# Patient Record
Sex: FEMALE | Race: BLACK OR AFRICAN AMERICAN | NOT HISPANIC OR LATINO | URBAN - METROPOLITAN AREA
[De-identification: names, ages, dates, MRNs, and addresses within clinical notes are randomized per-mention and may not be internally consistent; named-entity substitution may affect disease eponyms.]

---

## 2020-09-03 ENCOUNTER — HISTORICAL (OUTPATIENT)
Dept: PHYSICAL THERAPY | Facility: HOSPITAL | Age: 35
End: 2020-09-03

## 2020-09-09 ENCOUNTER — HISTORICAL (OUTPATIENT)
Dept: PHYSICAL THERAPY | Facility: HOSPITAL | Age: 35
End: 2020-09-09

## 2020-09-18 ENCOUNTER — HISTORICAL (OUTPATIENT)
Dept: PHYSICAL THERAPY | Facility: HOSPITAL | Age: 35
End: 2020-09-18

## 2020-09-22 ENCOUNTER — HISTORICAL (OUTPATIENT)
Dept: PHYSICAL THERAPY | Facility: HOSPITAL | Age: 35
End: 2020-09-22

## 2021-11-29 NOTE — PROVIDER TRANSFER
(Physician in Lead of Transfers)   Outside Transfer Acceptance Note / Regional Referral Center    Upon patient arrival to the ICU, please contact Hospital Medicine on call.     Patient Name: Shauna Queen    Transferring Physician: Dr. Temple///Hospital Medicine    Accepting Physician: BENITEZ Huffman MD    Date of Acceptance:  November 29, 2021    Transferring Facility:  [facility] ICU    Destination facility:  [facility] ICU///Dr. Joel    Reason for Transfer:  Surgical capacity    Report from Transferring Physician/Hospital course:  36-year-old female history of diabetes mellitus, sleep apnea on CPAP, schizophrenia, hypertension, and hyperlipidemia admitted to [facility] on November 24 from [facility] for treatment of diabetic ketoacidosis.  She was initiated on an insulin infusion.  DKA improved and she was transitioned off the insulin infusion.  She again developed DKA and was placed back on the insulin infusion for a period of time.  Currently she is off the insulin infusion and on subcutaneous insulin.  She was noted to have right upper/inner thigh pain with erythema, induration, and tenderness to palpation.  She was treated with IV clindamycin and vancomycin.  Due to her size ((BMI 74.32) they are unable to perform CT or MRI.  General Surgery there performed an incision and drainage with purulent material found.  She was taken to the OR on November 29 for a second-look at the thigh wound.  They were only able to do superficial debridement.  Postprocedure she extubated to 2 L nasal cannula and was awake with oxygen saturation 100 percent.  Surgery at their facility recommended transfer to a facility better able to perform procedures on a patient with her BMI.  Referring provider spoke with General Surgery at [facility].  Hospital Medicine was asked to admit for further treatment.  I spoke with her current attending.  He noted she has no alteration in  mentation.  Lower extremity is intact from a neurovascular standpoint.  She remains in the ICU.    November 24:  Sodium 132, potassium 3.8, chloride 104, CO2 13, BUN 5.05, creatinine 0.99, glucose 275, AST 13, ALT 17, magnesium 1.7, phosphorus 1.2, white blood cells 14.83, hemoglobin 13, hematocrit 42.1, platelets 348    November 25:  COVID negative, A1C greater than 14, lactic acid 1    November 27:  Procalcitonin 4.56    November 28:  Lactic acid 2.7, CRP 48.13    November 29:  Sodium 139, potassium 3.7, chloride 109, CO2 17, BUN 9.42, creatinine 0.72, glucose 172, calcium 8.2, magnesium 2.1, phosphorus 1.7, white blood cells 14.69, hemoglobin 11.6, hematocrit 34.6, platelets 272    Thigh wound Gram stain from November 28 noted few Gram-negative rods and few Gram-positive cocci    Chest x-ray on November 24 showed right-sided PICC line in good position.  No acute cardiopulmonary abnormality.    VS:  Temperature 98.7, pulse 125, respirations 43, blood pressure 140/74, oxygen saturation 100 percent 2L NC    To Do List:  Admit to Hospital Medicine  Consult General Surgery    BENITEZ Huffman MD  Hospital Medicine Staff  Cell: [phone]

## 2021-11-30 ENCOUNTER — HOSPITAL ENCOUNTER (INPATIENT)
Facility: HOSPITAL | Age: 36
LOS: 31 days | Discharge: NURSING, REHAB OR LONG-TERM CARE | DRG: 463 | End: 2021-12-31
Attending: INTERNAL MEDICINE | Admitting: INTERNAL MEDICINE
Payer: COMMERCIAL

## 2021-11-30 ENCOUNTER — ANESTHESIA (OUTPATIENT)
Dept: SURGERY | Facility: HOSPITAL | Age: 36
DRG: 463 | End: 2021-11-30
Payer: COMMERCIAL

## 2021-11-30 ENCOUNTER — ANESTHESIA EVENT (OUTPATIENT)
Dept: SURGERY | Facility: HOSPITAL | Age: 36
DRG: 463 | End: 2021-11-30
Payer: COMMERCIAL

## 2021-11-30 DIAGNOSIS — L02.415 ABSCESS OF RIGHT THIGH: ICD-10-CM

## 2021-11-30 DIAGNOSIS — M72.6 NECROTIZING FASCIITIS: ICD-10-CM

## 2021-11-30 DIAGNOSIS — K52.1 ANTIBIOTIC-ASSOCIATED DIARRHEA: ICD-10-CM

## 2021-11-30 DIAGNOSIS — R00.0 TACHYCARDIA: ICD-10-CM

## 2021-11-30 DIAGNOSIS — T36.95XA ANTIBIOTIC-ASSOCIATED DIARRHEA: ICD-10-CM

## 2021-11-30 DIAGNOSIS — M79.89 NECROTIZING SOFT TISSUE INFECTION: Primary | ICD-10-CM

## 2021-11-30 PROBLEM — G47.33 OSA (OBSTRUCTIVE SLEEP APNEA): Status: ACTIVE | Noted: 2021-11-30

## 2021-11-30 PROBLEM — E78.5 HLD (HYPERLIPIDEMIA): Status: ACTIVE | Noted: 2021-11-30

## 2021-11-30 PROBLEM — E11.9 DM (DIABETES MELLITUS): Status: ACTIVE | Noted: 2021-11-30

## 2021-11-30 LAB
ABO + RH BLD: NORMAL
ALBUMIN SERPL BCP-MCNC: 1.1 G/DL (ref 3.5–5.2)
ALLENS TEST: ABNORMAL
ANION GAP SERPL CALC-SCNC: 11 MMOL/L (ref 8–16)
ANION GAP SERPL CALC-SCNC: 8 MMOL/L (ref 8–16)
ANISOCYTOSIS BLD QL SMEAR: SLIGHT
BASOPHILS # BLD AUTO: ABNORMAL K/UL (ref 0–0.2)
BASOPHILS NFR BLD: 0 % (ref 0–1.9)
BASOPHILS NFR BLD: 0 % (ref 0–1.9)
BLD GP AB SCN CELLS X3 SERPL QL: NORMAL
BLD PROD TYP BPU: NORMAL
BLOOD UNIT EXPIRATION DATE: NORMAL
BLOOD UNIT TYPE CODE: 1700
BLOOD UNIT TYPE: NORMAL
BUN SERPL-MCNC: 10 MG/DL (ref 6–20)
BUN SERPL-MCNC: 9 MG/DL (ref 6–20)
CALCIUM SERPL-MCNC: 7.1 MG/DL (ref 8.7–10.5)
CALCIUM SERPL-MCNC: 7.9 MG/DL (ref 8.7–10.5)
CHLORIDE SERPL-SCNC: 108 MMOL/L (ref 95–110)
CHLORIDE SERPL-SCNC: 109 MMOL/L (ref 95–110)
CO2 SERPL-SCNC: 20 MMOL/L (ref 23–29)
CO2 SERPL-SCNC: 22 MMOL/L (ref 23–29)
CODING SYSTEM: NORMAL
CREAT SERPL-MCNC: 0.6 MG/DL (ref 0.5–1.4)
CREAT SERPL-MCNC: 0.8 MG/DL (ref 0.5–1.4)
DACRYOCYTES BLD QL SMEAR: ABNORMAL
DELSYS: ABNORMAL
DIFFERENTIAL METHOD: ABNORMAL
DIFFERENTIAL METHOD: ABNORMAL
DISPENSE STATUS: NORMAL
EOSINOPHIL # BLD AUTO: ABNORMAL K/UL (ref 0–0.5)
EOSINOPHIL NFR BLD: 0 % (ref 0–8)
EOSINOPHIL NFR BLD: 1 % (ref 0–8)
ERYTHROCYTE [DISTWIDTH] IN BLOOD BY AUTOMATED COUNT: 14.6 % (ref 11.5–14.5)
ERYTHROCYTE [DISTWIDTH] IN BLOOD BY AUTOMATED COUNT: 15.4 % (ref 11.5–14.5)
EST. GFR  (AFRICAN AMERICAN): >60 ML/MIN/1.73 M^2
EST. GFR  (AFRICAN AMERICAN): >60 ML/MIN/1.73 M^2
EST. GFR  (NON AFRICAN AMERICAN): >60 ML/MIN/1.73 M^2
EST. GFR  (NON AFRICAN AMERICAN): >60 ML/MIN/1.73 M^2
GLUCOSE SERPL-MCNC: 266 MG/DL (ref 70–110)
GLUCOSE SERPL-MCNC: 354 MG/DL (ref 70–110)
HCO3 UR-SCNC: 20.2 MMOL/L (ref 24–28)
HCT VFR BLD AUTO: 30.4 % (ref 37–48.5)
HCT VFR BLD AUTO: 33 % (ref 37–48.5)
HGB BLD-MCNC: 10.8 G/DL (ref 12–16)
HGB BLD-MCNC: 9.8 G/DL (ref 12–16)
HYPOCHROMIA BLD QL SMEAR: ABNORMAL
IMM GRANULOCYTES # BLD AUTO: ABNORMAL K/UL (ref 0–0.04)
IMM GRANULOCYTES # BLD AUTO: ABNORMAL K/UL (ref 0–0.04)
IMM GRANULOCYTES NFR BLD AUTO: ABNORMAL % (ref 0–0.5)
IMM GRANULOCYTES NFR BLD AUTO: ABNORMAL % (ref 0–0.5)
LACTATE SERPL-SCNC: 1.8 MMOL/L (ref 0.5–2.2)
LYMPHOCYTES # BLD AUTO: ABNORMAL K/UL (ref 1–4.8)
LYMPHOCYTES # BLD AUTO: ABNORMAL K/UL (ref 1–4.8)
LYMPHOCYTES NFR BLD: 15 % (ref 18–48)
LYMPHOCYTES NFR BLD: 16 % (ref 18–48)
MAGNESIUM SERPL-MCNC: 2.2 MG/DL (ref 1.6–2.6)
MAGNESIUM SERPL-MCNC: 2.2 MG/DL (ref 1.6–2.6)
MCH RBC QN AUTO: 26 PG (ref 27–31)
MCH RBC QN AUTO: 26.6 PG (ref 27–31)
MCHC RBC AUTO-ENTMCNC: 32.2 G/DL (ref 32–36)
MCHC RBC AUTO-ENTMCNC: 32.7 G/DL (ref 32–36)
MCV RBC AUTO: 79 FL (ref 82–98)
MCV RBC AUTO: 83 FL (ref 82–98)
MONOCYTES # BLD AUTO: ABNORMAL K/UL (ref 0.3–1)
MONOCYTES NFR BLD: 7 % (ref 4–15)
MONOCYTES NFR BLD: 7 % (ref 4–15)
NEUTROPHILS NFR BLD: 73 % (ref 38–73)
NEUTROPHILS NFR BLD: 75 % (ref 38–73)
NEUTS BAND NFR BLD MANUAL: 2 %
NEUTS BAND NFR BLD MANUAL: 4 %
NRBC BLD-RTO: 0 /100 WBC
NRBC BLD-RTO: 1 /100 WBC
PCO2 BLDA: 32.7 MMHG (ref 35–45)
PH SMN: 7.4 [PH] (ref 7.35–7.45)
PHOSPHATE SERPL-MCNC: 1.2 MG/DL (ref 2.7–4.5)
PHOSPHATE SERPL-MCNC: 1.5 MG/DL (ref 2.7–4.5)
PLATELET # BLD AUTO: 225 K/UL (ref 150–450)
PLATELET # BLD AUTO: 251 K/UL (ref 150–450)
PLATELET BLD QL SMEAR: ABNORMAL
PMV BLD AUTO: 9.4 FL (ref 9.2–12.9)
PMV BLD AUTO: 9.9 FL (ref 9.2–12.9)
PO2 BLDA: 92 MMHG (ref 80–100)
POC BE: -4 MMOL/L
POC SATURATED O2: 97 % (ref 95–100)
POC TCO2: 21 MMOL/L (ref 23–27)
POCT GLUCOSE: 311 MG/DL (ref 70–110)
POCT GLUCOSE: 315 MG/DL (ref 70–110)
POCT GLUCOSE: 374 MG/DL (ref 70–110)
POLYCHROMASIA BLD QL SMEAR: ABNORMAL
POTASSIUM SERPL-SCNC: 3.6 MMOL/L (ref 3.5–5.1)
POTASSIUM SERPL-SCNC: 4 MMOL/L (ref 3.5–5.1)
RBC # BLD AUTO: 3.68 M/UL (ref 4–5.4)
RBC # BLD AUTO: 4.16 M/UL (ref 4–5.4)
RH BLD: NORMAL
SAMPLE: ABNORMAL
SITE: ABNORMAL
SODIUM SERPL-SCNC: 139 MMOL/L (ref 136–145)
SODIUM SERPL-SCNC: 139 MMOL/L (ref 136–145)
TRANS ERYTHROCYTES VOL PATIENT: NORMAL ML
VANCOMYCIN SERPL-MCNC: 13.2 UG/ML
WBC # BLD AUTO: 19.2 K/UL (ref 3.9–12.7)
WBC # BLD AUTO: 19.43 K/UL (ref 3.9–12.7)

## 2021-11-30 PROCEDURE — 87077 CULTURE AEROBIC IDENTIFY: CPT | Performed by: INTERNAL MEDICINE

## 2021-11-30 PROCEDURE — 80069 RENAL FUNCTION PANEL: CPT | Performed by: SURGERY

## 2021-11-30 PROCEDURE — 82330 ASSAY OF CALCIUM: CPT | Performed by: SURGERY

## 2021-11-30 PROCEDURE — 99223 PR INITIAL HOSPITAL CARE,LEVL III: ICD-10-PCS | Mod: 25,,, | Performed by: SURGERY

## 2021-11-30 PROCEDURE — 84100 ASSAY OF PHOSPHORUS: CPT | Performed by: INTERNAL MEDICINE

## 2021-11-30 PROCEDURE — 25000003 PHARM REV CODE 250: Performed by: INTERNAL MEDICINE

## 2021-11-30 PROCEDURE — 94761 N-INVAS EAR/PLS OXIMETRY MLT: CPT

## 2021-11-30 PROCEDURE — D9220A PRA ANESTHESIA: ICD-10-PCS | Mod: CRNA,,, | Performed by: NURSE ANESTHETIST, CERTIFIED REGISTERED

## 2021-11-30 PROCEDURE — 87070 CULTURE OTHR SPECIMN AEROBIC: CPT | Performed by: INTERNAL MEDICINE

## 2021-11-30 PROCEDURE — 36000706: Performed by: SURGERY

## 2021-11-30 PROCEDURE — 85027 COMPLETE CBC AUTOMATED: CPT | Performed by: INTERNAL MEDICINE

## 2021-11-30 PROCEDURE — 36600 WITHDRAWAL OF ARTERIAL BLOOD: CPT

## 2021-11-30 PROCEDURE — 80202 ASSAY OF VANCOMYCIN: CPT | Performed by: INTERNAL MEDICINE

## 2021-11-30 PROCEDURE — 86901 BLOOD TYPING SEROLOGIC RH(D): CPT | Performed by: ANESTHESIOLOGY

## 2021-11-30 PROCEDURE — 83605 ASSAY OF LACTIC ACID: CPT | Performed by: INTERNAL MEDICINE

## 2021-11-30 PROCEDURE — C9399 UNCLASSIFIED DRUGS OR BIOLOG: HCPCS | Performed by: INTERNAL MEDICINE

## 2021-11-30 PROCEDURE — 87075 CULTR BACTERIA EXCEPT BLOOD: CPT | Performed by: INTERNAL MEDICINE

## 2021-11-30 PROCEDURE — 85027 COMPLETE CBC AUTOMATED: CPT | Mod: 91 | Performed by: SURGERY

## 2021-11-30 PROCEDURE — 36000707: Performed by: SURGERY

## 2021-11-30 PROCEDURE — 83735 ASSAY OF MAGNESIUM: CPT | Mod: 91 | Performed by: SURGERY

## 2021-11-30 PROCEDURE — 63600175 PHARM REV CODE 636 W HCPCS: Performed by: NURSE ANESTHETIST, CERTIFIED REGISTERED

## 2021-11-30 PROCEDURE — 86920 COMPATIBILITY TEST SPIN: CPT | Performed by: SURGERY

## 2021-11-30 PROCEDURE — 36415 COLL VENOUS BLD VENIPUNCTURE: CPT | Performed by: ANESTHESIOLOGY

## 2021-11-30 PROCEDURE — 20000000 HC ICU ROOM

## 2021-11-30 PROCEDURE — 63600175 PHARM REV CODE 636 W HCPCS: Performed by: INTERNAL MEDICINE

## 2021-11-30 PROCEDURE — D9220A PRA ANESTHESIA: Mod: CRNA,,, | Performed by: NURSE ANESTHETIST, CERTIFIED REGISTERED

## 2021-11-30 PROCEDURE — 87102 FUNGUS ISOLATION CULTURE: CPT | Performed by: INTERNAL MEDICINE

## 2021-11-30 PROCEDURE — 36415 COLL VENOUS BLD VENIPUNCTURE: CPT | Performed by: SURGERY

## 2021-11-30 PROCEDURE — 86850 RBC ANTIBODY SCREEN: CPT | Performed by: ANESTHESIOLOGY

## 2021-11-30 PROCEDURE — 85007 BL SMEAR W/DIFF WBC COUNT: CPT | Mod: 91 | Performed by: SURGERY

## 2021-11-30 PROCEDURE — 87040 BLOOD CULTURE FOR BACTERIA: CPT | Performed by: INTERNAL MEDICINE

## 2021-11-30 PROCEDURE — 25000003 PHARM REV CODE 250: Performed by: NURSE ANESTHETIST, CERTIFIED REGISTERED

## 2021-11-30 PROCEDURE — D9220A PRA ANESTHESIA: Mod: ANES,,, | Performed by: ANESTHESIOLOGY

## 2021-11-30 PROCEDURE — 83735 ASSAY OF MAGNESIUM: CPT | Performed by: INTERNAL MEDICINE

## 2021-11-30 PROCEDURE — 11045 PR DEB SUBQ TISSUE ADD-ON: ICD-10-PCS | Mod: ,,, | Performed by: SURGERY

## 2021-11-30 PROCEDURE — 25000003 PHARM REV CODE 250: Performed by: SURGERY

## 2021-11-30 PROCEDURE — 80048 BASIC METABOLIC PNL TOTAL CA: CPT | Performed by: INTERNAL MEDICINE

## 2021-11-30 PROCEDURE — 37000008 HC ANESTHESIA 1ST 15 MINUTES: Performed by: SURGERY

## 2021-11-30 PROCEDURE — 11042 DBRDMT SUBQ TIS 1ST 20SQCM/<: CPT | Mod: ,,, | Performed by: SURGERY

## 2021-11-30 PROCEDURE — 63600175 PHARM REV CODE 636 W HCPCS: Performed by: SURGERY

## 2021-11-30 PROCEDURE — 85007 BL SMEAR W/DIFF WBC COUNT: CPT | Performed by: INTERNAL MEDICINE

## 2021-11-30 PROCEDURE — 87186 SC STD MICRODIL/AGAR DIL: CPT | Performed by: INTERNAL MEDICINE

## 2021-11-30 PROCEDURE — 86920 COMPATIBILITY TEST SPIN: CPT

## 2021-11-30 PROCEDURE — 11045 DBRDMT SUBQ TISS EACH ADDL: CPT | Mod: ,,, | Performed by: SURGERY

## 2021-11-30 PROCEDURE — 99223 1ST HOSP IP/OBS HIGH 75: CPT | Mod: 25,,, | Performed by: SURGERY

## 2021-11-30 PROCEDURE — 36415 COLL VENOUS BLD VENIPUNCTURE: CPT | Performed by: INTERNAL MEDICINE

## 2021-11-30 PROCEDURE — P9021 RED BLOOD CELLS UNIT: HCPCS

## 2021-11-30 PROCEDURE — 37000009 HC ANESTHESIA EA ADD 15 MINS: Performed by: SURGERY

## 2021-11-30 PROCEDURE — 11042 PR DEBRIDEMENT, SKIN, SUB-Q TISSUE,=<20 SQ CM: ICD-10-PCS | Mod: ,,, | Performed by: SURGERY

## 2021-11-30 PROCEDURE — D9220A PRA ANESTHESIA: ICD-10-PCS | Mod: ANES,,, | Performed by: ANESTHESIOLOGY

## 2021-11-30 RX ORDER — SODIUM,POTASSIUM PHOSPHATES 280-250MG
2 POWDER IN PACKET (EA) ORAL 2 TIMES DAILY
Status: DISCONTINUED | OUTPATIENT
Start: 2021-11-30 | End: 2021-12-01

## 2021-11-30 RX ORDER — CLINDAMYCIN PHOSPHATE 600 MG/50ML
600 INJECTION, SOLUTION INTRAVENOUS
Status: DISCONTINUED | OUTPATIENT
Start: 2021-11-30 | End: 2021-12-01

## 2021-11-30 RX ORDER — SODIUM CHLORIDE 0.9 % (FLUSH) 0.9 %
.1-1 SYRINGE (ML) INJECTION
Status: DISCONTINUED | OUTPATIENT
Start: 2021-11-30 | End: 2021-12-03

## 2021-11-30 RX ORDER — ONDANSETRON 2 MG/ML
4 INJECTION INTRAMUSCULAR; INTRAVENOUS EVERY 8 HOURS PRN
Status: DISCONTINUED | OUTPATIENT
Start: 2021-11-30 | End: 2021-12-17

## 2021-11-30 RX ORDER — INSULIN ASPART 100 [IU]/ML
1-10 INJECTION, SOLUTION INTRAVENOUS; SUBCUTANEOUS EVERY 6 HOURS PRN
Status: DISCONTINUED | OUTPATIENT
Start: 2021-11-30 | End: 2021-12-01

## 2021-11-30 RX ORDER — HYDROCODONE BITARTRATE AND ACETAMINOPHEN 5; 325 MG/1; MG/1
1 TABLET ORAL EVERY 4 HOURS PRN
Status: DISCONTINUED | OUTPATIENT
Start: 2021-11-30 | End: 2021-12-31 | Stop reason: HOSPADM

## 2021-11-30 RX ORDER — HYDROCODONE BITARTRATE AND ACETAMINOPHEN 500; 5 MG/1; MG/1
TABLET ORAL
Status: DISCONTINUED | OUTPATIENT
Start: 2021-11-30 | End: 2021-12-03

## 2021-11-30 RX ORDER — ACETAMINOPHEN 325 MG/1
650 TABLET ORAL EVERY 4 HOURS PRN
Status: DISCONTINUED | OUTPATIENT
Start: 2021-11-30 | End: 2021-12-09

## 2021-11-30 RX ORDER — LIDOCAINE HYDROCHLORIDE 20 MG/ML
INJECTION INTRAVENOUS
Status: DISCONTINUED | OUTPATIENT
Start: 2021-11-30 | End: 2021-11-30

## 2021-11-30 RX ORDER — SUCCINYLCHOLINE CHLORIDE 20 MG/ML
INJECTION INTRAMUSCULAR; INTRAVENOUS
Status: DISCONTINUED | OUTPATIENT
Start: 2021-11-30 | End: 2021-11-30

## 2021-11-30 RX ORDER — FENTANYL CITRATE 50 UG/ML
INJECTION, SOLUTION INTRAMUSCULAR; INTRAVENOUS
Status: DISCONTINUED | OUTPATIENT
Start: 2021-11-30 | End: 2021-11-30

## 2021-11-30 RX ORDER — MORPHINE SULFATE 4 MG/ML
4 INJECTION, SOLUTION INTRAMUSCULAR; INTRAVENOUS EVERY 6 HOURS PRN
Status: DISCONTINUED | OUTPATIENT
Start: 2021-11-30 | End: 2021-12-18

## 2021-11-30 RX ORDER — TALC
6 POWDER (GRAM) TOPICAL NIGHTLY PRN
Status: DISCONTINUED | OUTPATIENT
Start: 2021-11-30 | End: 2021-12-31 | Stop reason: HOSPADM

## 2021-11-30 RX ORDER — ROCURONIUM BROMIDE 10 MG/ML
INJECTION, SOLUTION INTRAVENOUS
Status: DISCONTINUED | OUTPATIENT
Start: 2021-11-30 | End: 2021-11-30

## 2021-11-30 RX ORDER — MUPIROCIN 20 MG/G
OINTMENT TOPICAL 2 TIMES DAILY
Status: COMPLETED | OUTPATIENT
Start: 2021-11-30 | End: 2021-12-04

## 2021-11-30 RX ORDER — HEPARIN SODIUM 5000 [USP'U]/ML
5000 INJECTION, SOLUTION INTRAVENOUS; SUBCUTANEOUS EVERY 8 HOURS
Status: DISCONTINUED | OUTPATIENT
Start: 2021-11-30 | End: 2021-12-01

## 2021-11-30 RX ORDER — CEFEPIME HYDROCHLORIDE 1 G/50ML
2 INJECTION, SOLUTION INTRAVENOUS
Status: DISCONTINUED | OUTPATIENT
Start: 2021-11-30 | End: 2021-12-03

## 2021-11-30 RX ORDER — GLUCAGON 1 MG
1 KIT INJECTION
Status: DISCONTINUED | OUTPATIENT
Start: 2021-11-30 | End: 2021-12-31 | Stop reason: HOSPADM

## 2021-11-30 RX ORDER — PROPOFOL 10 MG/ML
VIAL (ML) INTRAVENOUS
Status: DISCONTINUED | OUTPATIENT
Start: 2021-11-30 | End: 2021-11-30

## 2021-11-30 RX ORDER — SODIUM CHLORIDE 9 MG/ML
INJECTION, SOLUTION INTRAVENOUS CONTINUOUS
Status: DISCONTINUED | OUTPATIENT
Start: 2021-11-30 | End: 2021-12-01

## 2021-11-30 RX ORDER — SODIUM CHLORIDE 0.9 % (FLUSH) 0.9 %
10 SYRINGE (ML) INJECTION
Status: DISCONTINUED | OUTPATIENT
Start: 2021-11-30 | End: 2021-12-03

## 2021-11-30 RX ADMIN — SUGAMMADEX 400 MG: 100 INJECTION, SOLUTION INTRAVENOUS at 05:11

## 2021-11-30 RX ADMIN — SODIUM CHLORIDE 1000 ML: 0.9 INJECTION, SOLUTION INTRAVENOUS at 05:11

## 2021-11-30 RX ADMIN — CEFEPIME 2 G: 2 INJECTION, POWDER, FOR SOLUTION INTRAVENOUS at 04:11

## 2021-11-30 RX ADMIN — Medication 2 PACKET: at 09:11

## 2021-11-30 RX ADMIN — INSULIN ASPART 8 UNITS: 100 INJECTION, SOLUTION INTRAVENOUS; SUBCUTANEOUS at 05:11

## 2021-11-30 RX ADMIN — INSULIN ASPART 10 UNITS: 100 INJECTION, SOLUTION INTRAVENOUS; SUBCUTANEOUS at 06:11

## 2021-11-30 RX ADMIN — ROCURONIUM BROMIDE 20 MG: 10 INJECTION, SOLUTION INTRAVENOUS at 04:11

## 2021-11-30 RX ADMIN — HEPARIN SODIUM 5000 UNITS: 5000 INJECTION INTRAVENOUS; SUBCUTANEOUS at 02:11

## 2021-11-30 RX ADMIN — LIDOCAINE HYDROCHLORIDE 100 MG: 20 INJECTION, SOLUTION INTRAVENOUS at 04:11

## 2021-11-30 RX ADMIN — ESMOLOL HYDROCHLORIDE 10 MG: 10 INJECTION INTRAVENOUS at 05:11

## 2021-11-30 RX ADMIN — MUPIROCIN: 20 OINTMENT TOPICAL at 08:11

## 2021-11-30 RX ADMIN — SODIUM CHLORIDE 5 ML/HR: 0.9 INJECTION, SOLUTION INTRAVENOUS at 04:11

## 2021-11-30 RX ADMIN — VANCOMYCIN HYDROCHLORIDE 2000 MG: 10 INJECTION, POWDER, LYOPHILIZED, FOR SOLUTION INTRAVENOUS at 12:11

## 2021-11-30 RX ADMIN — PROPOFOL 200 MG: 10 INJECTION, EMULSION INTRAVENOUS at 04:11

## 2021-11-30 RX ADMIN — FENTANYL CITRATE 50 MCG: 50 INJECTION, SOLUTION INTRAMUSCULAR; INTRAVENOUS at 04:11

## 2021-11-30 RX ADMIN — PROPOFOL 50 MG: 10 INJECTION, EMULSION INTRAVENOUS at 04:11

## 2021-11-30 RX ADMIN — GLYCOPYRROLATE 0.1 MG: 0.2 INJECTION, SOLUTION INTRAMUSCULAR; INTRAVITREAL at 03:11

## 2021-11-30 RX ADMIN — HEPARIN SODIUM 5000 UNITS: 5000 INJECTION INTRAVENOUS; SUBCUTANEOUS at 05:11

## 2021-11-30 RX ADMIN — MORPHINE SULFATE 4 MG: 4 INJECTION, SOLUTION INTRAMUSCULAR; INTRAVENOUS at 04:11

## 2021-11-30 RX ADMIN — SODIUM CHLORIDE, SODIUM LACTATE, POTASSIUM CHLORIDE, AND CALCIUM CHLORIDE 1000 ML: .6; .31; .03; .02 INJECTION, SOLUTION INTRAVENOUS at 08:11

## 2021-11-30 RX ADMIN — SUCCINYLCHOLINE CHLORIDE 200 MG: 20 INJECTION, SOLUTION INTRAMUSCULAR; INTRAVENOUS at 04:11

## 2021-11-30 RX ADMIN — CLINDAMYCIN PHOSPHATE 600 MG: 600 INJECTION, SOLUTION INTRAVENOUS at 06:11

## 2021-11-30 RX ADMIN — INSULIN DETEMIR 20 UNITS: 100 INJECTION, SOLUTION SUBCUTANEOUS at 12:11

## 2021-11-30 RX ADMIN — INSULIN ASPART 8 UNITS: 100 INJECTION, SOLUTION INTRAVENOUS; SUBCUTANEOUS at 12:11

## 2021-11-30 RX ADMIN — CEFEPIME 2 G: 2 INJECTION, POWDER, FOR SOLUTION INTRAVENOUS at 06:11

## 2021-11-30 RX ADMIN — HEPARIN SODIUM 5000 UNITS: 5000 INJECTION INTRAVENOUS; SUBCUTANEOUS at 09:11

## 2021-11-30 RX ADMIN — CLINDAMYCIN PHOSPHATE 600 MG: 600 INJECTION, SOLUTION INTRAVENOUS at 10:11

## 2021-11-30 NOTE — HPI
"Ms. Queen is a 36-year-old presents to the ED from outside hospital for abscess.  She has initially presented to outside hospital for DKA which resolved.  Patient was unaware of why she was here in July prompted her.  Per the checkout are received: "36-year-old female history of diabetes mellitus, sleep apnea on CPAP, schizophrenia, hypertension, and hyperlipidemia admitted to [facility] on November 24 from [facility] for treatment of diabetic ketoacidosis. She was initiated on an insulin infusion. DKA improved and she was transitioned off the insulin infusion. She again developed DKA and was placed back on the insulin infusion for a period of time. Currently she is off the insulin infusion and on subcutaneous insulin. She was noted to have right upper/inner thigh pain with erythema, induration, and tenderness to palpation. She was treated with IV clindamycin and vancomycin. Due to her size ((BMI 74.32) they are unable to perform CT or MRI. General Surgery there performed an incision and drainage with purulent material found. She was taken to the OR on November 29 for a second-look at the thigh wound. They were only able to do superficial debridement. Postprocedure she extubated to 2 L nasal cannula and was awake with oxygen saturation 100 percent. Surgery at their facility recommended transfer to a facility better able to perform procedures on a patient with her BMI. Referring provider spoke with General Surgery at [facility]. Hospital Medicine was asked to admit for further treatment. I spoke with her current attending. He noted she has no alteration in mentation. Lower extremity is intact from a neurovascular standpoint. She remains in the ICU."  "

## 2021-11-30 NOTE — CARE UPDATE
[facility]  ICU Shift Summary  Date: 11/30/2021      COVID Test: (--)  Isolation: No active isolations     Prehospitalization: [facility]  Admit Date / LOS : 11/30/2021/ 0 days    Diagnosis: <principal problem not specified>    Consults:        Active: Gen Surg       Needed: Wound Care     Code Status: Full Code   Advanced Directive: <no information>    LDA: Rajput and PICC       Central Lines/Site/Justification:Unable to Obtain/Maintain PIV       Urinary Cath/Order/Justification:Critically Ill in the ICU and requiring intensive monitoring    Vasopressors/Infusions:    sodium chloride 0.9% 5 mL/hr (11/30/21 3258)          GOALS: Volume/ Hemodynamic: MAP >65                     RASS: 0  alert and calm    Pain Management: PO/IV       Pain Controlled: yes     Rhythm: ST    Respiratory Device: Nasal Cannula and Bipap                  Most Recent SBT/ SAT: N/A       MOVE Screen: PT Consult  ICU Liberation: no    VTE Prophylaxis: Pharm  Mobility: Bedrest  Stress Ulcer Prophylaxis: Yes    Dietary: NPO  Tolerance: yes  Advancement: @ goal    I & O (24h):  No intake or output data in the 24 hours ending 11/30/21 0638     Restraints: No    Significant Dates:  Post Op Date: N/A  Rescue Date: N/A  Imaging/ Diagnostics: N/A    Noteworthy Labs:  See Below     CBC/Anemia Labs: Coags:    Recent Labs   Lab 11/30/21  0436   WBC 19.20*   HGB 10.8*   HCT 33.0*      MCV 79*   RDW 14.6*    No results for input(s): PT, INR, APTT in the last 168 hours.     Chemistries:   No results for input(s): NA, K, CL, CO2, BUN, CREATININE, CALCIUM, PROT, BILITOT, ALKPHOS, ALT, AST, GLUCOSE, MG, PHOS in the last 168 hours.    Invalid input(s): LABALBU     Cardiac Enzymes: Ejection Fractions:    No results for input(s): CPK, CPKMB, MB, TROPONINI in the last 72 hours. No results found for: EF     POCT Glucose: HbA1c:    Recent Labs   Lab 11/30/21  0557   POCTGLUCOSE 311*    No results found for: HGBA1C        ICU  LOS 2h  Level of Care: Critical Care    Chart Check: 12 HR Done  Shift Summary/Plan for the shift: New admit to the ICU, alert and oriented able to make all needs known. Patient on 2L per NC with oxygen saturations maintained above 99%. Patient is ST on cardiac monitor with HR 110s-120s. Wound noted to RT thigh area, Dsg changed. General Surgery has been  consulted and wound care. Increase complaint of pain noted, patient given one dose of morphine 4mg IVP per MD orders x1.  Rajput cath intact draining to gravity clear ector urine. Plan of care reviewed with patient. Safety precautions maintained/bariatric bed in use.

## 2021-11-30 NOTE — PLAN OF CARE
Problem: Adult Inpatient Plan of Care  Goal: Plan of Care Review  Outcome: Ongoing, Progressing  Goal: Patient-Specific Goal (Individualization)  Outcome: Ongoing, Progressing  Goal: Absence of Hospital-Acquired Illness or Injury  Outcome: Ongoing, Progressing  Goal: Optimal Comfort and Wellbeing  Outcome: Ongoing, Progressing  Goal: Readiness for Transition of Care  Outcome: Ongoing, Progressing  Goal: Rounds/Family Conference  Outcome: Ongoing, Progressing     Problem: Diabetes Comorbidity  Goal: Blood Glucose Level Within Desired Range  Outcome: Ongoing, Progressing     Problem: Bariatric Environmental Safety  Goal: Safety Maintained with Care  Outcome: Ongoing, Progressing     Problem: Skin Injury Risk Increased  Goal: Skin Health and Integrity  Outcome: Ongoing, Progressing

## 2021-11-30 NOTE — PLAN OF CARE
SW attempted to conduct assessment on patient, however, patient was very drowsy and unable to assist with full assessment. Patient confirmed her  and address, stating that she lives with her 11 y.o. daughter. Patient provided phone number of her mom, Deisy [phone].     SW called patient's mom twice to conduct assessment but was given message that she is not receiving calls at the moment.

## 2021-11-30 NOTE — CONSULTS
A 36 year old female transferred to [facility] from [facility] with HLD; abscess right thigh; DANG; DM  PMH: DM; DANG; schizophrenia; HTN; HLD  Was admitted to [facility] on 11/24 from [facility] for treatment of diabetic ketoacidosis  General surgery performed I&D right upper/inner thigh abscess and taken to OR 11/29 for second look at thigh wound-superficial debridement  11/30 WBC 19.2 Hgb 10.8 Hct 33.0   On [employer] bariatric bed  Scheduled for surgery this pm.   Will assist with wound management as needed

## 2021-11-30 NOTE — HOSPITAL COURSE
35 y/o female admitted to the hospital as a transfer secondary to R thigh abscess and a BMI of Body mass index is 84.77 kg/m².   Patient transferred here for surgical evaluation.  Patient went for surgical debridement on 11/30/21. Patient continued on Abx per ID recommendations. Blood cultures were NG. Underwent another debridement on 12/2 and again on 12/4 - noting significant necrotizing soft tissue infections concerning for tracking down into her knee. On broad spectrum antibiotics. To OR with Orthopedic surgery to explore right knee on 12/5. Patient again went for surgery on 12/6.  Wound closure with wound vac placement on 12/8.

## 2021-11-30 NOTE — SUBJECTIVE & OBJECTIVE
No past medical history on file.    No past surgical history on file.    Review of patient's allergies indicates:  Not on File    No current facility-administered medications on file prior to encounter.     No current outpatient medications on file prior to encounter.     Family History    None       Tobacco Use    Smoking status: Not on file    Smokeless tobacco: Not on file   Substance and Sexual Activity    Alcohol use: Not on file    Drug use: Not on file    Sexual activity: Not on file     Review of Systems   Constitutional: Positive for fever.   HENT: Negative.    Eyes: Negative.    Respiratory: Negative.    Cardiovascular: Negative.    Gastrointestinal: Positive for nausea and vomiting.   Endocrine: Negative.    Genitourinary: Negative.    Musculoskeletal: Negative.    Neurological: Negative.    Hematological: Negative.    Psychiatric/Behavioral: Negative.      Objective:     Vital Signs (Most Recent):    Vital Signs (24h Range):  Temp:  [100.2 °F (37.9 °C)] 100.2 °F (37.9 °C)  Pulse:  [129] 129  Resp:  [22] 22  SpO2:  [100 %] 100 %  BP: (119)/(67) 119/67        There is no height or weight on file to calculate BMI.    Physical Exam  Constitutional:       General: She is not in acute distress.     Appearance: She is obese.   HENT:      Head: Normocephalic and atraumatic.      Right Ear: External ear normal.      Left Ear: External ear normal.      Nose: Nose normal.      Mouth/Throat:      Mouth: Mucous membranes are moist.      Pharynx: Oropharynx is clear.   Eyes:      Extraocular Movements: Extraocular movements intact.      Conjunctiva/sclera: Conjunctivae normal.   Cardiovascular:      Rate and Rhythm: Normal rate and regular rhythm.      Pulses: Normal pulses.      Heart sounds: Normal heart sounds.   Pulmonary:      Effort: Pulmonary effort is normal.      Breath sounds: Normal breath sounds.   Abdominal:      General: Abdomen is flat. There is no distension.      Palpations: Abdomen is soft.       Tenderness: There is no abdominal tenderness.   Musculoskeletal:         General: No swelling. Normal range of motion.      Cervical back: Normal range of motion and neck supple.   Skin:     General: Skin is warm and dry.      Comments: Right upper thigh incision with packing. C/d   Neurological:      General: No focal deficit present.      Mental Status: She is alert and oriented to person, place, and time.      Comments: Patient groggy             Significant Labs: All pertinent labs within the past 24 hours have been reviewed.    Significant Imaging: I have reviewed all pertinent imaging results/findings within the past 24 hours.

## 2021-11-30 NOTE — ASSESSMENT & PLAN NOTE
Status post I&D with General surgery at outside hospital, did not feel like they completely removed abscess  Started on vanc Zosyn  Blood cultures pending  Lactate pending  Labs pending  Consult general surgery  NPO pending surgical procedure  Norco and Tylenol as needed for pain

## 2021-11-30 NOTE — EICU
36 year old female admitted s/p incision and drainage of right thigh abscess.  On camera morbidly obese  ,/61       Plan:  -.NS1L bolus  -Continue cefepime  -pancultures and deescalate antibiotics as needed  -analgesics as needed  -strict glycemic control  DVT prophylaxsis-SQ heparin 5000 units SQ q 8 hours

## 2021-11-30 NOTE — CARE UPDATE
Reviewed H and P by my colleague and agree with A and P. Patient transferred here for evaluation of R thigh abscess. Because of her size- BMI of Body mass index is 84.77 kg/m²- outside hospital transferred patient for surgical eval here. She was started on Vanc and Cefepime. Patient was transferred to the floor on 11/30. Not sure why she was in the ICU- although she had resolved DKA at other hospital.  Surgery consulted. Patient with AMS- likely infectious encephalopathy.  Discussed with surgery- patient will go for surgery today.  Unable to reach family. Emergency consent.  Will check an ABG.

## 2021-11-30 NOTE — NURSING
Pt placed on bariatric bed upon arrival to unit. Sizewise rep notified and Sizewise bed charges activated.

## 2021-11-30 NOTE — CONSULTS
[facility]  General Surgery  Consult Note    Consults  Subjective:     Chief Complaint/Reason for Admission: DKA due to necrotizing infection of right inner thigh/groin    History of Present Illness: This is a 37 yo F with class 3 obesity, T2DM, sleep apnea on CPAP, HTN, HLD, and schizophrenia, who is transferred from [facility] for treatment of a suspected necrotizing soft tissue infection of her right inner thigh/groin. She reportedly underwent I&D with purulent material obtained. However she subsequently developed signs concerning for NSTI. She underwent one further debridement and then was transferred. Here. Previously in DKA, now anion gap closed, on SQ insulin.     When we saw the patient she was very somnolent and disoriented. She wasn't really able to participate in the interview or answer our questions.      PMH (per chart review):  Class 3 obesity   T2DM  Sleep apnea   HTN   HLD  Schizophrenia    PSH:   Unknown    Family History    None       Tobacco Use    Smoking status: Not on file    Smokeless tobacco: Not on file   Substance and Sexual Activity    Alcohol use: Not on file    Drug use: Not on file    Sexual activity: Not on file     Review of Systems Unable to obtain 2/2 AMS  Objective:     Vital Signs (Most Recent):  Temp: 97.6 °F (36.4 °C) (11/30/21 0800)  Pulse: (!) 117 (11/30/21 1000)  Resp: (!) 34 (11/30/21 1000)  BP: 134/85 (11/30/21 1000)  SpO2: 99 % (11/30/21 1000) Vital Signs (24h Range):  Temp:  [97.6 °F (36.4 °C)-100.2 °F (37.9 °C)] 97.6 °F (36.4 °C)  Pulse:  [114-129] 117  Resp:  [22-34] 34  SpO2:  [99 %-100 %] 99 %  BP: (119-143)/(67-85) 134/85     Weight: (!) 224 kg (493 lb 13.3 oz)  Body mass index is 84.77 kg/m².      Intake/Output Summary (Last 24 hours) at 11/30/2021 1153  Last data filed at 11/30/2021 0800  Gross per 24 hour   Intake 1007.86 ml   Output --   Net 1007.86 ml       Physical Exam  GEN: somnolent, uncomfortable, obese. Not oriented to  place or time.  HEENT: MMM, no scleral icterus  CV: Tachycardia, distant heart sounds due to body habitus, regular rhythm  PULM: Mildly increased WOB on NC  ABD: soft, non-tender, non-distended, obese  EXT: moves all. Right upper thigh/groin with edema, erythema, induration and serous bullae. Incision packed, not fully removed. Inferior portion with necrotic fat.   SKIN: As above  NEURO: DALE  PSYCH: DALE    Significant Labs:  CBC:   Recent Labs   Lab 11/30/21  0436   WBC 19.20*   RBC 4.16   HGB 10.8*   HCT 33.0*      MCV 79*   MCH 26.0*   MCHC 32.7     CMP:   Recent Labs   Lab 11/30/21  0914   *   CALCIUM 7.1*      K 3.6   CO2 22*      BUN 9   CREATININE 0.6       Significant Diagnostics:  None    Assessment/Plan:     Active Diagnoses:    Diagnosis Date Noted POA    DM (diabetes mellitus) [E11.9] 11/30/2021 Yes    DANG (obstructive sleep apnea) [G47.33] 11/30/2021 Yes    Abscess of right thigh [L02.415] 11/30/2021 Yes    HLD (hyperlipidemia) [E78.5] 11/30/2021 Yes      Problems Resolved During this Admission:     This is a 36 year old F with class 3 obesity c/b several medical problems who is transferred to [facility] for treatment of suspected NSTI.    Agree with assessment that this is a necrotizing infection. Patient is booked for debridement today  Patient is disoriented. Unable to reach family - emergency 2 physician consent obtained  Continue broad spectrum antibiotics  Anticipate return to ICU post op    Patient examined with attending surgeon Dr. Joon Black MD PGY5  General Surgery  [facility]

## 2021-11-30 NOTE — H&P
"[facility] Hospital Medicine  History & Physical    Patient Name: Shauna Queen  MRN: [id number]  Patient Class: IP- Inpatient  Admission Date: 11/30/2021  Attending Physician: Francesco Arriaga Jr, MD  Primary Care Provider: Primary Doctor No         Patient information was obtained from patient and ER records.     Subjective:     Principal Problem:<principal problem not specified>    Chief Complaint: No chief complaint on file.       HPI: Ms. Queen is a 36-year-old presents to the ED from outside hospital for abscess.  She has initially presented to outside hospital for DKA which resolved.  Patient was unaware of why she was here in July prompted her.  Per the checkout are received: "36-year-old female history of diabetes mellitus, sleep apnea on CPAP, schizophrenia, hypertension, and hyperlipidemia admitted [place] on November 24 from [facility] for treatment of diabetic ketoacidosis. She was initiated on an insulin infusion. DKA improved and she was transitioned off the insulin infusion. She again developed DKA and was placed back on the insulin infusion for a period of time. Currently she is off the insulin infusion and on subcutaneous insulin. She was noted to have right upper/inner thigh pain with erythema, induration, and tenderness to palpation. She was treated with IV clindamycin and vancomycin. Due to her size ((BMI 74.32) they are unable to perform CT or MRI. General Surgery there performed an incision and drainage with purulent material found. She was taken to the OR on November 29 for a second-look at the thigh wound. They were only able to do superficial debridement. Postprocedure she extubated to 2 L nasal cannula and was awake with oxygen saturation 100 percent. Surgery at their facility recommended transfer to a facility better able to perform procedures on a patient with her BMI. Referring provider spoke with General Surgery at [facility]. " "Hospital Medicine was asked to admit for further treatment. I spoke with her current attending. He noted she has no alteration in mentation. Lower extremity is intact from a neurovascular standpoint. She remains in the ICU."      No past medical history on file.    No past surgical history on file.    Review of patient's allergies indicates:  Not on File    No current facility-administered medications on file prior to encounter.     No current outpatient medications on file prior to encounter.     Family History    None       Tobacco Use    Smoking status: Not on file    Smokeless tobacco: Not on file   Substance and Sexual Activity    Alcohol use: Not on file    Drug use: Not on file    Sexual activity: Not on file     Review of Systems   Constitutional: Positive for fever.   HENT: Negative.    Eyes: Negative.    Respiratory: Negative.    Cardiovascular: Negative.    Gastrointestinal: Positive for nausea and vomiting.   Endocrine: Negative.    Genitourinary: Negative.    Musculoskeletal: Negative.    Neurological: Negative.    Hematological: Negative.    Psychiatric/Behavioral: Negative.      Objective:     Vital Signs (Most Recent):    Vital Signs (24h Range):  Temp:  [100.2 °F (37.9 °C)] 100.2 °F (37.9 °C)  Pulse:  [129] 129  Resp:  [22] 22  SpO2:  [100 %] 100 %  BP: (119)/(67) 119/67        There is no height or weight on file to calculate BMI.    Physical Exam  Constitutional:       General: She is not in acute distress.     Appearance: She is obese.   HENT:      Head: Normocephalic and atraumatic.      Right Ear: External ear normal.      Left Ear: External ear normal.      Nose: Nose normal.      Mouth/Throat:      Mouth: Mucous membranes are moist.      Pharynx: Oropharynx is clear.   Eyes:      Extraocular Movements: Extraocular movements intact.      Conjunctiva/sclera: Conjunctivae normal.   Cardiovascular:      Rate and Rhythm: Normal rate and regular rhythm.      Pulses: Normal pulses.      Heart " sounds: Normal heart sounds.   Pulmonary:      Effort: Pulmonary effort is normal.      Breath sounds: Normal breath sounds.   Abdominal:      General: Abdomen is flat. There is no distension.      Palpations: Abdomen is soft.      Tenderness: There is no abdominal tenderness.   Musculoskeletal:         General: No swelling. Normal range of motion.      Cervical back: Normal range of motion and neck supple.   Skin:     General: Skin is warm and dry.      Comments: Right upper thigh incision with packing. C/d   Neurological:      General: No focal deficit present.      Mental Status: She is alert and oriented to person, place, and time.      Comments: Patient groggy             Significant Labs: All pertinent labs within the past 24 hours have been reviewed.    Significant Imaging: I have reviewed all pertinent imaging results/findings within the past 24 hours.    Assessment/Plan:     HLD (hyperlipidemia)  Awaiting med rec      Abscess of right thigh  Status post I&D with General surgery at outside hospital, did not feel like they completely removed abscess  Started on vanc Zosyn  Blood cultures pending  Lactate pending  Labs pending  Consult general surgery  NPO pending surgical procedure  Norco and Tylenol as needed for pain      DANG (obstructive sleep apnea)  Will start CPAP nightly at 5      DM (diabetes mellitus)  Sliding scale insulin, can adjust as needed        VTE Risk Mitigation (From admission, onward)         Ordered     IP VTE LOW RISK PATIENT  Once         11/30/21 0403     Place sequential compression device  Until discontinued         11/30/21 0403              Critical care time spent on the evaluation and treatment of severe organ dysfunction, review of pertinent labs and imaging studies, discussions with consulting providers and discussions with patient/family: 45 minutes.     Francesco Arriaga Jr, MD  Department of Hospital Medicine   [facility]

## 2021-12-01 ENCOUNTER — ANESTHESIA EVENT (OUTPATIENT)
Dept: SURGERY | Facility: HOSPITAL | Age: 36
DRG: 463 | End: 2021-12-01
Payer: COMMERCIAL

## 2021-12-01 PROBLEM — E83.39 HYPOPHOSPHATEMIA: Status: ACTIVE | Noted: 2021-12-01

## 2021-12-01 PROBLEM — E66.9 OBESITY: Status: ACTIVE | Noted: 2021-12-01

## 2021-12-01 PROBLEM — R53.81 DEBILITY: Status: ACTIVE | Noted: 2021-12-01

## 2021-12-01 PROBLEM — D63.8 ANEMIA OF CHRONIC DISEASE: Status: ACTIVE | Noted: 2021-12-01

## 2021-12-01 LAB
ANION GAP SERPL CALC-SCNC: 9 MMOL/L (ref 8–16)
BUN SERPL-MCNC: 9 MG/DL (ref 6–20)
CA-I BLDV-SCNC: 1.09 MMOL/L (ref 1.06–1.42)
CALCIUM SERPL-MCNC: 8.1 MG/DL (ref 8.7–10.5)
CHLORIDE SERPL-SCNC: 106 MMOL/L (ref 95–110)
CO2 SERPL-SCNC: 23 MMOL/L (ref 23–29)
CREAT SERPL-MCNC: 0.8 MG/DL (ref 0.5–1.4)
EST. GFR  (AFRICAN AMERICAN): >60 ML/MIN/1.73 M^2
EST. GFR  (NON AFRICAN AMERICAN): >60 ML/MIN/1.73 M^2
ESTIMATED AVG GLUCOSE: ABNORMAL MG/DL (ref 68–131)
GLUCOSE SERPL-MCNC: 324 MG/DL (ref 70–110)
HBA1C MFR BLD: >14 % (ref 4–5.6)
PHOSPHATE SERPL-MCNC: 1.3 MG/DL (ref 2.7–4.5)
POCT GLUCOSE: 311 MG/DL (ref 70–110)
POCT GLUCOSE: 330 MG/DL (ref 70–110)
POCT GLUCOSE: 335 MG/DL (ref 70–110)
POCT GLUCOSE: 346 MG/DL (ref 70–110)
POCT GLUCOSE: 367 MG/DL (ref 70–110)
POCT GLUCOSE: 377 MG/DL (ref 70–110)
POTASSIUM SERPL-SCNC: 3.5 MMOL/L (ref 3.5–5.1)
SODIUM SERPL-SCNC: 138 MMOL/L (ref 136–145)
VANCOMYCIN TROUGH SERPL-MCNC: 14 UG/ML (ref 10–22)

## 2021-12-01 PROCEDURE — 25000003 PHARM REV CODE 250: Performed by: INTERNAL MEDICINE

## 2021-12-01 PROCEDURE — 36415 COLL VENOUS BLD VENIPUNCTURE: CPT | Performed by: INTERNAL MEDICINE

## 2021-12-01 PROCEDURE — 63600175 PHARM REV CODE 636 W HCPCS: Performed by: INTERNAL MEDICINE

## 2021-12-01 PROCEDURE — 25000003 PHARM REV CODE 250: Performed by: SURGERY

## 2021-12-01 PROCEDURE — 84100 ASSAY OF PHOSPHORUS: CPT | Performed by: INTERNAL MEDICINE

## 2021-12-01 PROCEDURE — 83036 HEMOGLOBIN GLYCOSYLATED A1C: CPT | Performed by: INTERNAL MEDICINE

## 2021-12-01 PROCEDURE — 80202 ASSAY OF VANCOMYCIN: CPT | Performed by: INTERNAL MEDICINE

## 2021-12-01 PROCEDURE — 20000000 HC ICU ROOM

## 2021-12-01 PROCEDURE — C9399 UNCLASSIFIED DRUGS OR BIOLOG: HCPCS | Performed by: INTERNAL MEDICINE

## 2021-12-01 PROCEDURE — 99900035 HC TECH TIME PER 15 MIN (STAT)

## 2021-12-01 PROCEDURE — 80048 BASIC METABOLIC PNL TOTAL CA: CPT | Performed by: INTERNAL MEDICINE

## 2021-12-01 RX ORDER — SODIUM,POTASSIUM PHOSPHATES 280-250MG
1 POWDER IN PACKET (EA) ORAL
Status: DISCONTINUED | OUTPATIENT
Start: 2021-12-01 | End: 2021-12-31 | Stop reason: HOSPADM

## 2021-12-01 RX ORDER — SODIUM CHLORIDE 9 MG/ML
INJECTION, SOLUTION INTRAVENOUS CONTINUOUS
Status: DISCONTINUED | OUTPATIENT
Start: 2021-12-01 | End: 2021-12-09

## 2021-12-01 RX ORDER — INSULIN ASPART 100 [IU]/ML
1-10 INJECTION, SOLUTION INTRAVENOUS; SUBCUTANEOUS
Status: DISCONTINUED | OUTPATIENT
Start: 2021-12-01 | End: 2021-12-31 | Stop reason: HOSPADM

## 2021-12-01 RX ADMIN — Medication 1 PACKET: at 05:12

## 2021-12-01 RX ADMIN — SODIUM CHLORIDE 150 ML/HR: 0.9 INJECTION, SOLUTION INTRAVENOUS at 07:12

## 2021-12-01 RX ADMIN — CEFEPIME 2 G: 2 INJECTION, POWDER, FOR SOLUTION INTRAVENOUS at 05:12

## 2021-12-01 RX ADMIN — INSULIN ASPART 4 UNITS: 100 INJECTION, SOLUTION INTRAVENOUS; SUBCUTANEOUS at 09:12

## 2021-12-01 RX ADMIN — INSULIN ASPART 4 UNITS: 100 INJECTION, SOLUTION INTRAVENOUS; SUBCUTANEOUS at 05:12

## 2021-12-01 RX ADMIN — HEPARIN SODIUM 5000 UNITS: 5000 INJECTION INTRAVENOUS; SUBCUTANEOUS at 05:12

## 2021-12-01 RX ADMIN — INSULIN DETEMIR 20 UNITS: 100 INJECTION, SOLUTION SUBCUTANEOUS at 08:12

## 2021-12-01 RX ADMIN — Medication 2 PACKET: at 08:12

## 2021-12-01 RX ADMIN — INSULIN ASPART 8 UNITS: 100 INJECTION, SOLUTION INTRAVENOUS; SUBCUTANEOUS at 12:12

## 2021-12-01 RX ADMIN — MUPIROCIN: 20 OINTMENT TOPICAL at 09:12

## 2021-12-01 RX ADMIN — VANCOMYCIN HYDROCHLORIDE 2000 MG: 10 INJECTION, POWDER, LYOPHILIZED, FOR SOLUTION INTRAVENOUS at 12:12

## 2021-12-01 RX ADMIN — INSULIN ASPART 5 UNITS: 100 INJECTION, SOLUTION INTRAVENOUS; SUBCUTANEOUS at 12:12

## 2021-12-01 RX ADMIN — INSULIN ASPART 8 UNITS: 100 INJECTION, SOLUTION INTRAVENOUS; SUBCUTANEOUS at 05:12

## 2021-12-01 RX ADMIN — Medication 1 PACKET: at 09:12

## 2021-12-01 RX ADMIN — SODIUM CHLORIDE: 0.9 INJECTION, SOLUTION INTRAVENOUS at 12:12

## 2021-12-01 RX ADMIN — INSULIN DETEMIR 15 UNITS: 100 INJECTION, SOLUTION SUBCUTANEOUS at 12:12

## 2021-12-01 RX ADMIN — CLINDAMYCIN PHOSPHATE 600 MG: 600 INJECTION, SOLUTION INTRAVENOUS at 05:12

## 2021-12-01 RX ADMIN — Medication 1 PACKET: at 12:12

## 2021-12-01 RX ADMIN — HYDROCODONE BITARTRATE AND ACETAMINOPHEN 1 TABLET: 5; 325 TABLET ORAL at 08:12

## 2021-12-01 RX ADMIN — MUPIROCIN: 20 OINTMENT TOPICAL at 08:12

## 2021-12-01 RX ADMIN — INSULIN ASPART 10 UNITS: 100 INJECTION, SOLUTION INTRAVENOUS; SUBCUTANEOUS at 08:12

## 2021-12-01 NOTE — CARE UPDATE
Transfer Summary:     Patient admitted to the hospital as a transfer secondary to R thigh abscess and a BMI of Body mass index is 84.77 kg/m².   Patient transferred here for surgical evaluation.  Patient went for surgical debridement on 11/30/21. Patient continued on Abx. Wound cultures sent. Blood cultures were NG. PT/OT consulted.  Patient transferred to the floor on 12/1.    Plan: Continue Vanc/Cefepime. Follow cultures. Monitor blood sugars. PT/OT. Surgery will take for another debridement on Thursday.

## 2021-12-01 NOTE — PROGRESS NOTES
"[facility] Medicine  Progress Note    Patient Name: Shauna Queen  MRN: [id number]  Patient Class: IP- Inpatient   Admission Date: 11/30/2021  Length of Stay: 1 days  Attending Physician: Delgado Rangel MD  Primary Care Provider: Primary Doctor No        Subjective:     Principal Problem:Abscess of right thigh        HPI:  Ms. Queen is a 36-year-old presents to the ED from outside hospital for abscess.  She has initially presented to outside hospital for DKA which resolved.  Patient was unaware of why she was here in July prompted her.  Per the checkout are received: "36-year-old female history of diabetes mellitus, sleep apnea on CPAP, schizophrenia, hypertension, and hyperlipidemia admitted to [facility] on November 24 from [facility] for treatment of diabetic ketoacidosis. She was initiated on an insulin infusion. DKA improved and she was transitioned off the insulin infusion. She again developed DKA and was placed back on the insulin infusion for a period of time. Currently she is off the insulin infusion and on subcutaneous insulin. She was noted to have right upper/inner thigh pain with erythema, induration, and tenderness to palpation. She was treated with IV clindamycin and vancomycin. Due to her size ((BMI 74.32) they are unable to perform CT or MRI. General Surgery there performed an incision and drainage with purulent material found. She was taken to the OR on November 29 for a second-look at the thigh wound. They were only able to do superficial debridement. Postprocedure she extubated to 2 L nasal cannula and was awake with oxygen saturation 100 percent. Surgery at their facility recommended transfer to a facility better able to perform procedures on a patient with her BMI. Referring provider spoke with General Surgery at [facility]. Hospital Medicine was asked to admit for further treatment. I spoke with her current attending. He noted " "she has no alteration in mentation. Lower extremity is intact from a neurovascular standpoint. She remains in the ICU."      Overview/Hospital Course:  Patient admitted to the hospital as a transfer secondary to R thigh abscess and a BMI of Body mass index is 84.77 kg/m².   Patient transferred here for surgical evaluation.  Patient went for surgical debridement on 11/30/21. Patient continued on Abx. Wound cultures sent. Blood cultures were NG. PT/OT consulted.  Patient transferred to the floor on 12/1.       Interval History: No new issues     Review of Systems   Constitutional: Negative for fever.   HENT: Negative.    Eyes: Negative.    Respiratory: Negative.    Cardiovascular: Negative.    Gastrointestinal: Negative for nausea and vomiting.   Endocrine: Negative.    Genitourinary: Negative.    Musculoskeletal: Negative.    Neurological: Negative.    Hematological: Negative.    Psychiatric/Behavioral: Negative.      Objective:     Vital Signs (Most Recent):  Temp: 98.9 °F (37.2 °C) (12/01/21 0800)  Pulse: (!) 123 (12/01/21 0800)  Resp: 17 (12/01/21 0800)  BP: 117/69 (12/01/21 0805)  SpO2: 100 % (12/01/21 0800) Vital Signs (24h Range):  Temp:  [97.7 °F (36.5 °C)-99.9 °F (37.7 °C)] 98.9 °F (37.2 °C)  Pulse:  [113-139] 123  Resp:  [6-39] 17  SpO2:  [99 %-100 %] 100 %  BP: ()/(46-84) 117/69     Weight: (!) 224 kg (493 lb 13.3 oz)  Body mass index is 84.77 kg/m².    Intake/Output Summary (Last 24 hours) at 12/1/2021 1013  Last data filed at 12/1/2021 0800  Gross per 24 hour   Intake 1758 ml   Output 4400 ml   Net -2642 ml      Physical Exam  Vitals and nursing note reviewed.   Constitutional:       General: She is not in acute distress.     Appearance: Normal appearance. She is obese.   HENT:      Head: Normocephalic and atraumatic.   Cardiovascular:      Rate and Rhythm: Normal rate and regular rhythm.   Pulmonary:      Effort: Pulmonary effort is normal. No respiratory distress.      Breath sounds: No wheezing. "   Musculoskeletal:         General: No swelling. Normal range of motion.      Cervical back: Normal range of motion and neck supple.   Skin:     General: Skin is warm and dry.      Comments: Right upper thigh incision with packing. C/d   Neurological:      General: No focal deficit present.      Mental Status: She is alert and oriented to person, place, and time.      Comments: Patient groggy   Psychiatric:         Mood and Affect: Mood normal.         Behavior: Behavior normal.         Thought Content: Thought content normal.         Significant Labs:   All pertinent labs within the past 24 hours have been reviewed.  BMP:   Recent Labs   Lab 11/30/21 2142 11/30/21 2142 12/01/21  0524   *   < > 324*      < > 138   K 4.0   < > 3.5      < > 106   CO2 20*   < > 23   BUN 10   < > 9   CREATININE 0.8   < > 0.8   CALCIUM 7.9*   < > 8.1*   MG 2.2  --   --     < > = values in this interval not displayed.     CBC:   Recent Labs   Lab 11/30/21  0436 11/30/21 2142   WBC 19.20* 19.43*   HGB 10.8* 9.8*   HCT 33.0* 30.4*    225           Assessment/Plan:      * Abscess of right thigh  Status post I&D with General surgery at outside hospital, did not feel like they completely removed abscess  Started on vanc Zosyn  Blood cultures pending  Lactate pending  Labs pending  Consult general surgery  NPO pending surgical procedure  Norco and Tylenol as needed for pain    Necrotizing infection per surgery. S/p debridement on 11/30. Wound cultures sent. Unclear of future surgery plans.     Continue vanc and cefepime. Follow wound cultures       Obesity  Body mass index is 84.77 kg/m².  Weight loss as out patient       Hypophosphatemia  Repeating lab now. Will replace as needed       Anemia of chronic disease  No acute issues       Debility  PT/OT   Does not use a walker or cane       HLD (hyperlipidemia)  Awaiting med rec      DANG (obstructive sleep apnea)  Will start CPAP nightly at 5      DM (diabetes  mellitus)  Sliding scale insulin, can adjust as needed    Adjusting insulin. A1c ordered         VTE Risk Mitigation (From admission, onward)         Ordered     heparin (porcine) injection 5,000 Units  Every 8 hours         11/30/21 0509     IP VTE LOW RISK PATIENT  Once         11/30/21 0403     Place sequential compression device  Until discontinued         11/30/21 0403                Discharge Planning   STEVEN:      Code Status: Full Code   Is the patient medically ready for discharge?:     Reason for patient still in hospital (select all that apply): Patient unstable               Critical care time spent on the evaluation and treatment of severe organ dysfunction, review of pertinent labs and imaging studies, discussions with consulting providers and discussions with patient/family: 45 minutes.      Delgado Olsen MD  Department of Hospital Medicine   [facility]

## 2021-12-01 NOTE — ASSESSMENT & PLAN NOTE
Status post I&D with General surgery at outside hospital, did not feel like they completely removed abscess  Started on vanc Zosyn  Blood cultures pending  Lactate pending  Labs pending  Consult general surgery  NPO pending surgical procedure  Norco and Tylenol as needed for pain    Necrotizing infection per surgery. S/p debridement on 11/30. Wound cultures sent. Unclear of future surgery plans.     Continue vanc and cefepime. Follow wound cultures

## 2021-12-01 NOTE — CARE UPDATE
[facility]  ICU Shift Summary  Date: 11/30/2021      COVID Test: (--)  Isolation: No active isolations     Prehospitalization: Home  Admit Date / LOS : 11/30/2021/ 0 days    Diagnosis: <principal problem not specified>    Consults:        Active: Gen Surg       Needed: N/A     Code Status: Full Code   Advanced Directive: <no information>    LDA: Rajput and PICC       Central Lines/Site/Justification:Unable to Obtain/Maintain PIV       Urinary Cath/Order/Justification:Critically Ill in the ICU and requiring intensive monitoring    Vasopressors/Infusions:    sodium chloride 0.9% Stopped (11/30/21 0555)          GOALS: Volume/ Hemodynamic: N/A                     RASS: N/A    Pain Management: none       Pain Controlled: not applicable     Rhythm: ST    Respiratory Device: Room Air                  Most Recent SBT/ SAT: N/A        ICU Liberation: not applicable    VTE Prophylaxis: Pharm  Mobility: Bedrest  Stress Ulcer Prophylaxis: No    Dietary: NPO  Tolerance: not applicable  Advancement: no    I & O (24h):    Intake/Output Summary (Last 24 hours) at 11/30/2021 1833  Last data filed at 11/30/2021 1726  Gross per 24 hour   Intake 1228.37 ml   Output 3400 ml   Net -2171.63 ml        Restraints: No    Significant Dates:  Post Op Date: 11/30/21  Rescue Date: N/A  Imaging/ Diagnostics: N/A    Noteworthy Labs:  See labs attached.    CBC/Anemia Labs: Coags:    Recent Labs   Lab 11/30/21  0436   WBC 19.20*   HGB 10.8*   HCT 33.0*      MCV 79*   RDW 14.6*    No results for input(s): PT, INR, APTT in the last 168 hours.     Chemistries:   Recent Labs   Lab 11/30/21  0914      K 3.6      CO2 22*   BUN 9   CREATININE 0.6   CALCIUM 7.1*   MG 2.2   PHOS 1.2*        Cardiac Enzymes: Ejection Fractions:    No results for input(s): CPK, CPKMB, MB, TROPONINI in the last 72 hours. No results found for: EF     POCT Glucose: HbA1c:    Recent Labs   Lab 11/30/21  0557 11/30/21  1128 11/30/21  1818    POCTGLUCOSE 311* 315* 374*    No results found for: HGBA1C        ICU LOS 14h  Level of Care: Critical Care    Chart Check: 12 HR Done  Shift Summary/Plan for the shift: Patient remains in the ICU. Went to OR today for I&D of right leg wound. Returned to ICU on BiPAP. HR more elevated since returning from OR. Pulling off BiPAP, sats stable on room air. Free of falls, injury, or breakdown.

## 2021-12-01 NOTE — EICU
eICU Physician Virtual/Remote Brief Evaluation Note      Telephone call RN  Patient with heart rate in 120 is status post 1 L lactated Ringer's bolus  Also requesting diet postop  Chart reviewed, patient observed, discussed with RN  Underwent surgery for debridement of necrotizing soft infection  BMI 84, diabetic  /62, P 133, RR 18, O2 sat 100  Phosphorus this morning 1.2 with calcium 7.1, no albumin value  On long-acting and SSI insulin  Significantly negative I&O with 500 mL blood loss  Bolus additional L Ringer's lactate  Consistent carb diet ordered  Enteral phosphate supplementation started  Stat labs ordered      This report has been created through the use of M-Modal dictation software. Typographical and content errors may occur with this process. While efforts are made to detect and correct such errors, in some cases errors will persist. For this reason, wording in this document should be considered in the proper context and not strictly verbatim

## 2021-12-01 NOTE — CARE UPDATE
[facility] Intensive [facility]  ICU Shift Summary  Date: 12/1/2021      COVID Test: (--)  Isolation: No active isolations     Prehospitalization: Home  Admit Date / LOS : 11/30/2021/ 1 days    Diagnosis: Abscess of right thigh    Consults:        Active: Gen Surg, OT and PT       Needed: N/A     Code Status: Full Code   Advanced Directive: <no information>    LDA: Rajput and PICC       Central Lines/Site/Justification:Unable to Obtain/Maintain PIV       Urinary Cath/Order/Justification:Critically Ill in the ICU and requiring intensive monitoring    Vasopressors/Infusions:    sodium chloride 0.9% 150 mL/hr at 12/01/21 1600          GOALS: Volume/ Hemodynamic: N/A                     RASS: N/A    Pain Management: none       Pain Controlled: not applicable     Rhythm: ST    Respiratory Device: Room Air                  Most Recent SBT/ SAT: N/A       MOVE Screen: PT Consult  ICU Liberation: not applicable    VTE Prophylaxis: Pharm  Mobility: Bedrest  Stress Ulcer Prophylaxis: No    Dietary: PO, NPO after Midnight  Tolerance: yes  Advancement: NPO after midnight    I & O (24h):    Intake/Output Summary (Last 24 hours) at 12/1/2021 1714  Last data filed at 12/1/2021 1600  Gross per 24 hour   Intake 2559.58 ml   Output 1100 ml   Net 1459.58 ml        Restraints: No    Significant Dates:  Post Op Date: 11/30/21  Rescue Date: N/A  Imaging/ Diagnostics: N/A    Noteworthy Labs:  See labs attached.    CBC/Anemia Labs: Coags:    Recent Labs   Lab 11/30/21 0436 11/30/21 2142   WBC 19.20* 19.43*   HGB 10.8* 9.8*   HCT 33.0* 30.4*    225   MCV 79* 83   RDW 14.6* 15.4*    No results for input(s): PT, INR, APTT in the last 168 hours.     Chemistries:   Recent Labs   Lab 11/30/21  0914 11/30/21  0914 11/30/21 2142 12/01/21  0524      < > 139 138   K 3.6   < > 4.0 3.5      < > 108 106   CO2 22*   < > 20* 23   BUN 9   < > 10 9   CREATININE 0.6   < > 0.8 0.8   CALCIUM 7.1*   < > 7.9* 8.1*   MG 2.2  --  2.2  --    PHOS  1.2*   < > 1.5* 1.3*    < > = values in this interval not displayed.        Cardiac Enzymes: Ejection Fractions:    No results for input(s): CPK, CPKMB, MB, TROPONINI in the last 72 hours. No results found for: EF     POCT Glucose: HbA1c:    Recent Labs   Lab 12/01/21  0521 12/01/21  0807 12/01/21  1201   POCTGLUCOSE 346* 377* 335*    No results found for: HGBA1C        ICU LOS 1d 13h  Level of Care: Critical Care    Chart Check: 12 HR Done  Shift Summary/Plan for the shift: Patient remains in the ICU with transfer orders placed. NS infusing at 150mL/hr. PT/OT Consulted. Rajput in place, clear yellow urine output noted. Free of falls, injury, or breakdown.

## 2021-12-01 NOTE — PLAN OF CARE
Problem: Adult Inpatient Plan of Care  Goal: Plan of Care Review  Outcome: Ongoing, Progressing  Goal: Patient-Specific Goal (Individualization)  Outcome: Ongoing, Progressing  Goal: Absence of Hospital-Acquired Illness or Injury  Outcome: Ongoing, Progressing  Goal: Optimal Comfort and Wellbeing  Outcome: Ongoing, Progressing  Goal: Readiness for Transition of Care  Outcome: Ongoing, Progressing  Goal: Rounds/Family Conference  Outcome: Ongoing, Progressing     Problem: Diabetes Comorbidity  Goal: Blood Glucose Level Within Desired Range  Outcome: Ongoing, Progressing     Problem: Bariatric Environmental Safety  Goal: Safety Maintained with Care  Outcome: Ongoing, Progressing     Problem: Skin Injury Risk Increased  Goal: Skin Health and Integrity  Outcome: Ongoing, Progressing     Problem: Infection  Goal: Infection Symptom Resolution  Outcome: Ongoing, Progressing     Problem: Wound  Goal: Optimal Wound Healing  Outcome: Ongoing, Progressing

## 2021-12-01 NOTE — SUBJECTIVE & OBJECTIVE
Interval History: No new issues     Review of Systems   Constitutional: Negative for fever.   HENT: Negative.    Eyes: Negative.    Respiratory: Negative.    Cardiovascular: Negative.    Gastrointestinal: Negative for nausea and vomiting.   Endocrine: Negative.    Genitourinary: Negative.    Musculoskeletal: Negative.    Neurological: Negative.    Hematological: Negative.    Psychiatric/Behavioral: Negative.      Objective:     Vital Signs (Most Recent):  Temp: 98.9 °F (37.2 °C) (12/01/21 0800)  Pulse: (!) 123 (12/01/21 0800)  Resp: 17 (12/01/21 0800)  BP: 117/69 (12/01/21 0805)  SpO2: 100 % (12/01/21 0800) Vital Signs (24h Range):  Temp:  [97.7 °F (36.5 °C)-99.9 °F (37.7 °C)] 98.9 °F (37.2 °C)  Pulse:  [113-139] 123  Resp:  [6-39] 17  SpO2:  [99 %-100 %] 100 %  BP: ()/(46-84) 117/69     Weight: (!) 224 kg (493 lb 13.3 oz)  Body mass index is 84.77 kg/m².    Intake/Output Summary (Last 24 hours) at 12/1/2021 1013  Last data filed at 12/1/2021 0800  Gross per 24 hour   Intake 1758 ml   Output 4400 ml   Net -2642 ml      Physical Exam  Vitals and nursing note reviewed.   Constitutional:       General: She is not in acute distress.     Appearance: Normal appearance. She is obese.   HENT:      Head: Normocephalic and atraumatic.   Cardiovascular:      Rate and Rhythm: Normal rate and regular rhythm.   Pulmonary:      Effort: Pulmonary effort is normal. No respiratory distress.      Breath sounds: No wheezing.   Musculoskeletal:         General: No swelling. Normal range of motion.      Cervical back: Normal range of motion and neck supple.   Skin:     General: Skin is warm and dry.      Comments: Right upper thigh incision with packing. C/d   Neurological:      General: No focal deficit present.      Mental Status: She is alert and oriented to person, place, and time.      Comments: Patient groggy   Psychiatric:         Mood and Affect: Mood normal.         Behavior: Behavior normal.         Thought Content:  Thought content normal.         Significant Labs:   All pertinent labs within the past 24 hours have been reviewed.  BMP:   Recent Labs   Lab 11/30/21 2142 11/30/21 2142 12/01/21  0524   *   < > 324*      < > 138   K 4.0   < > 3.5      < > 106   CO2 20*   < > 23   BUN 10   < > 9   CREATININE 0.8   < > 0.8   CALCIUM 7.9*   < > 8.1*   MG 2.2  --   --     < > = values in this interval not displayed.     CBC:   Recent Labs   Lab 11/30/21 0436 11/30/21 2142   WBC 19.20* 19.43*   HGB 10.8* 9.8*   HCT 33.0* 30.4*    225

## 2021-12-01 NOTE — CARE UPDATE
[facility]  ICU Shift Summary  Date: 12/1/2021      COVID Test: (--)  Isolation: No active isolations     Prehospitalization: other  [facility]   Admit Date / LOS : 11/30/2021/ 1 days    Diagnosis: <principal problem not specified>    Consults:        Active: Gen Surg, WC       Needed: SW     Code Status: Full Code   Advanced Directive: <no information>    LDA: BIPAP, Rajput and PICC       Central Lines/Site/Justification:Multiple GTTS       Urinary Cath/Order/Justification:Critically Ill in the ICU and requiring intensive monitoring    Vasopressors/Infusions:    sodium chloride 0.9% Stopped (11/30/21 0555)          GOALS: Volume/ Hemodynamic: MAP >65                     RASS: 0  alert and calm    Pain Management: IV       Pain Controlled: yes     Rhythm: ST    Respiratory Device: Room Air                  Most Recent SBT/ SAT: N/A       MOVE Screen: PT Consult  ICU Liberation: no    VTE Prophylaxis: Pharm  Mobility: Bedrest  Stress Ulcer Prophylaxis: Yes    Dietary: PO  Tolerance: yes  Advancement: @ goal    I & O (24h):    Intake/Output Summary (Last 24 hours) at 12/1/2021 0601  Last data filed at 12/1/2021 0104  Gross per 24 hour   Intake 1486.37 ml   Output 4400 ml   Net -2913.63 ml        Restraints: No    Significant Dates:  Post Op Date: N/A  Rescue Date: N/A  Imaging/ Diagnostics: N/A    Noteworthy Labs: Labs Pending     CBC/Anemia Labs: Coags:    Recent Labs   Lab 11/30/21  0436 11/30/21 2142   WBC 19.20* 19.43*   HGB 10.8* 9.8*   HCT 33.0* 30.4*    225   MCV 79* 83   RDW 14.6* 15.4*    No results for input(s): PT, INR, APTT in the last 168 hours.     Chemistries:   Recent Labs   Lab 11/30/21  0914 11/30/21  0914 11/30/21 2142 12/01/21  0524      < > 139 138   K 3.6   < > 4.0 3.5      < > 108 106   CO2 22*   < > 20* 23   BUN 9   < > 10 9   CREATININE 0.6   < > 0.8 0.8   CALCIUM 7.1*   < > 7.9* 8.1*   MG 2.2  --  2.2  --    PHOS 1.2*  --  1.5*  --     < > = values  in this interval not displayed.        Cardiac Enzymes: Ejection Fractions:    No results for input(s): CPK, CPKMB, MB, TROPONINI in the last 72 hours. No results found for: EF     POCT Glucose: HbA1c:    Recent Labs   Lab 11/30/21  1818 12/01/21  0044 12/01/21  0521   POCTGLUCOSE 374* 367* 346*    No results found for: HGBA1C        ICU LOS 1d 2h  Level of Care: Critical Care    Chart Check: 12 HR Done  Shift Summary/Plan for the shift: Patient is oriented x 4 able to follow commands but drowsy,  falls back to sleep during conversations . Patient continuously took off cpap throughout the night, oxygen saturations remained above 99% on RA. Patient ST on cardiac monitor despite fluid boluses MD aware.   Rajput cath intact draining to gravity clear ector urine. Plan of care reviewed with patient, reinforcement is needed. Patient received 1 unit of PRBC this shift. Safety precautions maintained/bariatric bed in use.

## 2021-12-01 NOTE — PROGRESS NOTES
[facility]  General Surgery  Progress Note      Interval History:     POD1 s/p debridement of right thigh/groin for NSTI.     Feeling better since. More alert this morning. Now oriented to person, place, and time. Feels hungry.     Objective:     Vital Signs (Most Recent):  Temp: 98.9 °F (37.2 °C) (12/01/21 0800)  Pulse: (!) 112 (12/01/21 1000)  Resp: (!) 37 (12/01/21 1000)  BP: (!) 72/34 (12/01/21 1000)  SpO2: 100 % (12/01/21 1000) Vital Signs (24h Range):  Temp:  [97.7 °F (36.5 °C)-99.9 °F (37.7 °C)] 98.9 °F (37.2 °C)  Pulse:  [112-139] 112  Resp:  [6-39] 37  SpO2:  [97 %-100 %] 100 %  BP: ()/(34-84) 72/34     Weight: (!) 224 kg (493 lb 13.3 oz)  Body mass index is 84.77 kg/m².      Intake/Output Summary (Last 24 hours) at 12/1/2021 1152  Last data filed at 12/1/2021 0800  Gross per 24 hour   Intake 1758 ml   Output 4400 ml   Net -2642 ml       Physical Exam  GEN: drowsy, arousable, oriented  HEENT: MMM, no scleral icterus  CV: Tachycardia, distant heart sounds due to body habitus, regular rhythm  PULM: Mildly increased WOB on NC  ABD: soft, non-tender, non-distended, obese  EXT: moves all. Right upper thigh/groin with dressing in place, not taken down.       Significant Labs:  CBC:   Recent Labs   Lab 11/30/21 2142   WBC 19.43*   RBC 3.68*   HGB 9.8*   HCT 30.4*      MCV 83   MCH 26.6*   MCHC 32.2     CMP:   Recent Labs   Lab 11/30/21 2142 11/30/21 2142 12/01/21  0524   *   < > 324*   CALCIUM 7.9*   < > 8.1*   ALBUMIN 1.1*  --   --       < > 138   K 4.0   < > 3.5   CO2 20*   < > 23      < > 106   BUN 10   < > 9   CREATININE 0.8   < > 0.8    < > = values in this interval not displayed.       Significant Diagnostics:  None    Assessment/Plan:     Active Diagnoses:    Diagnosis Date Noted POA    PRINCIPAL PROBLEM:  Abscess of right thigh [L02.415] 11/30/2021 Yes    Debility [R53.81] 12/01/2021 Yes    Anemia of chronic disease [D63.8] 12/01/2021 Yes     Hypophosphatemia [E83.39] 12/01/2021 Yes    Obesity [E66.9] 12/01/2021 Yes    DM (diabetes mellitus) [E11.9] 11/30/2021 Yes    DANG (obstructive sleep apnea) [G47.33] 11/30/2021 Yes    HLD (hyperlipidemia) [E78.5] 11/30/2021 Yes      Problems Resolved During this Admission:     This is a 36 year old F with class 3 obesity c/b several medical problems who is transferred to [facility] for treatment of right groin/thigh NSTI.    Now POD1 s/p debridement. Feeling better since    Plan to return to OR tommorow   Okay for diet, NPO past midnight  Continue broad spectrum antibiotics to include Clindamycin    Patient discussed with attending surgeon Dr. Nohemi Black MD PGY5  General Surgery  [facility]

## 2021-12-01 NOTE — PLAN OF CARE
[facility]  Initial Discharge Assessment       Primary Care Provider: Jorge Dennis MD    Admission Diagnosis: Abscess of right thigh [L02.415]    Admission Date: 11/30/2021  Expected Discharge Date: TBD    Discharge Barriers Identified: None    Payor: MEDICAID / Plan[place] [employer] / Product Type: Managed Medicaid /     Extended Emergency Contact Information  Primary Emergency Contact: Deisy Elliott  Mobile Phone: [phone]  Relation: Mother  Secondary Emergency Contact: Soto Queen  Mobile Phone: [phone]  Relation: Sister    Discharge Plan A: Home  Discharge Plan B: Other (TBD)      [facility] - [place] [id number] [address] [address]  Phone: [phone] Fax: [phone]      Initial Assessment (most recent)       Adult Discharge Assessment - 12/01/21 1513          Discharge Assessment    Assessment Type Discharge Planning Assessment     Confirmed/corrected address, phone number and insurance Yes     Confirmed Demographics Correct on Facesheet   updated emergency contact phone numbers in demographics    Source of Information family     If unable to respond/provide information was family/caregiver contacted? Yes     Contact Name/Number Deisy Elliott (Mother)   [phone]     When was your last doctors appointment? 11/23/21     Communicated STEVEN with patient/caregiver Date not available/Unable to determine     Reason For Admission abcess of right thigh     Lives With alone     Facility Arrived From: home     Do you expect to return to your current living situation? Yes     Do you have help at home or someone to help you manage your care at home? No     Prior to hospitilization cognitive status: Alert/Oriented     Current cognitive status: Unable to Assess     Walking or Climbing Stairs Difficulty none     Dressing/Bathing Difficulty none     Equipment Currently Used at Home none     Readmission within 30 days? No     Patient currently  being followed by outpatient case management? No     Do you currently have service(s) that help you manage your care at home? No     Do you take prescription medications? Yes     Do you have prescription coverage? Yes     [place]age [place] [employer]     Do you have any problems affording any of your prescribed medications? No     Is the patient taking medications as prescribed? yes     Who is going to help you get home at discharge? Deisy Elliott (Mother)   [phone]  or Soto Queen (sister) [phone]     How do you get to doctors appointments? car, drives self     Are you on dialysis? No     Do you take coumadin? No     Discharge Plan A Home     Discharge Plan B Other   TBD    DME Needed Upon Discharge  other (see comments)   TBD    Discharge Plan discussed with: Parent(s)     Name(s) and Number(s) Deisy Elliott (Mother)   [phone]     Discharge Barriers Identified None                   SW introduced herself to patient's mother, Deisy, and explained Sw's role in the hospital. Patient's mother confirmed patient's  and address, stating that she lives alone but patient's mother lives close to patient. Patient's help at home will be her mom or her sister. Patient is independent at home, does not use any DME.     DC needs: none known at this time

## 2021-12-02 ENCOUNTER — ANESTHESIA (OUTPATIENT)
Dept: SURGERY | Facility: HOSPITAL | Age: 36
DRG: 463 | End: 2021-12-02
Payer: COMMERCIAL

## 2021-12-02 LAB
ANION GAP SERPL CALC-SCNC: 5 MMOL/L (ref 8–16)
ANISOCYTOSIS BLD QL SMEAR: SLIGHT
ANISOCYTOSIS BLD QL SMEAR: SLIGHT
APTT BLDCRRT: 23.6 SEC (ref 21–32)
BACTERIA SPEC AEROBE CULT: ABNORMAL
BASOPHILS # BLD AUTO: ABNORMAL K/UL (ref 0–0.2)
BASOPHILS NFR BLD: 0 % (ref 0–1.9)
BASOPHILS NFR BLD: 0 % (ref 0–1.9)
BLD PROD TYP BPU: NORMAL
BLOOD UNIT EXPIRATION DATE: NORMAL
BLOOD UNIT TYPE CODE: 1700
BLOOD UNIT TYPE: NORMAL
BUN SERPL-MCNC: 7 MG/DL (ref 6–20)
CALCIUM SERPL-MCNC: 7.8 MG/DL (ref 8.7–10.5)
CHLORIDE SERPL-SCNC: 106 MMOL/L (ref 95–110)
CO2 SERPL-SCNC: 26 MMOL/L (ref 23–29)
CODING SYSTEM: NORMAL
CREAT SERPL-MCNC: 0.7 MG/DL (ref 0.5–1.4)
DACRYOCYTES BLD QL SMEAR: ABNORMAL
DIFFERENTIAL METHOD: ABNORMAL
DIFFERENTIAL METHOD: ABNORMAL
DISPENSE STATUS: NORMAL
DOHLE BOD BLD QL SMEAR: PRESENT
EOSINOPHIL # BLD AUTO: ABNORMAL K/UL (ref 0–0.5)
EOSINOPHIL NFR BLD: 0 % (ref 0–8)
EOSINOPHIL NFR BLD: 0 % (ref 0–8)
ERYTHROCYTE [DISTWIDTH] IN BLOOD BY AUTOMATED COUNT: 14.6 % (ref 11.5–14.5)
ERYTHROCYTE [DISTWIDTH] IN BLOOD BY AUTOMATED COUNT: 15.6 % (ref 11.5–14.5)
EST. GFR  (AFRICAN AMERICAN): >60 ML/MIN/1.73 M^2
EST. GFR  (NON AFRICAN AMERICAN): >60 ML/MIN/1.73 M^2
FIBRINOGEN PPP-MCNC: 682 MG/DL (ref 182–400)
GLUCOSE SERPL-MCNC: 257 MG/DL (ref 70–110)
HCT VFR BLD AUTO: 27.5 % (ref 37–48.5)
HCT VFR BLD AUTO: 28.4 % (ref 37–48.5)
HGB BLD-MCNC: 9 G/DL (ref 12–16)
HGB BLD-MCNC: 9.1 G/DL (ref 12–16)
IMM GRANULOCYTES # BLD AUTO: ABNORMAL K/UL (ref 0–0.04)
IMM GRANULOCYTES # BLD AUTO: ABNORMAL K/UL (ref 0–0.04)
IMM GRANULOCYTES NFR BLD AUTO: ABNORMAL % (ref 0–0.5)
IMM GRANULOCYTES NFR BLD AUTO: ABNORMAL % (ref 0–0.5)
INR PPP: 1 (ref 0.8–1.2)
LYMPHOCYTES # BLD AUTO: ABNORMAL K/UL (ref 1–4.8)
LYMPHOCYTES NFR BLD: 18 % (ref 18–48)
LYMPHOCYTES NFR BLD: 21 % (ref 18–48)
MCH RBC QN AUTO: 26.5 PG (ref 27–31)
MCH RBC QN AUTO: 27.2 PG (ref 27–31)
MCHC RBC AUTO-ENTMCNC: 32 G/DL (ref 32–36)
MCHC RBC AUTO-ENTMCNC: 32.7 G/DL (ref 32–36)
MCV RBC AUTO: 81 FL (ref 82–98)
MCV RBC AUTO: 85 FL (ref 82–98)
METAMYELOCYTES NFR BLD MANUAL: 4 %
MONOCYTES # BLD AUTO: ABNORMAL K/UL (ref 0.3–1)
MONOCYTES NFR BLD: 13 % (ref 4–15)
MONOCYTES NFR BLD: 3 % (ref 4–15)
MYELOCYTES NFR BLD MANUAL: 1 %
NEUTROPHILS NFR BLD: 58 % (ref 38–73)
NEUTROPHILS NFR BLD: 63 % (ref 38–73)
NEUTS BAND NFR BLD MANUAL: 11 %
NEUTS BAND NFR BLD MANUAL: 8 %
NRBC BLD-RTO: 1 /100 WBC
NRBC BLD-RTO: 1 /100 WBC
OVALOCYTES BLD QL SMEAR: ABNORMAL
PHOSPHATE SERPL-MCNC: 1.4 MG/DL (ref 2.7–4.5)
PLATELET # BLD AUTO: 215 K/UL (ref 150–450)
PLATELET # BLD AUTO: 228 K/UL (ref 150–450)
PLATELET BLD QL SMEAR: ABNORMAL
PMV BLD AUTO: 10.1 FL (ref 9.2–12.9)
PMV BLD AUTO: 9.7 FL (ref 9.2–12.9)
POCT GLUCOSE: 224 MG/DL (ref 70–110)
POCT GLUCOSE: 277 MG/DL (ref 70–110)
POCT GLUCOSE: 327 MG/DL (ref 70–110)
POLYCHROMASIA BLD QL SMEAR: ABNORMAL
POTASSIUM SERPL-SCNC: 3 MMOL/L (ref 3.5–5.1)
PROTHROMBIN TIME: 11 SEC (ref 9–12.5)
RBC # BLD AUTO: 3.35 M/UL (ref 4–5.4)
RBC # BLD AUTO: 3.39 M/UL (ref 4–5.4)
SODIUM SERPL-SCNC: 137 MMOL/L (ref 136–145)
TOXIC GRANULES BLD QL SMEAR: PRESENT
TRANS ERYTHROCYTES VOL PATIENT: NORMAL ML
WBC # BLD AUTO: 18.78 K/UL (ref 3.9–12.7)
WBC # BLD AUTO: 24.06 K/UL (ref 3.9–12.7)

## 2021-12-02 PROCEDURE — P9021 RED BLOOD CELLS UNIT: HCPCS | Performed by: SURGERY

## 2021-12-02 PROCEDURE — D9220A PRA ANESTHESIA: Mod: CRNA,,, | Performed by: NURSE ANESTHETIST, CERTIFIED REGISTERED

## 2021-12-02 PROCEDURE — 63600175 PHARM REV CODE 636 W HCPCS: Performed by: STUDENT IN AN ORGANIZED HEALTH CARE EDUCATION/TRAINING PROGRAM

## 2021-12-02 PROCEDURE — 36000707: Performed by: SURGERY

## 2021-12-02 PROCEDURE — 85007 BL SMEAR W/DIFF WBC COUNT: CPT | Performed by: INTERNAL MEDICINE

## 2021-12-02 PROCEDURE — 25000003 PHARM REV CODE 250: Performed by: INTERNAL MEDICINE

## 2021-12-02 PROCEDURE — D9220A PRA ANESTHESIA: ICD-10-PCS | Mod: CRNA,,, | Performed by: NURSE ANESTHETIST, CERTIFIED REGISTERED

## 2021-12-02 PROCEDURE — 63600175 PHARM REV CODE 636 W HCPCS: Performed by: ANESTHESIOLOGY

## 2021-12-02 PROCEDURE — 85610 PROTHROMBIN TIME: CPT | Performed by: STUDENT IN AN ORGANIZED HEALTH CARE EDUCATION/TRAINING PROGRAM

## 2021-12-02 PROCEDURE — 20000000 HC ICU ROOM

## 2021-12-02 PROCEDURE — 11042 PR DEBRIDEMENT, SKIN, SUB-Q TISSUE,=<20 SQ CM: ICD-10-PCS | Mod: ,,, | Performed by: SURGERY

## 2021-12-02 PROCEDURE — 37000008 HC ANESTHESIA 1ST 15 MINUTES: Performed by: SURGERY

## 2021-12-02 PROCEDURE — 36415 COLL VENOUS BLD VENIPUNCTURE: CPT | Performed by: INTERNAL MEDICINE

## 2021-12-02 PROCEDURE — 63600175 PHARM REV CODE 636 W HCPCS: Performed by: INTERNAL MEDICINE

## 2021-12-02 PROCEDURE — 11045 PR DEB SUBQ TISSUE ADD-ON: ICD-10-PCS | Mod: ,,, | Performed by: SURGERY

## 2021-12-02 PROCEDURE — 80048 BASIC METABOLIC PNL TOTAL CA: CPT | Performed by: INTERNAL MEDICINE

## 2021-12-02 PROCEDURE — 25000003 PHARM REV CODE 250: Performed by: STUDENT IN AN ORGANIZED HEALTH CARE EDUCATION/TRAINING PROGRAM

## 2021-12-02 PROCEDURE — 85730 THROMBOPLASTIN TIME PARTIAL: CPT | Performed by: STUDENT IN AN ORGANIZED HEALTH CARE EDUCATION/TRAINING PROGRAM

## 2021-12-02 PROCEDURE — 11045 DBRDMT SUBQ TISS EACH ADDL: CPT | Mod: ,,, | Performed by: SURGERY

## 2021-12-02 PROCEDURE — 97530 THERAPEUTIC ACTIVITIES: CPT

## 2021-12-02 PROCEDURE — 85027 COMPLETE CBC AUTOMATED: CPT | Mod: 91 | Performed by: STUDENT IN AN ORGANIZED HEALTH CARE EDUCATION/TRAINING PROGRAM

## 2021-12-02 PROCEDURE — 37000009 HC ANESTHESIA EA ADD 15 MINS: Performed by: SURGERY

## 2021-12-02 PROCEDURE — 85007 BL SMEAR W/DIFF WBC COUNT: CPT | Mod: 91 | Performed by: STUDENT IN AN ORGANIZED HEALTH CARE EDUCATION/TRAINING PROGRAM

## 2021-12-02 PROCEDURE — 71000039 HC RECOVERY, EACH ADD'L HOUR: Performed by: SURGERY

## 2021-12-02 PROCEDURE — 36000706: Performed by: SURGERY

## 2021-12-02 PROCEDURE — 85027 COMPLETE CBC AUTOMATED: CPT | Performed by: INTERNAL MEDICINE

## 2021-12-02 PROCEDURE — 71000033 HC RECOVERY, INTIAL HOUR: Performed by: SURGERY

## 2021-12-02 PROCEDURE — 84100 ASSAY OF PHOSPHORUS: CPT | Performed by: INTERNAL MEDICINE

## 2021-12-02 PROCEDURE — D9220A PRA ANESTHESIA: ICD-10-PCS | Mod: ANES,,, | Performed by: ANESTHESIOLOGY

## 2021-12-02 PROCEDURE — D9220A PRA ANESTHESIA: Mod: ANES,,, | Performed by: ANESTHESIOLOGY

## 2021-12-02 PROCEDURE — 11042 DBRDMT SUBQ TIS 1ST 20SQCM/<: CPT | Mod: ,,, | Performed by: SURGERY

## 2021-12-02 PROCEDURE — 97166 OT EVAL MOD COMPLEX 45 MIN: CPT

## 2021-12-02 PROCEDURE — 85384 FIBRINOGEN ACTIVITY: CPT | Performed by: STUDENT IN AN ORGANIZED HEALTH CARE EDUCATION/TRAINING PROGRAM

## 2021-12-02 RX ORDER — SUCCINYLCHOLINE CHLORIDE 20 MG/ML
INJECTION INTRAMUSCULAR; INTRAVENOUS
Status: DISCONTINUED | OUTPATIENT
Start: 2021-12-02 | End: 2021-12-02

## 2021-12-02 RX ORDER — ACETAMINOPHEN 10 MG/ML
1000 INJECTION, SOLUTION INTRAVENOUS ONCE
Status: COMPLETED | OUTPATIENT
Start: 2021-12-02 | End: 2021-12-02

## 2021-12-02 RX ORDER — LIDOCAINE HYDROCHLORIDE 20 MG/ML
INJECTION INTRAVENOUS
Status: DISCONTINUED | OUTPATIENT
Start: 2021-12-02 | End: 2021-12-02

## 2021-12-02 RX ORDER — SODIUM CHLORIDE 0.9 % (FLUSH) 0.9 %
3 SYRINGE (ML) INJECTION
Status: DISCONTINUED | OUTPATIENT
Start: 2021-12-02 | End: 2021-12-03

## 2021-12-02 RX ORDER — HYDROMORPHONE HYDROCHLORIDE 2 MG/ML
0.2 INJECTION, SOLUTION INTRAMUSCULAR; INTRAVENOUS; SUBCUTANEOUS EVERY 5 MIN PRN
Status: DISCONTINUED | OUTPATIENT
Start: 2021-12-02 | End: 2021-12-05

## 2021-12-02 RX ORDER — PHENYLEPHRINE HYDROCHLORIDE 10 MG/ML
INJECTION INTRAVENOUS
Status: DISCONTINUED | OUTPATIENT
Start: 2021-12-02 | End: 2021-12-02

## 2021-12-02 RX ORDER — SODIUM CHLORIDE 0.9 % (FLUSH) 0.9 %
10 SYRINGE (ML) INJECTION
Status: DISCONTINUED | OUTPATIENT
Start: 2021-12-02 | End: 2021-12-14

## 2021-12-02 RX ORDER — PROPOFOL 10 MG/ML
VIAL (ML) INTRAVENOUS
Status: DISCONTINUED | OUTPATIENT
Start: 2021-12-02 | End: 2021-12-02

## 2021-12-02 RX ORDER — POTASSIUM CHLORIDE 20 MEQ/1
40 TABLET, EXTENDED RELEASE ORAL ONCE
Status: COMPLETED | OUTPATIENT
Start: 2021-12-02 | End: 2021-12-02

## 2021-12-02 RX ORDER — FENTANYL CITRATE 50 UG/ML
25 INJECTION, SOLUTION INTRAMUSCULAR; INTRAVENOUS EVERY 5 MIN PRN
Status: DISCONTINUED | OUTPATIENT
Start: 2021-12-02 | End: 2021-12-05

## 2021-12-02 RX ORDER — KETAMINE HYDROCHLORIDE 50 MG/ML
INJECTION, SOLUTION INTRAMUSCULAR; INTRAVENOUS
Status: DISCONTINUED | OUTPATIENT
Start: 2021-12-02 | End: 2021-12-02

## 2021-12-02 RX ORDER — HYDROCODONE BITARTRATE AND ACETAMINOPHEN 500; 5 MG/1; MG/1
TABLET ORAL
Status: DISCONTINUED | OUTPATIENT
Start: 2021-12-02 | End: 2021-12-05

## 2021-12-02 RX ADMIN — PHENYLEPHRINE HYDROCHLORIDE 200 MCG: 10 INJECTION INTRAVENOUS at 02:12

## 2021-12-02 RX ADMIN — SODIUM CHLORIDE: 0.9 INJECTION, SOLUTION INTRAVENOUS at 09:12

## 2021-12-02 RX ADMIN — Medication 1 PACKET: at 09:12

## 2021-12-02 RX ADMIN — CEFEPIME 2 G: 2 INJECTION, POWDER, FOR SOLUTION INTRAVENOUS at 04:12

## 2021-12-02 RX ADMIN — VANCOMYCIN HYDROCHLORIDE 2000 MG: 10 INJECTION, POWDER, LYOPHILIZED, FOR SOLUTION INTRAVENOUS at 12:12

## 2021-12-02 RX ADMIN — SODIUM CHLORIDE: 0.9 INJECTION, SOLUTION INTRAVENOUS at 11:12

## 2021-12-02 RX ADMIN — ACETAMINOPHEN 1000 MG: 10 INJECTION INTRAVENOUS at 05:12

## 2021-12-02 RX ADMIN — KETAMINE HYDROCHLORIDE 20 MG: 50 INJECTION, SOLUTION, CONCENTRATE INTRAMUSCULAR; INTRAVENOUS at 02:12

## 2021-12-02 RX ADMIN — SODIUM CHLORIDE 150 ML/HR: 0.9 INJECTION, SOLUTION INTRAVENOUS at 05:12

## 2021-12-02 RX ADMIN — INSULIN ASPART 6 UNITS: 100 INJECTION, SOLUTION INTRAVENOUS; SUBCUTANEOUS at 07:12

## 2021-12-02 RX ADMIN — POTASSIUM CHLORIDE 40 MEQ: 1500 TABLET, EXTENDED RELEASE ORAL at 08:12

## 2021-12-02 RX ADMIN — CEFEPIME 2 G: 2 INJECTION, POWDER, FOR SOLUTION INTRAVENOUS at 10:12

## 2021-12-02 RX ADMIN — ESMOLOL HYDROCHLORIDE 30 MG: 10 INJECTION INTRAVENOUS at 02:12

## 2021-12-02 RX ADMIN — PROPOFOL 200 MG: 10 INJECTION, EMULSION INTRAVENOUS at 02:12

## 2021-12-02 RX ADMIN — ESMOLOL HYDROCHLORIDE 10 MG: 10 INJECTION INTRAVENOUS at 03:12

## 2021-12-02 RX ADMIN — SUCCINYLCHOLINE CHLORIDE 200 MG: 20 INJECTION, SOLUTION INTRAMUSCULAR; INTRAVENOUS at 02:12

## 2021-12-02 RX ADMIN — MUPIROCIN: 20 OINTMENT TOPICAL at 08:12

## 2021-12-02 RX ADMIN — INSULIN ASPART 4 UNITS: 100 INJECTION, SOLUTION INTRAVENOUS; SUBCUTANEOUS at 11:12

## 2021-12-02 RX ADMIN — LIDOCAINE HYDROCHLORIDE 100 MG: 20 INJECTION, SOLUTION INTRAVENOUS at 02:12

## 2021-12-02 RX ADMIN — MUPIROCIN: 20 OINTMENT TOPICAL at 09:12

## 2021-12-02 NOTE — ASSESSMENT & PLAN NOTE
- Status post I&D with General surgery at outside hospital, did not feel like they completely removed abscess  - Started on vanc/Zosyn  - Blood cultures pending, negative to date so far  - Lactate normal  - Consult general surgery - planning for debridement in OR 11/30 and 12/2  - Pain control  - Cultures growing Enterococcus, continue on both Vanc/Zosyn until sensitivities are back

## 2021-12-02 NOTE — PROGRESS NOTES
"[facility]  [facility] Medicine  Progress Note    Patient Name: Shauna Queen  MRN: [id number]  Patient Class: IP- Inpatient   Admission Date: 11/30/2021  Length of Stay: 2 days  Attending Physician: Jaya Ibarra MD  Primary Care Provider: Jorge Dennis MD        Subjective:     Principal Problem:Abscess of right thigh        HPI:  Ms. Queen is a 36-year-old presents to the ED from outside hospital for abscess.  She has initially presented to outside hospital for DKA which resolved.  Patient was unaware of why she was here in July prompted her.  Per the checkout are received: "36-year-old female history of diabetes mellitus, sleep apnea on CPAP, schizophrenia, hypertension, and hyperlipidemia admitted to [facility] on November 24 from [facility] for treatment of diabetic ketoacidosis. She was initiated on an insulin infusion. DKA improved and she was transitioned off the insulin infusion. She again developed DKA and was placed back on the insulin infusion for a period of time. Currently she is off the insulin infusion and on subcutaneous insulin. She was noted to have right upper/inner thigh pain with erythema, induration, and tenderness to palpation. She was treated with IV clindamycin and vancomycin. Due to her size ((BMI 74.32) they are unable to perform CT or MRI. General Surgery there performed an incision and drainage with purulent material found. She was taken to the OR on November 29 for a second-look at the thigh wound. They were only able to do superficial debridement. Postprocedure she extubated to 2 L nasal cannula and was awake with oxygen saturation 100 percent. Surgery at their facility recommended transfer to a facility better able to perform procedures on a patient with her BMI. Referring provider spoke with General Surgery at [facility]. Hospital Medicine was asked to admit for further treatment. I spoke with her current attending. He noted she has " "no alteration in mentation. Lower extremity is intact from a neurovascular standpoint. She remains in the ICU."      Overview/Hospital Course:  Patient admitted to the hospital as a transfer secondary to R thigh abscess and a BMI of Body mass index is 84.77 kg/m².   Patient transferred here for surgical evaluation.  Patient went for surgical debridement on 11/30/21. Patient continued on Abx. Wound cultures sent. Blood cultures were NG. PT/OT consulted.  Patient transferred to the floor on 12/1.       Interval History: sleepy this morning and hungry.    Review of Systems   Constitutional: Negative for fever.   HENT: Negative.    Eyes: Negative.    Respiratory: Negative for cough and shortness of breath.    Cardiovascular: Negative for chest pain and leg swelling.   Gastrointestinal: Negative for nausea and vomiting.   Skin: Positive for wound.     Objective:     Vital Signs (Most Recent):  Temp: 99.1 °F (37.3 °C) (12/02/21 0715)  Pulse: 107 (12/02/21 1000)  Resp: (!) 39 (12/02/21 1000)  BP: (!) 97/53 (12/02/21 1000)  SpO2: 100 % (12/02/21 1000) Vital Signs (24h Range):  Temp:  [98.2 °F (36.8 °C)-99.1 °F (37.3 °C)] 99.1 °F (37.3 °C)  Pulse:  [103-124] 107  Resp:  [5-46] 39  SpO2:  [96 %-100 %] 100 %  BP: ()/(48-70) 97/53     Weight: (!) 224 kg (493 lb 13.3 oz)  Body mass index is 84.77 kg/m².    Intake/Output Summary (Last 24 hours) at 12/2/2021 1017  Last data filed at 12/2/2021 1000  Gross per 24 hour   Intake 4111.61 ml   Output 2500 ml   Net 1611.61 ml      Physical Exam  Vitals and nursing note reviewed.   Constitutional:       General: She is not in acute distress.     Appearance: Normal appearance. She is obese.   HENT:      Head: Normocephalic and atraumatic.   Cardiovascular:      Rate and Rhythm: Normal rate and regular rhythm.   Pulmonary:      Effort: Pulmonary effort is normal. No respiratory distress.      Breath sounds: No wheezing.   Musculoskeletal:         General: No swelling. Normal range of " motion.      Cervical back: Normal range of motion and neck supple.   Skin:     General: Skin is warm and dry.      Comments: Right upper thigh incision with packing. C/d   Neurological:      General: No focal deficit present.      Mental Status: She is alert and oriented to person, place, and time.      Comments: Patient groggy   Psychiatric:         Mood and Affect: Mood normal.         Behavior: Behavior normal.         Thought Content: Thought content normal.         Significant Labs:   All pertinent labs within the past 24 hours have been reviewed.  BMP:   Recent Labs   Lab 11/30/21 2142 12/01/21 0524 12/02/21  0428   *   < > 257*      < > 137   K 4.0   < > 3.0*      < > 106   CO2 20*   < > 26   BUN 10   < > 7   CREATININE 0.8   < > 0.7   CALCIUM 7.9*   < > 7.8*   MG 2.2  --   --     < > = values in this interval not displayed.     CBC:   Recent Labs   Lab 11/30/21 2142 12/02/21 0428   WBC 19.43* 18.78*   HGB 9.8* 9.0*   HCT 30.4* 27.5*    228           Assessment/Plan:      * Abscess of right thigh  - Status post I&D with General surgery at outside hospital, did not feel like they completely removed abscess  - Started on vanc/Zosyn  - Blood cultures pending, negative to date so far  - Lactate normal  - Consult general surgery - planning for debridement in OR 11/30 and 12/2  - Pain control  - Cultures growing Enterococcus, continue on both Vanc/Zosyn until sensitivities are back        Obesity  Body mass index is 84.77 kg/m².  Weight loss as out patient         Hypophosphatemia  - persistent  - started on phos-nak with meals      Anemia of chronic disease  No acute issues       Debility  PT/OT   Does not use a walker or cane   - see after procedure      HLD (hyperlipidemia)  - reported but patient is not sure what medications she is on  - hold for now      DANG (obstructive sleep apnea)  Will start CPAP nightly at 5      DM (diabetes mellitus)  A1c:   Lab Results   Component Value  Date    HGBA1C >14.0 (H) 12/01/2021     Meds: basal bolus insulin + SSI PRN to maintain goal 140-180  ADA diet, accuchecks ACHS, hypoglycemic protocol    - very uncontrolled, will need to  this patient on need for diet and medication adherence        VTE Risk Mitigation (From admission, onward)         Ordered     IP VTE LOW RISK PATIENT  Once         11/30/21 0403     Place sequential compression device  Until discontinued         11/30/21 0403                Discharge Planning   STEVEN:      Code Status: Full Code   Is the patient medically ready for discharge?:     Reason for patient still in hospital (select all that apply): Imaging  Discharge Plan A: Home            Critical care time spent on the evaluation and treatment of severe organ dysfunction, review of pertinent labs and imaging studies, discussions with consulting providers and discussions with patient/family: 45 minutes.      Jaya Ibarra MD  Department of Hospital Medicine   [facility] - Intensive Care

## 2021-12-02 NOTE — PT/OT/SLP PROGRESS
"Physical Therapy      Patient Name:  Shauna Queen   MRN:  [id number]    Patient not seen today secondary to pending another debridement today. Per MD note "see after procedure". Will follow up.        "

## 2021-12-02 NOTE — PROGRESS NOTES
Pharmacokinetic Assessment Follow Up: IV Vancomycin    Vancomycin serum concentration assessment(s):    The trough level was drawn correctly and can be used to guide therapy at this time. The measurement is within the desired definitive target range of 10 to 20 mcg/mL.    Vancomycin Regimen Plan:    Continue regimen to Vancomycin 2000 mg IV every 12 hours with next serum trough concentration measured at 2330 prior to 4th dose on 12/3/21    Drug levels (last 3 results):  Recent Labs   Lab Result Units 11/30/21  0436 12/01/21  2329   Vancomycin, Random ug/mL 13.2  --    Vancomycin-Trough ug/mL  --  14.0       Pharmacy will continue to follow and monitor vancomycin.    Please contact pharmacy at extension [phone] for questions regarding this assessment.    Thank you for the consult,   Fitz Hernandez       Patient brief summary:  Shauna Queen is a 36 y.o. female initiated on antimicrobial therapy with IV Vancomycin for treatment of skin & soft tissue infection    The patient's current regimen is Vancomycin 2000mg q12h    Drug Allergies:   Review of patient's allergies indicates:  No Known Allergies    Actual Body Weight:   224 kg    Renal Function:   Estimated Creatinine Clearance: 189.7 mL/min (based on SCr of 0.8 mg/dL).,     Dialysis Method (if applicable):  N/A    CBC (last 72 hours):  Recent Labs   Lab Result Units 11/30/21 0436 11/30/21 2142 12/01/21  0524   WBC K/uL 19.20* 19.43*  --    Hemoglobin g/dL 10.8* 9.8*  --    Hemoglobin A1C %  --   --  >14.0*   Hematocrit % 33.0* 30.4*  --    Platelets K/uL 251 225  --    Gran % % 73.0 75.0*  --    Lymph % % 15.0* 16.0*  --    Mono % % 7.0 7.0  --    Eosinophil % % 1.0 0.0  --    Basophil % % 0.0 0.0  --    Differential Method  Manual Manual  --        Metabolic Panel (last 72 hours):  Recent Labs   Lab Result Units 11/30/21  0914 11/30/21 2142 12/01/21  0524   Sodium mmol/L 139 139 138   Potassium mmol/L 3.6 4.0 3.5   Chloride mmol/L 109 108 106   CO2  mmol/L 22* 20* 23   Glucose mg/dL 266* 354* 324*   BUN mg/dL 9 10 9   Creatinine mg/dL 0.6 0.8 0.8   Albumin g/dL  --  1.1*  --    Magnesium mg/dL 2.2 2.2  --    Phosphorus mg/dL 1.2* 1.5* 1.3*       Vancomycin Administrations:  vancomycin given in the last 96 hours                     vancomycin (VANCOCIN) 2,000 mg in dextrose 5 % 500 mL IVPB (mg) 2,000 mg New Bag 12/02/21 0043      Restarted 12/01/21 1354     2,000 mg New Bag  1209     2,000 mg New Bag  0054      Restarted 11/30/21 1326     2,000 mg New Bag  1207                    Microbiologic Results:  Microbiology Results (last 7 days)       Procedure Component Value Units Date/Time    Aerobic culture [[id number]]  (Abnormal) Collected: 11/30/21 1635    Order Status: Completed Specimen: Abscess from Leg, Right Updated: 12/01/21 1113     Aerobic Bacterial Culture ENTEROCOCCUS SPECIES  Many  Identification and susceptibility pending      Narrative:      Right Inner thigh abcess    Blood culture [[id number]] Collected: 11/30/21 0436    Order Status: Completed Specimen: Blood Updated: 12/01/21 0503     Blood Culture, Routine No Growth to date      No Growth to date    Blood culture [[id number]] Collected: 11/30/21 0436    Order Status: Completed Specimen: Blood Updated: 12/01/21 0503     Blood Culture, Routine No Growth to date      No Growth to date    Fungus culture [[id number]] Collected: 11/30/21 1635    Order Status: Sent Specimen: Abscess from Leg, Right Updated: 11/30/21 1933    Culture, Anaerobe [[id number]] Collected: 11/30/21 1635    Order Status: Sent Specimen: Abscess from Leg, Right Updated: 11/30/21 1932

## 2021-12-02 NOTE — PLAN OF CARE
Problem: Occupational Therapy Goal  Goal: Occupational Therapy Goal  Description: Goals to be met by: 12/31/2021    Patient will increase functional independence with ADLs by performing:    Feeding with [place].  UE Dressing with Modified [place].  LE Dressing with Minimal Assistance.  Grooming while seated with [place].  Toileting from bedside commode with Minimal Assistance for hygiene and clothing management.   Toilet transfer to bedside commode with Minimal Assistance.    Outcome: Ongoing, Progressing

## 2021-12-02 NOTE — PT/OT/SLP EVAL
"Occupational Therapy   Evaluation    Name: Shauna Queen  MRN: [id number]  Admitting Diagnosis:  Abscess of right thigh  Recent Surgery: Procedure(s) (LRB):  IRRIGATION AND DEBRIDEMENT (Right) 2 Days Post-Op    Recommendations:     Discharge Recommendations: home health OT  Discharge Equipment Recommendations:  bedside commode  Barriers to discharge:   (Acuity, revision of R innner thigh abcess required this am.)    Assessment:     Shauna Queen is a 36 y.o. female with a medical diagnosis of Abscess of right thigh.  She presents with max fatigue, RN requesting no standing this date 2* abscess revision planned for "11:08" am this date. Performance deficits affecting function: weakness,impaired functional mobilty,gait instability,impaired endurance,impaired self care skills,decreased lower extremity function,decreased ROM,impaired skin,impaired cardiopulmonary response to activity.      Rehab Prognosis: Good; patient would benefit from acute skilled OT services to address these deficits and reach maximum level of function.       Plan:     Patient to be seen 3 x/week,5 x/week to address the above listed problems via self-care/home management,therapeutic activities,therapeutic exercises  · Plan of Care Expires: 12/31/21  · Plan of Care Reviewed with: patient,mother    Subjective     Chief Complaint: Fatigue and The nurses don't want me to get up. I'm ready though."   Patient/Family Comments/goals: Return to own Home with Mother on next street over for community level post d/c support.   Occupational Profile:  Living Environment: Patient resides in [facility] with 2 children (male 4yrs, Female 11 yrs old). 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 Patient was (I) all self care, and home management (x16 years exp.), (+) driving and employed as stated above 1 week prior to eval this date.   Equipment Used at Home:  none  Assistance upon Discharge: Self, Mother (also a trained CNA). "     Pain/Comfort:  · Pain Rating 1: 0/10    Patients cultural, spiritual, Christian conflicts given the current situation: no    Objective:     Communicated with: RN  prior to session.  Patient found supine with telemetry,peripheral IV,blood pressure cuff,pulse ox/continuous, and utilizing Bariatric bed upon OT entry to room.    General Precautions: Standard, fall (high BMI)   Orthopedic Precautions:N/A   Braces: N/A  Respiratory Status::   · O2 Device (Oxygen Therapy): room air    Occupational Performance:    Bed Mobility:    · Patient completed Rolling/Turning to Left with  moderate assistance  · Patient completed Rolling/Turning to Right with maximal assistance x2 persons (OT and nursing student)   · Patient completed Scooting/Bridging with moderate assistance  · Patient completed Supine to Sit with moderate assistance  Patient completed Sit to Supine with maximal assistance x2 persons   (OT and nursing student)     Functional Mobility/Transfers: RN requesting hold standing this am 2* R inner thigh abscess revision planned mid am and following OT eval.   · Functional Mobility: Patient willingness and initiation good, max a over all.     Activities of Daily Living:  · Feeding:  Hold, NPO pending am procedure    · Grooming: moderate assistance in semi recumbent   · Upper Body Dressing: maximal assistance gown, ties and medical lines.   · Lower Body Dressing: total assistance    Toileting: maximal assistance and of in bed 2-3 staff.     Cognitive/Visual Perceptual:  Cognitive/Psychosocial Skills:     -       Oriented to: Person, Place, Time and Situation   -       Follows Commands/attention:Follows multistep  commands  -       Communication: clear/fluent  -       Memory: No Deficits noted  -       Safety awareness/insight to disability: intact   -       Mood/Affect/Coping skills/emotional control: Cooperative and Pleasant    Physical Exam:  Postural examination/scapula alignment:    -       Rounded shoulders  -        Forward head  Skin integrity: UB visible skin intact, Right inner thigh abscess with wound treatments ongoing, bandaged and outwardly dry during eval and all mobility.   Sensation:    -       Intact  light/touch with localization  (B) Upper Extremity Range of Motion:  WNL  (B) Upper Extremity Strength: WNL   Strength: 4/5 (B)   Fine /Gross Motor Coordination: Intact neurologically, however impaired in supine 2* BMI    AMPAC 6 Click ADL:  AMPAC Total Score: 12    Treatment & Education:  Orienting interventions not required.  *Patient and Mother provided with education re: role of OT, and OT Treatment rationales / protocols. Both parties verbalized understanding.  *Patient agreeable to therapy session. Lights on / shade open for session .    SC:   *Basic self-care tasks and rudimentary functional mobility undertaken -- assist levels noted above.  TA:   Patient completed dynamic sit/stand TRUNK CONTROL/FUNCTIONAL REACH AND GROSS GRASP activity to bolster balance recovery skills as employed in bedside ADL related  functional reach tasks, as well as to progress safest execution of transitional movements in ADL tasks.   SESSION'S END:  *General in room safety and (A)'d mobility advised, call button use reviewed.  *Questions/concerns within OT scope addressed.  Education:  Patient left with HOB elevated (mid Lilly's) with all lines intact, call button in reach, RN notified and Mother  present    GOALS:   Multidisciplinary Problems     Occupational Therapy Goals        Problem: Occupational Therapy Goal    Goal Priority Disciplines Outcome Interventions   Occupational Therapy Goal     OT, PT/OT Ongoing, Progressing    Description: Goals to be met by: 12/31/2021    Patient will increase functional independence with ADLs by performing:    Feeding with [place].  UE Dressing with Modified [place].  LE Dressing with Minimal Assistance.  Grooming while seated with [place].  Toileting from bedside commode  with Minimal Assistance for hygiene and clothing management.   Toilet transfer to bedside commode with Minimal Assistance.                     History:     History reviewed. No pertinent past medical history.    History reviewed. No pertinent surgical history.    Time Tracking:     OT Date of Treatment: 12/02/21  OT Start Time: 0947  OT Stop Time: 1036  OT Total Time (min): 49 min    Billable Minutes:Evaluation 20  Therapeutic Activity 29  12/2/2021

## 2021-12-02 NOTE — SUBJECTIVE & OBJECTIVE
Interval History: sleepy this morning and hungry.    Review of Systems   Constitutional: Negative for fever.   HENT: Negative.    Eyes: Negative.    Respiratory: Negative for cough and shortness of breath.    Cardiovascular: Negative for chest pain and leg swelling.   Gastrointestinal: Negative for nausea and vomiting.   Skin: Positive for wound.     Objective:     Vital Signs (Most Recent):  Temp: 99.1 °F (37.3 °C) (12/02/21 0715)  Pulse: 107 (12/02/21 1000)  Resp: (!) 39 (12/02/21 1000)  BP: (!) 97/53 (12/02/21 1000)  SpO2: 100 % (12/02/21 1000) Vital Signs (24h Range):  Temp:  [98.2 °F (36.8 °C)-99.1 °F (37.3 °C)] 99.1 °F (37.3 °C)  Pulse:  [103-124] 107  Resp:  [5-46] 39  SpO2:  [96 %-100 %] 100 %  BP: ()/(48-70) 97/53     Weight: (!) 224 kg (493 lb 13.3 oz)  Body mass index is 84.77 kg/m².    Intake/Output Summary (Last 24 hours) at 12/2/2021 1017  Last data filed at 12/2/2021 1000  Gross per 24 hour   Intake 4111.61 ml   Output 2500 ml   Net 1611.61 ml      Physical Exam  Vitals and nursing note reviewed.   Constitutional:       General: She is not in acute distress.     Appearance: Normal appearance. She is obese.   HENT:      Head: Normocephalic and atraumatic.   Cardiovascular:      Rate and Rhythm: Normal rate and regular rhythm.   Pulmonary:      Effort: Pulmonary effort is normal. No respiratory distress.      Breath sounds: No wheezing.   Musculoskeletal:         General: No swelling. Normal range of motion.      Cervical back: Normal range of motion and neck supple.   Skin:     General: Skin is warm and dry.      Comments: Right upper thigh incision with packing. C/d   Neurological:      General: No focal deficit present.      Mental Status: She is alert and oriented to person, place, and time.      Comments: Patient groggy   Psychiatric:         Mood and Affect: Mood normal.         Behavior: Behavior normal.         Thought Content: Thought content normal.         Significant Labs:   All  pertinent labs within the past 24 hours have been reviewed.  BMP:   Recent Labs   Lab 11/30/21 2142 12/01/21 0524 12/02/21 0428   *   < > 257*      < > 137   K 4.0   < > 3.0*      < > 106   CO2 20*   < > 26   BUN 10   < > 7   CREATININE 0.8   < > 0.7   CALCIUM 7.9*   < > 7.8*   MG 2.2  --   --     < > = values in this interval not displayed.     CBC:   Recent Labs   Lab 11/30/21 2142 12/02/21 0428   WBC 19.43* 18.78*   HGB 9.8* 9.0*   HCT 30.4* 27.5*    228

## 2021-12-02 NOTE — OP NOTE
[facility] - Intensive Care  General Surgery  Operative Note    SUMMARY     Date of Procedure: 12/2/2021     Procedure: Procedure(s) (LRB):  DEBRIDEMENT, LOWER EXTREMITY (Right)       Surgeon(s) and Role:     * Jorge Dupree MD - Primary     * Serena Black MD - Resident - Assisting        Pre-Operative Diagnosis: Necrotizing soft tissue infection [M79.89]    Post-Operative Diagnosis: Necrotizing fasciitis     Anesthesia: General    Operative Findings (including complications, if any): Significant amount of necrotic fat, fascia, and muscle. Thin, purulent fluid tracking along fascial planes.     Description of Technical Procedures:  All risks/benefits/alternatives were explained to the patient. She expressed understanding and gave written consent. She was brought to the operating room and placed on the OR table in the supine position. After the induction of general anesthesia she was prepped and draped in the usual sterile fashion. Prior to beginning a WHO timeout was performed. When the dressing was removed we encountered a significant amount of dead tissues, which included skin, fat, fascia, and muscle. Furthermore, purulent drainage was found tracking along fascial planes. The necrotic tissues were extensively debrided bluntly and with electrocautery. The wound measured 50 cm x 30 cm x 10 cm at the conclusion of our debridement. The wound was then irrigated with 3 liters of sterile saline. Hemostasis was obtained with electrocautery. The wound was packed with Dakin's-soaked Kerlix and a sterile dressing applied. There were no complications. The patient was hemodynamically stable throughout the case, excepting tachycardia. She was extubated and taken to the PACU in stable condition for recovery, where she will receive 1 U PRBC.    Estimated Blood Loss (EBL): 600 cc           Implants: Wound packed with 5 Kerlix rolls    Specimens: None            Condition: Stable    Disposition: PACU - hemodynamically  stable.

## 2021-12-02 NOTE — PT/OT/SLP PROGRESS
Occupational Therapy      Patient Name:  Shauna Queen   MRN:  [id number]    Patient not seen today secondary to Patient unarousable. Mother, , and OT present. Mother providing Hx. OT will follow-up 12/02/21.    12/1/2021

## 2021-12-02 NOTE — ASSESSMENT & PLAN NOTE
A1c:   Lab Results   Component Value Date    HGBA1C >14.0 (H) 12/01/2021     Meds: basal bolus insulin + SSI PRN to maintain goal 140-180  ADA diet, accuchecks ACHS, hypoglycemic protocol    - very uncontrolled, will need to  this patient on need for diet and medication adherence

## 2021-12-03 ENCOUNTER — ANESTHESIA EVENT (OUTPATIENT)
Dept: SURGERY | Facility: HOSPITAL | Age: 36
DRG: 463 | End: 2021-12-03
Payer: COMMERCIAL

## 2021-12-03 LAB
ANION GAP SERPL CALC-SCNC: 9 MMOL/L (ref 8–16)
BASOPHILS # BLD AUTO: ABNORMAL K/UL (ref 0–0.2)
BASOPHILS NFR BLD: 0 % (ref 0–1.9)
BUN SERPL-MCNC: 7 MG/DL (ref 6–20)
CALCIUM SERPL-MCNC: 6.6 MG/DL (ref 8.7–10.5)
CHLORIDE SERPL-SCNC: 104 MMOL/L (ref 95–110)
CO2 SERPL-SCNC: 22 MMOL/L (ref 23–29)
CREAT SERPL-MCNC: 0.7 MG/DL (ref 0.5–1.4)
DIFFERENTIAL METHOD: ABNORMAL
EOSINOPHIL # BLD AUTO: ABNORMAL K/UL (ref 0–0.5)
EOSINOPHIL NFR BLD: 0 % (ref 0–8)
ERYTHROCYTE [DISTWIDTH] IN BLOOD BY AUTOMATED COUNT: 14.6 % (ref 11.5–14.5)
EST. GFR  (AFRICAN AMERICAN): >60 ML/MIN/1.73 M^2
EST. GFR  (NON AFRICAN AMERICAN): >60 ML/MIN/1.73 M^2
GLUCOSE SERPL-MCNC: 318 MG/DL (ref 70–110)
HCT VFR BLD AUTO: 26.7 % (ref 37–48.5)
HGB BLD-MCNC: 8.9 G/DL (ref 12–16)
IMM GRANULOCYTES # BLD AUTO: ABNORMAL K/UL (ref 0–0.04)
IMM GRANULOCYTES NFR BLD AUTO: ABNORMAL % (ref 0–0.5)
LYMPHOCYTES # BLD AUTO: ABNORMAL K/UL (ref 1–4.8)
LYMPHOCYTES NFR BLD: 10 % (ref 18–48)
MCH RBC QN AUTO: 27.1 PG (ref 27–31)
MCHC RBC AUTO-ENTMCNC: 33.3 G/DL (ref 32–36)
MCV RBC AUTO: 81 FL (ref 82–98)
MONOCYTES # BLD AUTO: ABNORMAL K/UL (ref 0.3–1)
MONOCYTES NFR BLD: 9 % (ref 4–15)
NEUTROPHILS NFR BLD: 66 % (ref 38–73)
NEUTS BAND NFR BLD MANUAL: 15 %
NRBC BLD-RTO: 1 /100 WBC
PHOSPHATE SERPL-MCNC: 2.1 MG/DL (ref 2.7–4.5)
PLATELET # BLD AUTO: 208 K/UL (ref 150–450)
PMV BLD AUTO: 9.8 FL (ref 9.2–12.9)
POCT GLUCOSE: 321 MG/DL (ref 70–110)
POCT GLUCOSE: 338 MG/DL (ref 70–110)
POCT GLUCOSE: 369 MG/DL (ref 70–110)
POTASSIUM SERPL-SCNC: 4.1 MMOL/L (ref 3.5–5.1)
RBC # BLD AUTO: 3.29 M/UL (ref 4–5.4)
SODIUM SERPL-SCNC: 135 MMOL/L (ref 136–145)
WBC # BLD AUTO: 23.8 K/UL (ref 3.9–12.7)

## 2021-12-03 PROCEDURE — 85007 BL SMEAR W/DIFF WBC COUNT: CPT | Performed by: INTERNAL MEDICINE

## 2021-12-03 PROCEDURE — 97161 PT EVAL LOW COMPLEX 20 MIN: CPT

## 2021-12-03 PROCEDURE — 25000003 PHARM REV CODE 250: Performed by: INTERNAL MEDICINE

## 2021-12-03 PROCEDURE — 27000221 HC OXYGEN, UP TO 24 HOURS

## 2021-12-03 PROCEDURE — 20000000 HC ICU ROOM

## 2021-12-03 PROCEDURE — 36415 COLL VENOUS BLD VENIPUNCTURE: CPT | Performed by: STUDENT IN AN ORGANIZED HEALTH CARE EDUCATION/TRAINING PROGRAM

## 2021-12-03 PROCEDURE — 85027 COMPLETE CBC AUTOMATED: CPT | Performed by: INTERNAL MEDICINE

## 2021-12-03 PROCEDURE — 63600175 PHARM REV CODE 636 W HCPCS: Performed by: STUDENT IN AN ORGANIZED HEALTH CARE EDUCATION/TRAINING PROGRAM

## 2021-12-03 PROCEDURE — C9399 UNCLASSIFIED DRUGS OR BIOLOG: HCPCS | Performed by: STUDENT IN AN ORGANIZED HEALTH CARE EDUCATION/TRAINING PROGRAM

## 2021-12-03 PROCEDURE — 36415 COLL VENOUS BLD VENIPUNCTURE: CPT | Performed by: INTERNAL MEDICINE

## 2021-12-03 PROCEDURE — 94660 CPAP INITIATION&MGMT: CPT

## 2021-12-03 PROCEDURE — 27000190 HC CPAP FULL FACE MASK W/VALVE

## 2021-12-03 PROCEDURE — 80048 BASIC METABOLIC PNL TOTAL CA: CPT | Performed by: INTERNAL MEDICINE

## 2021-12-03 PROCEDURE — 99900035 HC TECH TIME PER 15 MIN (STAT)

## 2021-12-03 PROCEDURE — 97530 THERAPEUTIC ACTIVITIES: CPT

## 2021-12-03 PROCEDURE — 80202 ASSAY OF VANCOMYCIN: CPT | Performed by: STUDENT IN AN ORGANIZED HEALTH CARE EDUCATION/TRAINING PROGRAM

## 2021-12-03 PROCEDURE — 84100 ASSAY OF PHOSPHORUS: CPT | Performed by: INTERNAL MEDICINE

## 2021-12-03 PROCEDURE — 63600175 PHARM REV CODE 636 W HCPCS: Performed by: INTERNAL MEDICINE

## 2021-12-03 PROCEDURE — 25000003 PHARM REV CODE 250: Performed by: STUDENT IN AN ORGANIZED HEALTH CARE EDUCATION/TRAINING PROGRAM

## 2021-12-03 RX ORDER — CALCIUM CARBONATE 200(500)MG
1000 TABLET,CHEWABLE ORAL DAILY
Status: DISCONTINUED | OUTPATIENT
Start: 2021-12-03 | End: 2021-12-31 | Stop reason: HOSPADM

## 2021-12-03 RX ORDER — LOPERAMIDE HYDROCHLORIDE 2 MG/1
2 CAPSULE ORAL 4 TIMES DAILY PRN
Status: DISCONTINUED | OUTPATIENT
Start: 2021-12-04 | End: 2021-12-18

## 2021-12-03 RX ORDER — INSULIN ASPART 100 [IU]/ML
8 INJECTION, SOLUTION INTRAVENOUS; SUBCUTANEOUS
Status: DISCONTINUED | OUTPATIENT
Start: 2021-12-03 | End: 2021-12-11

## 2021-12-03 RX ADMIN — INSULIN ASPART 8 UNITS: 100 INJECTION, SOLUTION INTRAVENOUS; SUBCUTANEOUS at 04:12

## 2021-12-03 RX ADMIN — INSULIN DETEMIR 35 UNITS: 100 INJECTION, SOLUTION SUBCUTANEOUS at 08:12

## 2021-12-03 RX ADMIN — SODIUM CHLORIDE: 0.9 INJECTION, SOLUTION INTRAVENOUS at 09:12

## 2021-12-03 RX ADMIN — Medication 1 PACKET: at 08:12

## 2021-12-03 RX ADMIN — VANCOMYCIN HYDROCHLORIDE 2000 MG: 10 INJECTION, POWDER, LYOPHILIZED, FOR SOLUTION INTRAVENOUS at 12:12

## 2021-12-03 RX ADMIN — MUPIROCIN: 20 OINTMENT TOPICAL at 08:12

## 2021-12-03 RX ADMIN — SODIUM CHLORIDE: 0.9 INJECTION, SOLUTION INTRAVENOUS at 10:12

## 2021-12-03 RX ADMIN — INSULIN ASPART 10 UNITS: 100 INJECTION, SOLUTION INTRAVENOUS; SUBCUTANEOUS at 08:12

## 2021-12-03 RX ADMIN — Medication 1 PACKET: at 09:12

## 2021-12-03 RX ADMIN — INSULIN ASPART 8 UNITS: 100 INJECTION, SOLUTION INTRAVENOUS; SUBCUTANEOUS at 11:12

## 2021-12-03 RX ADMIN — Medication 1 PACKET: at 11:12

## 2021-12-03 RX ADMIN — INSULIN DETEMIR 35 UNITS: 100 INJECTION, SOLUTION SUBCUTANEOUS at 09:12

## 2021-12-03 RX ADMIN — MUPIROCIN: 20 OINTMENT TOPICAL at 09:12

## 2021-12-03 RX ADMIN — VANCOMYCIN HYDROCHLORIDE 2000 MG: 10 INJECTION, POWDER, LYOPHILIZED, FOR SOLUTION INTRAVENOUS at 11:12

## 2021-12-03 RX ADMIN — Medication 1 PACKET: at 04:12

## 2021-12-03 RX ADMIN — CALCIUM CARBONATE (ANTACID) CHEW TAB 500 MG 1000 MG: 500 CHEW TAB at 08:12

## 2021-12-03 NOTE — PLAN OF CARE
Problem: Physical Therapy Goal  Goal: Physical Therapy Goal  Description: Goals to be met by:      Patient will increase functional independence with mobility by performin. Supine to sit with Modified [place]  2. Sit to supine with Modified [place]  3. Sit to stand transfer with Modified [place]  4. Bed to chair transfer with Modified [place]   5. Gait  x 150 feet with Modified [place] .     Outcome: Ongoing, Progressing

## 2021-12-03 NOTE — PT/OT/SLP PROGRESS
Occupational Therapy      Patient Name:  Shauna Queen   MRN:  [id number]    Patient not seen this am: OT spoke with attending GIOVANY Canchola. Jesika on duty as assigned GIOVANY Herring at lunch x2 trials. GIOVANY Canchola, states Patient experienced significant blood loss in surgery yesterday. Has returned to ICU vs moved to floor as planned. RN further reports Sx rounding incomplete. Patient may need further interventions/Nursing care. RN at time of wellness prechecks (x2) instructing HOLD out of bed this date. OT will follow-up as instructed.     Addendum: conflicting Patient availability reports given to OT and PT. Verbal from regularly attending GIOVANY herring to PT indicates Patient cleared for treatments this date. OT will follow up pm this date.     ADDENDUM: OT follow up pm: OT instructed in absolute terms to HOLD Patient 2* unstable BP and pending abscess site revision.  OT will track Patient over weekend, and follow up as instructed.    12/3/2021

## 2021-12-03 NOTE — PT/OT/SLP EVAL
Physical Therapy Evaluation    Patient Name:  Shauna Queen   MRN:  [id number]    Recommendations:     Discharge Recommendations:  other (see comments) (TBD - pt going for surgery again tomorrow.)   Discharge Equipment Recommendations: other (see comments) (TBD)   Barriers to discharge: ongoing medical treatment    Assessment:     Shauna Queen is a 36 y.o. female admitted with a medical diagnosis of Abscess of right thigh.  She presents with the following impairments/functional limitations:  weakness,impaired skin,edema,impaired cardiopulmonary response to activity,impaired self care skills,impaired functional mobilty,gait instability,decreased lower extremity function .    Pt able to move to sit and stand from bariatric bed in ICU with minimum assistance however pt became suddenly weak after standing, causing her to abrubtly sit unsafely at edge of bed which required emergent assist of 5 staff to prevent fall to floor. Hypotensive in standing? Pt going to OR again possibly Saturday for another R thigh debridement per Gen Surgery.     Rehab Prognosis: Good; patient would benefit from acute skilled PT services to address these deficits and reach maximum level of function.    Recent Surgery: Procedure(s) (LRB):  DEBRIDEMENT, LOWER EXTREMITY (Right) 1 Day Post-Op    Plan:     During this hospitalization, patient to be seen 6 x/week to address the identified rehab impairments via gait training,therapeutic activities,therapeutic exercises,neuromuscular re-education and progress toward the following goals:    · Plan of Care Expires:  12/17/21    Subjective     Chief Complaint: unable to tolerate bed flat (after being fully supine x several minutes for pericare)  Patient/Family Comments/goals: pt eager to stand, as she has not stood in several days  Pain/Comfort:  · Pain Rating 1: 0/10    Patients cultural, spiritual, Baptist conflicts given the current situation: no    Living Environment:  [facility] with no SAMANTHA with 4 &  12 y/o children. 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   Prior to admission, patients level of function was fully independent.  Equipment used at home: none.  DME owned (not currently used): none.  Upon discharge, patient will have assistance from ?    Objective:     Communicated with nurse Herring prior to session.  Patient found HOB elevated with PICC line,telemetry,blood pressure cuff,pulse ox (continuous)  upon PT entry to room.    General Precautions: Standard, fall   Orthopedic Precautions:N/A   Braces: N/A  Respiratory Status:  · O2 Device (Oxygen Therapy): room air    Exams:  · mother present throughout. HR 120s at rest  · Cognitive Exam:  Patient is oriented to Person, Place, Time, Situation and flat affect  · Gross Motor Coordination:  WFL  · Postural Exam:  Patient presented with the following abnormalities:    · -       No postural abnormalities identified  · Sensation:    · -       Intact  · RLE ROM: Deficits: limited by adipose approximation  · RLE Strength: Deficits: all move against gravity  · LLE ROM: Deficits: see RLE  · LLE Strength: Deficits: see RLE    Functional Mobility:  · Bed Mobility:     · Rolling Left:  moderate assistance  · Scooting: unable to scoot posteriorly once seated EOB  · Supine to Sit: minimum assistance  · For trunk via HHA  · Increased time and cues needed  · Sit to Supine: dependence of 5 persons  · Transfers:     · Sit to Stand:  minimum assistance with hand-held assist   · HHA x 2 (mother as helper)  · Static standing ~ 60 seconds with good balance as nurse vasiliy eason (pt noted to have BM).   · Pt abruptly sat at EOB. Cues pt to stand again in order to safely get her buttocks back in bed however with x 2 attempts to stand pt's buttocks began to slide anteriorly, causing emergent call for help of more staff to safely assist back in supine  · Pt denied R thigh pain, even while standing  · Balance: good seated and standing  · HR 130s with  activtiy    AM-PAC 6 CLICK MOBILITY  Total Score:10     Patient left supine with multiple nurses present.    GOALS:   Multidisciplinary Problems     Physical Therapy Goals        Problem: Physical Therapy Goal    Goal Priority Disciplines Outcome Goal Variances Interventions   Physical Therapy Goal     PT, PT/OT Ongoing, Progressing     Description: Goals to be met by:      Patient will increase functional independence with mobility by performin. Supine to sit with Modified [place]  2. Sit to supine with Modified [place]  3. Sit to stand transfer with Modified [place]  4. Bed to chair transfer with Modified [place]   5. Gait  x 150 feet with Modified [place] .                      History:     History reviewed. No pertinent past medical history.    Past Surgical History:   Procedure Laterality Date    DEBRIDEMENT OF LOWER EXTREMITY Right 2021    Procedure: DEBRIDEMENT, LOWER EXTREMITY;  Surgeon: Jorge Dupree MD;  Location: [facility];  Service: General;  Laterality: Right;       Time Tracking:     PT Received On: 21  PT Start Time: 1358     PT Stop Time: 1436  PT Total Time (min): 38 min     Billable Minutes: Evaluation 15 and Therapeutic Activity 23      2021

## 2021-12-03 NOTE — PLAN OF CARE
Patient made a tele boarder, awaiting a bed.   Tolerating diet.  Right groin oozing slightly, drsg changed.   Patient stood briefly at side of bed for very short period with PT.  BM x 1, loose yellow brown.   Rajput cath patent, good output.   Mother at bedside all shift.

## 2021-12-03 NOTE — CARE UPDATE
[facility]  ICU Shift Summary  Date: 12/3/2021      COVID Test: (--)  Isolation: No active isolations     Prehospitalization: Home  Admit Date / LOS : 11/30/2021/ 3 days    Diagnosis: Abscess of right thigh    Consults:        Active: Gen Surg and Wound Care       Needed: N/A     Code Status: Full Code   Advanced Directive: <no information>    LDA: Rajput and PICC       Central Lines/Site/Justification:Unable to Obtain/Maintain PIV       Urinary Cath/Order/Justification:Non Healing Sacral/Perineal Wound    Vasopressors/Infusions:    sodium chloride 0.9% 150 mL/hr at 12/03/21 1700          GOALS: Volume/ Hemodynamic: N/A                     RASS: 0  alert and calm    Pain Management: IV       Pain Controlled: yes     Rhythm: ST    Respiratory Device: Room Air                  Most Recent SBT/ SAT: N/A       MOVE Screen: FAIL  ICU Liberation: not applicable    VTE Prophylaxis: Pharm and Mechanical  Mobility: OOB to Chair  Stress Ulcer Prophylaxis: Yes    Dietary: PO  Tolerance: yes  Advancement: @ goal    I & O (24h):    Intake/Output Summary (Last 24 hours) at 12/3/2021 1710  Last data filed at 12/3/2021 1700  Gross per 24 hour   Intake 4356.1 ml   Output 4500 ml   Net -143.9 ml        Restraints: No    Significant Dates:  Post Op Date: 12/02/2021  Rescue Date: N/A  Imaging/ Diagnostics: N/A    Noteworthy Labs:  none    CBC/Anemia Labs: Coags:    Recent Labs   Lab 12/02/21  1821 12/03/21  0440   WBC 24.06* 23.80*   HGB 9.1* 8.9*   HCT 28.4* 26.7*    208   MCV 85 81*   RDW 15.6* 14.6*    Recent Labs   Lab 12/02/21  1821   INR 1.0   APTT 23.6        Chemistries:   Recent Labs   Lab 11/30/21  0914 11/30/21  0914 11/30/21 2142 12/01/21  0524 12/02/21  0428 12/03/21  0440      < > 139   < > 137 135*   K 3.6   < > 4.0   < > 3.0* 4.1      < > 108   < > 106 104   CO2 22*   < > 20*   < > 26 22*   BUN 9   < > 10   < > 7 7   CREATININE 0.6   < > 0.8   < > 0.7 0.7   CALCIUM 7.1*   < > 7.9*   <  > 7.8* 6.6*   MG 2.2  --  2.2  --   --   --    PHOS 1.2*   < > 1.5*   < > 1.4* 2.1*    < > = values in this interval not displayed.        Cardiac Enzymes: Ejection Fractions:    No results for input(s): CPK, CPKMB, MB, TROPONINI in the last 72 hours. No results found for: EF     POCT Glucose: HbA1c:    Recent Labs   Lab 12/03/21  0714 12/03/21  1147 12/03/21  1638   POCTGLUCOSE 369* 338* 321*    Hemoglobin A1C   Date Value Ref Range Status   12/01/2021 >14.0 (H) 4.0 - 5.6 % Final     Comment:     ADA Screening Guidelines:  5.7-6.4%  Consistent with prediabetes  >or=6.5%  Consistent with diabetes    High levels of fetal hemoglobin interfere with the HbA1C  assay. Heterozygous hemoglobin variants (HbS, HgC, etc)do  not significantly interfere with this assay.   However, presence of multiple variants may affect accuracy.             ICU LOS 3d 13h  Level of Care: OK to Transfer    Chart Check: 12 HR Done  Shift Summary/Plan for the shift: see care plan note

## 2021-12-03 NOTE — EICU
Rounding (Video Assessment):  No    Intervention Initiated From:  Bedside    Megan Communicated with Bedside Nurse regarding:  Other    Nurse Notified:  No    Doctor Notified:  Yes    Comments: bedside nurse called to get diet reordered after surgery. Pt was drinking water in PACU without a problem. Informed .

## 2021-12-03 NOTE — EICU
Request for diet    Patient is s/p debridement of RLE  Reportedly tolerating sips of water in PACU  Patient seen awake    · Diet ordered with aspiration precautions  · Diet ordered. Discussed with bedside RN to keep NPO after midnight in the event that patient is to be brought back to OR for repeat debrindement in am

## 2021-12-03 NOTE — PROGRESS NOTES
"[facility] Care  [facility] Medicine  Progress Note    Patient Name: Shauna Queen  MRN: [id number]  Patient Class: IP- Inpatient   Admission Date: 11/30/2021  Length of Stay: 3 days  Attending Physician: Jaya Ibarra MD  Primary Care Provider: Jorge Dennis MD        Subjective:     Principal Problem:Abscess of right thigh        HPI:  Ms. Queen is a 36-year-old presents to the ED from outside hospital for abscess.  She has initially presented to outside hospital for DKA which resolved.  Patient was unaware of why she was here in July prompted her.  Per the checkout are received: "36-year-old female history of diabetes mellitus, sleep apnea on CPAP, schizophrenia, hypertension, and hyperlipidemia admitted to [facility] on November 24 from [facility] for treatment of diabetic ketoacidosis. She was initiated on an insulin infusion. DKA improved and she was transitioned off the insulin infusion. She again developed DKA and was placed back on the insulin infusion for a period of time. Currently she is off the insulin infusion and on subcutaneous insulin. She was noted to have right upper/inner thigh pain with erythema, induration, and tenderness to palpation. She was treated with IV clindamycin and vancomycin. Due to her size ((BMI 74.32) they are unable to perform CT or MRI. General Surgery there performed an incision and drainage with purulent material found. She was taken to the OR on November 29 for a second-look at the thigh wound. They were only able to do superficial debridement. Postprocedure she extubated to 2 L nasal cannula and was awake with oxygen saturation 100 percent. Surgery at their facility recommended transfer to a facility better able to perform procedures on a patient with her BMI. Referring provider spoke with General Surgery at [facility]. Hospital Medicine was asked to admit for further treatment. I spoke with her current attending. He noted she has " "no alteration in mentation. Lower extremity is intact from a neurovascular standpoint. She remains in the ICU."      Overview/Hospital Course:  Patient admitted to the hospital as a transfer secondary to R thigh abscess and a BMI of Body mass index is 84.77 kg/m².   Patient transferred here for surgical evaluation.  Patient went for surgical debridement on 11/30/21. Patient continued on Abx. Wound cultures sent. Blood cultures were NG. PT/OT consulted.  Patient transferred to the floor on 12/1.       Interval History: Wants to go home, discussed need to stay in hospital until we formulate a plan for home.    Review of Systems   Constitutional: Negative for fever.   HENT: Negative.    Eyes: Negative.    Respiratory: Negative for cough and shortness of breath.    Cardiovascular: Negative for chest pain and leg swelling.   Gastrointestinal: Negative for nausea and vomiting.   Skin: Positive for wound.     Objective:     Vital Signs (Most Recent):  Temp: 98.8 °F (37.1 °C) (12/03/21 1200)  Pulse: (!) 114 (12/03/21 1400)  Resp: (!) 37 (12/03/21 1400)  BP: (!) 115/53 (12/03/21 1300)  SpO2: 100 % (12/03/21 1400) Vital Signs (24h Range):  Temp:  [98.1 °F (36.7 °C)-99.1 °F (37.3 °C)] 98.8 °F (37.1 °C)  Pulse:  [] 114  Resp:  [14-39] 37  SpO2:  [95 %-100 %] 100 %  BP: ()/(51-87) 115/53     Weight: (!) 224 kg (493 lb 13.3 oz)  Body mass index is 84.77 kg/m².    Intake/Output Summary (Last 24 hours) at 12/3/2021 1539  Last data filed at 12/3/2021 1400  Gross per 24 hour   Intake 4049.06 ml   Output 3200 ml   Net 849.06 ml      Physical Exam  Vitals and nursing note reviewed.   Constitutional:       General: She is not in acute distress.     Appearance: Normal appearance. She is obese.   HENT:      Head: Normocephalic and atraumatic.   Cardiovascular:      Rate and Rhythm: Regular rhythm. Tachycardia present.   Pulmonary:      Effort: Pulmonary effort is normal. No respiratory distress.      Breath sounds: No " wheezing.   Musculoskeletal:         General: No swelling. Normal range of motion.      Cervical back: Normal range of motion and neck supple.   Skin:     General: Skin is warm and dry.      Comments: Right upper thigh incision with packing. C/d   Neurological:      General: No focal deficit present.      Mental Status: She is alert and oriented to person, place, and time.   Psychiatric:         Mood and Affect: Mood normal.         Behavior: Behavior normal.         Thought Content: Thought content normal.         Significant Labs:   All pertinent labs within the past 24 hours have been reviewed.  BMP:   Recent Labs   Lab 12/03/21  0440   *   *   K 4.1      CO2 22*   BUN 7   CREATININE 0.7   CALCIUM 6.6*     CBC:   Recent Labs   Lab 12/02/21  0428 12/02/21  1821 12/03/21  0440   WBC 18.78* 24.06* 23.80*   HGB 9.0* 9.1* 8.9*   HCT 27.5* 28.4* 26.7*    215 208           Assessment/Plan:      * Abscess of right thigh  - Status post I&D with General surgery at outside hospital, did not feel like they completely removed abscess  - Started on vanc/Zosyn  - Blood cultures pending, negative to date so far  - Lactate normal  - Consult general surgery - planning for debridement in OR 11/30 and 12/2. Possibly another debridement planned.  - Pain control  - Cultures growing Enterococcus, continue on Vanc for now        Obesity  Body mass index is 84.77 kg/m².  Weight loss as out patient         Hypophosphatemia  - persistent  - started on phos-nak with meals      Anemia of chronic disease  No acute issues   - had some blood loss during OR - received 1 unit pRBC      Debility  PT/OT   Does not use a walker or cane   - see after procedure      HLD (hyperlipidemia)  - reported but patient is not sure what medications she is on  - check am lipid panel      DANG (obstructive sleep apnea)  Patient likely has component of OHS as well  - outpatient sleep study      DM (diabetes mellitus)  A1c:   Lab Results    Component Value Date    HGBA1C >14.0 (H) 12/01/2021     Meds: basal bolus insulin + SSI PRN to maintain goal 140-180  ADA diet, accuchecks ACHS, hypoglycemic protocol    - very uncontrolled, will need to  this patient on need for diet and medication adherence        VTE Risk Mitigation (From admission, onward)         Ordered     IP VTE LOW RISK PATIENT  Once         11/30/21 0403     Place sequential compression device  Until discontinued         11/30/21 0403                Discharge Planning   STEVEN:      Code Status: Full Code   Is the patient medically ready for discharge?:     Reason for patient still in hospital (select all that apply): Treatment  Discharge Plan A: Home            Critical care time spent on the evaluation and treatment of severe organ dysfunction, review of pertinent labs and imaging studies, discussions with consulting providers and discussions with patient/family: 40 minutes.      Jaya Ibarra MD  Department of Hospital Medicine   [facility] - Intensive Care

## 2021-12-03 NOTE — NURSING TRANSFER
Nursing Transfer Note      12/2/2021     Reason patient is being transferred: ICU admit    Transfer From: PACU; To: ICU room 273    Transfer via bariatric bed    Transfer with cardiac monitoring    Transported by 2x nurses (KOBE Mondragon, RN & SHERRI Brown RN)    Medicines sent: NS infusing    Any special needs or follow-up needed: None    Chart send with patient: Yes    Notified: patient & mother    Patient reassessed at: 1722 on 12/2/20221    Pt is AAO x4 and denies having any pain at current time. Pt appears to be in no apparent distress at current time. Pt has IV fluid infusing and is on room air. Incision to right inner thigh which is dressed in gauze and tape remained c/d/i. Pt blood transfusion stopped and completed. Dr. Le notified regarding pt's HR within the 120, ordered to complete fluid replacement, no additional orders placed. Nuvia score documented per flow sheet. Pt has no personal belongings in PACU at bedside.

## 2021-12-03 NOTE — PLAN OF CARE
Pt shift uneventful. Recheck Hemoglobin showed improvement from EBL during procedure previous shift. No fall/ injuries during shift

## 2021-12-03 NOTE — SUBJECTIVE & OBJECTIVE
Interval History: Wants to go home, discussed need to stay in hospital until we formulate a plan for home.    Review of Systems   Constitutional: Negative for fever.   HENT: Negative.    Eyes: Negative.    Respiratory: Negative for cough and shortness of breath.    Cardiovascular: Negative for chest pain and leg swelling.   Gastrointestinal: Negative for nausea and vomiting.   Skin: Positive for wound.     Objective:     Vital Signs (Most Recent):  Temp: 98.8 °F (37.1 °C) (12/03/21 1200)  Pulse: (!) 114 (12/03/21 1400)  Resp: (!) 37 (12/03/21 1400)  BP: (!) 115/53 (12/03/21 1300)  SpO2: 100 % (12/03/21 1400) Vital Signs (24h Range):  Temp:  [98.1 °F (36.7 °C)-99.1 °F (37.3 °C)] 98.8 °F (37.1 °C)  Pulse:  [] 114  Resp:  [14-39] 37  SpO2:  [95 %-100 %] 100 %  BP: ()/(51-87) 115/53     Weight: (!) 224 kg (493 lb 13.3 oz)  Body mass index is 84.77 kg/m².    Intake/Output Summary (Last 24 hours) at 12/3/2021 1539  Last data filed at 12/3/2021 1400  Gross per 24 hour   Intake 4049.06 ml   Output 3200 ml   Net 849.06 ml      Physical Exam  Vitals and nursing note reviewed.   Constitutional:       General: She is not in acute distress.     Appearance: Normal appearance. She is obese.   HENT:      Head: Normocephalic and atraumatic.   Cardiovascular:      Rate and Rhythm: Regular rhythm. Tachycardia present.   Pulmonary:      Effort: Pulmonary effort is normal. No respiratory distress.      Breath sounds: No wheezing.   Musculoskeletal:         General: No swelling. Normal range of motion.      Cervical back: Normal range of motion and neck supple.   Skin:     General: Skin is warm and dry.      Comments: Right upper thigh incision with packing. C/d   Neurological:      General: No focal deficit present.      Mental Status: She is alert and oriented to person, place, and time.   Psychiatric:         Mood and Affect: Mood normal.         Behavior: Behavior normal.         Thought Content: Thought content normal.          Significant Labs:   All pertinent labs within the past 24 hours have been reviewed.  BMP:   Recent Labs   Lab 12/03/21  0440   *   *   K 4.1      CO2 22*   BUN 7   CREATININE 0.7   CALCIUM 6.6*     CBC:   Recent Labs   Lab 12/02/21  0428 12/02/21  1821 12/03/21  0440   WBC 18.78* 24.06* 23.80*   HGB 9.0* 9.1* 8.9*   HCT 27.5* 28.4* 26.7*    215 208

## 2021-12-04 ENCOUNTER — ANESTHESIA EVENT (OUTPATIENT)
Dept: SURGERY | Facility: HOSPITAL | Age: 36
DRG: 463 | End: 2021-12-04
Payer: COMMERCIAL

## 2021-12-04 ENCOUNTER — ANESTHESIA (OUTPATIENT)
Dept: SURGERY | Facility: HOSPITAL | Age: 36
DRG: 463 | End: 2021-12-04
Payer: COMMERCIAL

## 2021-12-04 PROBLEM — M72.6 NECROTIZING FASCIITIS: Status: ACTIVE | Noted: 2021-11-30

## 2021-12-04 LAB
ANION GAP SERPL CALC-SCNC: 9 MMOL/L (ref 8–16)
BACTERIA BLD CULT: NORMAL
BACTERIA BLD CULT: NORMAL
BACTERIA SPEC ANAEROBE CULT: NORMAL
BASOPHILS # BLD AUTO: ABNORMAL K/UL (ref 0–0.2)
BASOPHILS NFR BLD: 0 % (ref 0–1.9)
BUN SERPL-MCNC: 5 MG/DL (ref 6–20)
CALCIUM SERPL-MCNC: 7.2 MG/DL (ref 8.7–10.5)
CHLORIDE SERPL-SCNC: 106 MMOL/L (ref 95–110)
CHOLEST SERPL-MCNC: 97 MG/DL (ref 120–199)
CHOLEST/HDLC SERPL: 7.5 {RATIO} (ref 2–5)
CO2 SERPL-SCNC: 23 MMOL/L (ref 23–29)
CREAT SERPL-MCNC: 0.7 MG/DL (ref 0.5–1.4)
DIFFERENTIAL METHOD: ABNORMAL
EOSINOPHIL # BLD AUTO: ABNORMAL K/UL (ref 0–0.5)
EOSINOPHIL NFR BLD: 0 % (ref 0–8)
ERYTHROCYTE [DISTWIDTH] IN BLOOD BY AUTOMATED COUNT: 14.9 % (ref 11.5–14.5)
EST. GFR  (AFRICAN AMERICAN): >60 ML/MIN/1.73 M^2
EST. GFR  (NON AFRICAN AMERICAN): >60 ML/MIN/1.73 M^2
GLUCOSE SERPL-MCNC: 163 MG/DL (ref 70–110)
HCT VFR BLD AUTO: 26.7 % (ref 37–48.5)
HDLC SERPL-MCNC: 13 MG/DL (ref 40–75)
HDLC SERPL: 13.4 % (ref 20–50)
HGB BLD-MCNC: 9 G/DL (ref 12–16)
IMM GRANULOCYTES # BLD AUTO: ABNORMAL K/UL (ref 0–0.04)
IMM GRANULOCYTES NFR BLD AUTO: ABNORMAL % (ref 0–0.5)
LDLC SERPL CALC-MCNC: 51.6 MG/DL (ref 63–159)
LYMPHOCYTES # BLD AUTO: ABNORMAL K/UL (ref 1–4.8)
LYMPHOCYTES NFR BLD: 13 % (ref 18–48)
MCH RBC QN AUTO: 27.8 PG (ref 27–31)
MCHC RBC AUTO-ENTMCNC: 33.7 G/DL (ref 32–36)
MCV RBC AUTO: 82 FL (ref 82–98)
MONOCYTES # BLD AUTO: ABNORMAL K/UL (ref 0.3–1)
MONOCYTES NFR BLD: 8 % (ref 4–15)
NEUTROPHILS NFR BLD: 65 % (ref 38–73)
NEUTS BAND NFR BLD MANUAL: 14 %
NONHDLC SERPL-MCNC: 84 MG/DL
NRBC BLD-RTO: 2 /100 WBC
PHOSPHATE SERPL-MCNC: 1.7 MG/DL (ref 2.7–4.5)
PLATELET # BLD AUTO: 213 K/UL (ref 150–450)
PMV BLD AUTO: 9.5 FL (ref 9.2–12.9)
POCT GLUCOSE: 188 MG/DL (ref 70–110)
POCT GLUCOSE: 305 MG/DL (ref 70–110)
POCT GLUCOSE: 370 MG/DL (ref 70–110)
POCT GLUCOSE: 423 MG/DL (ref 70–110)
POTASSIUM SERPL-SCNC: 4 MMOL/L (ref 3.5–5.1)
RBC # BLD AUTO: 3.24 M/UL (ref 4–5.4)
SODIUM SERPL-SCNC: 138 MMOL/L (ref 136–145)
TRIGL SERPL-MCNC: 162 MG/DL (ref 30–150)
VANCOMYCIN TROUGH SERPL-MCNC: 17.3 UG/ML (ref 10–22)
WBC # BLD AUTO: 21.77 K/UL (ref 3.9–12.7)

## 2021-12-04 PROCEDURE — 87075 CULTR BACTERIA EXCEPT BLOOD: CPT | Performed by: STUDENT IN AN ORGANIZED HEALTH CARE EDUCATION/TRAINING PROGRAM

## 2021-12-04 PROCEDURE — 80061 LIPID PANEL: CPT | Performed by: STUDENT IN AN ORGANIZED HEALTH CARE EDUCATION/TRAINING PROGRAM

## 2021-12-04 PROCEDURE — 11045 PR DEB SUBQ TISSUE ADD-ON: ICD-10-PCS | Mod: ,,, | Performed by: SURGERY

## 2021-12-04 PROCEDURE — 27000190 HC CPAP FULL FACE MASK W/VALVE

## 2021-12-04 PROCEDURE — 37000008 HC ANESTHESIA 1ST 15 MINUTES: Performed by: SURGERY

## 2021-12-04 PROCEDURE — 25000003 PHARM REV CODE 250: Performed by: CLINIC/CENTER

## 2021-12-04 PROCEDURE — 99900035 HC TECH TIME PER 15 MIN (STAT)

## 2021-12-04 PROCEDURE — 88305 TISSUE EXAM BY PATHOLOGIST: CPT | Performed by: PATHOLOGY

## 2021-12-04 PROCEDURE — 27201423 OPTIME MED/SURG SUP & DEVICES STERILE SUPPLY: Performed by: SURGERY

## 2021-12-04 PROCEDURE — 87205 SMEAR GRAM STAIN: CPT | Performed by: STUDENT IN AN ORGANIZED HEALTH CARE EDUCATION/TRAINING PROGRAM

## 2021-12-04 PROCEDURE — 36415 COLL VENOUS BLD VENIPUNCTURE: CPT | Performed by: INTERNAL MEDICINE

## 2021-12-04 PROCEDURE — 85007 BL SMEAR W/DIFF WBC COUNT: CPT | Performed by: INTERNAL MEDICINE

## 2021-12-04 PROCEDURE — 37000009 HC ANESTHESIA EA ADD 15 MINS: Performed by: SURGERY

## 2021-12-04 PROCEDURE — 84100 ASSAY OF PHOSPHORUS: CPT | Performed by: INTERNAL MEDICINE

## 2021-12-04 PROCEDURE — 87186 SC STD MICRODIL/AGAR DIL: CPT | Performed by: STUDENT IN AN ORGANIZED HEALTH CARE EDUCATION/TRAINING PROGRAM

## 2021-12-04 PROCEDURE — 87102 FUNGUS ISOLATION CULTURE: CPT | Performed by: STUDENT IN AN ORGANIZED HEALTH CARE EDUCATION/TRAINING PROGRAM

## 2021-12-04 PROCEDURE — 94660 CPAP INITIATION&MGMT: CPT

## 2021-12-04 PROCEDURE — 25000003 PHARM REV CODE 250: Performed by: INTERNAL MEDICINE

## 2021-12-04 PROCEDURE — D9220A PRA ANESTHESIA: Mod: CRNA,,, | Performed by: STUDENT IN AN ORGANIZED HEALTH CARE EDUCATION/TRAINING PROGRAM

## 2021-12-04 PROCEDURE — 25000003 PHARM REV CODE 250: Performed by: STUDENT IN AN ORGANIZED HEALTH CARE EDUCATION/TRAINING PROGRAM

## 2021-12-04 PROCEDURE — 63600175 PHARM REV CODE 636 W HCPCS: Performed by: STUDENT IN AN ORGANIZED HEALTH CARE EDUCATION/TRAINING PROGRAM

## 2021-12-04 PROCEDURE — 80048 BASIC METABOLIC PNL TOTAL CA: CPT | Performed by: INTERNAL MEDICINE

## 2021-12-04 PROCEDURE — 88312 SPECIAL STAINS GROUP 1: CPT | Performed by: PATHOLOGY

## 2021-12-04 PROCEDURE — 88312 SPECIAL STAINS GROUP 1: CPT | Mod: 26,,, | Performed by: PATHOLOGY

## 2021-12-04 PROCEDURE — 11042 DBRDMT SUBQ TIS 1ST 20SQCM/<: CPT | Mod: ,,, | Performed by: SURGERY

## 2021-12-04 PROCEDURE — 88305 TISSUE EXAM BY PATHOLOGIST: CPT | Mod: 26,,, | Performed by: PATHOLOGY

## 2021-12-04 PROCEDURE — 63600175 PHARM REV CODE 636 W HCPCS: Performed by: ANESTHESIOLOGY

## 2021-12-04 PROCEDURE — 21400001 HC TELEMETRY ROOM

## 2021-12-04 PROCEDURE — 87077 CULTURE AEROBIC IDENTIFY: CPT | Performed by: STUDENT IN AN ORGANIZED HEALTH CARE EDUCATION/TRAINING PROGRAM

## 2021-12-04 PROCEDURE — 11045 DBRDMT SUBQ TISS EACH ADDL: CPT | Mod: ,,, | Performed by: SURGERY

## 2021-12-04 PROCEDURE — D9220A PRA ANESTHESIA: ICD-10-PCS | Mod: ANES,,, | Performed by: ANESTHESIOLOGY

## 2021-12-04 PROCEDURE — 36000707: Performed by: SURGERY

## 2021-12-04 PROCEDURE — 88312 PR  SPECIAL STAINS,GROUP I: ICD-10-PCS | Mod: 26,,, | Performed by: PATHOLOGY

## 2021-12-04 PROCEDURE — D9220A PRA ANESTHESIA: Mod: ANES,,, | Performed by: ANESTHESIOLOGY

## 2021-12-04 PROCEDURE — 87070 CULTURE OTHR SPECIMN AEROBIC: CPT | Performed by: STUDENT IN AN ORGANIZED HEALTH CARE EDUCATION/TRAINING PROGRAM

## 2021-12-04 PROCEDURE — 88305 TISSUE EXAM BY PATHOLOGIST: ICD-10-PCS | Mod: 26,,, | Performed by: PATHOLOGY

## 2021-12-04 PROCEDURE — 87076 CULTURE ANAEROBE IDENT EACH: CPT | Performed by: STUDENT IN AN ORGANIZED HEALTH CARE EDUCATION/TRAINING PROGRAM

## 2021-12-04 PROCEDURE — 85027 COMPLETE CBC AUTOMATED: CPT | Performed by: INTERNAL MEDICINE

## 2021-12-04 PROCEDURE — 36000706: Performed by: SURGERY

## 2021-12-04 PROCEDURE — 63600175 PHARM REV CODE 636 W HCPCS: Performed by: INTERNAL MEDICINE

## 2021-12-04 PROCEDURE — 11042 PR DEBRIDEMENT, SKIN, SUB-Q TISSUE,=<20 SQ CM: ICD-10-PCS | Mod: ,,, | Performed by: SURGERY

## 2021-12-04 PROCEDURE — D9220A PRA ANESTHESIA: ICD-10-PCS | Mod: CRNA,,, | Performed by: STUDENT IN AN ORGANIZED HEALTH CARE EDUCATION/TRAINING PROGRAM

## 2021-12-04 RX ORDER — CEFEPIME HYDROCHLORIDE 1 G/50ML
2 INJECTION, SOLUTION INTRAVENOUS
Status: DISCONTINUED | OUTPATIENT
Start: 2021-12-04 | End: 2021-12-04

## 2021-12-04 RX ORDER — DEXAMETHASONE SODIUM PHOSPHATE 4 MG/ML
INJECTION, SOLUTION INTRA-ARTICULAR; INTRALESIONAL; INTRAMUSCULAR; INTRAVENOUS; SOFT TISSUE
Status: DISCONTINUED | OUTPATIENT
Start: 2021-12-04 | End: 2021-12-04

## 2021-12-04 RX ORDER — FLUCONAZOLE 2 MG/ML
800 INJECTION, SOLUTION INTRAVENOUS
Status: DISCONTINUED | OUTPATIENT
Start: 2021-12-05 | End: 2021-12-04

## 2021-12-04 RX ORDER — ONDANSETRON 2 MG/ML
INJECTION INTRAMUSCULAR; INTRAVENOUS
Status: DISCONTINUED | OUTPATIENT
Start: 2021-12-04 | End: 2021-12-04

## 2021-12-04 RX ORDER — PHENYLEPHRINE HYDROCHLORIDE 10 MG/ML
INJECTION INTRAVENOUS
Status: DISCONTINUED | OUTPATIENT
Start: 2021-12-04 | End: 2021-12-04

## 2021-12-04 RX ORDER — LIDOCAINE HYDROCHLORIDE 20 MG/ML
INJECTION INTRAVENOUS
Status: DISCONTINUED | OUTPATIENT
Start: 2021-12-04 | End: 2021-12-04

## 2021-12-04 RX ORDER — ROCURONIUM BROMIDE 10 MG/ML
INJECTION, SOLUTION INTRAVENOUS
Status: DISCONTINUED | OUTPATIENT
Start: 2021-12-04 | End: 2021-12-04

## 2021-12-04 RX ORDER — FENTANYL CITRATE 50 UG/ML
INJECTION, SOLUTION INTRAMUSCULAR; INTRAVENOUS
Status: DISCONTINUED | OUTPATIENT
Start: 2021-12-04 | End: 2021-12-04

## 2021-12-04 RX ORDER — ENOXAPARIN SODIUM 100 MG/ML
40 INJECTION SUBCUTANEOUS EVERY 24 HOURS
Status: DISCONTINUED | OUTPATIENT
Start: 2021-12-04 | End: 2021-12-31 | Stop reason: HOSPADM

## 2021-12-04 RX ORDER — KETAMINE HYDROCHLORIDE 100 MG/ML
INJECTION, SOLUTION INTRAMUSCULAR; INTRAVENOUS
Status: DISCONTINUED | OUTPATIENT
Start: 2021-12-04 | End: 2021-12-04

## 2021-12-04 RX ORDER — SUCCINYLCHOLINE CHLORIDE 20 MG/ML
INJECTION INTRAMUSCULAR; INTRAVENOUS
Status: DISCONTINUED | OUTPATIENT
Start: 2021-12-04 | End: 2021-12-04

## 2021-12-04 RX ORDER — FLUCONAZOLE 2 MG/ML
1200 INJECTION, SOLUTION INTRAVENOUS
Status: DISCONTINUED | OUTPATIENT
Start: 2021-12-04 | End: 2021-12-04

## 2021-12-04 RX ORDER — PROPOFOL 10 MG/ML
VIAL (ML) INTRAVENOUS
Status: DISCONTINUED | OUTPATIENT
Start: 2021-12-04 | End: 2021-12-04

## 2021-12-04 RX ADMIN — KETAMINE HYDROCHLORIDE 10 MG: 100 INJECTION, SOLUTION, CONCENTRATE INTRAMUSCULAR; INTRAVENOUS at 12:12

## 2021-12-04 RX ADMIN — FENTANYL CITRATE 25 MCG: 50 INJECTION INTRAMUSCULAR; INTRAVENOUS at 08:12

## 2021-12-04 RX ADMIN — PROPOFOL 50 MG: 10 INJECTION, EMULSION INTRAVENOUS at 12:12

## 2021-12-04 RX ADMIN — PHENYLEPHRINE HYDROCHLORIDE 200 MCG: 10 INJECTION INTRAVENOUS at 12:12

## 2021-12-04 RX ADMIN — SUCCINYLCHOLINE CHLORIDE 200 MG: 20 INJECTION, SOLUTION INTRAMUSCULAR; INTRAVENOUS at 11:12

## 2021-12-04 RX ADMIN — INSULIN ASPART 8 UNITS: 100 INJECTION, SOLUTION INTRAVENOUS; SUBCUTANEOUS at 03:12

## 2021-12-04 RX ADMIN — INSULIN DETEMIR 35 UNITS: 100 INJECTION, SOLUTION SUBCUTANEOUS at 08:12

## 2021-12-04 RX ADMIN — KETAMINE HYDROCHLORIDE 10 MG: 100 INJECTION, SOLUTION, CONCENTRATE INTRAMUSCULAR; INTRAVENOUS at 11:12

## 2021-12-04 RX ADMIN — DEXAMETHASONE SODIUM PHOSPHATE 4 MG: 4 INJECTION, SOLUTION INTRAMUSCULAR; INTRAVENOUS at 11:12

## 2021-12-04 RX ADMIN — Medication 1 PACKET: at 05:12

## 2021-12-04 RX ADMIN — FENTANYL CITRATE 25 MCG: 50 INJECTION, SOLUTION INTRAMUSCULAR; INTRAVENOUS at 11:12

## 2021-12-04 RX ADMIN — PHENYLEPHRINE HYDROCHLORIDE 200 MCG: 10 INJECTION INTRAVENOUS at 11:12

## 2021-12-04 RX ADMIN — PROPOFOL 100 MG: 10 INJECTION, EMULSION INTRAVENOUS at 11:12

## 2021-12-04 RX ADMIN — VANCOMYCIN HYDROCHLORIDE 2000 MG: 10 INJECTION, POWDER, LYOPHILIZED, FOR SOLUTION INTRAVENOUS at 12:12

## 2021-12-04 RX ADMIN — VANCOMYCIN HYDROCHLORIDE 2000 MG: 10 INJECTION, POWDER, LYOPHILIZED, FOR SOLUTION INTRAVENOUS at 02:12

## 2021-12-04 RX ADMIN — INSULIN ASPART 5 UNITS: 100 INJECTION, SOLUTION INTRAVENOUS; SUBCUTANEOUS at 09:12

## 2021-12-04 RX ADMIN — SODIUM CHLORIDE: 0.9 INJECTION, SOLUTION INTRAVENOUS at 05:12

## 2021-12-04 RX ADMIN — MUPIROCIN: 20 OINTMENT TOPICAL at 09:12

## 2021-12-04 RX ADMIN — ESMOLOL HYDROCHLORIDE 20 MG: 10 INJECTION INTRAVENOUS at 12:12

## 2021-12-04 RX ADMIN — PROPOFOL 50 MG: 10 INJECTION, EMULSION INTRAVENOUS at 11:12

## 2021-12-04 RX ADMIN — FENTANYL CITRATE 25 MCG: 50 INJECTION, SOLUTION INTRAMUSCULAR; INTRAVENOUS at 01:12

## 2021-12-04 RX ADMIN — LIDOCAINE HYDROCHLORIDE 100 MG: 20 INJECTION, SOLUTION INTRAVENOUS at 11:12

## 2021-12-04 RX ADMIN — PIPERACILLIN AND TAZOBACTAM 4.5 G: 4; .5 INJECTION, POWDER, LYOPHILIZED, FOR SOLUTION INTRAVENOUS; PARENTERAL at 04:12

## 2021-12-04 RX ADMIN — KETAMINE HYDROCHLORIDE 20 MG: 100 INJECTION, SOLUTION, CONCENTRATE INTRAMUSCULAR; INTRAVENOUS at 11:12

## 2021-12-04 RX ADMIN — LOPERAMIDE HYDROCHLORIDE 2 MG: 2 CAPSULE ORAL at 12:12

## 2021-12-04 RX ADMIN — Medication 1 PACKET: at 09:12

## 2021-12-04 RX ADMIN — PROPOFOL 200 MG: 10 INJECTION, EMULSION INTRAVENOUS at 11:12

## 2021-12-04 RX ADMIN — AMPHOTERICIN B 650 MG: 50 INJECTION, POWDER, LYOPHILIZED, FOR SOLUTION INTRAVENOUS at 05:12

## 2021-12-04 RX ADMIN — ESMOLOL HYDROCHLORIDE 10 MG: 10 INJECTION INTRAVENOUS at 11:12

## 2021-12-04 RX ADMIN — PHENYLEPHRINE HYDROCHLORIDE 100 MCG: 10 INJECTION INTRAVENOUS at 12:12

## 2021-12-04 RX ADMIN — SODIUM CHLORIDE: 0.9 INJECTION, SOLUTION INTRAVENOUS at 02:12

## 2021-12-04 RX ADMIN — MUPIROCIN: 20 OINTMENT TOPICAL at 08:12

## 2021-12-04 RX ADMIN — ESMOLOL HYDROCHLORIDE 10 MG: 10 INJECTION INTRAVENOUS at 12:12

## 2021-12-04 RX ADMIN — ONDANSETRON 4 MG: 2 INJECTION, SOLUTION INTRAMUSCULAR; INTRAVENOUS at 12:12

## 2021-12-04 RX ADMIN — LIDOCAINE HYDROCHLORIDE 10 MG: 20 INJECTION, SOLUTION INTRAVENOUS at 11:12

## 2021-12-04 RX ADMIN — SUGAMMADEX 400 MG: 100 INJECTION, SOLUTION INTRAVENOUS at 01:12

## 2021-12-04 RX ADMIN — FENTANYL CITRATE 25 MCG: 50 INJECTION INTRAMUSCULAR; INTRAVENOUS at 04:12

## 2021-12-04 RX ADMIN — PHENYLEPHRINE HYDROCHLORIDE 100 MCG: 10 INJECTION INTRAVENOUS at 11:12

## 2021-12-04 RX ADMIN — ROCURONIUM BROMIDE 20 MG: 10 INJECTION, SOLUTION INTRAVENOUS at 12:12

## 2021-12-04 NOTE — PLAN OF CARE
[facility] Intensive Care  ICU Shift Summary  Date: 12/4/2021      COVID Test: (--)  Isolation: No active isolations     Prehospitalization: Home  Admit Date / LOS : 11/30/2021/ 4 days    Diagnosis: Abscess of right thigh    Consults:        Active: Gen Surg, Ortho, OT, PT, Pulm CC, Spiritual Care and Wound Care       Needed: N/A     Code Status: Full Code   Advanced Directive: <no information>    LDA: PICC       Central Lines/Site/Justification:Unable to Obtain/Maintain PIV       Urinary Cath/Order/Justification:Non Healing Sacral/Perineal Wound    Vasopressors/Infusions:    sodium chloride 0.9% 150 mL/hr at 12/04/21 1406          GOALS: Volume/ Hemodynamic: HR >                     RASS: 0  alert and calm    Pain Management: IV       Pain Controlled: yes     Rhythm: NSR    Respiratory Device: Room Air    Oxygen Concentration (%):  [24] 24             Most Recent SBT/ SAT: Fail       MOVE Screen: FAIL  ICU Liberation: not applicable    VTE Prophylaxis: Pharm  Mobility: Bedrest  Stress Ulcer Prophylaxis: Yes    Dietary: PO  Tolerance: yes  Advancement: no    I & O (24h):    Intake/Output Summary (Last 24 hours) at 12/4/2021 1448  Last data filed at 12/4/2021 1438  Gross per 24 hour   Intake 3870.86 ml   Output 5120 ml   Net -1249.14 ml        Restraints: No    Significant Dates:  Post Op Date: N/A  Rescue Date: N/A  Imaging/ Diagnostics: N/A    Noteworthy Labs:  none    CBC/Anemia Labs: Coags:    Recent Labs   Lab 12/03/21  0440 12/04/21  0521   WBC 23.80* 21.77*   HGB 8.9* 9.0*   HCT 26.7* 26.7*    213   MCV 81* 82   RDW 14.6* 14.9*    Recent Labs   Lab 12/02/21  1821   INR 1.0   APTT 23.6        Chemistries:   Recent Labs   Lab 11/30/21  0914 11/30/21  0914 11/30/21  2142 12/01/21  0524 12/03/21  0440 12/04/21  0521      < > 139   < > 135* 138   K 3.6   < > 4.0   < > 4.1 4.0      < > 108   < > 104 106   CO2 22*   < > 20*   < > 22* 23   BUN 9   < > 10   < > 7 5*   CREATININE 0.6   < > 0.8   < >  0.7 0.7   CALCIUM 7.1*   < > 7.9*   < > 6.6* 7.2*   MG 2.2  --  2.2  --   --   --    PHOS 1.2*   < > 1.5*   < > 2.1* 1.7*    < > = values in this interval not displayed.        Cardiac Enzymes: Ejection Fractions:    No results for input(s): CPK, CPKMB, MB, TROPONINI in the last 72 hours. No results found for: EF     POCT Glucose: HbA1c:    Recent Labs   Lab 12/03/21  1147 12/03/21  1638 12/04/21  0726   POCTGLUCOSE 338* 321* 188*    Hemoglobin A1C   Date Value Ref Range Status   12/01/2021 >14.0 (H) 4.0 - 5.6 % Final     Comment:     ADA Screening Guidelines:  5.7-6.4%  Consistent with prediabetes  >or=6.5%  Consistent with diabetes    High levels of fetal hemoglobin interfere with the HbA1C  assay. Heterozygous hemoglobin variants (HbS, HgC, etc)do  not significantly interfere with this assay.   However, presence of multiple variants may affect accuracy.             ICU LOS 4d 10h  Level of Care: OK to Transfer    Chart Check: 12 HR Done  Shift Summary/Plan for the shift: VSS. Okay to transfer.

## 2021-12-04 NOTE — ASSESSMENT & PLAN NOTE
- Status post I&D with General surgery at outside hospital, did not feel like they completely removed abscess  - Started on vanc/Zosyn  - Blood cultures pending, negative to date so far  - Lactate normal  - Consult general surgery   - Underwent debridement in OR 11/30, 12/2, and 12/4 -- planning on another debridement on 12/5 with Orthopedic Surgery for knee exploration  - added Amphotericin for concern for fungal infection  - ID consulted for further antibiotic recommendations  - Pain control

## 2021-12-04 NOTE — PROGRESS NOTES
[facility]  General Surgery  Progress Note      Interval History:     POD3/1 s/p debridement of right thigh/groin for necrotizing fasciitis    Plan was to return to OR today for washout/possible debridement. Unfortunately patient ate lunch.     She is feeling okay. Pain controlled, tolerating diet.    Objective:     Vital Signs (Most Recent):  Temp: 98.4 °F (36.9 °C) (12/03/21 1901)  Pulse: (!) 129 (12/03/21 1921)  Resp: (!) 27 (12/03/21 1901)  BP: (!) 95/55 (12/03/21 1901)  SpO2: 97 % (12/03/21 2003) Vital Signs (24h Range):  Temp:  [98 °F (36.7 °C)-98.8 °F (37.1 °C)] 98.4 °F (36.9 °C)  Pulse:  [113-129] 129  Resp:  [15-39] 27  SpO2:  [95 %-100 %] 97 %  BP: ()/(51-87) 95/55     Weight: (!) 224 kg (493 lb 13.3 oz)  Body mass index is 84.77 kg/m².      Intake/Output Summary (Last 24 hours) at 12/3/2021 2138  Last data filed at 12/3/2021 1800  Gross per 24 hour   Intake 3984.98 ml   Output 4720 ml   Net -735.02 ml       Physical Exam  GEN: alert and oriented, NAD  HEENT: MMM, no scleral icterus  CV: Tachycardic to 120s, distant heart sounds due to body habitus, regular rhythm  PULM: Mildly increased WOB on RA  ABD: soft, non-tender, non-distended, obese  EXT: moves all. Right upper thigh/groin with dressing in place, not taken down.       Significant Labs:  CBC:   Recent Labs   Lab 12/03/21  0440   WBC 23.80*   RBC 3.29*   HGB 8.9*   HCT 26.7*      MCV 81*   MCH 27.1   MCHC 33.3     CMP:   Recent Labs   Lab 12/03/21  0440   *   CALCIUM 6.6*   *   K 4.1   CO2 22*      BUN 7   CREATININE 0.7       Significant Diagnostics:  None    Assessment/Plan:     Active Diagnoses:    Diagnosis Date Noted POA    PRINCIPAL PROBLEM:  Abscess of right thigh [L02.415] 11/30/2021 Yes    Debility [R53.81] 12/01/2021 Yes    Anemia of chronic disease [D63.8] 12/01/2021 Yes    Hypophosphatemia [E83.39] 12/01/2021 Yes    Obesity [E66.9] 12/01/2021 Yes    DM (diabetes mellitus) [E11.9]  11/30/2021 Yes    DANG (obstructive sleep apnea) [G47.33] 11/30/2021 Yes    HLD (hyperlipidemia) [E78.5] 11/30/2021 Yes      Problems Resolved During this Admission:     This is a 36 year old F with class 3 obesity c/b several medical problems who is transferred to [facility] for treatment of right groin/thigh necrotizing fasciitis.    Now POD3/1 s/p debridement.    Patient will need frequent washouts/debridements as this is a rapidly progressive disease. Plan was for OR today, unfortunately patient ate  OR tommorow AM  NPO past midnight  Continue broad spectrum antibiotics to include Clindamycin    Patient examined with attending surgeon Dr. Joslyn Black MD PGY5  General Surgery  [facility]

## 2021-12-04 NOTE — NURSING TRANSFER
Nursing Transfer Note      12/4/2021     Reason patient is being transferred: per orders    Transfer To:  335a    Transfer via bed    Transfer with cardiac monitoring    Transported by patient guide and Nurse Kaveh and Nurse Mccarty    Medicines sent: Insulin and Zosyn    Any special needs or follow-up needed: Npo after MN    Chart send with patient: Yes    Notified: Mother present @ bedside    Patient reassessed at: 12/04 @ 1630    Upon arrival to floor: cardiac monitor applied, patient oriented to room, call bell in reach and bed in lowest position

## 2021-12-04 NOTE — OP NOTE
[facility] Intensive Care  General Surgery  Operative Note    SUMMARY     Date of Procedure: 11/30/2021     Procedure: Procedure(s) (LRB):  DEBRIDEMENT, LOWER EXTREMITY (Right)       Surgeon(s) and Role:     * Jorge Dupree MD - Primary     * Serena Black MD - Resident - Assisting        Pre-Operative Diagnosis: Necrotizing soft tissue infection [M79.89]    Post-Operative Diagnosis: Post-Op Diagnosis Codes:     * Necrotizing soft tissue infection [M79.89]    Anesthesia: General    Operative Findings (including complications, if any): significant quantity of necrotic fat and skin, small amount of necrotic fascia and muscle. Several pockets of purulence.    Description of Technical Procedures:  The patient was altered and disoriented. Multiple attempts were made to get in touch with the patient's family. Unfortunately these were unsuccessful so emergency, two-physician consent was obtained. The patient was brought to the operating room and placed on the OR table in the lithotomy position. After the induction of general anesthesia she was prepped and draped in the usual sterile fashion. Prior to beginning a WHO timeout was performed. The dressings were removed and the wound inspected. There was a significant amount of foul smelling, necrotic skin and fat. These were removed with a combination of blunt dissection and electrocautery. Due to the extensive dissection, this was a high blood loss procedure. During our dissection, the saphenous vein was identified and suture ligated. Multiple pockets of purulence were encountered. Cultures were sent. Additionally, a sample of necrotic fat was sent for culture. After all necrotic tissues had been debrided, the wound was copiously irrigated with sterile saline and packed with Dakin's-soaked Kerlix. The wound measured 30 cm x 30 cm x 10 cm. A sterile dressing was applied. The patient was awakened from anesthesia, extubated, and brought to the PACU in stable  condition. She will receive 1 U PRBC post-operatively.     Estimated Blood Loss (EBL): 500 cc           Implants: Wound packed with 4 rolls Kerlix    Specimens: necrotic fat for culture    Condition: Stable    Disposition: ICU - extubated and stable.

## 2021-12-04 NOTE — NURSING
Report called to Nurse Chelsey. Patient transferring to room 335-A. Belonging gathered. Escort called.      Patient ecsorted via bed per this nurse, Nurse Mccarty and patient guide accompanied by mother.

## 2021-12-04 NOTE — OP NOTE
[facility] - Intensive Care  Operative Note    SUMMARY     Surgery Date: 12/4/2021     Surgeon(s) and Role:     * Brant Jorgensen MD - Primary     * Serena Black MD - Resident - Assisting    Pre-op Diagnosis:  Necrotizing soft tissue infection [M79.89]    Post-op Diagnosis:  Post-Op Diagnosis Codes:     * Necrotizing soft tissue infection [M79.89]    Procedure(s) (LRB):  DEBRIDEMENT, LOWER EXTREMITY (Right)    Anesthesia: General    Indication for procedure: Shauna Queen is 36 y.o. female with diabetes and morbid obesity with a BMI above 80 who has had multiple debridements and washouts of right medial proximal thigh for necrotizing fasciitis. After discussion of disease process, we discussed options and have elected for operation to perform repeat washout exploration debridement of necrotizing fasciitis.    Description of Procedure: After consent was obtained, patient was taken to the OR. The right thigh medially was prepped and draped in a standard sterile fashion after general anesthesia was started.  Patient was placed in lithotomy position. Time out was performed. Antibiotics were started with around the clock vancomycin. We began the procedure by removing the prior packing after prepping and draping and exploring the wound.  There were multiple purulent pockets extending circumferentially these were all explored the skin incisions were expanded to track these and they were washed out thoroughly we did send more tissue for culture.  We then explored distally and found a concerning pocket that tracked deep to the fascia distally medially and then finally posteriorly towards the popliteal fossa and we washed out all of this purulent fluid because of the location of the anatomy we did not explore and I did speak with orthopedics who will join us on our subsequent take back for exploration towards the popliteal fossa.  Throughout the wound there was necrotic infected nonviable fascia that was all excised  and this extended medially towards the groin however we did not see any tracking purulent pockets going towards the groin or abdominal wall or cavity.  There were several vessels which we encountered and used 3-0 silk stick ties for suture ligation for control.  We then irrigated with 3 L of normal saline with a Pulsavac and packed the wound with saline soaked Kerlix including packing through the tracking pockets in tracts.  The wound measured 70 cm x 45 cm x 10 cm.  All counts were correct x2. Patient was awakened from anesthesia and taken to the recovery room in a stable condition having suffered no issues at this time.    Description of the findings of the procedure: necrotic fascia and purulent infection extending along multiple fascial tracts including toward the popliteal fossa/medial posterior thigh.    Wound measured 70 cm x 45 cm x 10 cm    Estimated Blood Loss: 500 mL         Specimens:   Specimen (24h ago, onward)                 Start     Ordered    12/04/21 1223  Specimen to Pathology, Surgery General Surgery  Once        Comments: Pre-op Diagnosis: Necrotizing soft tissue infection [M79.89]    Procedure(s):  DEBRIDEMENT, LOWER EXTREMITY     Number of specimens: 1    Name of specimens: right thigh   References:    Click here for ordering Quick Tip   Question Answer Comment   Procedure Type: General Surgery    Specimen Class: Complex case/Special    Release to patient Immediate        12/04/21 1229                    Drains/Implants: saline soaked kerlix.

## 2021-12-04 NOTE — PLAN OF CARE
Pt NPO after midnight for debriedment procedure during dayshift. Pt had multiple bowel movements soft and brown. E-ICU MD notified and placed orders for Imodium. Pt dressing changed and premedicated for pain with fentanyl. Pt v/s stable throughout shift and free from fall and injuries.

## 2021-12-04 NOTE — PROGRESS NOTES
Pharmacokinetic Assessment Follow Up: IV Vancomycin    Vancomycin serum concentration assessment(s):    The trough level was drawn correctly and can be used to guide therapy at this time. The measurement is within the desired definitive target range of 10 to 20 mcg/mL.    Vancomycin Regimen Plan:    Continue regimen to Vancomycin 2000 mg IV every 12 hours with next serum trough concentration measured at 1130 prior to 4th dose on 12/6    Drug levels (last 3 results):  Recent Labs   Lab Result Units 12/01/21  2329 12/03/21  2347   Vancomycin-Trough ug/mL 14.0 17.3       Pharmacy will continue to follow and monitor vancomycin.    Please contact pharmacy at extension [phone] for questions regarding this assessment.    Thank you for the consult,   Linwood Gaona       Patient brief summary:  Shauna Queen is a 36 y.o. female initiated on antimicrobial therapy with IV Vancomycin for treatment of skin & soft tissue infection    The patient's current regimen is 2000 mg q12h    Drug Allergies:   Review of patient's allergies indicates:  No Known Allergies    Actual Body Weight:   224 kg    Renal Function:   Estimated Creatinine Clearance: 216.8 mL/min (based on SCr of 0.7 mg/dL).,     Dialysis Method (if applicable):  N/A    CBC (last 72 hours):  Recent Labs   Lab Result Units 12/01/21  0524 12/02/21  0428 12/02/21  1821 12/03/21  0440   WBC K/uL  --  18.78* 24.06* 23.80*   Hemoglobin g/dL  --  9.0* 9.1* 8.9*   Hemoglobin A1C % >14.0*  --   --   --    Hematocrit %  --  27.5* 28.4* 26.7*   Platelets K/uL  --  228 215 208   Gran % %  --  58.0 63.0 66.0   Lymph % %  --  21.0 18.0 10.0*   Mono % %  --  13.0 3.0* 9.0   Eosinophil % %  --  0.0 0.0 0.0   Basophil % %  --  0.0 0.0 0.0   Differential Method   --  Manual Manual Manual       Metabolic Panel (last 72 hours):  Recent Labs   Lab Result Units 12/01/21  0524 12/02/21  0428 12/03/21  0440   Sodium mmol/L 138 137 135*   Potassium mmol/L 3.5 3.0* 4.1   Chloride mmol/L 106  106 104   CO2 mmol/L 23 26 22*   Glucose mg/dL 324* 257* 318*   BUN mg/dL 9 7 7   Creatinine mg/dL 0.8 0.7 0.7   Phosphorus mg/dL 1.3* 1.4* 2.1*       Vancomycin Administrations:  vancomycin given in the last 96 hours                     vancomycin (VANCOCIN) 2,000 mg in dextrose 5 % 500 mL IVPB (mg) 2,000 mg New Bag 12/04/21 0029      Restarted 12/03/21 1343      Restarted  1203     2,000 mg New Bag  1153      Restarted  0216     2,000 mg New Bag  0059     2,000 mg New Bag 12/02/21 1214     2,000 mg New Bag  0043      Restarted 12/01/21 1354     2,000 mg New Bag  1209     2,000 mg New Bag  0054      Restarted 11/30/21 1326     2,000 mg New Bag  1207                    Microbiologic Results:  Microbiology Results (last 7 days)       Procedure Component Value Units Date/Time    Blood culture [[id number]] Collected: 11/30/21 0436    Order Status: Completed Specimen: Blood Updated: 12/03/21 0503     Blood Culture, Routine No Growth to date      No Growth to date      No Growth to date      No Growth to date    Blood culture [[id number]] Collected: 11/30/21 0436    Order Status: Completed Specimen: Blood Updated: 12/03/21 0503     Blood Culture, Routine No Growth to date      No Growth to date      No Growth to date      No Growth to date    Culture, Anaerobe [[id number]] Collected: 11/30/21 1635    Order Status: Completed Specimen: Abscess from Leg, Right Updated: 12/02/21 1234     Anaerobic Culture Culture in progress    Narrative:      Right Inner thigh abcess    Aerobic culture [[id number]]  (Abnormal)  (Susceptibility) Collected: 11/30/21 1635    Order Status: Completed Specimen: Abscess from Leg, Right Updated: 12/02/21 1207     Aerobic Bacterial Culture ENTEROCOCCUS FAECALIS  Many      Narrative:      Right Inner thigh abcess    Fungus culture [[id number]] Collected: 11/30/21 1635    Order Status: Sent Specimen: Abscess from Leg, Right Updated: 11/30/21 1933

## 2021-12-04 NOTE — SUBJECTIVE & OBJECTIVE
Interval History:   Went to OR for debridement today, very concerning for significant necrotizing fascitis with extension to knee. Patient seen after OR, pain is controlled currently. Just sleepy.    Review of Systems   Constitutional: Negative for fever.   HENT: Negative.    Eyes: Negative.    Respiratory: Negative for cough and shortness of breath.    Cardiovascular: Negative for chest pain and leg swelling.   Gastrointestinal: Negative for nausea and vomiting.   Skin: Positive for wound.     Objective:     Vital Signs (Most Recent):  Temp: 98.1 °F (36.7 °C) (12/04/21 1400)  Pulse: (!) 124 (12/04/21 1357)  Resp: 20 (12/04/21 1357)  BP: 123/69 (12/04/21 1357)  SpO2: 100 % (12/04/21 1357) Vital Signs (24h Range):  Temp:  [98.1 °F (36.7 °C)-98.9 °F (37.2 °C)] 98.1 °F (36.7 °C)  Pulse:  [109-129] 124  Resp:  [5-33] 20  SpO2:  [97 %-100 %] 100 %  BP: ()/(51-69) 123/69     Weight: (!) 224 kg (493 lb 13.3 oz)  Body mass index is 84.77 kg/m².    Intake/Output Summary (Last 24 hours) at 12/4/2021 1527  Last data filed at 12/4/2021 1438  Gross per 24 hour   Intake 3863.82 ml   Output 5120 ml   Net -1256.18 ml      Physical Exam  Vitals and nursing note reviewed.   Constitutional:       General: She is not in acute distress.     Appearance: Normal appearance. She is obese.   HENT:      Head: Normocephalic and atraumatic.   Cardiovascular:      Rate and Rhythm: Regular rhythm. Tachycardia present.   Pulmonary:      Effort: Pulmonary effort is normal. No respiratory distress.      Breath sounds: No wheezing.   Musculoskeletal:         General: No swelling. Normal range of motion.      Cervical back: Normal range of motion and neck supple.   Skin:     General: Skin is warm and dry.      Comments: Right upper thigh incision with packing. C/d   Neurological:      General: No focal deficit present.      Mental Status: She is alert and oriented to person, place, and time.   Psychiatric:         Mood and Affect: Mood normal.          Behavior: Behavior normal.         Thought Content: Thought content normal.         Significant Labs:   All pertinent labs within the past 24 hours have been reviewed.  BMP:   Recent Labs   Lab 12/04/21  0521   *      K 4.0      CO2 23   BUN 5*   CREATININE 0.7   CALCIUM 7.2*     CBC:   Recent Labs   Lab 12/02/21  1821 12/03/21  0440 12/04/21  0521   WBC 24.06* 23.80* 21.77*   HGB 9.1* 8.9* 9.0*   HCT 28.4* 26.7* 26.7*    208 213

## 2021-12-04 NOTE — NURSING
Received from OR via bed. VSS. Patient alert and oriented x 4. Denies pain @ this time. Mother @ bedside. Rajput intact with adequate amt of clear yellow urine noted in gu bag. Dressing to RLE C/D/I. Safety and comfort measures provided.

## 2021-12-04 NOTE — PROGRESS NOTES
"[facility] Intensive Care  [facility] Medicine  Progress Note    Patient Name: Shauna Queen  MRN: [id number]  Patient Class: IP- Inpatient   Admission Date: 11/30/2021  Length of Stay: 4 days  Attending Physician: Jaya Ibarra MD  Primary Care Provider: Jorge Dennis MD        Subjective:     Principal Problem:Necrotizing fasciitis        HPI:  Ms. Queen is a 36-year-old presents to the ED from outside hospital for abscess.  She has initially presented to outside hospital for DKA which resolved.  Patient was unaware of why she was here in July prompted her.  Per the checkout are received: "36-year-old female history of diabetes mellitus, sleep apnea on CPAP, schizophrenia, hypertension, and hyperlipidemia admitted to [facility] on November 24 from [facility] for treatment of diabetic ketoacidosis. She was initiated on an insulin infusion. DKA improved and she was transitioned off the insulin infusion. She again developed DKA and was placed back on the insulin infusion for a period of time. Currently she is off the insulin infusion and on subcutaneous insulin. She was noted to have right upper/inner thigh pain with erythema, induration, and tenderness to palpation. She was treated with IV clindamycin and vancomycin. Due to her size ((BMI 74.32) they are unable to perform CT or MRI. General Surgery there performed an incision and drainage with purulent material found. She was taken to the OR on November 29 for a second-look at the thigh wound. They were only able to do superficial debridement. Postprocedure she extubated to 2 L nasal cannula and was awake with oxygen saturation 100 percent. Surgery at their facility recommended transfer to a facility better able to perform procedures on a patient with her BMI. Referring provider spoke with General Surgery at [facility]. Hospital Medicine was asked to admit for further treatment. I spoke with her current attending. He noted she has no " "alteration in mentation. Lower extremity is intact from a neurovascular standpoint. She remains in the ICU."      Overview/Hospital Course:  Patient admitted to the hospital as a transfer secondary to R thigh abscess and a BMI of Body mass index is 84.77 kg/m².   Patient transferred here for surgical evaluation.  Patient went for surgical debridement on 11/30/21. Patient continued on Abx. Wound cultures sent. Blood cultures were NG. PT/OT consulted. Underwent another debridement on 12/2 and again on 12/4 - noting significant necrotizing soft tissue infections concerning for tracking down into her knee. On broad spectrum antibiotics. Surgery recommending anti-fungals. Plan for OR with Orthopedic surgery to explore right knee on 12/5.      Interval History:   Went to OR for debridement today, very concerning for significant necrotizing fascitis with extension to knee. Patient seen after OR, pain is controlled currently. Just sleepy.    Review of Systems   Constitutional: Negative for fever.   HENT: Negative.    Eyes: Negative.    Respiratory: Negative for cough and shortness of breath.    Cardiovascular: Negative for chest pain and leg swelling.   Gastrointestinal: Negative for nausea and vomiting.   Skin: Positive for wound.     Objective:     Vital Signs (Most Recent):  Temp: 98.1 °F (36.7 °C) (12/04/21 1400)  Pulse: (!) 124 (12/04/21 1357)  Resp: 20 (12/04/21 1357)  BP: 123/69 (12/04/21 1357)  SpO2: 100 % (12/04/21 1357) Vital Signs (24h Range):  Temp:  [98.1 °F (36.7 °C)-98.9 °F (37.2 °C)] 98.1 °F (36.7 °C)  Pulse:  [109-129] 124  Resp:  [5-33] 20  SpO2:  [97 %-100 %] 100 %  BP: ()/(51-69) 123/69     Weight: (!) 224 kg (493 lb 13.3 oz)  Body mass index is 84.77 kg/m².    Intake/Output Summary (Last 24 hours) at 12/4/2021 1527  Last data filed at 12/4/2021 1438  Gross per 24 hour   Intake 3863.82 ml   Output 5120 ml   Net -1256.18 ml      Physical Exam  Vitals and nursing note reviewed.   Constitutional:      "  General: She is not in acute distress.     Appearance: Normal appearance. She is obese.   HENT:      Head: Normocephalic and atraumatic.   Cardiovascular:      Rate and Rhythm: Regular rhythm. Tachycardia present.   Pulmonary:      Effort: Pulmonary effort is normal. No respiratory distress.      Breath sounds: No wheezing.   Musculoskeletal:         General: No swelling. Normal range of motion.      Cervical back: Normal range of motion and neck supple.   Skin:     General: Skin is warm and dry.      Comments: Right upper thigh incision with packing. C/d   Neurological:      General: No focal deficit present.      Mental Status: She is alert and oriented to person, place, and time.   Psychiatric:         Mood and Affect: Mood normal.         Behavior: Behavior normal.         Thought Content: Thought content normal.         Significant Labs:   All pertinent labs within the past 24 hours have been reviewed.  BMP:   Recent Labs   Lab 12/04/21  0521   *      K 4.0      CO2 23   BUN 5*   CREATININE 0.7   CALCIUM 7.2*     CBC:   Recent Labs   Lab 12/02/21  1821 12/03/21  0440 12/04/21  0521   WBC 24.06* 23.80* 21.77*   HGB 9.1* 8.9* 9.0*   HCT 28.4* 26.7* 26.7*    208 213           Assessment/Plan:      * Necrotizing fasciitis  - Status post I&D with General surgery at outside hospital, did not feel like they completely removed abscess  - Started on vanc/Zosyn  - Blood cultures pending, negative to date so far  - Lactate normal  - Consult general surgery   - Underwent debridement in OR 11/30, 12/2, and 12/4 -- planning on another debridement on 12/5 with Orthopedic Surgery for knee exploration  - added Amphotericin for concern for fungal infection  - ID consulted for further antibiotic recommendations  - Pain control          Obesity  Body mass index is 84.77 kg/m².  Weight loss as out patient         Hypophosphatemia  - persistent  - started on phos-nak with meals      Anemia of chronic  disease  No acute issues   - had some blood loss during OR - received 1 unit pRBC      Debility  PT/OT   Does not use a walker or cane   - see after procedure      HLD (hyperlipidemia)  - reported but patient is not sure what medications she is on  - check am lipid panel      DANG (obstructive sleep apnea)  Patient likely has component of OHS as well  - outpatient sleep study      DM (diabetes mellitus)  A1c:   Lab Results   Component Value Date    HGBA1C >14.0 (H) 12/01/2021     Meds: basal bolus insulin + SSI PRN to maintain goal 140-180  ADA diet, accuchecks ACHS, hypoglycemic protocol    - very uncontrolled, will need to  this patient on need for diet and medication adherence        VTE Risk Mitigation (From admission, onward)         Ordered     IP VTE LOW RISK PATIENT  Once         11/30/21 0403     Place sequential compression device  Until discontinued         11/30/21 0403                Discharge Planning   STEVEN:      Code Status: Full Code   Is the patient medically ready for discharge?:     Reason for patient still in hospital (select all that apply): Treatment  Discharge Plan A: Home            Critical care time spent on the evaluation and treatment of severe organ dysfunction, review of pertinent labs and imaging studies, discussions with consulting providers and discussions with patient/family: 45 minutes.      Jaya Ibarra MD  Department of Hospital Medicine   [facility] - Intensive Care

## 2021-12-04 NOTE — NURSING
Received pt from ICU.Report given to RN Patient resting quietly, no apparent distress noted at this time. Wheels locked in lowest position, call light within reach, Telemetry monitoring in place. . Dr. Ibarra notified. Will continue to monitor.

## 2021-12-05 ENCOUNTER — ANESTHESIA (OUTPATIENT)
Dept: SURGERY | Facility: HOSPITAL | Age: 36
DRG: 463 | End: 2021-12-05
Payer: COMMERCIAL

## 2021-12-05 LAB
ABO + RH BLD: NORMAL
ANION GAP SERPL CALC-SCNC: 7 MMOL/L (ref 8–16)
ANISOCYTOSIS BLD QL SMEAR: SLIGHT
BASOPHILS # BLD AUTO: ABNORMAL K/UL (ref 0–0.2)
BASOPHILS NFR BLD: 0 % (ref 0–1.9)
BASOPHILS NFR BLD: 0 % (ref 0–1.9)
BLD GP AB SCN CELLS X3 SERPL QL: NORMAL
BLD PROD TYP BPU: NORMAL
BLOOD UNIT EXPIRATION DATE: NORMAL
BLOOD UNIT TYPE CODE: 9500
BLOOD UNIT TYPE: NORMAL
BUN SERPL-MCNC: 5 MG/DL (ref 6–20)
CALCIUM SERPL-MCNC: 6.6 MG/DL (ref 8.7–10.5)
CHLORIDE SERPL-SCNC: 104 MMOL/L (ref 95–110)
CO2 SERPL-SCNC: 25 MMOL/L (ref 23–29)
CODING SYSTEM: NORMAL
CREAT SERPL-MCNC: 0.7 MG/DL (ref 0.5–1.4)
DIFFERENTIAL METHOD: ABNORMAL
DIFFERENTIAL METHOD: ABNORMAL
DISPENSE STATUS: NORMAL
EOSINOPHIL # BLD AUTO: ABNORMAL K/UL (ref 0–0.5)
EOSINOPHIL NFR BLD: 0 % (ref 0–8)
EOSINOPHIL NFR BLD: 0 % (ref 0–8)
ERYTHROCYTE [DISTWIDTH] IN BLOOD BY AUTOMATED COUNT: 14.6 % (ref 11.5–14.5)
ERYTHROCYTE [DISTWIDTH] IN BLOOD BY AUTOMATED COUNT: 14.9 % (ref 11.5–14.5)
EST. GFR  (AFRICAN AMERICAN): >60 ML/MIN/1.73 M^2
EST. GFR  (NON AFRICAN AMERICAN): >60 ML/MIN/1.73 M^2
GLUCOSE SERPL-MCNC: 287 MG/DL (ref 70–110)
GRAM STN SPEC: NORMAL
HCT VFR BLD AUTO: 19.2 % (ref 37–48.5)
HCT VFR BLD AUTO: 23.4 % (ref 37–48.5)
HGB BLD-MCNC: 6.3 G/DL (ref 12–16)
HGB BLD-MCNC: 7.8 G/DL (ref 12–16)
IMM GRANULOCYTES # BLD AUTO: ABNORMAL K/UL (ref 0–0.04)
IMM GRANULOCYTES # BLD AUTO: ABNORMAL K/UL (ref 0–0.04)
IMM GRANULOCYTES NFR BLD AUTO: ABNORMAL % (ref 0–0.5)
IMM GRANULOCYTES NFR BLD AUTO: ABNORMAL % (ref 0–0.5)
LYMPHOCYTES # BLD AUTO: ABNORMAL K/UL (ref 1–4.8)
LYMPHOCYTES NFR BLD: 8 % (ref 18–48)
LYMPHOCYTES NFR BLD: 8 % (ref 18–48)
MCH RBC QN AUTO: 27.6 PG (ref 27–31)
MCH RBC QN AUTO: 28.7 PG (ref 27–31)
MCHC RBC AUTO-ENTMCNC: 32.8 G/DL (ref 32–36)
MCHC RBC AUTO-ENTMCNC: 33.3 G/DL (ref 32–36)
MCV RBC AUTO: 84 FL (ref 82–98)
MCV RBC AUTO: 86 FL (ref 82–98)
METAMYELOCYTES NFR BLD MANUAL: 3 %
MONOCYTES # BLD AUTO: ABNORMAL K/UL (ref 0.3–1)
MONOCYTES NFR BLD: 6 % (ref 4–15)
MONOCYTES NFR BLD: 6 % (ref 4–15)
MYELOCYTES NFR BLD MANUAL: 2 %
NEUTROPHILS NFR BLD: 61 % (ref 38–73)
NEUTROPHILS NFR BLD: 82 % (ref 38–73)
NEUTS BAND NFR BLD MANUAL: 20 %
NEUTS BAND NFR BLD MANUAL: 4 %
NRBC BLD-RTO: 3 /100 WBC
NRBC BLD-RTO: 3 /100 WBC
PHOSPHATE SERPL-MCNC: 1.6 MG/DL (ref 2.7–4.5)
PLATELET # BLD AUTO: 177 K/UL (ref 150–450)
PLATELET # BLD AUTO: 178 K/UL (ref 150–450)
PLATELET BLD QL SMEAR: ABNORMAL
PMV BLD AUTO: 10.1 FL (ref 9.2–12.9)
PMV BLD AUTO: 10.9 FL (ref 9.2–12.9)
POCT GLUCOSE: 300 MG/DL (ref 70–110)
POCT GLUCOSE: 303 MG/DL (ref 70–110)
POCT GLUCOSE: 378 MG/DL (ref 70–110)
POCT GLUCOSE: 381 MG/DL (ref 70–110)
POLYCHROMASIA BLD QL SMEAR: ABNORMAL
POLYCHROMASIA BLD QL SMEAR: ABNORMAL
POTASSIUM SERPL-SCNC: 3.3 MMOL/L (ref 3.5–5.1)
RBC # BLD AUTO: 2.28 M/UL (ref 4–5.4)
RBC # BLD AUTO: 2.72 M/UL (ref 4–5.4)
SODIUM SERPL-SCNC: 136 MMOL/L (ref 136–145)
TOXIC GRANULES BLD QL SMEAR: PRESENT
TRANS ERYTHROCYTES VOL PATIENT: NORMAL ML
WBC # BLD AUTO: 23.91 K/UL (ref 3.9–12.7)
WBC # BLD AUTO: 52.51 K/UL (ref 3.9–12.7)

## 2021-12-05 PROCEDURE — 25000003 PHARM REV CODE 250: Performed by: INTERNAL MEDICINE

## 2021-12-05 PROCEDURE — 80048 BASIC METABOLIC PNL TOTAL CA: CPT | Performed by: INTERNAL MEDICINE

## 2021-12-05 PROCEDURE — 27000190 HC CPAP FULL FACE MASK W/VALVE

## 2021-12-05 PROCEDURE — 25000003 PHARM REV CODE 250: Performed by: STUDENT IN AN ORGANIZED HEALTH CARE EDUCATION/TRAINING PROGRAM

## 2021-12-05 PROCEDURE — 36000707: Performed by: SURGERY

## 2021-12-05 PROCEDURE — 63600175 PHARM REV CODE 636 W HCPCS: Performed by: INTERNAL MEDICINE

## 2021-12-05 PROCEDURE — 25000003 PHARM REV CODE 250: Performed by: HOSPITALIST

## 2021-12-05 PROCEDURE — 36415 COLL VENOUS BLD VENIPUNCTURE: CPT | Performed by: INTERNAL MEDICINE

## 2021-12-05 PROCEDURE — 37000009 HC ANESTHESIA EA ADD 15 MINS: Performed by: SURGERY

## 2021-12-05 PROCEDURE — 84100 ASSAY OF PHOSPHORUS: CPT | Performed by: INTERNAL MEDICINE

## 2021-12-05 PROCEDURE — 85060 PATHOLOGIST REVIEW: ICD-10-PCS | Mod: ,,, | Performed by: PATHOLOGY

## 2021-12-05 PROCEDURE — 11042 PR DEBRIDEMENT, SKIN, SUB-Q TISSUE,=<20 SQ CM: ICD-10-PCS | Mod: ,,, | Performed by: SURGERY

## 2021-12-05 PROCEDURE — 86920 COMPATIBILITY TEST SPIN: CPT | Performed by: INTERNAL MEDICINE

## 2021-12-05 PROCEDURE — 63600175 PHARM REV CODE 636 W HCPCS: Performed by: NURSE ANESTHETIST, CERTIFIED REGISTERED

## 2021-12-05 PROCEDURE — 63600175 PHARM REV CODE 636 W HCPCS: Performed by: STUDENT IN AN ORGANIZED HEALTH CARE EDUCATION/TRAINING PROGRAM

## 2021-12-05 PROCEDURE — 86920 COMPATIBILITY TEST SPIN: CPT | Performed by: STUDENT IN AN ORGANIZED HEALTH CARE EDUCATION/TRAINING PROGRAM

## 2021-12-05 PROCEDURE — 20000000 HC ICU ROOM

## 2021-12-05 PROCEDURE — P9021 RED BLOOD CELLS UNIT: HCPCS | Performed by: INTERNAL MEDICINE

## 2021-12-05 PROCEDURE — 11045 DBRDMT SUBQ TISS EACH ADDL: CPT | Mod: ,,, | Performed by: SURGERY

## 2021-12-05 PROCEDURE — 99900035 HC TECH TIME PER 15 MIN (STAT)

## 2021-12-05 PROCEDURE — 11042 DBRDMT SUBQ TIS 1ST 20SQCM/<: CPT | Mod: ,,, | Performed by: SURGERY

## 2021-12-05 PROCEDURE — 94761 N-INVAS EAR/PLS OXIMETRY MLT: CPT

## 2021-12-05 PROCEDURE — 36415 COLL VENOUS BLD VENIPUNCTURE: CPT | Performed by: STUDENT IN AN ORGANIZED HEALTH CARE EDUCATION/TRAINING PROGRAM

## 2021-12-05 PROCEDURE — 36000706: Performed by: SURGERY

## 2021-12-05 PROCEDURE — 11045 PR DEB SUBQ TISSUE ADD-ON: ICD-10-PCS | Mod: ,,, | Performed by: SURGERY

## 2021-12-05 PROCEDURE — D9220A PRA ANESTHESIA: Mod: CRNA,,, | Performed by: NURSE ANESTHETIST, CERTIFIED REGISTERED

## 2021-12-05 PROCEDURE — D9220A PRA ANESTHESIA: ICD-10-PCS | Mod: ANES,,, | Performed by: ANESTHESIOLOGY

## 2021-12-05 PROCEDURE — 25000003 PHARM REV CODE 250: Performed by: NURSE ANESTHETIST, CERTIFIED REGISTERED

## 2021-12-05 PROCEDURE — D9220A PRA ANESTHESIA: Mod: ANES,,, | Performed by: ANESTHESIOLOGY

## 2021-12-05 PROCEDURE — 27201423 OPTIME MED/SURG SUP & DEVICES STERILE SUPPLY: Performed by: SURGERY

## 2021-12-05 PROCEDURE — 27000221 HC OXYGEN, UP TO 24 HOURS

## 2021-12-05 PROCEDURE — 85007 BL SMEAR W/DIFF WBC COUNT: CPT | Mod: 91 | Performed by: STUDENT IN AN ORGANIZED HEALTH CARE EDUCATION/TRAINING PROGRAM

## 2021-12-05 PROCEDURE — 85027 COMPLETE CBC AUTOMATED: CPT | Mod: 91 | Performed by: STUDENT IN AN ORGANIZED HEALTH CARE EDUCATION/TRAINING PROGRAM

## 2021-12-05 PROCEDURE — D9220A PRA ANESTHESIA: ICD-10-PCS | Mod: CRNA,,, | Performed by: NURSE ANESTHETIST, CERTIFIED REGISTERED

## 2021-12-05 PROCEDURE — 94660 CPAP INITIATION&MGMT: CPT

## 2021-12-05 PROCEDURE — 86850 RBC ANTIBODY SCREEN: CPT | Performed by: INTERNAL MEDICINE

## 2021-12-05 PROCEDURE — P9021 RED BLOOD CELLS UNIT: HCPCS | Performed by: STUDENT IN AN ORGANIZED HEALTH CARE EDUCATION/TRAINING PROGRAM

## 2021-12-05 PROCEDURE — 85060 BLOOD SMEAR INTERPRETATION: CPT | Mod: ,,, | Performed by: PATHOLOGY

## 2021-12-05 PROCEDURE — 85007 BL SMEAR W/DIFF WBC COUNT: CPT | Performed by: INTERNAL MEDICINE

## 2021-12-05 PROCEDURE — 85027 COMPLETE CBC AUTOMATED: CPT | Performed by: INTERNAL MEDICINE

## 2021-12-05 PROCEDURE — 37000008 HC ANESTHESIA 1ST 15 MINUTES: Performed by: SURGERY

## 2021-12-05 RX ORDER — HYDROCODONE BITARTRATE AND ACETAMINOPHEN 500; 5 MG/1; MG/1
TABLET ORAL
Status: DISCONTINUED | OUTPATIENT
Start: 2021-12-05 | End: 2021-12-06

## 2021-12-05 RX ORDER — PROPOFOL 10 MG/ML
VIAL (ML) INTRAVENOUS
Status: DISCONTINUED | OUTPATIENT
Start: 2021-12-05 | End: 2021-12-05

## 2021-12-05 RX ORDER — ONDANSETRON 2 MG/ML
INJECTION INTRAMUSCULAR; INTRAVENOUS
Status: DISCONTINUED | OUTPATIENT
Start: 2021-12-05 | End: 2021-12-05

## 2021-12-05 RX ORDER — FENTANYL CITRATE 50 UG/ML
INJECTION, SOLUTION INTRAMUSCULAR; INTRAVENOUS
Status: DISCONTINUED | OUTPATIENT
Start: 2021-12-05 | End: 2021-12-05

## 2021-12-05 RX ORDER — SODIUM CHLORIDE 0.9 % (FLUSH) 0.9 %
3 SYRINGE (ML) INJECTION
Status: CANCELLED | OUTPATIENT
Start: 2021-12-05

## 2021-12-05 RX ORDER — KETAMINE HYDROCHLORIDE 100 MG/ML
INJECTION, SOLUTION INTRAMUSCULAR; INTRAVENOUS
Status: DISCONTINUED | OUTPATIENT
Start: 2021-12-05 | End: 2021-12-05

## 2021-12-05 RX ORDER — PHENYLEPHRINE HYDROCHLORIDE 10 MG/ML
INJECTION INTRAVENOUS
Status: DISCONTINUED | OUTPATIENT
Start: 2021-12-05 | End: 2021-12-05

## 2021-12-05 RX ORDER — SUCCINYLCHOLINE CHLORIDE 20 MG/ML
INJECTION INTRAMUSCULAR; INTRAVENOUS
Status: DISCONTINUED | OUTPATIENT
Start: 2021-12-05 | End: 2021-12-05

## 2021-12-05 RX ORDER — FENTANYL CITRATE 50 UG/ML
25 INJECTION, SOLUTION INTRAMUSCULAR; INTRAVENOUS EVERY 5 MIN PRN
Status: CANCELLED | OUTPATIENT
Start: 2021-12-05

## 2021-12-05 RX ORDER — LIDOCAINE HYDROCHLORIDE 20 MG/ML
INJECTION INTRAVENOUS
Status: DISCONTINUED | OUTPATIENT
Start: 2021-12-05 | End: 2021-12-05

## 2021-12-05 RX ORDER — ROCURONIUM BROMIDE 10 MG/ML
INJECTION, SOLUTION INTRAVENOUS
Status: DISCONTINUED | OUTPATIENT
Start: 2021-12-05 | End: 2021-12-05

## 2021-12-05 RX ORDER — HYDROCODONE BITARTRATE AND ACETAMINOPHEN 500; 5 MG/1; MG/1
TABLET ORAL
Status: DISCONTINUED | OUTPATIENT
Start: 2021-12-05 | End: 2021-12-08

## 2021-12-05 RX ORDER — HYDROMORPHONE HYDROCHLORIDE 2 MG/ML
0.2 INJECTION, SOLUTION INTRAMUSCULAR; INTRAVENOUS; SUBCUTANEOUS EVERY 5 MIN PRN
Status: CANCELLED | OUTPATIENT
Start: 2021-12-05

## 2021-12-05 RX ORDER — ALPRAZOLAM 0.25 MG/1
0.25 TABLET ORAL 2 TIMES DAILY PRN
Status: DISPENSED | OUTPATIENT
Start: 2021-12-05 | End: 2021-12-06

## 2021-12-05 RX ADMIN — ROCURONIUM BROMIDE 30 MG: 10 INJECTION, SOLUTION INTRAVENOUS at 09:12

## 2021-12-05 RX ADMIN — HYDROCODONE BITARTRATE AND ACETAMINOPHEN 1 TABLET: 5; 325 TABLET ORAL at 10:12

## 2021-12-05 RX ADMIN — ENOXAPARIN SODIUM 40 MG: 40 INJECTION SUBCUTANEOUS at 04:12

## 2021-12-05 RX ADMIN — SUGAMMADEX 400 MG: 100 INJECTION, SOLUTION INTRAVENOUS at 10:12

## 2021-12-05 RX ADMIN — INSULIN ASPART 10 UNITS: 100 INJECTION, SOLUTION INTRAVENOUS; SUBCUTANEOUS at 12:12

## 2021-12-05 RX ADMIN — KETAMINE HYDROCHLORIDE 20 MG: 100 INJECTION, SOLUTION, CONCENTRATE INTRAMUSCULAR; INTRAVENOUS at 08:12

## 2021-12-05 RX ADMIN — LIDOCAINE HYDROCHLORIDE 60 MG: 20 INJECTION, SOLUTION INTRAVENOUS at 09:12

## 2021-12-05 RX ADMIN — INSULIN ASPART 8 UNITS: 100 INJECTION, SOLUTION INTRAVENOUS; SUBCUTANEOUS at 08:12

## 2021-12-05 RX ADMIN — PROPOFOL 200 MG: 10 INJECTION, EMULSION INTRAVENOUS at 09:12

## 2021-12-05 RX ADMIN — INSULIN ASPART 8 UNITS: 100 INJECTION, SOLUTION INTRAVENOUS; SUBCUTANEOUS at 04:12

## 2021-12-05 RX ADMIN — PHENYLEPHRINE HYDROCHLORIDE 200 MCG: 10 INJECTION INTRAVENOUS at 10:12

## 2021-12-05 RX ADMIN — PIPERACILLIN AND TAZOBACTAM 4.5 G: 4; .5 INJECTION, POWDER, LYOPHILIZED, FOR SOLUTION INTRAVENOUS; PARENTERAL at 12:12

## 2021-12-05 RX ADMIN — SODIUM CHLORIDE: 0.9 INJECTION, SOLUTION INTRAVENOUS at 11:12

## 2021-12-05 RX ADMIN — HYDROCODONE BITARTRATE AND ACETAMINOPHEN 1 TABLET: 5; 325 TABLET ORAL at 06:12

## 2021-12-05 RX ADMIN — KETAMINE HYDROCHLORIDE 20 MG: 100 INJECTION, SOLUTION, CONCENTRATE INTRAMUSCULAR; INTRAVENOUS at 09:12

## 2021-12-05 RX ADMIN — AMPHOTERICIN B 650 MG: 50 INJECTION, POWDER, LYOPHILIZED, FOR SOLUTION INTRAVENOUS at 05:12

## 2021-12-05 RX ADMIN — Medication 1 PACKET: at 04:12

## 2021-12-05 RX ADMIN — SUCCINYLCHOLINE CHLORIDE 200 MG: 20 INJECTION, SOLUTION INTRAMUSCULAR; INTRAVENOUS at 09:12

## 2021-12-05 RX ADMIN — ALPRAZOLAM 0.25 MG: 0.25 TABLET ORAL at 08:12

## 2021-12-05 RX ADMIN — Medication 1 PACKET: at 08:12

## 2021-12-05 RX ADMIN — FENTANYL CITRATE 25 MCG: 50 INJECTION, SOLUTION INTRAMUSCULAR; INTRAVENOUS at 09:12

## 2021-12-05 RX ADMIN — SODIUM CHLORIDE: 0.9 INJECTION, SOLUTION INTRAVENOUS at 07:12

## 2021-12-05 RX ADMIN — Medication 1 PACKET: at 11:12

## 2021-12-05 RX ADMIN — ONDANSETRON 4 MG: 2 INJECTION, SOLUTION INTRAMUSCULAR; INTRAVENOUS at 09:12

## 2021-12-05 RX ADMIN — VANCOMYCIN HYDROCHLORIDE 2000 MG: 10 INJECTION, POWDER, LYOPHILIZED, FOR SOLUTION INTRAVENOUS at 12:12

## 2021-12-05 RX ADMIN — HYDROCODONE BITARTRATE AND ACETAMINOPHEN 1 TABLET: 5; 325 TABLET ORAL at 08:12

## 2021-12-05 RX ADMIN — PIPERACILLIN AND TAZOBACTAM 4.5 G: 4; .5 INJECTION, POWDER, LYOPHILIZED, FOR SOLUTION INTRAVENOUS; PARENTERAL at 04:12

## 2021-12-05 RX ADMIN — HYDROCODONE BITARTRATE AND ACETAMINOPHEN 1 TABLET: 5; 325 TABLET ORAL at 01:12

## 2021-12-05 RX ADMIN — FENTANYL CITRATE 50 MCG: 50 INJECTION, SOLUTION INTRAMUSCULAR; INTRAVENOUS at 09:12

## 2021-12-05 RX ADMIN — PIPERACILLIN AND TAZOBACTAM 4.5 G: 4; .5 INJECTION, POWDER, LYOPHILIZED, FOR SOLUTION INTRAVENOUS; PARENTERAL at 07:12

## 2021-12-05 RX ADMIN — ENOXAPARIN SODIUM 40 MG: 40 INJECTION SUBCUTANEOUS at 12:12

## 2021-12-05 RX ADMIN — INSULIN ASPART 8 UNITS: 100 INJECTION, SOLUTION INTRAVENOUS; SUBCUTANEOUS at 12:12

## 2021-12-05 RX ADMIN — INSULIN ASPART 10 UNITS: 100 INJECTION, SOLUTION INTRAVENOUS; SUBCUTANEOUS at 04:12

## 2021-12-05 RX ADMIN — INSULIN DETEMIR 35 UNITS: 100 INJECTION, SOLUTION SUBCUTANEOUS at 08:12

## 2021-12-05 RX ADMIN — ALPRAZOLAM 0.25 MG: 0.25 TABLET ORAL at 03:12

## 2021-12-05 RX ADMIN — PIPERACILLIN AND TAZOBACTAM 4.5 G: 4; .5 INJECTION, POWDER, LYOPHILIZED, FOR SOLUTION INTRAVENOUS; PARENTERAL at 10:12

## 2021-12-05 NOTE — NURSING
Received patient via bed from surgery. VSS on non rebreather. Sats 100%. Will wean to NC. Patient denies pain @ this time. No stated concerns. Safety and comfort measures provided.

## 2021-12-05 NOTE — ASSESSMENT & PLAN NOTE
- Status post I&D with General surgery at outside hospital, did not feel like they completely removed abscess  - Started on vanc/Zosyn  - Blood cultures pending, negative to date so far  - Lactate normal  - Consult general surgery   - Underwent debridement in OR 11/30, 12/2, and 12/4, 12/5 - no extension into knee  - added Amphotericin for concern for fungal infection  - ID consulted for further antimicrobial recommendations  - Pain control  - Tissue culture growing Enteroccocus Faecalis, pending fungal cultures

## 2021-12-05 NOTE — PLAN OF CARE
[facility] Intensive Care  ICU Shift Summary  Date: 12/5/2021      COVID Test: (--)  Isolation: No active isolations     Prehospitalization: Home  Admit Date / LOS : 11/30/2021/ 5 days    Diagnosis: Necrotizing fasciitis    Consults:        Active: Gen Surg, OT, PT and Wound Care       Needed: N/A     Code Status: Full Code   Advanced Directive: <no information>    LDA: PICC       Central Lines/Site/Justification:Unable to Obtain/Maintain PIV       Urinary Cath/Order/Justification:Non Healing Sacral/Perineal Wound    Vasopressors/Infusions:    sodium chloride 0.9% 150 mL/hr at 12/05/21 1117          GOALS: Volume/ Hemodynamic: HR >                     RASS: 0  alert and calm    Pain Management: IV       Pain Controlled: yes     Rhythm: ST    Respiratory Device: Nasal Cannula                  Most Recent SBT/ SAT: N/A       MOVE Screen: PT Consult  ICU Liberation: no    VTE Prophylaxis: Pharm  Mobility: Bedrest  Stress Ulcer Prophylaxis: Yes    Dietary: PO  Tolerance: yes  Advancement: no    I & O (24h):    Intake/Output Summary (Last 24 hours) at 12/5/2021 1651  Last data filed at 12/5/2021 1630  Gross per 24 hour   Intake 3848.89 ml   Output 3110 ml   Net 738.89 ml        Restraints: No    Significant Dates:  Post Op Date: N/A  Rescue Date: N/A  Imaging/ Diagnostics: N/A    Noteworthy Labs:  none    CBC/Anemia Labs: Coags:    Recent Labs   Lab 12/04/21  0521 12/05/21  0345   WBC 21.77* 23.91*   HGB 9.0* 6.3*   HCT 26.7* 19.2*    178   MCV 82 84   RDW 14.9* 14.9*    Recent Labs   Lab 12/02/21  1821   INR 1.0   APTT 23.6        Chemistries:   Recent Labs   Lab 11/30/21  0914 11/30/21  0914 11/30/21  2142 12/01/21  0524 12/04/21  0521 12/05/21  0345      < > 139   < > 138 136   K 3.6   < > 4.0   < > 4.0 3.3*      < > 108   < > 106 104   CO2 22*   < > 20*   < > 23 25   BUN 9   < > 10   < > 5* 5*   CREATININE 0.6   < > 0.8   < > 0.7 0.7   CALCIUM 7.1*   < > 7.9*   < > 7.2* 6.6*   MG 2.2  --  2.2  --    --   --    PHOS 1.2*   < > 1.5*   < > 1.7* 1.6*    < > = values in this interval not displayed.        Cardiac Enzymes: Ejection Fractions:    No results for input(s): CPK, CPKMB, MB, TROPONINI in the last 72 hours. No results found for: EF     POCT Glucose: HbA1c:    Recent Labs   Lab 12/05/21  0747 12/05/21  1214 12/05/21  1611   POCTGLUCOSE 300* 381* 378*    Hemoglobin A1C   Date Value Ref Range Status   12/01/2021 >14.0 (H) 4.0 - 5.6 % Final     Comment:     ADA Screening Guidelines:  5.7-6.4%  Consistent with prediabetes  >or=6.5%  Consistent with diabetes    High levels of fetal hemoglobin interfere with the HbA1C  assay. Heterozygous hemoglobin variants (HbS, HgC, etc)do  not significantly interfere with this assay.   However, presence of multiple variants may affect accuracy.             ICU LOS 5h  Level of Care: Critical Care    Chart Check: 12 HR Done  Shift Summary/Plan for the shift: Will continue to monitor after wound debridement and monitor h/h

## 2021-12-05 NOTE — PROGRESS NOTES
"[facility] Intensive Care  [facility] Medicine  Progress Note    Patient Name: Shauna Queen  MRN: [id number]  Patient Class: IP- Inpatient   Admission Date: 11/30/2021  Length of Stay: 5 days  Attending Physician: Jaya Ibarra MD  Primary Care Provider: Jorge Dennis MD        Subjective:     Principal Problem:Necrotizing fasciitis        HPI:  Ms. Queen is a 36-year-old presents to the ED from outside hospital for abscess.  She has initially presented to outside hospital for DKA which resolved.  Patient was unaware of why she was here in July prompted her.  Per the checkout are received: "36-year-old female history of diabetes mellitus, sleep apnea on CPAP, schizophrenia, hypertension, and hyperlipidemia admitted [facility] November 24 from [facility] for treatment of diabetic ketoacidosis. She was initiated on an insulin infusion. DKA improved and she was transitioned off the insulin infusion. She again developed DKA and was placed back on the insulin infusion for a period of time. Currently she is off the insulin infusion and on subcutaneous insulin. She was noted to have right upper/inner thigh pain with erythema, induration, and tenderness to palpation. She was treated with IV clindamycin and vancomycin. Due to her size ((BMI 74.32) they are unable to perform CT or MRI. General Surgery there performed an incision and drainage with purulent material found. She was taken to the OR on November 29 for a second-look at the thigh wound. They were only able to do superficial debridement. Postprocedure she extubated to 2 L nasal cannula and was awake with oxygen saturation 100 percent. Surgery at their facility recommended transfer to a facility better able to perform procedures on a patient with her BMI. Referring provider spoke with General Surgery at [facility]. Hospital Medicine was asked to admit for further treatment. I spoke with her current attending. He noted she has no " "alteration in mentation. Lower extremity is intact from a neurovascular standpoint. She remains in the ICU."      Overview/Hospital Course:  Patient admitted to the hospital as a transfer secondary to R thigh abscess and a BMI of Body mass index is 84.77 kg/m².   Patient transferred here for surgical evaluation.  Patient went for surgical debridement on 11/30/21. Patient continued on Abx. Wound cultures sent. Blood cultures were NG. PT/OT consulted. Underwent another debridement on 12/2 and again on 12/4 - noting significant necrotizing soft tissue infections concerning for tracking down into her knee. On broad spectrum antibiotics. Surgery recommending anti-fungals. Plan for OR with Orthopedic surgery to explore right knee on 12/5.      Interval History:   Seen prior to surgery this morning. Hb drop post op which is to be expected, receiving blood. Discussed with Anesthesia and Gen Surgery and will monitor patient in ICU post OR today.    Review of Systems   Constitutional: Negative for fever.   HENT: Negative.    Eyes: Negative.    Respiratory: Negative for cough and shortness of breath.    Cardiovascular: Negative for chest pain and leg swelling.   Gastrointestinal: Negative for nausea and vomiting.   Skin: Positive for wound.     Objective:     Vital Signs (Most Recent):  Temp: 98.4 °F (36.9 °C) (12/05/21 1105)  Pulse: (!) 122 (12/05/21 1143)  Resp: 13 (12/05/21 1143)  BP: 110/63 (12/05/21 1105)  SpO2: 100 % (12/05/21 1143) Vital Signs (24h Range):  Temp:  [98.1 °F (36.7 °C)-99.7 °F (37.6 °C)] 98.4 °F (36.9 °C)  Pulse:  [111-130] 122  Resp:  [13-25] 13  SpO2:  [99 %-100 %] 100 %  BP: (110-130)/(61-82) 110/63     Weight: (!) 224 kg (493 lb 13.3 oz)  Body mass index is 84.77 kg/m².    Intake/Output Summary (Last 24 hours) at 12/5/2021 1205  Last data filed at 12/5/2021 1058  Gross per 24 hour   Intake 4809.89 ml   Output 3160 ml   Net 1649.89 ml      Physical Exam  Vitals and nursing note reviewed. "   Constitutional:       General: She is not in acute distress.     Appearance: Normal appearance. She is obese.   HENT:      Head: Normocephalic and atraumatic.   Cardiovascular:      Rate and Rhythm: Regular rhythm. Tachycardia present.   Pulmonary:      Effort: Pulmonary effort is normal. No respiratory distress.      Breath sounds: No wheezing.   Musculoskeletal:         General: No swelling. Normal range of motion.      Cervical back: Normal range of motion and neck supple.   Skin:     General: Skin is warm and dry.      Comments: Right upper thigh incision with packing. C/d   Neurological:      General: No focal deficit present.      Mental Status: She is alert and oriented to person, place, and time.   Psychiatric:         Mood and Affect: Mood normal.         Behavior: Behavior normal.         Thought Content: Thought content normal.         Significant Labs:   All pertinent labs within the past 24 hours have been reviewed.  BMP:   Recent Labs   Lab 12/05/21  0345   *      K 3.3*      CO2 25   BUN 5*   CREATININE 0.7   CALCIUM 6.6*     CBC:   Recent Labs   Lab 12/04/21  0521 12/05/21  0345   WBC 21.77* 23.91*   HGB 9.0* 6.3*   HCT 26.7* 19.2*    178           Assessment/Plan:      * Necrotizing fasciitis  - Status post I&D with General surgery at outside hospital, did not feel like they completely removed abscess  - Started on vanc/Zosyn  - Blood cultures pending, negative to date so far  - Lactate normal  - Consult general surgery   - Underwent debridement in OR 11/30, 12/2, and 12/4, 12/5 - no extension into knee  - added Amphotericin for concern for fungal infection  - ID consulted for further antimicrobial recommendations  - Pain control  - Tissue culture growing Enteroccocus Faecalis, pending fungal cultures          Obesity  Body mass index is 84.77 kg/m².  Weight loss as out patient         Hypophosphatemia  - persistent  - started on phos-nak with meals      Anemia of chronic  disease  No acute issues but now with post operative blood loss  - continue to transfuse if hb  <7      Debility  PT/OT   Does not use a walker or cane   - see after procedure      HLD (hyperlipidemia)  - reported but patient is not sure what medications she is on  - check am lipid panel      DANG (obstructive sleep apnea)  Patient likely has component of OHS as well  - outpatient sleep study      DM (diabetes mellitus)  A1c:   Lab Results   Component Value Date    HGBA1C >14.0 (H) 12/01/2021     Meds: basal bolus insulin + SSI PRN to maintain goal 140-180  ADA diet, accuchecks ACHS, hypoglycemic protocol    - very uncontrolled, will need to  this patient on need for diet and medication adherence        VTE Risk Mitigation (From admission, onward)         Ordered     enoxaparin injection 40 mg  Daily         12/04/21 2306     IP VTE LOW RISK PATIENT  Once         11/30/21 0403     Place sequential compression device  Until discontinued         11/30/21 0403                Discharge Planning   STEVEN:      Code Status: Full Code   Is the patient medically ready for discharge?:     Reason for patient still in hospital (select all that apply): Treatment  Discharge Plan A: Home            Critical care time spent on the evaluation and treatment of severe organ dysfunction, review of pertinent labs and imaging studies, discussions with consulting providers and discussions with patient/family: 35 minutes.      Jaya Ibarra MD  Department of Hospital Medicine   [facility] - Intensive Care

## 2021-12-05 NOTE — PLAN OF CARE
Problem: Adult Inpatient Plan of Care  Goal: Optimal Comfort and Wellbeing  Outcome: Ongoing, Progressing  Intervention: Provide Person-Centered Care  Flowsheets (Taken 12/4/2021 1835)  Trust Relationship/Rapport:   care explained   questions answered   questions encouraged   emotional support provided   reassurance provided     Problem: Diabetes Comorbidity  Goal: Blood Glucose Level Within Desired Range  Outcome: Ongoing, Progressing  Intervention: Maintain Glycemic Control  Flowsheets (Taken 12/4/2021 1835)  Glycemic Management: blood glucose monitoring     Problem: Bariatric Environmental Safety  Goal: Safety Maintained with Care  Outcome: Ongoing, Progressing

## 2021-12-05 NOTE — SUBJECTIVE & OBJECTIVE
Interval History:   Seen prior to surgery this morning. Hb drop post op which is to be expected, receiving blood. Discussed with Anesthesia and Gen Surgery and will monitor patient in ICU post OR today.    Review of Systems   Constitutional: Negative for fever.   HENT: Negative.    Eyes: Negative.    Respiratory: Negative for cough and shortness of breath.    Cardiovascular: Negative for chest pain and leg swelling.   Gastrointestinal: Negative for nausea and vomiting.   Skin: Positive for wound.     Objective:     Vital Signs (Most Recent):  Temp: 98.4 °F (36.9 °C) (12/05/21 1105)  Pulse: (!) 122 (12/05/21 1143)  Resp: 13 (12/05/21 1143)  BP: 110/63 (12/05/21 1105)  SpO2: 100 % (12/05/21 1143) Vital Signs (24h Range):  Temp:  [98.1 °F (36.7 °C)-99.7 °F (37.6 °C)] 98.4 °F (36.9 °C)  Pulse:  [111-130] 122  Resp:  [13-25] 13  SpO2:  [99 %-100 %] 100 %  BP: (110-130)/(61-82) 110/63     Weight: (!) 224 kg (493 lb 13.3 oz)  Body mass index is 84.77 kg/m².    Intake/Output Summary (Last 24 hours) at 12/5/2021 1205  Last data filed at 12/5/2021 1058  Gross per 24 hour   Intake 4809.89 ml   Output 3160 ml   Net 1649.89 ml      Physical Exam  Vitals and nursing note reviewed.   Constitutional:       General: She is not in acute distress.     Appearance: Normal appearance. She is obese.   HENT:      Head: Normocephalic and atraumatic.   Cardiovascular:      Rate and Rhythm: Regular rhythm. Tachycardia present.   Pulmonary:      Effort: Pulmonary effort is normal. No respiratory distress.      Breath sounds: No wheezing.   Musculoskeletal:         General: No swelling. Normal range of motion.      Cervical back: Normal range of motion and neck supple.   Skin:     General: Skin is warm and dry.      Comments: Right upper thigh incision with packing. C/d   Neurological:      General: No focal deficit present.      Mental Status: She is alert and oriented to person, place, and time.   Psychiatric:         Mood and Affect: Mood  normal.         Behavior: Behavior normal.         Thought Content: Thought content normal.         Significant Labs:   All pertinent labs within the past 24 hours have been reviewed.  BMP:   Recent Labs   Lab 12/05/21  0345   *      K 3.3*      CO2 25   BUN 5*   CREATININE 0.7   CALCIUM 6.6*     CBC:   Recent Labs   Lab 12/04/21  0521 12/05/21  0345   WBC 21.77* 23.91*   HGB 9.0* 6.3*   HCT 26.7* 19.2*    178

## 2021-12-05 NOTE — CONSULTS
"   Ortho Consult    No chief complaint on file.      History of present illness:    36-year-old female with history extreme morbid obesity presented with right thigh infection and necrotizing fasciitis.  She was admitted and has undergone multiple debridements of her right thigh.  I was consulted for assistance with possible distal and popliteal extension.    History reviewed. No pertinent past medical history.    Past Surgical History:   Procedure Laterality Date    DEBRIDEMENT OF LOWER EXTREMITY Right 12/2/2021    Procedure: DEBRIDEMENT, LOWER EXTREMITY;  Surgeon: Jorge Dupree MD;  Location: [facility] OR;  Service: General;  Laterality: Right;       Review of patient's allergies indicates:  No Known Allergies    Prior to Admission medications    Not on File       Social History     Occupational History    Not on file   Tobacco Use    Smoking status: Not on file    Smokeless tobacco: Not on file   Substance and Sexual Activity    Alcohol use: Not on file    Drug use: Not on file    Sexual activity: Not on file       Temp:  [98.1 °F (36.7 °C)-99.7 °F (37.6 °C)] 98.3 °F (36.8 °C)  Pulse:  [111-130] 117  Resp:  [16-23] 18  SpO2:  [99 %-100 %] 100 %  BP: (115-130)/(61-82) 116/63  Height: 5' 4" (162.6 cm)  Weight: (!) 224 kg (493 lb 13.3 oz)  BMI (Calculated): 84.7    Physical examination:    The patient was seen intraoperatively general anesthesia was being induced.  Right thigh revealed medial wound which is extensive and undergone debridement there is still some soupiness to the wound and some distal tracking down near the medial aspect of the right knee.      Recent Labs   Lab 12/05/21  0345   WBC 23.91*   RBC 2.28*   HGB 6.3*   HCT 19.2*      MCV 84   MCH 27.6   MCHC 32.8     Recent Labs   Lab 12/05/21  0345   CALCIUM 6.6*      K 3.3*   CO2 25      BUN 5*   CREATININE 0.7     No results for input(s): PT, INR, APTT in the last 24 hours.          Current Facility-Administered Medications: "     0.9%  NaCl infusion (for blood administration), , Intravenous, Q24H PRN, LEIGH Escobar MD    0.9%  NaCl infusion (for blood administration), , Intravenous, Q24H PRN, Jaya Ibarra MD    0.9%  NaCl infusion, , Intravenous, Continuous, Delgado Rangel MD, Last Rate: 150 mL/hr at 12/05/21 0724, New Bag at 12/05/21 0724    acetaminophen tablet 650 mg, 650 mg, Oral, Q4H PRN, Delgado Rangel MD    ALPRAZolam tablet 0.25 mg, 0.25 mg, Oral, BID PRN, Choco Gould, SAURAV, 0.25 mg at 12/05/21 0349    amphotericin B liposome (AMBISOME) 650 mg in dextrose 5 % 650 mL IVPB, 3 mg/kg, Intravenous, Q24H, Jaya Ibarra MD, Last Rate: 325 mL/hr at 12/04/21 1713, 650 mg at 12/04/21 1713    calcium carbonate 200 mg calcium (500 mg) chewable tablet 1,000 mg, 1,000 mg, Oral, Daily, Jaya Ibarra MD, 1,000 mg at 12/03/21 0843    dextrose 50% injection 12.5 g, 12.5 g, Intravenous, PRN, Delgado Rangel MD    enoxaparin injection 40 mg, 40 mg, Subcutaneous, Daily, Delgado Rangel MD, 40 mg at 12/05/21 0031    glucagon (human recombinant) injection 1 mg, 1 mg, Intramuscular, PRN, Delgado Rangel MD    HYDROcodone-acetaminophen 5-325 mg per tablet 1 tablet, 1 tablet, Oral, Q4H PRN, Delgado Rangel MD, 1 tablet at 12/05/21 0817    insulin aspart U-100 pen 1-10 Units, 1-10 Units, Subcutaneous, QID (AC + HS) PRN, Delgado Rangel MD, 5 Units at 12/04/21 2112    insulin aspart U-100 pen 8 Units, 8 Units, Subcutaneous, TIDWM, Jaya Ibarra MD, 8 Units at 12/04/21 1513    insulin detemir U-100 pen 35 Units, 35 Units, Subcutaneous, BID, Jaya Ibarra MD, 35 Units at 12/04/21 2054    loperamide capsule 2 mg, 2 mg, Oral, QID PRN, Anshul Perez MD, 2 mg at 12/04/21 0031    melatonin tablet 6 mg, 6 mg, Oral, Nightly PRN, Delgado Rangel MD    morphine injection 4 mg, 4 mg, Intravenous, Q6H PRN, Delgado Rangel MD, 4 mg at 11/30/21 0442    ondansetron injection 4  mg, 4 mg, Intravenous, Q8H PRN, Delgado Rangel MD    piperacillin-tazobactam 4.5 g in dextrose 5 % 100 mL IVPB (ready to mix system), 4.5 g, Intravenous, Q8H, Jaya Ibarra MD, Last Rate: 25 mL/hr at 12/05/21 0749, 4.5 g at 12/05/21 0749    potassium, sodium phosphates 280-160-250 mg packet 1 packet, 1 packet, Oral, QID (AC & HS), Delgado Rangel MD, 1 packet at 12/04/21 2102    sodium chloride 0.9% flush 10 mL, 10 mL, Intravenous, PRN, Ulysses Le MD    vancomycin (VANCOCIN) 2,000 mg in dextrose 5 % 500 mL IVPB, 2,000 mg, Intravenous, Q12H, Delgado Rangel MD, Last Rate: 250 mL/hr at 12/05/21 0030, 2,000 mg at 12/05/21 0030    Pharmacy to dose Vancomycin consult, , , Once **AND** vancomycin - pharmacy to dose, , Intravenous, pharmacy to manage frequency, Delgado Rangel MD    Facility-Administered Medications Ordered in Other Encounters:     fentaNYL 50 mcg/mL injection, , Intravenous, PRN, Burke Kitchen, CRNA, 25 mcg at 12/05/21 0940    ketamine injection, , Intravenous, PRN, Burke Kitchen, CRNA, 20 mg at 12/05/21 0944    LIDOcaine (cardiac) injection, , Intravenous, PRN, Burke Kitchen, CRNA, 60 mg at 12/05/21 0904    ondansetron injection, , Intravenous, PRN, Burke SAMUELS Imtiaz, CRNA, 4 mg at 12/05/21 0915    propofol (DIPRIVAN) 10 mg/mL infusion, , Intravenous, PRN, Burke SAMUELS Imtiaz, CRNA, 200 mg at 12/05/21 0904    rocuronium injection, , Intravenous, PRN, Burke SAMUELS Imtiaz, CRNA, 30 mg at 12/05/21 0930    succinylcholine injection, , Intravenous, PRN, Aj Imtiaz, CRNA, 200 mg at 12/05/21 0904    Assessment and Plan:    36-year-old female presents with right thigh necrotizing fasciitis with Enterococcus faecalis.  Status post debridements.  Additional debridement was carried out in surgery by Dr. Jorgensen.  Assisted in the procedure.  Superficial extension was seen in the medial aspect of the knee but did not go into the deep tissues or significantly distally into the calf.  There  is no pus seen in the calf either superficial or deep in the gastroc.  Gastroc musculature was viable.  Additional debridement was carried out throughout the thigh.  Remaining tissues and skin appeared viable.  Wound was extensive reaching up to the hip flexion crease and all the way distal and medial aspect into the proximal calf.  Wound was packed open per General surgery with plans to perform additional serial debridements.    Extreme morbid obesity with remarkable BMI of 84.7      Steve Hutchins MD  [facility]  This note has been transcribed with voice recognition software, but not reviewed and may contain unrecognized errors.

## 2021-12-05 NOTE — CARE UPDATE
Ordered 1U PRBC for below    Recent Labs   Lab 12/05/21  0345   WBC 23.91*   RBC 2.28*   HGB 6.3*   HCT 19.2*      MCV 84   MCH 27.6   MCHC 32.8

## 2021-12-05 NOTE — OP NOTE
[facility]  [facility]ative Note    SUMMARY     Surgery Date: 12/5/2021     Surgeon(s) and Role:     * Brant Jogrensen MD - Primary     * Serena Black MD - Resident - Assisting     * Steve Hutchins III, MD - Co-Surgeon    Pre-op Diagnosis:  Necrotizing soft tissue infection [M79.89]    Post-op Diagnosis:  Post-Op Diagnosis Codes:     * Necrotizing soft tissue infection [M79.89]    Procedure(s) (LRB):  DEBRIDEMENT, LOWER EXTREMITY (Right)    Anesthesia: General    Indication for procedure: Shauna Queen is 36 y.o. female with diabetes and morbid obesity with a BMI above 80 who has had multiple debridements and washouts of right medial proximal thigh for necrotizing fasciitis. After discussion of disease process, we discussed options and have elected for operation to perform repeat washout exploration debridement of necrotizing fasciitis; on prior exploration we noted some purulent fluid tracking distally towards the knee joint we washed it out with plans to take back than 24 hours with assistance of the orthopedic surgeon Dr. Hutchins for assistance in evaluation.    Description of Procedure: After consent was obtained, patient was taken to the OR. The right thigh was prepped and draped in a standard sterile fashion after general anesthesia was started. Time out was performed. Antibiotics were started with around the clock scheduled clindamycin vancomycin and Zosyn. We began the procedure by removing the dressing and prepping the open wound of note the entire leg was prepped sterile distal dressing was placed and the patient was placed in a frog-leg supine position.  The wound was explored circumferentially and there were scattered areas of scant purulent fluid superior laterally which was explored without opening extending the incision we found some edematous fascia which was bluntly and sharply excised and debrided and then we extended our dissection distally taking the prior noted area of pus and  purulent fluid distally and sharply connecting the wound distally medially to the knee down approximately 20 additional cm.  We opened up this wound using electrocautery to separate the subcutaneous fat down to the gastroc muscle fascia.  The fascia and muscle were explored and found to be normal with no pus within the calf and we  some ready tissue bluntly and sharply and were satisfied that our exploration did not extend in terms the infection towards the below-knee segment of the leg.  We then used 6 L of saline with a Pulsavac to wash out the wound and used a combination of blunt and sharp dissection to separate all the loose fat as well as fascial as well as muscle tissue.  We were satisfied that the exposed tissue including muscle and fascia circumferentially and deeply were all free from obvious necrotic or infected tissues and we repacked the wound with Betadine-soaked Kerlix and covered this with dry dressings.  We will plan to bring the patient back to the operating room in 24-48 hours for repeat exploration.  All counts were correct x2. Patient was awakened from anesthesia and taken to the recovery room in a stable condition having suffered no issues at this time.    Description of the findings of the procedure: [id number] cm, with an extension distally 20 cm towards the knee    Estimated Blood Loss: 250 mL         Specimens:   Specimen (24h ago, onward)             Start     Ordered    12/04/21 1223  Specimen to Pathology, Surgery General Surgery  Once        Comments: Pre-op Diagnosis: Necrotizing soft tissue infection [M79.89]    Procedure(s):  DEBRIDEMENT, LOWER EXTREMITY     Number of specimens: 1    Name of specimens: right thigh   References:    Click here for ordering Quick Tip   Question Answer Comment   Procedure Type: General Surgery    Specimen Class: Complex case/Special    Release to patient Immediate        12/04/21 1229              Packed with betadine soaked kerlix.

## 2021-12-05 NOTE — PROGRESS NOTES
Vancomycin consult follow-up:    Patient reviewed, renal function stable, no new levels, continue current therapy; Next levels due: trough due 12/6/2021 at 1130

## 2021-12-06 ENCOUNTER — ANESTHESIA (OUTPATIENT)
Dept: SURGERY | Facility: HOSPITAL | Age: 36
DRG: 463 | End: 2021-12-06
Payer: MEDICAID

## 2021-12-06 ENCOUNTER — ANESTHESIA EVENT (OUTPATIENT)
Dept: SURGERY | Facility: HOSPITAL | Age: 36
DRG: 463 | End: 2021-12-06
Payer: COMMERCIAL

## 2021-12-06 LAB
ANION GAP SERPL CALC-SCNC: 8 MMOL/L (ref 8–16)
BACTERIA SPEC AEROBE CULT: ABNORMAL
BACTERIA SPEC AEROBE CULT: ABNORMAL
BASOPHILS # BLD AUTO: ABNORMAL K/UL (ref 0–0.2)
BASOPHILS NFR BLD: 0 % (ref 0–1.9)
BUN SERPL-MCNC: 7 MG/DL (ref 6–20)
CALCIUM SERPL-MCNC: 6.5 MG/DL (ref 8.7–10.5)
CHLORIDE SERPL-SCNC: 102 MMOL/L (ref 95–110)
CO2 SERPL-SCNC: 24 MMOL/L (ref 23–29)
CREAT SERPL-MCNC: 0.8 MG/DL (ref 0.5–1.4)
DIFFERENTIAL METHOD: ABNORMAL
EOSINOPHIL # BLD AUTO: ABNORMAL K/UL (ref 0–0.5)
EOSINOPHIL NFR BLD: 0 % (ref 0–8)
ERYTHROCYTE [DISTWIDTH] IN BLOOD BY AUTOMATED COUNT: 14.7 % (ref 11.5–14.5)
EST. GFR  (AFRICAN AMERICAN): >60 ML/MIN/1.73 M^2
EST. GFR  (NON AFRICAN AMERICAN): >60 ML/MIN/1.73 M^2
GLUCOSE SERPL-MCNC: 225 MG/DL (ref 70–110)
HCT VFR BLD AUTO: 21.9 % (ref 37–48.5)
HGB BLD-MCNC: 7.1 G/DL (ref 12–16)
IMM GRANULOCYTES # BLD AUTO: ABNORMAL K/UL (ref 0–0.04)
IMM GRANULOCYTES NFR BLD AUTO: ABNORMAL % (ref 0–0.5)
LYMPHOCYTES # BLD AUTO: ABNORMAL K/UL (ref 1–4.8)
LYMPHOCYTES NFR BLD: 13 % (ref 18–48)
MCH RBC QN AUTO: 28 PG (ref 27–31)
MCHC RBC AUTO-ENTMCNC: 32.4 G/DL (ref 32–36)
MCV RBC AUTO: 86 FL (ref 82–98)
MONOCYTES # BLD AUTO: ABNORMAL K/UL (ref 0.3–1)
MONOCYTES NFR BLD: 10 % (ref 4–15)
NEUTROPHILS NFR BLD: 73 % (ref 38–73)
NEUTS BAND NFR BLD MANUAL: 4 %
NRBC BLD-RTO: 3 /100 WBC
PATH REV BLD -IMP: NORMAL
PHOSPHATE SERPL-MCNC: 2.4 MG/DL (ref 2.7–4.5)
PLATELET # BLD AUTO: 195 K/UL (ref 150–450)
PMV BLD AUTO: 9.9 FL (ref 9.2–12.9)
POCT GLUCOSE: 197 MG/DL (ref 70–110)
POCT GLUCOSE: 209 MG/DL (ref 70–110)
POCT GLUCOSE: 238 MG/DL (ref 70–110)
POCT GLUCOSE: 247 MG/DL (ref 70–110)
POCT GLUCOSE: 366 MG/DL (ref 70–110)
POTASSIUM SERPL-SCNC: 3.9 MMOL/L (ref 3.5–5.1)
RBC # BLD AUTO: 2.54 M/UL (ref 4–5.4)
SODIUM SERPL-SCNC: 134 MMOL/L (ref 136–145)
VANCOMYCIN TROUGH SERPL-MCNC: 24.6 UG/ML (ref 10–22)
WBC # BLD AUTO: 35.08 K/UL (ref 3.9–12.7)

## 2021-12-06 PROCEDURE — 36415 COLL VENOUS BLD VENIPUNCTURE: CPT | Performed by: STUDENT IN AN ORGANIZED HEALTH CARE EDUCATION/TRAINING PROGRAM

## 2021-12-06 PROCEDURE — 63600175 PHARM REV CODE 636 W HCPCS: Performed by: INTERNAL MEDICINE

## 2021-12-06 PROCEDURE — 63600175 PHARM REV CODE 636 W HCPCS: Performed by: STUDENT IN AN ORGANIZED HEALTH CARE EDUCATION/TRAINING PROGRAM

## 2021-12-06 PROCEDURE — 11043 DBRDMT MUSC&/FSCA 1ST 20/<: CPT | Mod: ,,, | Performed by: SURGERY

## 2021-12-06 PROCEDURE — 25000003 PHARM REV CODE 250: Performed by: STUDENT IN AN ORGANIZED HEALTH CARE EDUCATION/TRAINING PROGRAM

## 2021-12-06 PROCEDURE — 80048 BASIC METABOLIC PNL TOTAL CA: CPT | Performed by: INTERNAL MEDICINE

## 2021-12-06 PROCEDURE — 36000707: Performed by: SURGERY

## 2021-12-06 PROCEDURE — 80202 ASSAY OF VANCOMYCIN: CPT | Performed by: STUDENT IN AN ORGANIZED HEALTH CARE EDUCATION/TRAINING PROGRAM

## 2021-12-06 PROCEDURE — 20000000 HC ICU ROOM

## 2021-12-06 PROCEDURE — 11043 PR DEBRIDEMENT, SKIN, SUB-Q TISSUE,MUSCLE,=<20 SQ CM: ICD-10-PCS | Mod: ,,, | Performed by: SURGERY

## 2021-12-06 PROCEDURE — 85007 BL SMEAR W/DIFF WBC COUNT: CPT | Performed by: INTERNAL MEDICINE

## 2021-12-06 PROCEDURE — 27201423 OPTIME MED/SURG SUP & DEVICES STERILE SUPPLY: Performed by: SURGERY

## 2021-12-06 PROCEDURE — 25000003 PHARM REV CODE 250: Performed by: INTERNAL MEDICINE

## 2021-12-06 PROCEDURE — 11046 DBRDMT MUSC&/FSCA EA ADDL: CPT | Mod: ,,, | Performed by: SURGERY

## 2021-12-06 PROCEDURE — 85027 COMPLETE CBC AUTOMATED: CPT | Performed by: INTERNAL MEDICINE

## 2021-12-06 PROCEDURE — 36000706: Performed by: SURGERY

## 2021-12-06 PROCEDURE — 99223 1ST HOSP IP/OBS HIGH 75: CPT | Mod: ,,, | Performed by: INTERNAL MEDICINE

## 2021-12-06 PROCEDURE — 94761 N-INVAS EAR/PLS OXIMETRY MLT: CPT

## 2021-12-06 PROCEDURE — 11046 PR DEB MUSC/FASCIA ADD-ON: ICD-10-PCS | Mod: ,,, | Performed by: SURGERY

## 2021-12-06 PROCEDURE — D9220A PRA ANESTHESIA: ICD-10-PCS | Mod: ANES,,, | Performed by: ANESTHESIOLOGY

## 2021-12-06 PROCEDURE — D9220A PRA ANESTHESIA: ICD-10-PCS | Mod: CRNA,,, | Performed by: NURSE ANESTHETIST, CERTIFIED REGISTERED

## 2021-12-06 PROCEDURE — D9220A PRA ANESTHESIA: Mod: ANES,,, | Performed by: ANESTHESIOLOGY

## 2021-12-06 PROCEDURE — D9220A PRA ANESTHESIA: Mod: CRNA,,, | Performed by: NURSE ANESTHETIST, CERTIFIED REGISTERED

## 2021-12-06 PROCEDURE — 37000008 HC ANESTHESIA 1ST 15 MINUTES: Performed by: SURGERY

## 2021-12-06 PROCEDURE — 84100 ASSAY OF PHOSPHORUS: CPT | Performed by: INTERNAL MEDICINE

## 2021-12-06 PROCEDURE — 99223 PR INITIAL HOSPITAL CARE,LEVL III: ICD-10-PCS | Mod: ,,, | Performed by: INTERNAL MEDICINE

## 2021-12-06 PROCEDURE — 36415 COLL VENOUS BLD VENIPUNCTURE: CPT | Performed by: INTERNAL MEDICINE

## 2021-12-06 PROCEDURE — 99900035 HC TECH TIME PER 15 MIN (STAT)

## 2021-12-06 PROCEDURE — 37000009 HC ANESTHESIA EA ADD 15 MINS: Performed by: SURGERY

## 2021-12-06 RX ORDER — LIDOCAINE HYDROCHLORIDE 20 MG/ML
INJECTION INTRAVENOUS
Status: DISCONTINUED | OUTPATIENT
Start: 2021-12-06 | End: 2021-12-06

## 2021-12-06 RX ORDER — PROPOFOL 10 MG/ML
VIAL (ML) INTRAVENOUS
Status: DISCONTINUED | OUTPATIENT
Start: 2021-12-06 | End: 2021-12-06

## 2021-12-06 RX ORDER — KETAMINE HYDROCHLORIDE 100 MG/ML
INJECTION, SOLUTION INTRAMUSCULAR; INTRAVENOUS
Status: DISCONTINUED | OUTPATIENT
Start: 2021-12-06 | End: 2021-12-06

## 2021-12-06 RX ORDER — FENTANYL CITRATE 50 UG/ML
INJECTION, SOLUTION INTRAMUSCULAR; INTRAVENOUS
Status: DISCONTINUED | OUTPATIENT
Start: 2021-12-06 | End: 2021-12-06

## 2021-12-06 RX ORDER — SODIUM,POTASSIUM PHOSPHATES 280-250MG
2 POWDER IN PACKET (EA) ORAL ONCE
Status: DISCONTINUED | OUTPATIENT
Start: 2021-12-06 | End: 2021-12-06

## 2021-12-06 RX ORDER — SUCCINYLCHOLINE CHLORIDE 20 MG/ML
INJECTION INTRAMUSCULAR; INTRAVENOUS
Status: DISCONTINUED | OUTPATIENT
Start: 2021-12-06 | End: 2021-12-06

## 2021-12-06 RX ADMIN — PROPOFOL 150 MG: 10 INJECTION, EMULSION INTRAVENOUS at 02:12

## 2021-12-06 RX ADMIN — PROPOFOL 40 MG: 10 INJECTION, EMULSION INTRAVENOUS at 03:12

## 2021-12-06 RX ADMIN — CALCIUM CARBONATE (ANTACID) CHEW TAB 500 MG 1000 MG: 500 CHEW TAB at 09:12

## 2021-12-06 RX ADMIN — PIPERACILLIN AND TAZOBACTAM 4.5 G: 4; .5 INJECTION, POWDER, LYOPHILIZED, FOR SOLUTION INTRAVENOUS; PARENTERAL at 02:12

## 2021-12-06 RX ADMIN — SODIUM CHLORIDE: 0.9 INJECTION, SOLUTION INTRAVENOUS at 10:12

## 2021-12-06 RX ADMIN — PIPERACILLIN AND TAZOBACTAM 4.5 G: 4; .5 INJECTION, POWDER, LYOPHILIZED, FOR SOLUTION INTRAVENOUS; PARENTERAL at 10:12

## 2021-12-06 RX ADMIN — Medication 1 PACKET: at 06:12

## 2021-12-06 RX ADMIN — PIPERACILLIN AND TAZOBACTAM 4.5 G: 4; .5 INJECTION, POWDER, LYOPHILIZED, FOR SOLUTION INTRAVENOUS; PARENTERAL at 09:12

## 2021-12-06 RX ADMIN — INSULIN ASPART 4 UNITS: 100 INJECTION, SOLUTION INTRAVENOUS; SUBCUTANEOUS at 08:12

## 2021-12-06 RX ADMIN — KETAMINE HYDROCHLORIDE 50 MG: 100 INJECTION, SOLUTION, CONCENTRATE INTRAMUSCULAR; INTRAVENOUS at 02:12

## 2021-12-06 RX ADMIN — LIDOCAINE HYDROCHLORIDE 100 MG: 20 INJECTION, SOLUTION INTRAVENOUS at 02:12

## 2021-12-06 RX ADMIN — VANCOMYCIN HYDROCHLORIDE 2000 MG: 10 INJECTION, POWDER, LYOPHILIZED, FOR SOLUTION INTRAVENOUS at 01:12

## 2021-12-06 RX ADMIN — SUCCINYLCHOLINE CHLORIDE 200 MG: 20 INJECTION, SOLUTION INTRAMUSCULAR; INTRAVENOUS at 02:12

## 2021-12-06 RX ADMIN — Medication 1 PACKET: at 08:12

## 2021-12-06 RX ADMIN — AMPHOTERICIN B 650 MG: 50 INJECTION, POWDER, LYOPHILIZED, FOR SOLUTION INTRAVENOUS at 06:12

## 2021-12-06 RX ADMIN — FENTANYL CITRATE 50 MCG: 50 INJECTION, SOLUTION INTRAMUSCULAR; INTRAVENOUS at 02:12

## 2021-12-06 RX ADMIN — Medication 1 PACKET: at 01:12

## 2021-12-06 RX ADMIN — INSULIN DETEMIR 35 UNITS: 100 INJECTION, SOLUTION SUBCUTANEOUS at 09:12

## 2021-12-06 RX ADMIN — ENOXAPARIN SODIUM 40 MG: 40 INJECTION SUBCUTANEOUS at 06:12

## 2021-12-06 RX ADMIN — SODIUM CHLORIDE: 0.9 INJECTION, SOLUTION INTRAVENOUS at 04:12

## 2021-12-06 RX ADMIN — PROPOFOL 40 MG: 10 INJECTION, EMULSION INTRAVENOUS at 02:12

## 2021-12-06 RX ADMIN — INSULIN DETEMIR 35 UNITS: 100 INJECTION, SOLUTION SUBCUTANEOUS at 08:12

## 2021-12-06 NOTE — PT/OT/SLP PROGRESS
Occupational Therapy      Patient Name:  Shauna Queen   MRN:  [id number]    Patient not seen today secondary to Patient unavailable per RN. Patient transport to OR with staff assist of 4 ongoing at time of OT arrival. Rx team reporting very large area of tissue debridement required this date as it relates to ongoing interventions for necrotizing fasciitis. RN present, OT states upcoming treatment focus to be on bed level strengthening, as needed to bolster patient engagement in seated and supine pressure relief, as well as to support optimal Patient riven bed mobility and decrease pulling, shearing risks. OT will follow-up 12/07/21.    12/6/2021

## 2021-12-06 NOTE — PROGRESS NOTES
Pharmacokinetic Assessment Follow Up: IV Vancomycin    Vancomycin serum concentration assessment(s):    The trough level was drawn correctly and can be used to guide therapy at this time. The measurement is above the desired definitive target range of 15 to 20 mcg/mL.    Vancomycin Regimen Plan:    Re-dose when the random level is less than 20 mcg/mL, next level to be drawn at 0030 on 12/7/2021  Pharmacy will adjust dose based on level.    Drug levels (last 3 results):  Recent Labs   Lab Result Units 12/03/21  2347 12/06/21  1145   Vancomycin-Trough ug/mL 17.3 24.6*       Pharmacy will continue to follow and monitor vancomycin.    Please contact pharmacy at extension [phone] for questions regarding this assessment.    Thank you for the consult,   Bob Ulloa Jr       Patient brief summary:  Shauna Queen is a 36 y.o. female initiated on antimicrobial therapy with IV Vancomycin for treatment of skin & soft tissue infection    Drug Allergies:   Review of patient's allergies indicates:  No Known Allergies    Actual Body Weight:   224 kg    Renal Function:   Estimated Creatinine Clearance: 189.7 mL/min (based on SCr of 0.8 mg/dL).,     Dialysis Method (if applicable):  N/A    CBC (last 72 hours):  Recent Labs   Lab Result Units 12/04/21  0521 12/05/21 0345 12/05/21 2039 12/06/21  0424   WBC K/uL 21.77* 23.91* 52.51* 35.08*   Hemoglobin g/dL 9.0* 6.3* 7.8* 7.1*   Hematocrit % 26.7* 19.2* 23.4* 21.9*   Platelets K/uL 213 178 177 195   Gran % % 65.0 82.0* 61.0 73.0   Lymph % % 13.0* 8.0* 8.0* 13.0*   Mono % % 8.0 6.0 6.0 10.0   Eosinophil % % 0.0 0.0 0.0 0.0   Basophil % % 0.0 0.0 0.0 0.0   Differential Method  Manual Manual Manual Manual       Metabolic Panel (last 72 hours):  Recent Labs   Lab Result Units 12/04/21  0521 12/05/21 0345 12/06/21  0424   Sodium mmol/L 138 136 134*   Potassium mmol/L 4.0 3.3* 3.9   Chloride mmol/L 106 104 102   CO2 mmol/L 23 25 24   Glucose mg/dL 163* 287* 225*   BUN mg/dL 5* 5* 7    Creatinine mg/dL 0.7 0.7 0.8   Phosphorus mg/dL 1.7* 1.6* 2.4*       Vancomycin Administrations:  vancomycin given in the last 96 hours                     vancomycin (VANCOCIN) 2,000 mg in dextrose 5 % 500 mL IVPB (mg) 2,000 mg New Bag 12/06/21 0128     2,000 mg New Bag 12/05/21 1217     2,000 mg New Bag  0030      Restarted 12/04/21 1429     2,000 mg New Bag  1408      Restarted  0107     2,000 mg New Bag  0029      Restarted 12/03/21 1343      Restarted  1203     2,000 mg New Bag  1153      Restarted  0216     2,000 mg New Bag  0059                    Microbiologic Results:  Microbiology Results (last 7 days)       Procedure Component Value Units Date/Time    Aerobic culture [[id number]]  (Abnormal)  (Susceptibility) Collected: 12/04/21 1312    Order Status: Completed Specimen: Wound from Leg, Right Updated: 12/06/21 1142     Aerobic Bacterial Culture ENTEROBACTER CLOACAE  Few        ENTEROCOCCUS FAECALIS  Moderate      Narrative:      thigh    Culture, Anaerobe [[id number]] Collected: 12/04/21 1312    Order Status: Completed Specimen: Wound from Leg, Right Updated: 12/06/21 0707     Anaerobic Culture Culture in progress    Narrative:      thigh    Gram stain [[id number]] Collected: 12/04/21 1312    Order Status: Completed Specimen: Wound from Leg, Right Updated: 12/05/21 0757     Gram Stain Result Few WBC's      Few Gram positive cocci in pairs      Few Gram negative rods      Rare Gram positive rods    Narrative:      thigh    Fungus culture [[id number]] Collected: 12/04/21 1312    Order Status: Sent Specimen: Wound from Leg, Right Updated: 12/04/21 1457    Culture, Anaerobe [[id number]] Collected: 11/30/21 1635    Order Status: Completed Specimen: Abscess from Leg, Right Updated: 12/04/21 0709     Anaerobic Culture No anaerobes isolated    Narrative:      Right Inner thigh abcess    Blood culture [[id number]] Collected: 11/30/21 0436    Order Status: Completed Specimen: Blood Updated: 12/04/21 0503     Blood  Culture, Routine No Growth after 4 days.     Blood culture [[id number]] Collected: 11/30/21 0436    Order Status: Completed Specimen: Blood Updated: 12/04/21 0503     Blood Culture, Routine No Growth after 4 days.     Aerobic culture [[id number]]  (Abnormal)  (Susceptibility) Collected: 11/30/21 1635    Order Status: Completed Specimen: Abscess from Leg, Right Updated: 12/02/21 1207     Aerobic Bacterial Culture ENTEROCOCCUS FAECALIS  Many      Narrative:      Right Inner thigh abcess    Fungus culture [[id number]] Collected: 11/30/21 1635    Order Status: Sent Specimen: Abscess from Leg, Right Updated: 11/30/21 1933

## 2021-12-06 NOTE — HPI
"36F with h/o morbid obesity (BMI 85), DM, HTN, DANG, HLD admitted to OSH on 11/24 with R thigh pain, erythema, tenderness and found to be in DKA. Imaging reportedly couldn't be obtained 2/2 size. Surgery performed I&D and purulent material found and was taken to OR 11/29 for superficial debridement. Then was transferred to [facility] for a "facility better able to perform procedures on a patient with her BMI".      Patient is awake and alert in ICU. Reports her leg wound "popped up" a day before she arrived at OSH. Says that it started as a boil on her thigh and then it "busted" and she had been applying warm compresses and peroxide. Reports she has been ambulatory prior to arrival, but  says she feels like she is going to fall when she tries to walk here. Denies any water or environmental exposures to wound. Denies pet or animal contacts    Has undergone several I&D with surgery on 11/30, 12/2, 12/4, 12/5 for Necrotizing soft tissue infection. Per op notes they saw a "significant amount of foul smelling, necrotic skin and fat"  and "purulent drainage was found tracking along fascial planes"      Surgical Cultures: 11/30 with - amp sensitive e.faecalis. cultures 12/4 with enteroocccus and enterobacter  BCX- NGTD    Path was sent 12/4 and pending    Has been on vancomycin and zosyn. Ampho added 12/4 for concern for mucor     ID consulted for abx recs    Component 2 d ago   Aerobic Bacterial Culture  Abnormal   ENTEROBACTER CLOACAE   Few   P      Aerobic Bacterial Culture  Abnormal   ENTEROCOCCUS SPECIES   Moderate   Identification and susceptibility pending   P      Resulting Agency [facility]          Susceptibility     Enterobacter cloacae     CULTURE, AEROBIC  (SPECIFY SOURCE) (Preliminary)     Cefepime <=2 mcg/mL Sensitive     Ceftriaxone <=1 mcg/mL Sensitive     Ciprofloxacin <=1 mcg/mL Sensitive     Ertapenem <=0.5 mcg/mL Sensitive     Gentamicin <=4 mcg/mL Sensitive     Levofloxacin <=2 mcg/mL Sensitive     Meropenem <=1 " mcg/mL Sensitive     Minocycline <=4 mcg/mL Sensitive     Piperacillin/Tazo <=16 mcg/mL Sensitive     Tetracycline <=4 mcg/mL Sensitive     Tobramycin <=4 mcg/mL Sensitive     Trimeth/Sulfa >2/38 mcg/mL Resistant               Specimen Information: Leg, Right; Abscess        0 Result Notes         (important suggestion)  Newer results are available. Click to view them now.     Component 6 d ago   Aerobic Bacterial Culture  Abnormal   ENTEROCOCCUS FAECALIS   Many     Resulting Agency [facility]          Susceptibility     Enterococcus faecalis     CULTURE, AEROBIC  (SPECIFY SOURCE)     Ampicillin <=2 mcg/mL Sensitive     Gentamicin Synergy Screen <=500 mcg/mL Sensitive     Tetracycline >8 mcg/mL Resistant     Vancomycin 2 mcg/mL Sensitive

## 2021-12-06 NOTE — OP NOTE
[facility] - Intensive Care  General Surgery  Operative Note    SUMMARY     Date of Procedure: 12/6/2021     Procedure: Procedure(s) (LRB):  DEBRIDEMENT, LOWER EXTREMITY (Right)       Surgeon(s) and Role:     * Haroldo Song MD - Primary     * Serena Black MD - Resident - Assisting        Pre-Operative Diagnosis: Necrotizing soft tissue infection [M79.89]    Post-Operative Diagnosis: Post-Op Diagnosis Codes:     * Necrotizing soft tissue infection [M79.89]    Anesthesia: General    Operative Findings (including complications, if any):  Large area from her mid calf all the way to her groin and from a her lateral quadriceps all way posteriorly to her of the gastrocs as well as her hamstrings.  No evidence of overt of ongoing necrotizing fasciitis there was 1 area towards her of Um gluteal fold inferior gluteal fold that had some fluid however this fluid was not dispensed colored or of kaylene purulence.    Wound measurement: [id number] cm, with an extension distally 20 cm towards the knee    Description of Technical Procedures:  Patient was taken to the operating room placed on operating table supine position.  Under adequate general anesthesia she was prepped and draped around her right leg as her packing from her previous operations were removed.  The patient did have some soilage secondary to a bowel movement that was on her perineum.  Once the prepped and draped the area was the irrigated with Pulsavac and then repacked in bandage.  Patient was awakened and transported to recovery room in satisfactory condition.    Significant Surgical Tasks Conducted by the Assistant(s), if Applicable:  Greater than 50%    Estimated Blood Loss (EBL): * No values recorded between 12/6/2021 12:00 AM and 12/6/2021  3:01 PM *           Implants: * No implants in log *    Specimens:   Specimen (24h ago, onward)            None                  Condition: Good    Disposition: PACU - hemodynamically stable.    Attestation: I was  present and scrubbed for the entire procedure.

## 2021-12-06 NOTE — PLAN OF CARE
Problem: Occupational Therapy Goal  Goal: Occupational Therapy Goal  Description: Goals to be met by: 12/31/2021    Patient will increase functional independence with ADLs by performing:    Feeding with [place].  UE Dressing with Modified [place].  LE Dressing with Minimal Assistance.  Grooming while seated with [place].  Toileting from bedside commode with Minimal Assistance for hygiene and clothing management.   Toilet transfer to bedside commode with Minimal Assistance.    Outcome: Ongoing, Not Progressing    Current POC with focus on UB there ex, very limited to no OOB. There ex ongoing to support   Patient to engage in BUE AROM (SHOULDERS, WRISTS, HANDS/GROSS GRASP, TRUNK ERECTORS) all planes/joints to support:      * pain free BUE  AROM and flexibility      * to bolster strength/coordination   needed for fullest recovery of         normative ADL task initiation/task tolerance levels  *to recover effective use of UB as a stabilizer in all ADL transfers.  *increase recovery of effective respiration and proper breathing     awareness in functional tasks.

## 2021-12-06 NOTE — ASSESSMENT & PLAN NOTE
36F with h/o morbid obesity (BMI 85), DM, HTN, DANG, HLD admitted 11/24 with R thigh pain, erythema, tenderness and found to be in DKA. Imaging reportedly couldn't be obtained 2/2 size. Noted to have necrotizing soft tissue infection of R upper leg, now s/p  I&D with surgery on 11/30, 12/2, 12/4, 12/5. Surgical Cultures: 11/30 with - amp sensitive e.faecalis. cultures 12/4 with enteroocccus and enterobacter. BCX- NGTD. Path was sent 12/4 and pending. Has been on vancomycin and zosyn. Ampho added 12/4 given surgery's reported concern for mucor. ID consulted for abx recs    Recommendations:   - appreciate surgery's help getting source control of infection. Agree with plan for continued debridement as required to healthy tissue, viviana if this is indeed a mold infection  - continue zosyn and amphoB. Stop vancomycin  - if patient undergoes additional surgery, frozen pathology can be helpful in establishing mold diagnosis.   - f/u path and fungal cultures  - need weight loss and better control of DM  - wound care as per primary team

## 2021-12-06 NOTE — PLAN OF CARE
Patient went for I&D of right thigh on 12/5, and may also go back today. She states that her thigh is still painful. Bed pads changed out x 2 due to copious drainage. Norco for pain. WBC count critical at 52.51 for the evening draw, 35.08 on morning labs.

## 2021-12-06 NOTE — PROGRESS NOTES
"[facility]ensive [facility]  [facility]  Progress Note    Patient Name: Shauna Queen  MRN: [id number]  Patient Class: IP- Inpatient   Admission Date: 11/30/2021  Length of Stay: 6 days  Attending Physician: Delagdo Rangel MD  Primary Care Provider: Jorge Dennis MD        Subjective:     Principal Problem:Necrotizing fasciitis        HPI:  Ms. Queen is a 36-year-old presents to the ED from outside hospital for abscess.  She has initially presented to outside hospital for DKA which resolved.  Patient was unaware of why she was here in July prompted her.  Per the checkout are received: "36-year-old female history of diabetes mellitus, sleep apnea on CPAP, schizophrenia, hypertension, and hyperlipidemia admitted to [facility] on November 24 from [facility] for treatment of diabetic ketoacidosis. She was initiated on an insulin infusion. DKA improved and she was transitioned off the insulin infusion. She again developed DKA and was placed back on the insulin infusion for a period of time. Currently she is off the insulin infusion and on subcutaneous insulin. She was noted to have right upper/inner thigh pain with erythema, induration, and tenderness to palpation. She was treated with IV clindamycin and vancomycin. Due to her size ((BMI 74.32) they are unable to perform CT or MRI. General Surgery there performed an incision and drainage with purulent material found. She was taken to the OR on November 29 for a second-look at the thigh wound. They were only able to do superficial debridement. Postprocedure she extubated to 2 L nasal cannula and was awake with oxygen saturation 100 percent. Surgery at their facility recommended transfer to a facility better able to perform procedures on a patient with her BMI. Referring provider spoke with General Surgery at [facility]. Hospital Medicine was asked to admit for further treatment. I spoke with her current attending. He noted she " "has no alteration in mentation. Lower extremity is intact from a neurovascular standpoint. She remains in the ICU."      Overview/Hospital Course:  Patient admitted to the hospital as a transfer secondary to R thigh abscess and a BMI of Body mass index is 84.77 kg/m².   Patient transferred here for surgical evaluation.  Patient went for surgical debridement on 11/30/21. Patient continued on Abx. Wound cultures sent. Blood cultures were NG. PT/OT consulted. Underwent another debridement on 12/2 and again on 12/4 - noting significant necrotizing soft tissue infections concerning for tracking down into her knee. On broad spectrum antibiotics. Surgery recommending anti-fungals. Plan for OR with Orthopedic surgery to explore right knee on 12/5. Patient again went for surgery on 12/6.      Interval History: No new issues.    Review of Systems   Constitutional: Negative for fever.   HENT: Negative.    Eyes: Negative.    Respiratory: Negative.    Cardiovascular: Negative.    Gastrointestinal: Negative for nausea and vomiting.   Endocrine: Negative.    Genitourinary: Negative.    Musculoskeletal: Negative.    Neurological: Negative.    Hematological: Negative.    Psychiatric/Behavioral: Negative.      Objective:     Vital Signs (Most Recent):  Temp: 98.8 °F (37.1 °C) (12/06/21 0701)  Pulse: (!) 116 (12/06/21 0900)  Resp: 15 (12/06/21 0900)  BP: 118/67 (12/06/21 0900)  SpO2: 100 % (12/06/21 0900) Vital Signs (24h Range):  Temp:  [97.6 °F (36.4 °C)-99.3 °F (37.4 °C)] 98.8 °F (37.1 °C)  Pulse:  [105-132] 116  Resp:  [9-37] 15  SpO2:  [98 %-100 %] 100 %  BP: ()/(50-87) 118/67     Weight: (!) 224 kg (493 lb 13.3 oz)  Body mass index is 84.77 kg/m².    Intake/Output Summary (Last 24 hours) at 12/6/2021 1027  Last data filed at 12/6/2021 0611  Gross per 24 hour   Intake 6260.44 ml   Output 1925 ml   Net 4335.44 ml      Physical Exam  Vitals and nursing note reviewed.   Constitutional:       General: She is not in acute " distress.     Appearance: Normal appearance. She is obese.   HENT:      Head: Normocephalic and atraumatic.   Cardiovascular:      Rate and Rhythm: Normal rate and regular rhythm.   Pulmonary:      Effort: Pulmonary effort is normal. No respiratory distress.      Breath sounds: No wheezing.   Musculoskeletal:         General: No swelling. Normal range of motion.      Cervical back: Normal range of motion and neck supple.   Skin:     General: Skin is warm and dry.      Comments: Right upper thigh incision with packing. C/d   Neurological:      General: No focal deficit present.      Mental Status: She is alert and oriented to person, place, and time.      Comments: Patient groggy   Psychiatric:         Mood and Affect: Mood normal.         Behavior: Behavior normal.         Thought Content: Thought content normal.         Significant Labs:   All pertinent labs within the past 24 hours have been reviewed.  BMP:   Recent Labs   Lab 12/06/21  0424   *   *   K 3.9      CO2 24   BUN 7   CREATININE 0.8   CALCIUM 6.5*     CBC:   Recent Labs   Lab 12/05/21  0345 12/05/21  2039 12/06/21  0424   WBC 23.91* 52.51* 35.08*   HGB 6.3* 7.8* 7.1*   HCT 19.2* 23.4* 21.9*    177 195           Assessment/Plan:      * Necrotizing fasciitis  - Status post I&D with General surgery at outside hospital, did not feel like they completely removed abscess  - Started on vanc/Zosyn  - Blood cultures pending, negative to date so far  - Lactate normal  - Consult general surgery   - Underwent debridement in OR 11/30, 12/2, and 12/4, 12/5 - no extension into knee  - added Amphotericin for concern for fungal infection  - ID consulted for further antimicrobial recommendations  - Pain control  - Tissue culture growing Enteroccocus Faecalis, pending fungal cultures    OR again today- 12/6.           Obesity  Body mass index is 84.77 kg/m².  Weight loss as out patient         Hypophosphatemia  - persistent  - started on phos-nak  with meals      Anemia of chronic disease  No acute issues but now with post operative blood loss  - continue to transfuse if hb  <7      Debility  PT/OT   Does not use a walker or cane   - see after procedure      HLD (hyperlipidemia)  - reported but patient is not sure what medications she is on  - check am lipid panel      DANG (obstructive sleep apnea)  Patient likely has component of OHS as well  - outpatient sleep study      DM (diabetes mellitus)  A1c:   Lab Results   Component Value Date    HGBA1C >14.0 (H) 12/01/2021     Meds: basal bolus insulin + SSI PRN to maintain goal 140-180  ADA diet, accuchecks ACHS, hypoglycemic protocol    - very uncontrolled, will need to  this patient on need for diet and medication adherence        VTE Risk Mitigation (From admission, onward)         Ordered     enoxaparin injection 40 mg  Daily         12/04/21 2306     IP VTE LOW RISK PATIENT  Once         11/30/21 0403     Place sequential compression device  Until discontinued         11/30/21 0403                Discharge Planning   STEVEN:      Code Status: Full Code   Is the patient medically ready for discharge?:     Reason for patient still in hospital (select all that apply): Patient unstable  Discharge Plan A: Home            Critical care time spent on the evaluation and treatment of severe organ dysfunction, review of pertinent labs and imaging studies, discussions with consulting providers and discussions with patient/family:35 minutes.      Delgado Olsen MD  Department of Hospital Medicine   [facility] - Intensive Care

## 2021-12-06 NOTE — PT/OT/SLP PROGRESS
Physical Therapy      Patient Name:  Shauna Queen   MRN:  [id number]    Patient not seen today secondary to acute blood loss anemia (7.1/21.9) , and going back to OR with General Surgery today for another debridement. D/w nurse Soraya. Will need Surgery clearance to proceed with physical therapy after debridement given extensive nature of wound and multiple debridements.

## 2021-12-06 NOTE — ASSESSMENT & PLAN NOTE
- Status post I&D with General surgery at outside hospital, did not feel like they completely removed abscess  - Started on vanc/Zosyn  - Blood cultures pending, negative to date so far  - Lactate normal  - Consult general surgery   - Underwent debridement in OR 11/30, 12/2, and 12/4, 12/5 - no extension into knee  - added Amphotericin for concern for fungal infection  - ID consulted for further antimicrobial recommendations  - Pain control  - Tissue culture growing Enteroccocus Faecalis, pending fungal cultures    OR again today- 12/6.

## 2021-12-06 NOTE — SUBJECTIVE & OBJECTIVE
Review of Systems   Constitutional: Negative for fever.   HENT: Negative.    Eyes: Negative.    Respiratory: Negative.    Cardiovascular: Negative.    Gastrointestinal: Negative for nausea and vomiting.   Endocrine: Negative.    Genitourinary: Negative.    Musculoskeletal: Negative.    Neurological: Negative.    Hematological: Negative.    Psychiatric/Behavioral: Negative.      Objective:     Vital Signs (Most Recent):  Temp: 98.8 °F (37.1 °C) (12/06/21 0701)  Pulse: (!) 116 (12/06/21 0900)  Resp: 15 (12/06/21 0900)  BP: 118/67 (12/06/21 0900)  SpO2: 100 % (12/06/21 0900) Vital Signs (24h Range):  Temp:  [97.6 °F (36.4 °C)-99.3 °F (37.4 °C)] 98.8 °F (37.1 °C)  Pulse:  [105-132] 116  Resp:  [9-37] 15  SpO2:  [98 %-100 %] 100 %  BP: ()/(50-87) 118/67     Weight: (!) 224 kg (493 lb 13.3 oz)  Body mass index is 84.77 kg/m².    Intake/Output Summary (Last 24 hours) at 12/6/2021 1304  Last data filed at 12/6/2021 0611  Gross per 24 hour   Intake 4920.44 ml   Output 1875 ml   Net 3045.44 ml      Physical Exam  Vitals and nursing note reviewed.   Constitutional:       General: She is not in acute distress.     Appearance: Normal appearance. She is obese.   HENT:      Head: Normocephalic and atraumatic.   Cardiovascular:      Rate and Rhythm: Normal rate and regular rhythm.   Pulmonary:      Effort: Pulmonary effort is normal. No respiratory distress.      Breath sounds: No wheezing.   Musculoskeletal:         General: No swelling. Normal range of motion.      Cervical back: Normal range of motion and neck supple.   Skin:     General: Skin is warm and dry.      Comments: Right upper thigh incision with packing. Only adipose visible on bedside exam. Some scant drainage   Neurological:      General: No focal deficit present.      Mental Status: She is alert and oriented to person, place, and time.      Motor: No weakness.   Psychiatric:         Mood and Affect: Mood normal.         Behavior: Behavior normal.          Thought Content: Thought content normal.         Significant Labs:   All pertinent labs within the past 24 hours have been reviewed.  BMP:   Recent Labs   Lab 12/06/21  0424   *   *   K 3.9      CO2 24   BUN 7   CREATININE 0.8   CALCIUM 6.5*     CBC:   Recent Labs   Lab 12/05/21 0345 12/05/21 2039 12/06/21 0424   WBC 23.91* 52.51* 35.08*   HGB 6.3* 7.8* 7.1*   HCT 19.2* 23.4* 21.9*    177 195

## 2021-12-06 NOTE — SUBJECTIVE & OBJECTIVE
Interval History: No new issues.    Review of Systems   Constitutional: Negative for fever.   HENT: Negative.    Eyes: Negative.    Respiratory: Negative.    Cardiovascular: Negative.    Gastrointestinal: Negative for nausea and vomiting.   Endocrine: Negative.    Genitourinary: Negative.    Musculoskeletal: Negative.    Neurological: Negative.    Hematological: Negative.    Psychiatric/Behavioral: Negative.      Objective:     Vital Signs (Most Recent):  Temp: 98.8 °F (37.1 °C) (12/06/21 0701)  Pulse: (!) 116 (12/06/21 0900)  Resp: 15 (12/06/21 0900)  BP: 118/67 (12/06/21 0900)  SpO2: 100 % (12/06/21 0900) Vital Signs (24h Range):  Temp:  [97.6 °F (36.4 °C)-99.3 °F (37.4 °C)] 98.8 °F (37.1 °C)  Pulse:  [105-132] 116  Resp:  [9-37] 15  SpO2:  [98 %-100 %] 100 %  BP: ()/(50-87) 118/67     Weight: (!) 224 kg (493 lb 13.3 oz)  Body mass index is 84.77 kg/m².    Intake/Output Summary (Last 24 hours) at 12/6/2021 1027  Last data filed at 12/6/2021 0611  Gross per 24 hour   Intake 6260.44 ml   Output 1925 ml   Net 4335.44 ml      Physical Exam  Vitals and nursing note reviewed.   Constitutional:       General: She is not in acute distress.     Appearance: Normal appearance. She is obese.   HENT:      Head: Normocephalic and atraumatic.   Cardiovascular:      Rate and Rhythm: Normal rate and regular rhythm.   Pulmonary:      Effort: Pulmonary effort is normal. No respiratory distress.      Breath sounds: No wheezing.   Musculoskeletal:         General: No swelling. Normal range of motion.      Cervical back: Normal range of motion and neck supple.   Skin:     General: Skin is warm and dry.      Comments: Right upper thigh incision with packing. C/d   Neurological:      General: No focal deficit present.      Mental Status: She is alert and oriented to person, place, and time.      Comments: Patient groggy   Psychiatric:         Mood and Affect: Mood normal.         Behavior: Behavior normal.         Thought Content:  Thought content normal.         Significant Labs:   All pertinent labs within the past 24 hours have been reviewed.  BMP:   Recent Labs   Lab 12/06/21  0424   *   *   K 3.9      CO2 24   BUN 7   CREATININE 0.8   CALCIUM 6.5*     CBC:   Recent Labs   Lab 12/05/21 0345 12/05/21 2039 12/06/21 0424   WBC 23.91* 52.51* 35.08*   HGB 6.3* 7.8* 7.1*   HCT 19.2* 23.4* 21.9*    177 195

## 2021-12-06 NOTE — PROGRESS NOTES
12/6/2021:  Please assess the need to continue Vancomycin based on current culture and sensitivity reports showing gram negative rods and Enterococcus.  Thank you.  Bob Ulloa Pharm.D.

## 2021-12-07 LAB
ANION GAP SERPL CALC-SCNC: 7 MMOL/L (ref 8–16)
ANISOCYTOSIS BLD QL SMEAR: SLIGHT
BASOPHILS # BLD AUTO: 0.09 K/UL (ref 0–0.2)
BASOPHILS NFR BLD: 0.3 % (ref 0–1.9)
BLD PROD TYP BPU: NORMAL
BLOOD UNIT EXPIRATION DATE: NORMAL
BLOOD UNIT TYPE CODE: 1700
BLOOD UNIT TYPE CODE: 9500
BLOOD UNIT TYPE: NORMAL
BUN SERPL-MCNC: 8 MG/DL (ref 6–20)
CALCIUM SERPL-MCNC: 6.6 MG/DL (ref 8.7–10.5)
CHLORIDE SERPL-SCNC: 105 MMOL/L (ref 95–110)
CO2 SERPL-SCNC: 25 MMOL/L (ref 23–29)
CODING SYSTEM: NORMAL
CREAT SERPL-MCNC: 0.9 MG/DL (ref 0.5–1.4)
DIFFERENTIAL METHOD: ABNORMAL
DISPENSE STATUS: NORMAL
EOSINOPHIL # BLD AUTO: 0 K/UL (ref 0–0.5)
EOSINOPHIL NFR BLD: 0.1 % (ref 0–8)
ERYTHROCYTE [DISTWIDTH] IN BLOOD BY AUTOMATED COUNT: 15.1 % (ref 11.5–14.5)
EST. GFR  (AFRICAN AMERICAN): >60 ML/MIN/1.73 M^2
EST. GFR  (NON AFRICAN AMERICAN): >60 ML/MIN/1.73 M^2
GLUCOSE SERPL-MCNC: 137 MG/DL (ref 70–110)
HCT VFR BLD AUTO: 19.6 % (ref 37–48.5)
HGB BLD-MCNC: 6.3 G/DL (ref 12–16)
HYPOCHROMIA BLD QL SMEAR: ABNORMAL
IMM GRANULOCYTES # BLD AUTO: 1.63 K/UL (ref 0–0.04)
IMM GRANULOCYTES NFR BLD AUTO: 4.8 % (ref 0–0.5)
LACTATE SERPL-SCNC: 0.9 MMOL/L (ref 0.5–2.2)
LACTATE SERPL-SCNC: 1 MMOL/L (ref 0.5–2.2)
LYMPHOCYTES # BLD AUTO: 3.5 K/UL (ref 1–4.8)
LYMPHOCYTES NFR BLD: 10.2 % (ref 18–48)
MCH RBC QN AUTO: 27.9 PG (ref 27–31)
MCHC RBC AUTO-ENTMCNC: 32.1 G/DL (ref 32–36)
MCV RBC AUTO: 87 FL (ref 82–98)
MONOCYTES # BLD AUTO: 2.5 K/UL (ref 0.3–1)
MONOCYTES NFR BLD: 7.4 % (ref 4–15)
NEUTROPHILS # BLD AUTO: 26.2 K/UL (ref 1.8–7.7)
NEUTROPHILS NFR BLD: 77.2 % (ref 38–73)
NRBC BLD-RTO: 2 /100 WBC
NUM UNITS TRANS PACKED RBC: NORMAL
PHOSPHATE SERPL-MCNC: 2.8 MG/DL (ref 2.7–4.5)
PLATELET # BLD AUTO: 229 K/UL (ref 150–450)
PMV BLD AUTO: 9.4 FL (ref 9.2–12.9)
POCT GLUCOSE: 115 MG/DL (ref 70–110)
POCT GLUCOSE: 124 MG/DL (ref 70–110)
POCT GLUCOSE: 137 MG/DL (ref 70–110)
POCT GLUCOSE: 191 MG/DL (ref 70–110)
POIKILOCYTOSIS BLD QL SMEAR: SLIGHT
POLYCHROMASIA BLD QL SMEAR: ABNORMAL
POTASSIUM SERPL-SCNC: 3.8 MMOL/L (ref 3.5–5.1)
RBC # BLD AUTO: 2.26 M/UL (ref 4–5.4)
SODIUM SERPL-SCNC: 137 MMOL/L (ref 136–145)
TRANS ERYTHROCYTES VOL PATIENT: NORMAL ML
WBC # BLD AUTO: 33.95 K/UL (ref 3.9–12.7)

## 2021-12-07 PROCEDURE — 25000003 PHARM REV CODE 250: Performed by: STUDENT IN AN ORGANIZED HEALTH CARE EDUCATION/TRAINING PROGRAM

## 2021-12-07 PROCEDURE — 25000003 PHARM REV CODE 250: Performed by: SURGERY

## 2021-12-07 PROCEDURE — 94761 N-INVAS EAR/PLS OXIMETRY MLT: CPT

## 2021-12-07 PROCEDURE — 36430 TRANSFUSION BLD/BLD COMPNT: CPT

## 2021-12-07 PROCEDURE — 27000221 HC OXYGEN, UP TO 24 HOURS

## 2021-12-07 PROCEDURE — 84100 ASSAY OF PHOSPHORUS: CPT | Performed by: INTERNAL MEDICINE

## 2021-12-07 PROCEDURE — P9016 RBC LEUKOCYTES REDUCED: HCPCS | Performed by: INTERNAL MEDICINE

## 2021-12-07 PROCEDURE — 36415 COLL VENOUS BLD VENIPUNCTURE: CPT | Performed by: HOSPITALIST

## 2021-12-07 PROCEDURE — 99900035 HC TECH TIME PER 15 MIN (STAT)

## 2021-12-07 PROCEDURE — 99233 SBSQ HOSP IP/OBS HIGH 50: CPT | Mod: ,,, | Performed by: INTERNAL MEDICINE

## 2021-12-07 PROCEDURE — 83605 ASSAY OF LACTIC ACID: CPT | Mod: 91 | Performed by: HOSPITALIST

## 2021-12-07 PROCEDURE — 85025 COMPLETE CBC W/AUTO DIFF WBC: CPT | Performed by: INTERNAL MEDICINE

## 2021-12-07 PROCEDURE — 80048 BASIC METABOLIC PNL TOTAL CA: CPT | Performed by: INTERNAL MEDICINE

## 2021-12-07 PROCEDURE — 99233 PR SUBSEQUENT HOSPITAL CARE,LEVL III: ICD-10-PCS | Mod: ,,, | Performed by: INTERNAL MEDICINE

## 2021-12-07 PROCEDURE — 63600175 PHARM REV CODE 636 W HCPCS: Performed by: STUDENT IN AN ORGANIZED HEALTH CARE EDUCATION/TRAINING PROGRAM

## 2021-12-07 PROCEDURE — 63600175 PHARM REV CODE 636 W HCPCS: Performed by: INTERNAL MEDICINE

## 2021-12-07 PROCEDURE — 36415 COLL VENOUS BLD VENIPUNCTURE: CPT | Performed by: INTERNAL MEDICINE

## 2021-12-07 PROCEDURE — 20000000 HC ICU ROOM

## 2021-12-07 PROCEDURE — 94760 N-INVAS EAR/PLS OXIMETRY 1: CPT

## 2021-12-07 PROCEDURE — 25000003 PHARM REV CODE 250: Performed by: INTERNAL MEDICINE

## 2021-12-07 PROCEDURE — 94799 UNLISTED PULMONARY SVC/PX: CPT

## 2021-12-07 RX ORDER — ACETAMINOPHEN 10 MG/ML
1000 INJECTION, SOLUTION INTRAVENOUS ONCE
Status: DISCONTINUED | OUTPATIENT
Start: 2021-12-07 | End: 2021-12-07

## 2021-12-07 RX ORDER — MUPIROCIN 20 MG/G
1 OINTMENT TOPICAL 2 TIMES DAILY
Status: COMPLETED | OUTPATIENT
Start: 2021-12-07 | End: 2021-12-11

## 2021-12-07 RX ORDER — MORPHINE SULFATE 4 MG/ML
5 INJECTION, SOLUTION INTRAMUSCULAR; INTRAVENOUS EVERY 4 HOURS PRN
Status: DISCONTINUED | OUTPATIENT
Start: 2021-12-07 | End: 2021-12-07

## 2021-12-07 RX ORDER — SODIUM CHLORIDE 9 MG/ML
INJECTION, SOLUTION INTRAVENOUS CONTINUOUS
Status: DISCONTINUED | OUTPATIENT
Start: 2021-12-07 | End: 2021-12-07

## 2021-12-07 RX ADMIN — MUPIROCIN 1 G: 20 OINTMENT TOPICAL at 10:12

## 2021-12-07 RX ADMIN — Medication 1 PACKET: at 09:12

## 2021-12-07 RX ADMIN — MUPIROCIN 1 G: 20 OINTMENT TOPICAL at 09:12

## 2021-12-07 RX ADMIN — PIPERACILLIN AND TAZOBACTAM 4.5 G: 4; .5 INJECTION, POWDER, LYOPHILIZED, FOR SOLUTION INTRAVENOUS; PARENTERAL at 08:12

## 2021-12-07 RX ADMIN — CALCIUM CARBONATE (ANTACID) CHEW TAB 500 MG 1000 MG: 500 CHEW TAB at 10:12

## 2021-12-07 RX ADMIN — AMPHOTERICIN B 650 MG: 50 INJECTION, POWDER, LYOPHILIZED, FOR SOLUTION INTRAVENOUS at 04:12

## 2021-12-07 RX ADMIN — ENOXAPARIN SODIUM 40 MG: 40 INJECTION SUBCUTANEOUS at 04:12

## 2021-12-07 RX ADMIN — Medication 1 PACKET: at 04:12

## 2021-12-07 RX ADMIN — Medication 1 PACKET: at 06:12

## 2021-12-07 RX ADMIN — SODIUM CHLORIDE: 0.9 INJECTION, SOLUTION INTRAVENOUS at 10:12

## 2021-12-07 RX ADMIN — INSULIN DETEMIR 35 UNITS: 100 INJECTION, SOLUTION SUBCUTANEOUS at 09:12

## 2021-12-07 RX ADMIN — Medication 1 PACKET: at 10:12

## 2021-12-07 RX ADMIN — PIPERACILLIN AND TAZOBACTAM 4.5 G: 4; .5 INJECTION, POWDER, LYOPHILIZED, FOR SOLUTION INTRAVENOUS; PARENTERAL at 10:12

## 2021-12-07 RX ADMIN — PIPERACILLIN AND TAZOBACTAM 4.5 G: 4; .5 INJECTION, POWDER, LYOPHILIZED, FOR SOLUTION INTRAVENOUS; PARENTERAL at 04:12

## 2021-12-07 NOTE — PT/OT/SLP PROGRESS
Physical Therapy      Patient Name:  Shauna Queen   MRN:  [id number]    Patient not seen today secondary to pending completion of blood transfusion for acute blood loss anemia 6.3/19.6. Have sent Secure Chat to nurse Lira- Seeking clearance from General Surgery that mobilization is ok.

## 2021-12-07 NOTE — SUBJECTIVE & OBJECTIVE
Interval History: No new complaints.    Review of Systems   HENT: Negative for ear discharge and ear pain.    Eyes: Negative for discharge and itching.   Endocrine: Negative for cold intolerance and heat intolerance.   Neurological: Negative for seizures and syncope.     Objective:     Vital Signs (Most Recent):  Temp: 98.5 °F (36.9 °C) (12/07/21 1501)  Pulse: (!) 115 (12/07/21 1630)  Resp: 18 (12/07/21 1630)  BP: (!) 115/57 (12/07/21 1630)  SpO2: 100 % (12/07/21 1630) Vital Signs (24h Range):  Temp:  [98.5 °F (36.9 °C)-99.1 °F (37.3 °C)] 98.5 °F (36.9 °C)  Pulse:  [106-124] 115  Resp:  [15-43] 18  SpO2:  [99 %-100 %] 100 %  BP: (101-155)/(49-86) 115/57     Weight: (!) 224 kg (493 lb 13.3 oz)  Body mass index is 84.77 kg/m².    Intake/Output Summary (Last 24 hours) at 12/7/2021 1703  Last data filed at 12/7/2021 1400  Gross per 24 hour   Intake 2289.35 ml   Output 1500 ml   Net 789.35 ml      Physical Exam  Vitals and nursing note reviewed.   Constitutional:       General: She is not in acute distress.     Appearance: Normal appearance. She is obese.   HENT:      Head: Normocephalic and atraumatic.   Cardiovascular:      Rate and Rhythm: Normal rate and regular rhythm.   Pulmonary:      Effort: Pulmonary effort is normal. No respiratory distress.      Breath sounds: No wheezing.   Musculoskeletal:         General: No swelling. Normal range of motion.      Cervical back: Normal range of motion and neck supple.   Skin:     General: Skin is warm and dry.      Comments: Right upper thigh incision with packing. Only adipose visible on bedside exam. Some scant drainage   Neurological:      General: No focal deficit present.      Mental Status: She is alert and oriented to person, place, and time.      Motor: No weakness.   Psychiatric:         Mood and Affect: Mood normal.         Behavior: Behavior normal.         Thought Content: Thought content normal.         Significant Labs:   All pertinent labs within the past 24  hours have been reviewed.  BMP:   Recent Labs   Lab 12/07/21  0453   *      K 3.8      CO2 25   BUN 8   CREATININE 0.9   CALCIUM 6.6*     CBC:   Recent Labs   Lab 12/05/21 2039 12/06/21 0424 12/07/21 0453   WBC 52.51* 35.08* 33.95*   HGB 7.8* 7.1* 6.3*   HCT 23.4* 21.9* 19.6*    195 229       Significant Imaging: I have reviewed all pertinent imaging results/findings within the past 24 hours.

## 2021-12-07 NOTE — PROGRESS NOTES
[facility]  General Surgery  Progress Note      Interval History:     Patient is s/p serial debridements of right lower extremity (groin to knee) for necrotizing fasciitis, most recently 12/6. At this time the wound appeared healthy with minimal necrotic tissue encountered.     Continues to be tachycardic. No fevers.    Feeling well this AM. Pain controlled. Good appetite.     Objective:     Vital Signs (Most Recent):  Temp: 99.1 °F (37.3 °C) (12/07/21 0824)  Pulse: (!) 120 (12/07/21 1030)  Resp: 19 (12/07/21 1030)  BP: (!) 113/59 (12/07/21 1030)  SpO2: 100 % (12/07/21 1030) Vital Signs (24h Range):  Temp:  [98.3 °F (36.8 °C)-99.1 °F (37.3 °C)] 99.1 °F (37.3 °C)  Pulse:  [106-124] 120  Resp:  [15-43] 19  SpO2:  [99 %-100 %] 100 %  BP: (101-183)/(49-86) 113/59     Weight: (!) 224 kg (493 lb 13.3 oz)  Body mass index is 84.77 kg/m².      Intake/Output Summary (Last 24 hours) at 12/7/2021 1247  Last data filed at 12/7/2021 0824  Gross per 24 hour   Intake 2057.05 ml   Output 1450 ml   Net 607.05 ml       Physical Exam  GEN: alert and oriented, NAD  HEENT: MMM, no scleral icterus  CV: Tachycardic to 110s, distant heart sounds due to body habitus, regular rhythm  PULM: Mildly increased WOB on RA  ABD: soft, non-tender, non-distended, obese  EXT: moves all. Right upper thigh/groin with dressing in place, not taken down.       Significant Labs:  CBC:   Recent Labs   Lab 12/07/21  0453   WBC 33.95*   RBC 2.26*   HGB 6.3*   HCT 19.6*      MCV 87   MCH 27.9   MCHC 32.1     CMP:   Recent Labs   Lab 12/07/21  0453   *   CALCIUM 6.6*      K 3.8   CO2 25      BUN 8   CREATININE 0.9       Significant Diagnostics:  None    Assessment/Plan:     Active Diagnoses:    Diagnosis Date Noted POA    PRINCIPAL PROBLEM:  Necrotizing fasciitis [M72.6] 11/30/2021 Yes    Debility [R53.81] 12/01/2021 Yes    Anemia of chronic disease [D63.8] 12/01/2021 Yes    Hypophosphatemia [E83.39] 12/01/2021  Yes    Obesity [E66.9] 12/01/2021 Yes    DM (diabetes mellitus) [E11.9] 11/30/2021 Yes    DANG (obstructive sleep apnea) [G47.33] 11/30/2021 Yes    HLD (hyperlipidemia) [E78.5] 11/30/2021 Yes      Problems Resolved During this Admission:     This is a 36 year old F with class 3 obesity c/b several medical problems who is transferred to [facility] for treatment of right groin/thigh necrotizing fasciitis.    Now s/p serial debridements (groin to knee), most recently 12/6. At this time the wound was found to be healthy with minimal necrotic tissues.    No need for washout today. Will book for washout, possible partial closure, possible wound VAC placement tomorrow.     NPO past midnight  Continue broad spectrum antibiotics (guided by ID)    Patient discussed with attending surgeon Dr. Joon Black MD PGY5  General Surgery  [facility]

## 2021-12-07 NOTE — PT/OT/SLP PROGRESS
Physical Therapy      Patient Name:  Shauna Queen   MRN:  [id number]    Patient discussed with nurse Soraya. Will hold PT until right thigh wound at least partially closed due to:     1) Extensive nature and location of open wound  2) Ortho MD concern regarding patient mobilizing to sit on edge of bed (d/w nurse verbally) given location of wound  3) Dressings being managed solely by General Surgery in OR  4) Loose stool incontinence with mobility with no nursing orders for dressing changes  5) Tendency for dressings to fall off when patient mobilizes, as they did Friday during last PT session.

## 2021-12-07 NOTE — CONSULTS
[facility] - Intensive Care  Infectious Disease  Consult Note    Patient Name: Shauna Queen  MRN: [id number]  Admission Date: 11/30/2021  Hospital Length of Stay: 7 days  Attending Physician: Antonio Bernal MD  Primary Care Provider: Jorge Dennis MD     Isolation Status: No active isolations    Patient information was obtained from patient and ER records.      Consults  Assessment/Plan:     * Necrotizing fasciitis  36F with h/o morbid obesity (BMI 85), DM, HTN, DANG, HLD admitted 11/24 with R thigh pain, erythema, tenderness and found to be in DKA. Imaging reportedly couldn't be obtained 2/2 size. Noted to have necrotizing soft tissue infection of R upper leg, now s/p  I&D with surgery on 11/30, 12/2, 12/4, 12/5. Surgical Cultures: 11/30 with - amp sensitive e.faecalis. cultures 12/4 with enteroocccus and enterobacter. BCX- NGTD. Path was sent 12/4 and pending. Has been on vancomycin and zosyn. Ampho added 12/4 given surgery's reported concern for mucor. ID consulted for abx recs    Spoke with pathology- saw microabscess formation, acute inflammation and subQ tissue fibrosis. Path says bacterial and fungal infections can appear this way. No hyphal elements seen on H&E stain, but they will perform PAS and GMS additional stains    Recommendations:   - appreciate surgery's help getting source control of infection.   - continue zosyn and amphoB. Stop vancomycin  - f/u path (PAS/GMS stain) and fungal cultures  - need weight loss and better control of DM  - wound care as per primary team          Thank you for your consult. I will follow-up with patient. Please contact us if you have any additional questions.    Nalini Colon MD  Infectious Disease  [facility] [place] - Intensive Care    Subjective:     Principal Problem: Necrotizing fasciitis    HPI:   36F with h/o morbid obesity (BMI 85), DM, HTN, DAGN, HLD admitted to OSH on 11/24 with R thigh pain, erythema, tenderness and found to be in DKA. Imaging reportedly couldn't be  "obtained 2/2 size. Surgery performed I&D and purulent material found and was taken to OR 11/29 for superficial debridement. Then was transferred to [facility] for a "facility better able to perform procedures on a patient with her BMI".      Patient is awake and alert in ICU. Reports her leg wound "popped up" a day before she arrived at OSH. Says that it started as a boil on her thigh and then it "busted" and she had been applying warm compresses and peroxide. Reports she has been ambulatory prior to arrival, but  says she feels like she is going to fall when she tries to walk here. Denies any water or environmental exposures to wound. Denies pet or animal contacts    Has undergone several I&D with surgery on 11/30, 12/2, 12/4, 12/5 for Necrotizing soft tissue infection. Per op notes they saw a "significant amount of foul smelling, necrotic skin and fat"  and "purulent drainage was found tracking along fascial planes"      Surgical Cultures: 11/30 with - amp sensitive e.faecalis. cultures 12/4 with enteroocccus and enterobacter  BCX- NGTD    Path was sent 12/4 and pending    Has been on vancomycin and zosyn. Ampho added 12/4 for concern for mucor     ID consulted for abx recs    Component 2 d ago   Aerobic Bacterial Culture  Abnormal   ENTEROBACTER CLOACAE   Few   P      Aerobic Bacterial Culture  Abnormal   ENTEROCOCCUS SPECIES   Moderate   Identification and susceptibility pending   P      Resulting Agency [facility]          Susceptibility     Enterobacter cloacae     CULTURE, AEROBIC  (SPECIFY SOURCE) (Preliminary)     Cefepime <=2 mcg/mL Sensitive     Ceftriaxone <=1 mcg/mL Sensitive     Ciprofloxacin <=1 mcg/mL Sensitive     Ertapenem <=0.5 mcg/mL Sensitive     Gentamicin <=4 mcg/mL Sensitive     Levofloxacin <=2 mcg/mL Sensitive     Meropenem <=1 mcg/mL Sensitive     Minocycline <=4 mcg/mL Sensitive     Piperacillin/Tazo <=16 mcg/mL Sensitive     Tetracycline <=4 mcg/mL Sensitive     Tobramycin <=4 mcg/mL Sensitive     " Trimeth/Sulfa >2/38 mcg/mL Resistant               Specimen Information: Leg, Right; Abscess         0 Result Notes         (important suggestion)  Newer results are available. Click to view them now.     Component 6 d ago   Aerobic Bacterial Culture  Abnormal   ENTEROCOCCUS FAECALIS   Many     Resulting Agency [facility]          Susceptibility     Enterococcus faecalis     CULTURE, AEROBIC  (SPECIFY SOURCE)     Ampicillin <=2 mcg/mL Sensitive     Gentamicin Synergy Screen <=500 mcg/mL Sensitive     Tetracycline >8 mcg/mL Resistant     Vancomycin 2 mcg/mL Sensitive                 Interval history: NAEO. Remains in ICU. Anticipating wound closure with sgy soon. Pt denies new complaints. Remains on her cellphone    Review of Systems   Constitutional: Negative for fever.   HENT: Negative.    Eyes: Negative.    Respiratory: Negative.    Cardiovascular: Negative.    Gastrointestinal: Negative for nausea and vomiting.   Endocrine: Negative.    Genitourinary: Negative.    Musculoskeletal: Negative.    Neurological: Negative.    Hematological: Negative.    Psychiatric/Behavioral: Negative.      Objective:     Vital Signs (Most Recent):  Temp: 98.9 °F (37.2 °C) (12/07/21 1101)  Pulse: (!) 115 (12/07/21 1230)  Resp: 17 (12/07/21 1230)  BP: (!) 113/55 (12/07/21 1230)  SpO2: 100 % (12/07/21 1230) Vital Signs (24h Range):  Temp:  [98.3 °F (36.8 °C)-99.1 °F (37.3 °C)] 98.9 °F (37.2 °C)  Pulse:  [106-124] 115  Resp:  [15-43] 17  SpO2:  [99 %-100 %] 100 %  BP: (101-183)/(49-86) 113/55     Weight: (!) 224 kg (493 lb 13.3 oz)  Body mass index is 84.77 kg/m².    Intake/Output Summary (Last 24 hours) at 12/7/2021 1255  Last data filed at 12/7/2021 1200  Gross per 24 hour   Intake 2602.64 ml   Output 1650 ml   Net 952.64 ml      Physical Exam  Vitals and nursing note reviewed.   Constitutional:       General: She is not in acute distress.     Appearance: Normal appearance. She is obese.   HENT:      Head: Normocephalic and atraumatic.    Cardiovascular:      Rate and Rhythm: Normal rate and regular rhythm.   Pulmonary:      Effort: Pulmonary effort is normal. No respiratory distress.      Breath sounds: No wheezing.   Musculoskeletal:         General: No swelling. Normal range of motion.      Cervical back: Normal range of motion and neck supple.   Skin:     General: Skin is warm and dry.      Comments: Right upper thigh incision with packing. Only adipose visible on bedside exam. Some scant drainage   Neurological:      General: No focal deficit present.      Mental Status: She is alert and oriented to person, place, and time.      Motor: No weakness.   Psychiatric:         Mood and Affect: Mood normal.         Behavior: Behavior normal.         Thought Content: Thought content normal.         Significant Labs:   All pertinent labs within the past 24 hours have been reviewed.  BMP:   Recent Labs   Lab 12/07/21  0453   *      K 3.8      CO2 25   BUN 8   CREATININE 0.9   CALCIUM 6.6*     CBC:   Recent Labs   Lab 12/05/21 2039 12/06/21  0424 12/07/21  0453   WBC 52.51* 35.08* 33.95*   HGB 7.8* 7.1* 6.3*   HCT 23.4* 21.9* 19.6*    195 229

## 2021-12-07 NOTE — PT/OT/SLP PROGRESS
"Occupational Therapy      Patient Name:  Shauna Queen   MRN:  [id number]    *Patient not seen today secondary to Wound care needs over riding. Attending RN advising hold all LB mobility and EOB sitting this date in strongest terms.   *Patient unable to be mobilized this date. Attending RN advising no LB mobility or scooting to EOB 2* wound dressing not secured. Wound bed highly vulnerable to tissue trauma. RN's over multiple dates advising BP unstable and significant blood loss has been an ongoing problem. RN present strongly requesting hold all standing/OOB or EOB sitting.   *OT requesting HOLD until wound bed stabilized, OR, Modified OT POC effective December 06, 2021: "OT states upcoming treatment focus to be on bed level strengthening, as needed to bolster patient engagement in seated and supine pressure relief, as well as to support optimal Patient driven bed mobility and decrease pulling, shearing risks."  *OT will follow-up as instructed.       12/7/2021  "

## 2021-12-07 NOTE — PROGRESS NOTES
"[facility]  [place]  Progress Note    Patient Name: Shauna Queen  MRN: [id number]  Patient Class: IP- Inpatient   Admission Date: 11/30/2021  Length of Stay: 7 days  Attending Physician: Antonio Bernal MD  Primary Care Provider: Jorge Dennis MD        Subjective:     Principal Problem:Necrotizing fasciitis        HPI:  Ms. Queen is a 36-year-old presents to the ED from outside hospital for abscess.  She has initially presented to outside hospital for DKA which resolved.  Patient was unaware of why she was here in July prompted her.  Per the checkout are received: "36-year-old female history of diabetes mellitus, sleep apnea on CPAP, schizophrenia, hypertension, and hyperlipidemia admitted to [facility] on November 24 from [facility] for treatment of diabetic ketoacidosis. She was initiated on an insulin infusion. DKA improved and she was transitioned off the insulin infusion. She again developed DKA and was placed back on the insulin infusion for a period of time. Currently she is off the insulin infusion and on subcutaneous insulin. She was noted to have right upper/inner thigh pain with erythema, induration, and tenderness to palpation. She was treated with IV clindamycin and vancomycin. Due to her size ((BMI 74.32) they are unable to perform CT or MRI. General Surgery there performed an incision and drainage with purulent material found. She was taken to the OR on November 29 for a second-look at the thigh wound. They were only able to do superficial debridement. Postprocedure she extubated to 2 L nasal cannula and was awake with oxygen saturation 100 percent. Surgery at their facility recommended transfer to a facility better able to perform procedures on a patient with her BMI. Referring provider spoke with General Surgery at [facility]. Hospital Medicine was asked to admit for further treatment. I spoke with her current attending. He noted she has " "no alteration in mentation. Lower extremity is intact from a neurovascular standpoint. She remains in the ICU."      Overview/Hospital Course:  Patient admitted to the hospital as a transfer secondary to R thigh abscess and a BMI of Body mass index is 84.77 kg/m².   Patient transferred here for surgical evaluation.  Patient went for surgical debridement on 11/30/21. Patient continued on Abx. Wound cultures sent. Blood cultures were NG. PT/OT consulted. Underwent another debridement on 12/2 and again on 12/4 - noting significant necrotizing soft tissue infections concerning for tracking down into her knee. On broad spectrum antibiotics. Surgery recommending anti-fungals. Plan for OR with Orthopedic surgery to explore right knee on 12/5. Patient again went for surgery on 12/6.      Interval History: No new complaints.    Review of Systems   HENT: Negative for ear discharge and ear pain.    Eyes: Negative for discharge and itching.   Endocrine: Negative for cold intolerance and heat intolerance.   Neurological: Negative for seizures and syncope.     Objective:     Vital Signs (Most Recent):  Temp: 98.5 °F (36.9 °C) (12/07/21 1501)  Pulse: (!) 115 (12/07/21 1630)  Resp: 18 (12/07/21 1630)  BP: (!) 115/57 (12/07/21 1630)  SpO2: 100 % (12/07/21 1630) Vital Signs (24h Range):  Temp:  [98.5 °F (36.9 °C)-99.1 °F (37.3 °C)] 98.5 °F (36.9 °C)  Pulse:  [106-124] 115  Resp:  [15-43] 18  SpO2:  [99 %-100 %] 100 %  BP: (101-155)/(49-86) 115/57     Weight: (!) 224 kg (493 lb 13.3 oz)  Body mass index is 84.77 kg/m².    Intake/Output Summary (Last 24 hours) at 12/7/2021 1703  Last data filed at 12/7/2021 1400  Gross per 24 hour   Intake 2289.35 ml   Output 1500 ml   Net 789.35 ml      Physical Exam  Vitals and nursing note reviewed.   Constitutional:       General: She is not in acute distress.     Appearance: Normal appearance. She is obese.   HENT:      Head: Normocephalic and atraumatic.   Cardiovascular:      Rate and Rhythm: " Normal rate and regular rhythm.   Pulmonary:      Effort: Pulmonary effort is normal. No respiratory distress.      Breath sounds: No wheezing.   Musculoskeletal:         General: No swelling. Normal range of motion.      Cervical back: Normal range of motion and neck supple.   Skin:     General: Skin is warm and dry.      Comments: Right upper thigh incision with packing. Only adipose visible on bedside exam. Some scant drainage   Neurological:      General: No focal deficit present.      Mental Status: She is alert and oriented to person, place, and time.      Motor: No weakness.   Psychiatric:         Mood and Affect: Mood normal.         Behavior: Behavior normal.         Thought Content: Thought content normal.         Significant Labs:   All pertinent labs within the past 24 hours have been reviewed.  BMP:   Recent Labs   Lab 12/07/21  0453   *      K 3.8      CO2 25   BUN 8   CREATININE 0.9   CALCIUM 6.6*     CBC:   Recent Labs   Lab 12/05/21 2039 12/06/21  0424 12/07/21  0453   WBC 52.51* 35.08* 33.95*   HGB 7.8* 7.1* 6.3*   HCT 23.4* 21.9* 19.6*    195 229       Significant Imaging: I have reviewed all pertinent imaging results/findings within the past 24 hours.      Assessment/Plan:      * Necrotizing fasciitis  - Status post I&D with General surgery at outside hospital, did not feel like they completely removed abscess  - Started on vanc/Zosyn  - Blood cultures pending, negative to date so far  - Lactate normal  - Consult general surgery   - Underwent debridement in OR 11/30, 12/2, and 12/4, 12/5 - no extension into knee  - added Amphotericin for concern for fungal infection  - ID consulted for further antimicrobial recommendations  - Pain control  - Tissue culture growing Enteroccocus Faecalis, pending fungal cultures    OR again today- 12/6.           Obesity  Body mass index is 84.77 kg/m².  Weight loss as out patient         Hypophosphatemia  - persistent  - started on  phos-nak with meals      Anemia of chronic disease  No acute issues but now with post operative blood loss  - continue to transfuse if hb  <7      Debility  PT/OT   Does not use a walker or cane   - see after procedure      HLD (hyperlipidemia)  - reported but patient is not sure what medications she is on  - check am lipid panel      DANG (obstructive sleep apnea)  Patient likely has component of OHS as well  - outpatient sleep study      DM (diabetes mellitus)  A1c:   Lab Results   Component Value Date    HGBA1C >14.0 (H) 12/01/2021     Meds: basal bolus insulin + SSI PRN to maintain goal 140-180  ADA diet, accuchecks ACHS, hypoglycemic protocol    - very uncontrolled, will need to  this patient on need for diet and medication adherence        VTE Risk Mitigation (From admission, onward)         Ordered     enoxaparin injection 40 mg  Daily         12/04/21 2306     IP VTE LOW RISK PATIENT  Once         11/30/21 0403     Place sequential compression device  Until discontinued         11/30/21 0403                Discharge Planning   STEVEN:      Code Status: Full Code   Is the patient medically ready for discharge?:     Reason for patient still in hospital (select all that apply): Treatment  Discharge Plan A: Home            Antonio Bernal MD  Department of Hospital Medicine   [facility] - Intensive Care

## 2021-12-07 NOTE — ASSESSMENT & PLAN NOTE
36F with h/o morbid obesity (BMI 85), DM, HTN, DANG, HLD admitted 11/24 with R thigh pain, erythema, tenderness and found to be in DKA. Imaging reportedly couldn't be obtained 2/2 size. Noted to have necrotizing soft tissue infection of R upper leg, now s/p  I&D with surgery on 11/30, 12/2, 12/4, 12/5. Surgical Cultures: 11/30 with - amp sensitive e.faecalis. cultures 12/4 with enteroocccus and enterobacter. BCX- NGTD. Path was sent 12/4 and pending. Has been on vancomycin and zosyn. Ampho added 12/4 given surgery's reported concern for mucor. ID consulted for abx recs    Spoke with pathology- saw microabscess formation, acute inflammation and subQ tissue fibrosis. Path says bacterial and fungal infections can appear this way. No hyphal elements seen on H&E stain, but they will perform PAS and GMS additional stains    Recommendations:   - appreciate surgery's help getting source control of infection.   - continue zosyn and amphoB. Stop vancomycin  - f/u path (PAS/GMS stain) and fungal cultures  - need weight loss and better control of DM  - wound care as per primary team

## 2021-12-07 NOTE — SUBJECTIVE & OBJECTIVE
Interval history: NAEO. Remains in ICU. Anticipating wound closure with sgy soon. Pt denies new complaints. Remains on her cellphone    Review of Systems   Constitutional: Negative for fever.   HENT: Negative.    Eyes: Negative.    Respiratory: Negative.    Cardiovascular: Negative.    Gastrointestinal: Negative for nausea and vomiting.   Endocrine: Negative.    Genitourinary: Negative.    Musculoskeletal: Negative.    Neurological: Negative.    Hematological: Negative.    Psychiatric/Behavioral: Negative.      Objective:     Vital Signs (Most Recent):  Temp: 98.9 °F (37.2 °C) (12/07/21 1101)  Pulse: (!) 115 (12/07/21 1230)  Resp: 17 (12/07/21 1230)  BP: (!) 113/55 (12/07/21 1230)  SpO2: 100 % (12/07/21 1230) Vital Signs (24h Range):  Temp:  [98.3 °F (36.8 °C)-99.1 °F (37.3 °C)] 98.9 °F (37.2 °C)  Pulse:  [106-124] 115  Resp:  [15-43] 17  SpO2:  [99 %-100 %] 100 %  BP: (101-183)/(49-86) 113/55     Weight: (!) 224 kg (493 lb 13.3 oz)  Body mass index is 84.77 kg/m².    Intake/Output Summary (Last 24 hours) at 12/7/2021 1255  Last data filed at 12/7/2021 1200  Gross per 24 hour   Intake 2602.64 ml   Output 1650 ml   Net 952.64 ml      Physical Exam  Vitals and nursing note reviewed.   Constitutional:       General: She is not in acute distress.     Appearance: Normal appearance. She is obese.   HENT:      Head: Normocephalic and atraumatic.   Cardiovascular:      Rate and Rhythm: Normal rate and regular rhythm.   Pulmonary:      Effort: Pulmonary effort is normal. No respiratory distress.      Breath sounds: No wheezing.   Musculoskeletal:         General: No swelling. Normal range of motion.      Cervical back: Normal range of motion and neck supple.   Skin:     General: Skin is warm and dry.      Comments: Right upper thigh incision with packing. Only adipose visible on bedside exam. Some scant drainage   Neurological:      General: No focal deficit present.      Mental Status: She is alert and oriented to person,  place, and time.      Motor: No weakness.   Psychiatric:         Mood and Affect: Mood normal.         Behavior: Behavior normal.         Thought Content: Thought content normal.         Significant Labs:   All pertinent labs within the past 24 hours have been reviewed.  BMP:   Recent Labs   Lab 12/07/21  0453   *      K 3.8      CO2 25   BUN 8   CREATININE 0.9   CALCIUM 6.6*     CBC:   Recent Labs   Lab 12/05/21 2039 12/06/21 0424 12/07/21 0453   WBC 52.51* 35.08* 33.95*   HGB 7.8* 7.1* 6.3*   HCT 23.4* 21.9* 19.6*    195 229

## 2021-12-08 ENCOUNTER — ANESTHESIA (OUTPATIENT)
Dept: SURGERY | Facility: HOSPITAL | Age: 36
DRG: 463 | End: 2021-12-08
Payer: COMMERCIAL

## 2021-12-08 ENCOUNTER — ANESTHESIA EVENT (OUTPATIENT)
Dept: SURGERY | Facility: HOSPITAL | Age: 36
DRG: 463 | End: 2021-12-08
Payer: COMMERCIAL

## 2021-12-08 LAB
ALBUMIN SERPL BCP-MCNC: 1.3 G/DL (ref 3.5–5.2)
ALP SERPL-CCNC: 96 U/L (ref 55–135)
ALT SERPL W/O P-5'-P-CCNC: 18 U/L (ref 10–44)
ANION GAP SERPL CALC-SCNC: 7 MMOL/L (ref 8–16)
ANION GAP SERPL CALC-SCNC: 7 MMOL/L (ref 8–16)
AST SERPL-CCNC: 21 U/L (ref 10–40)
BASOPHILS # BLD AUTO: 0.08 K/UL (ref 0–0.2)
BASOPHILS NFR BLD: 0.3 % (ref 0–1.9)
BILIRUB SERPL-MCNC: 0.4 MG/DL (ref 0.1–1)
BLD PROD TYP BPU: NORMAL
BLOOD UNIT EXPIRATION DATE: NORMAL
BLOOD UNIT TYPE CODE: 1700
BLOOD UNIT TYPE: NORMAL
BUN SERPL-MCNC: 8 MG/DL (ref 6–20)
BUN SERPL-MCNC: 8 MG/DL (ref 6–20)
CALCIUM SERPL-MCNC: 7.1 MG/DL (ref 8.7–10.5)
CALCIUM SERPL-MCNC: 7.1 MG/DL (ref 8.7–10.5)
CHLORIDE SERPL-SCNC: 104 MMOL/L (ref 95–110)
CHLORIDE SERPL-SCNC: 104 MMOL/L (ref 95–110)
CO2 SERPL-SCNC: 27 MMOL/L (ref 23–29)
CO2 SERPL-SCNC: 27 MMOL/L (ref 23–29)
CODING SYSTEM: NORMAL
CREAT SERPL-MCNC: 0.9 MG/DL (ref 0.5–1.4)
CREAT SERPL-MCNC: 0.9 MG/DL (ref 0.5–1.4)
DIFFERENTIAL METHOD: ABNORMAL
DISPENSE STATUS: NORMAL
EOSINOPHIL # BLD AUTO: 0 K/UL (ref 0–0.5)
EOSINOPHIL NFR BLD: 0.2 % (ref 0–8)
ERYTHROCYTE [DISTWIDTH] IN BLOOD BY AUTOMATED COUNT: 16 % (ref 11.5–14.5)
EST. GFR  (AFRICAN AMERICAN): >60 ML/MIN/1.73 M^2
EST. GFR  (AFRICAN AMERICAN): >60 ML/MIN/1.73 M^2
EST. GFR  (NON AFRICAN AMERICAN): >60 ML/MIN/1.73 M^2
EST. GFR  (NON AFRICAN AMERICAN): >60 ML/MIN/1.73 M^2
GLUCOSE SERPL-MCNC: 142 MG/DL (ref 70–110)
GLUCOSE SERPL-MCNC: 142 MG/DL (ref 70–110)
HCT VFR BLD AUTO: 22.2 % (ref 37–48.5)
HGB BLD-MCNC: 7.3 G/DL (ref 12–16)
IMM GRANULOCYTES # BLD AUTO: 1.1 K/UL (ref 0–0.04)
IMM GRANULOCYTES NFR BLD AUTO: 4.1 % (ref 0–0.5)
LYMPHOCYTES # BLD AUTO: 2.7 K/UL (ref 1–4.8)
LYMPHOCYTES NFR BLD: 10.1 % (ref 18–48)
MAGNESIUM SERPL-MCNC: 2 MG/DL (ref 1.6–2.6)
MCH RBC QN AUTO: 29.4 PG (ref 27–31)
MCHC RBC AUTO-ENTMCNC: 32.9 G/DL (ref 32–36)
MCV RBC AUTO: 90 FL (ref 82–98)
MONOCYTES # BLD AUTO: 2.1 K/UL (ref 0.3–1)
MONOCYTES NFR BLD: 7.9 % (ref 4–15)
NEUTROPHILS # BLD AUTO: 20.6 K/UL (ref 1.8–7.7)
NEUTROPHILS NFR BLD: 77.4 % (ref 38–73)
NRBC BLD-RTO: 1 /100 WBC
NUM UNITS TRANS PACKED RBC: NORMAL
PHOSPHATE SERPL-MCNC: 3.2 MG/DL (ref 2.7–4.5)
PLATELET # BLD AUTO: 229 K/UL (ref 150–450)
PLATELET BLD QL SMEAR: ABNORMAL
PMV BLD AUTO: 9.3 FL (ref 9.2–12.9)
POCT GLUCOSE: 144 MG/DL (ref 70–110)
POCT GLUCOSE: 147 MG/DL (ref 70–110)
POCT GLUCOSE: 168 MG/DL (ref 70–110)
POCT GLUCOSE: 407 MG/DL (ref 70–110)
POCT GLUCOSE: 431 MG/DL (ref 70–110)
POTASSIUM SERPL-SCNC: 4.1 MMOL/L (ref 3.5–5.1)
POTASSIUM SERPL-SCNC: 4.1 MMOL/L (ref 3.5–5.1)
PROT SERPL-MCNC: 4.4 G/DL (ref 6–8.4)
RBC # BLD AUTO: 2.48 M/UL (ref 4–5.4)
SODIUM SERPL-SCNC: 138 MMOL/L (ref 136–145)
SODIUM SERPL-SCNC: 138 MMOL/L (ref 136–145)
WBC # BLD AUTO: 26.59 K/UL (ref 3.9–12.7)

## 2021-12-08 PROCEDURE — 11042 DBRDMT SUBQ TIS 1ST 20SQCM/<: CPT | Mod: ,,, | Performed by: SURGERY

## 2021-12-08 PROCEDURE — 83735 ASSAY OF MAGNESIUM: CPT | Performed by: SURGERY

## 2021-12-08 PROCEDURE — 11045 PR DEB SUBQ TISSUE ADD-ON: ICD-10-PCS | Mod: ,,, | Performed by: SURGERY

## 2021-12-08 PROCEDURE — 37000009 HC ANESTHESIA EA ADD 15 MINS: Performed by: SURGERY

## 2021-12-08 PROCEDURE — 27000221 HC OXYGEN, UP TO 24 HOURS

## 2021-12-08 PROCEDURE — 63600175 PHARM REV CODE 636 W HCPCS: Performed by: STUDENT IN AN ORGANIZED HEALTH CARE EDUCATION/TRAINING PROGRAM

## 2021-12-08 PROCEDURE — 25000003 PHARM REV CODE 250: Performed by: STUDENT IN AN ORGANIZED HEALTH CARE EDUCATION/TRAINING PROGRAM

## 2021-12-08 PROCEDURE — 63600175 PHARM REV CODE 636 W HCPCS: Performed by: SURGERY

## 2021-12-08 PROCEDURE — D9220A PRA ANESTHESIA: ICD-10-PCS | Mod: CRNA,,, | Performed by: STUDENT IN AN ORGANIZED HEALTH CARE EDUCATION/TRAINING PROGRAM

## 2021-12-08 PROCEDURE — 25000003 PHARM REV CODE 250: Performed by: INTERNAL MEDICINE

## 2021-12-08 PROCEDURE — 94660 CPAP INITIATION&MGMT: CPT

## 2021-12-08 PROCEDURE — 36415 COLL VENOUS BLD VENIPUNCTURE: CPT | Performed by: SURGERY

## 2021-12-08 PROCEDURE — D9220A PRA ANESTHESIA: ICD-10-PCS | Mod: ANES,,, | Performed by: ANESTHESIOLOGY

## 2021-12-08 PROCEDURE — 80053 COMPREHEN METABOLIC PANEL: CPT | Performed by: SURGERY

## 2021-12-08 PROCEDURE — 97606 NEG PRS WND THER DME>50 SQCM: CPT | Mod: ,,, | Performed by: SURGERY

## 2021-12-08 PROCEDURE — 11042 PR DEBRIDEMENT, SKIN, SUB-Q TISSUE,=<20 SQ CM: ICD-10-PCS | Mod: ,,, | Performed by: SURGERY

## 2021-12-08 PROCEDURE — 36000706: Performed by: SURGERY

## 2021-12-08 PROCEDURE — 85025 COMPLETE CBC W/AUTO DIFF WBC: CPT | Performed by: INTERNAL MEDICINE

## 2021-12-08 PROCEDURE — D9220A PRA ANESTHESIA: Mod: ANES,,, | Performed by: ANESTHESIOLOGY

## 2021-12-08 PROCEDURE — 25000003 PHARM REV CODE 250: Performed by: SURGERY

## 2021-12-08 PROCEDURE — 63600175 PHARM REV CODE 636 W HCPCS: Performed by: INTERNAL MEDICINE

## 2021-12-08 PROCEDURE — 20000000 HC ICU ROOM

## 2021-12-08 PROCEDURE — 63600175 PHARM REV CODE 636 W HCPCS: Mod: JG | Performed by: STUDENT IN AN ORGANIZED HEALTH CARE EDUCATION/TRAINING PROGRAM

## 2021-12-08 PROCEDURE — 63600175 PHARM REV CODE 636 W HCPCS

## 2021-12-08 PROCEDURE — 97606 PR NEG PRESS WOUND THERAPY (NPWT) W/NON-DISPOSABLE WOUND VAC DEVICE (DME), >=50 CM: ICD-10-PCS | Mod: ,,, | Performed by: SURGERY

## 2021-12-08 PROCEDURE — 11045 DBRDMT SUBQ TISS EACH ADDL: CPT | Mod: ,,, | Performed by: SURGERY

## 2021-12-08 PROCEDURE — P9016 RBC LEUKOCYTES REDUCED: HCPCS | Performed by: INTERNAL MEDICINE

## 2021-12-08 PROCEDURE — 25000003 PHARM REV CODE 250

## 2021-12-08 PROCEDURE — 25000242 PHARM REV CODE 250 ALT 637 W/ HCPCS: Performed by: STUDENT IN AN ORGANIZED HEALTH CARE EDUCATION/TRAINING PROGRAM

## 2021-12-08 PROCEDURE — 94799 UNLISTED PULMONARY SVC/PX: CPT

## 2021-12-08 PROCEDURE — 37000008 HC ANESTHESIA 1ST 15 MINUTES: Performed by: SURGERY

## 2021-12-08 PROCEDURE — 86850 RBC ANTIBODY SCREEN: CPT

## 2021-12-08 PROCEDURE — 99900035 HC TECH TIME PER 15 MIN (STAT)

## 2021-12-08 PROCEDURE — 84100 ASSAY OF PHOSPHORUS: CPT | Performed by: INTERNAL MEDICINE

## 2021-12-08 PROCEDURE — 36000707: Performed by: SURGERY

## 2021-12-08 PROCEDURE — 94761 N-INVAS EAR/PLS OXIMETRY MLT: CPT

## 2021-12-08 PROCEDURE — D9220A PRA ANESTHESIA: Mod: CRNA,,, | Performed by: STUDENT IN AN ORGANIZED HEALTH CARE EDUCATION/TRAINING PROGRAM

## 2021-12-08 RX ORDER — KETAMINE HYDROCHLORIDE 100 MG/ML
INJECTION, SOLUTION INTRAMUSCULAR; INTRAVENOUS
Status: DISCONTINUED | OUTPATIENT
Start: 2021-12-08 | End: 2021-12-10

## 2021-12-08 RX ORDER — HYDROCODONE BITARTRATE AND ACETAMINOPHEN 500; 5 MG/1; MG/1
TABLET ORAL
Status: DISCONTINUED | OUTPATIENT
Start: 2021-12-08 | End: 2021-12-09

## 2021-12-08 RX ORDER — LIDOCAINE HYDROCHLORIDE 20 MG/ML
INJECTION INTRAVENOUS
Status: DISCONTINUED | OUTPATIENT
Start: 2021-12-08 | End: 2021-12-10

## 2021-12-08 RX ORDER — PROPOFOL 10 MG/ML
VIAL (ML) INTRAVENOUS
Status: DISCONTINUED | OUTPATIENT
Start: 2021-12-08 | End: 2021-12-10

## 2021-12-08 RX ORDER — ROCURONIUM BROMIDE 10 MG/ML
INJECTION, SOLUTION INTRAVENOUS
Status: DISCONTINUED | OUTPATIENT
Start: 2021-12-08 | End: 2021-12-10

## 2021-12-08 RX ORDER — HYDROCODONE BITARTRATE AND ACETAMINOPHEN 500; 5 MG/1; MG/1
TABLET ORAL
Status: DISCONTINUED | OUTPATIENT
Start: 2021-12-08 | End: 2021-12-08

## 2021-12-08 RX ORDER — DEXAMETHASONE SODIUM PHOSPHATE 4 MG/ML
INJECTION, SOLUTION INTRA-ARTICULAR; INTRALESIONAL; INTRAMUSCULAR; INTRAVENOUS; SOFT TISSUE
Status: DISCONTINUED | OUTPATIENT
Start: 2021-12-08 | End: 2021-12-10

## 2021-12-08 RX ORDER — ALBUTEROL SULFATE 90 UG/1
AEROSOL, METERED RESPIRATORY (INHALATION)
Status: DISCONTINUED | OUTPATIENT
Start: 2021-12-08 | End: 2021-12-10

## 2021-12-08 RX ORDER — PHENYLEPHRINE HYDROCHLORIDE 10 MG/ML
INJECTION INTRAVENOUS
Status: DISCONTINUED | OUTPATIENT
Start: 2021-12-08 | End: 2021-12-10

## 2021-12-08 RX ORDER — FENTANYL CITRATE 50 UG/ML
INJECTION, SOLUTION INTRAMUSCULAR; INTRAVENOUS
Status: DISCONTINUED | OUTPATIENT
Start: 2021-12-08 | End: 2021-12-10

## 2021-12-08 RX ORDER — ONDANSETRON 2 MG/ML
INJECTION INTRAMUSCULAR; INTRAVENOUS
Status: DISCONTINUED | OUTPATIENT
Start: 2021-12-08 | End: 2021-12-10

## 2021-12-08 RX ORDER — SUCCINYLCHOLINE CHLORIDE 20 MG/ML
INJECTION INTRAMUSCULAR; INTRAVENOUS
Status: DISCONTINUED | OUTPATIENT
Start: 2021-12-08 | End: 2021-12-10

## 2021-12-08 RX ADMIN — ONDANSETRON 4 MG: 2 INJECTION, SOLUTION INTRAMUSCULAR; INTRAVENOUS at 01:12

## 2021-12-08 RX ADMIN — PIPERACILLIN AND TAZOBACTAM 4.5 G: 4; .5 INJECTION, POWDER, LYOPHILIZED, FOR SOLUTION INTRAVENOUS; PARENTERAL at 08:12

## 2021-12-08 RX ADMIN — FENTANYL CITRATE 25 MCG: 50 INJECTION, SOLUTION INTRAMUSCULAR; INTRAVENOUS at 02:12

## 2021-12-08 RX ADMIN — AMPHOTERICIN B 650 MG: 50 INJECTION, POWDER, LYOPHILIZED, FOR SOLUTION INTRAVENOUS at 04:12

## 2021-12-08 RX ADMIN — INSULIN ASPART 8 UNITS: 100 INJECTION, SOLUTION INTRAVENOUS; SUBCUTANEOUS at 05:12

## 2021-12-08 RX ADMIN — MUPIROCIN 1 G: 20 OINTMENT TOPICAL at 08:12

## 2021-12-08 RX ADMIN — INSULIN ASPART 2 UNITS: 100 INJECTION, SOLUTION INTRAVENOUS; SUBCUTANEOUS at 05:12

## 2021-12-08 RX ADMIN — FENTANYL CITRATE 25 MCG: 50 INJECTION, SOLUTION INTRAMUSCULAR; INTRAVENOUS at 01:12

## 2021-12-08 RX ADMIN — HYDROCODONE BITARTRATE AND ACETAMINOPHEN 1 TABLET: 5; 325 TABLET ORAL at 07:12

## 2021-12-08 RX ADMIN — SUGAMMADEX 400 MG: 100 INJECTION, SOLUTION INTRAVENOUS at 02:12

## 2021-12-08 RX ADMIN — PROPOFOL 150 MG: 10 INJECTION, EMULSION INTRAVENOUS at 01:12

## 2021-12-08 RX ADMIN — KETAMINE HYDROCHLORIDE 30 MG: 100 INJECTION, SOLUTION, CONCENTRATE INTRAMUSCULAR; INTRAVENOUS at 01:12

## 2021-12-08 RX ADMIN — ROCURONIUM BROMIDE 20 MG: 10 INJECTION, SOLUTION INTRAVENOUS at 01:12

## 2021-12-08 RX ADMIN — SODIUM CHLORIDE: 0.9 INJECTION, SOLUTION INTRAVENOUS at 04:12

## 2021-12-08 RX ADMIN — LIDOCAINE HYDROCHLORIDE 100 MG: 20 INJECTION, SOLUTION INTRAVENOUS at 01:12

## 2021-12-08 RX ADMIN — Medication 1 PACKET: at 05:12

## 2021-12-08 RX ADMIN — CALCIUM CARBONATE (ANTACID) CHEW TAB 500 MG 1000 MG: 500 CHEW TAB at 08:12

## 2021-12-08 RX ADMIN — ESMOLOL HYDROCHLORIDE 10 MG: 10 INJECTION INTRAVENOUS at 02:12

## 2021-12-08 RX ADMIN — PHENYLEPHRINE HYDROCHLORIDE 100 MCG: 10 INJECTION INTRAVENOUS at 01:12

## 2021-12-08 RX ADMIN — PIPERACILLIN AND TAZOBACTAM 4.5 G: 4; .5 INJECTION, POWDER, LYOPHILIZED, FOR SOLUTION INTRAVENOUS; PARENTERAL at 10:12

## 2021-12-08 RX ADMIN — SUCCINYLCHOLINE CHLORIDE 200 MG: 20 INJECTION, SOLUTION INTRAMUSCULAR; INTRAVENOUS at 01:12

## 2021-12-08 RX ADMIN — PROPOFOL 50 MG: 10 INJECTION, EMULSION INTRAVENOUS at 01:12

## 2021-12-08 RX ADMIN — ENOXAPARIN SODIUM 40 MG: 40 INJECTION SUBCUTANEOUS at 04:12

## 2021-12-08 RX ADMIN — DEXAMETHASONE SODIUM PHOSPHATE 4 MG: 4 INJECTION, SOLUTION INTRAMUSCULAR; INTRAVENOUS at 01:12

## 2021-12-08 RX ADMIN — Medication 1 PACKET: at 11:12

## 2021-12-08 RX ADMIN — INSULIN DETEMIR 35 UNITS: 100 INJECTION, SOLUTION SUBCUTANEOUS at 08:12

## 2021-12-08 RX ADMIN — ESMOLOL HYDROCHLORIDE 10 MG: 10 INJECTION INTRAVENOUS at 01:12

## 2021-12-08 RX ADMIN — KETAMINE HYDROCHLORIDE 20 MG: 100 INJECTION, SOLUTION, CONCENTRATE INTRAMUSCULAR; INTRAVENOUS at 01:12

## 2021-12-08 RX ADMIN — Medication 1 PACKET: at 03:12

## 2021-12-08 RX ADMIN — CALCIUM GLUCONATE 1 G: 98 INJECTION, SOLUTION INTRAVENOUS at 11:12

## 2021-12-08 RX ADMIN — Medication 1 PACKET: at 08:12

## 2021-12-08 RX ADMIN — PIPERACILLIN AND TAZOBACTAM 4.5 G: 4; .5 INJECTION, POWDER, LYOPHILIZED, FOR SOLUTION INTRAVENOUS; PARENTERAL at 03:12

## 2021-12-08 RX ADMIN — ALBUTEROL SULFATE 4 PUFF: 108 AEROSOL, METERED RESPIRATORY (INHALATION) at 01:12

## 2021-12-08 RX ADMIN — PROPOFOL 30 MG: 10 INJECTION, EMULSION INTRAVENOUS at 01:12

## 2021-12-08 RX ADMIN — SODIUM CHLORIDE: 0.9 INJECTION, SOLUTION INTRAVENOUS at 11:12

## 2021-12-08 RX ADMIN — INSULIN ASPART 5 UNITS: 100 INJECTION, SOLUTION INTRAVENOUS; SUBCUTANEOUS at 08:12

## 2021-12-08 NOTE — NURSING
[facility] [place] [place] [facility] [place]    Patient Name: Shauna Queen  MRN: [id number]  Attending Provider:  Antonio Bernal MD  12/8/2021       Wounds on : Wound Wednesday    [] No   [x]Prevention Measures Documented     [x] Yes    [x]LDA's Charted   [x]Wound Care Consulted (optional)   []Photo Taken (optional)   []MD Notified    Attending RN:  Soraya June RN     Second RN:  Jesika Naidu RN

## 2021-12-08 NOTE — OP NOTE
[facility] - Intensive Care  Operative Note    SUMMARY     Surgery Date: 12/8/2021     Surgeon(s) and Role:     * Brant Jorgensen MD - Primary    Assisting Surgeon: None    Pre-op Diagnosis:  Necrotizing soft tissue infection [M79.89]    Post-op Diagnosis:  Post-Op Diagnosis Codes:     * Necrotizing soft tissue infection [M79.89]    Procedure(s) (LRB):  CLOSURE, WOUND (Right)   Wound washout  Wound debridement  Wound Vac Placement    Anesthesia: General    Indication for procedure: Shauna Queen is 36 y.o. female with necrotizing fasciitis of RLE s/p multiple debridements and washout. After discussion of disease process, we discussed options and have elected for operation to perform repeat I&D washout and partial closure/wound vac.    Description of Procedure: After consent was obtained, patient was taken to the OR. The right thigh was prepped and draped in a standard sterile fashion after general anesthesia was started. Time out was performed. Antibiotics were started with zosyn. We began the procedure by removing the prior packing and wound coverage. The wound was clean, mostly. We bluntly and sharply removed some necrotic fat.  There did not appear to be any extension of purulent pockets and there was 1 area of an approximately 5 x 2 cm skin edge that was beginning to look quite necrotic and we sharply excised this.  The remainder of the wound was free from any aggressive an obvious infection.  At the superior lateral the medial and the distal ends of the wound there was skin that we could bring back together and closed for partial closure and greatly decreased the size of the wound.  We washed out the wound with a L of warm normal saline. We used a combination of vertical mattress and simple interrupted sutures using 2-0 nylons and narrowed a significant portion of the wound.  There was still a significant central area which was approximately 30 x 20 cm which had significant tension that we could not close  completely.  We did place wound VAC sponge including white sponge medially and distally and then gray silver sponge in the wound bed and placed a wound VAC to good seal.  All counts were correct x2. Patient was awakened from anesthesia and taken to the recovery room in a stable condition having suffered no issues at this time.    Description of the findings of the procedure:   30 cm x 20 cm wound, no ongoing infection needing significant debridement.  Wound VAC placed to good seal    Estimated Blood Loss: * No values recorded between 12/8/2021  1:40 PM and 12/8/2021  2:53 PM *         Specimens:   Specimen (24h ago, onward)            None          Drains/Implants:  Wound VAC

## 2021-12-08 NOTE — PLAN OF CARE
Recommendations    1) Re-initiate diabetic diet when medically able  2) Add Moody BID to aid in wound healing  3) Hyperglycemia    Goals:   1) Patient to meet > 50% EEN/EPN via PO/ONS intake  2) Monitored labs to trend toward target ranges    Nutrition Goal Status: new  Communication of RD Recs: other (comment) (POC)

## 2021-12-08 NOTE — PLAN OF CARE
Pt remains awake, alert, and oriented in ICU. HR running sinus tach. BP stable and afebrile. Pt on 2L NC sating above 95%. IV antibiotics given. Blood sugar monitored per order. Insulin given per order. Pt NPO after midnight. Pt had two bowel movements this shift. Rajput in place with good urine output. Pt had no complaints this shift, updated on plan of care. No new falls, injuries, or skin break down at this time.

## 2021-12-08 NOTE — ASSESSMENT & PLAN NOTE
A1c:   Lab Results   Component Value Date    HGBA1C >14.0 (H) 12/01/2021     Meds: basal bolus insulin + SSI PRN to maintain goal 140-180  ADA diet, accuchecks ACHS, hypoglycemic protocol  Uncontrolled with hyperglycemia.  Continue current management.

## 2021-12-08 NOTE — PLAN OF CARE
[facility] - Intensive Care  Discharge Reassessment    Primary Care Provider: Jorge Dennis MD    Expected Discharge Date:  TBD    Reassessment (most recent)       Discharge Reassessment - 12/08/21 1712          Discharge Reassessment    Assessment Type Discharge Planning Reassessment     Did the patient's condition or plan change since previous assessment? Yes     Discharge Plan discussed with: Patient     Name(s) and Number(s) Emergency contact:  Deisy Elliott; mother;  [phone]     Communicated STEVEN with patient/caregiver No     Discharge Plan A Home Health     Discharge Plan B Home with family     DME Needed Upon Discharge  other (see comments)   TBD:  patient currently has a wound v.a.c. in place    Discharge Barriers Identified Bariatric     Why the patient remains in the hospital Requires continued medical care        Post-Acute Status    Coverage [facility] connection     Discharge Delays None known at this time                   Patient lives in [place].  She stated that she lives alone.  Stated that her mother would be available to help post discharge.  Her mother will also transport her home at discharge.  Patient's preferred pharmacy is [facility] in [place].  Patient's communication board updated with 's information.  Case managment will assist with discharge planning.

## 2021-12-08 NOTE — PROGRESS NOTES
"[facility] [place] Intensive Care  Adult Nutrition  Progress Note    SUMMARY       Recommendations    1) Re-initiate diabetic diet when medically able  2) Add Moody BID to aid in wound healing  3) Hyperglycemia    Goals:   1) Patient to meet > 50% EEN/EPN via PO/ONS intake  2) Monitored labs to trend toward target ranges    Nutrition Goal Status: new  Communication of RD Recs: other (comment) (POC)    Dietitian Rounds Brief    12/8 - LOS, Patient not seen today d/t being out of room per washout procedure for wound. Per chart PO intake has been 0 - 25%, will follow up.    Assessment and Plan  Nutrition Problem  Inadequate Oral Intake    Related to (etiology):   Unknown     Signs and Symptoms (as evidenced by):   PO intake 0 - 25%    Interventions/Recommendations (treatment strategy):  Carbohydrate Controlled Diet  Commercial Beverage  Collaboration of care with other providers    Nutrition Diagnosis Status:   New      Reason for Assessment    Reason For Assessment: length of stay  Diagnosis: infection/sepsis (Necrotizing fasciitis)  Relevant Medical History: DM, DANG, HLD, Anemia, HypoPO4, Obesity, Necrotizing Fasciitis  Nutrition Discharge Planning: Diabetic Diet    Nutrition Risk Screen    Nutrition Risk Screen: large or nonhealing wound, burn or pressure injury    Nutrition/Diet History    Spiritual, Cultural Beliefs, Yarsani Practices, Values that Affect Care: no  Food Allergies: NKFA  Factors Affecting Nutritional Intake: other (see comments) (DALE)    Anthropometrics    Temp: 98.6 °F (37 °C)  Height Method: Stated  Height: 5' 4.02" (162.6 cm)  Height (inches): 64.02 in  Weight Method: Bed Scale  Weight: (!) 224 kg (493 lb 13.3 oz)  Weight (lb): (!) 493.84 lb  Ideal Body Weight (IBW), Female: 120.1 lb  % Ideal Body Weight, Female (lb): 411.19 %  BMI (Calculated): 84.7  BMI Grade: greater than 40 - morbid obesity       Lab/Procedures/Meds    Pertinent Labs Reviewed: reviewed  Pertinent Medications Reviewed: " reviewed      Estimated/Assessed Needs    Weight Used For Calorie Calculations: (!) 224 kg (493 lb 13.3 oz)  Energy Calorie Requirements (kcal): 2464 - 3136  Energy Need Method: Kcal/kg (11 - 14 kcal/kg)  Protein Requirements: 81 - 109g (1.5 - 2.0 g/kg IBW)  Weight Used For Protein Calculations: 54.5 kg (120 lb 1.6 oz)     Estimated Fluid Requirement Method: RDA Method (or per MD)  RDA Method (mL): 2464  CHO Requirement: 308g      Nutrition Prescription Ordered    Current Diet Order: NPO    Evaluation of Received Nutrient/Fluid Intake    Energy Calories Required: not meeting needs  Protein Required: not meeting needs  Fluid Required: exceeds needs  % Intake of Estimated Energy Needs: 0 - 25 %  % Meal Intake: NPO    Nutrition Risk    Level of Risk/Frequency of Follow-up:  (1-2x/week)     Monitor and Evaluation    Food and Nutrient Intake: energy intake,food and beverage intake  Food and Nutrient Adminstration: diet order  Knowledge/Beliefs/Attitudes: food and nutrition knowledge/skill,beliefs and attitudes  Physical Activity and Function: nutrition-related ADLs and IADLs  Anthropometric Measurements: weight,weight change,body mass index  Biochemical Data, Medical Tests and Procedures: electrolyte and renal panel,gastrointestinal profile,glucose/endocrine profile,inflammatory profile,lipid profile  Nutrition-Focused Physical Findings: overall appearance,head and eyes,extremities, muscles and bones,skin     Nutrition Follow-Up    RD Follow-up?: Yes

## 2021-12-08 NOTE — PROGRESS NOTES
"[facility] Intensive Care  [facility] Medicine  Progress Note    Patient Name: Shauna Queen  MRN: [id number]  Patient Class: IP- Inpatient   Admission Date: 11/30/2021  Length of Stay: 8 days  Attending Physician: Antonio Bernal MD  Primary Care Provider: Jorge Dennis MD        Subjective:     Principal Problem:Necrotizing fasciitis        HPI:  Ms. Queen is a 36-year-old presents to the ED from outside hospital for abscess.  She has initially presented to outside hospital for DKA which resolved.  Patient was unaware of why she was here in July prompted her.  Per the checkout are received: "36-year-old female history of diabetes mellitus, sleep apnea on CPAP, schizophrenia, hypertension, and hyperlipidemia admitted to [facility] on November 24 from [facility] for treatment of diabetic ketoacidosis. She was initiated on an insulin infusion. DKA improved and she was transitioned off the insulin infusion. She again developed DKA and was placed back on the insulin infusion for a period of time. Currently she is off the insulin infusion and on subcutaneous insulin. She was noted to have right upper/inner thigh pain with erythema, induration, and tenderness to palpation. She was treated with IV clindamycin and vancomycin. Due to her size ((BMI 74.32) they are unable to perform CT or MRI. General Surgery there performed an incision and drainage with purulent material found. She was taken to the OR on November 29 for a second-look at the thigh wound. They were only able to do superficial debridement. Postprocedure she extubated to 2 L nasal cannula and was awake with oxygen saturation 100 percent. Surgery at their facility recommended transfer to a facility better able to perform procedures on a patient with her BMI. Referring provider spoke with General Surgery at [facility]. Hospital Medicine was asked to admit for further treatment. I spoke with her current attending. He noted she has " "no alteration in mentation. Lower extremity is intact from a neurovascular standpoint. She remains in the ICU."      Overview/Hospital Course:  Patient admitted to the hospital as a transfer secondary to R thigh abscess and a BMI of Body mass index is 84.77 kg/m².   Patient transferred here for surgical evaluation.  Patient went for surgical debridement on 11/30/21. Patient continued on Abx. Wound cultures sent. Blood cultures were NG. PT/OT consulted. Underwent another debridement on 12/2 and again on 12/4 - noting significant necrotizing soft tissue infections concerning for tracking down into her knee. On broad spectrum antibiotics. Surgery recommending anti-fungals. Plan for OR with Orthopedic surgery to explore right knee on 12/5. Patient again went for surgery on 12/6.      Interval History: No acute events overnight.    Review of Systems   HENT: Negative for ear discharge and ear pain.    Eyes: Negative for discharge and itching.   Endocrine: Negative for cold intolerance and heat intolerance.   Neurological: Negative for seizures and syncope.     Objective:     Vital Signs (Most Recent):  Temp: 98.6 °F (37 °C) (12/08/21 1516)  Pulse: (!) 115 (12/08/21 1516)  Resp: (!) 27 (12/08/21 1516)  BP: 132/87 (12/08/21 1516)  SpO2: 100 % (12/08/21 1516) Vital Signs (24h Range):  Temp:  [98.6 °F (37 °C)-99.3 °F (37.4 °C)] 98.6 °F (37 °C)  Pulse:  [100-121] 115  Resp:  [17-33] 27  SpO2:  [100 %] 100 %  BP: ()/(49-87) 132/87     Weight: (!) 224 kg (493 lb 13.3 oz)  Body mass index is 84.72 kg/m².    Intake/Output Summary (Last 24 hours) at 12/8/2021 1554  Last data filed at 12/8/2021 1508  Gross per 24 hour   Intake 3775.49 ml   Output 2850 ml   Net 925.49 ml      Physical Exam  Vitals and nursing note reviewed.   Constitutional:       General: She is not in acute distress.     Appearance: Normal appearance. She is obese.   HENT:      Head: Normocephalic and atraumatic.   Cardiovascular:      Rate and Rhythm: Normal " rate and regular rhythm.   Pulmonary:      Effort: Pulmonary effort is normal. No respiratory distress.      Breath sounds: No wheezing.   Musculoskeletal:         General: No swelling. Normal range of motion.      Cervical back: Normal range of motion and neck supple.   Skin:     General: Skin is warm and dry.      Comments: Right upper thigh incision with packing. Only adipose visible on bedside exam. Some scant drainage   Neurological:      General: No focal deficit present.      Mental Status: She is alert and oriented to person, place, and time.      Motor: No weakness.   Psychiatric:         Mood and Affect: Mood normal.         Behavior: Behavior normal.         Thought Content: Thought content normal.         Significant Labs:   All pertinent labs within the past 24 hours have been reviewed.  BMP:   Recent Labs   Lab 12/08/21  0358   *  142*     138   K 4.1  4.1     104   CO2 27  27   BUN 8  8   CREATININE 0.9  0.9   CALCIUM 7.1*  7.1*   MG 2.0     CBC:   Recent Labs   Lab 12/07/21  0453 12/08/21  0358   WBC 33.95* 26.59*   HGB 6.3* 7.3*   HCT 19.6* 22.2*    229       Significant Imaging: I have reviewed all pertinent imaging results/findings within the past 24 hours.      Assessment/Plan:      * Necrotizing fasciitis  - Status post I&D with General surgery at outside hospital, did not feel like they completely removed abscess  ABx's per ID  Appreciate Surgery and Ortho inpu.  - Underwent debridement in OR 11/30, 12/2, and 12/4, 12/5 - no extension into knee.  OR again on 12/6  - added Amphotericin for concern for fungal infection  - ID consulted for further antimicrobial recommendations  - Pain control  To OR today for wound closure.          Obesity  Morbid obesity  Body mass index is 84.72 kg/m².          Hypophosphatemia  Replace as needed.      Anemia of chronic disease  With anemia of acute blood loss as expected from multiple surgeries.  Transfuse for  Hgb<7      Debility  PT/OT once more stable      HLD (hyperlipidemia)  - reported but patient is not sure what medications she is on      DANG (obstructive sleep apnea)  Patient likely has component of OHS as well  - outpatient sleep study      DM (diabetes mellitus)  A1c:   Lab Results   Component Value Date    HGBA1C >14.0 (H) 12/01/2021     Meds: basal bolus insulin + SSI PRN to maintain goal 140-180  ADA diet, accuchecks ACHS, hypoglycemic protocol  Uncontrolled with hyperglycemia.  Continue current management.        VTE Risk Mitigation (From admission, onward)         Ordered     enoxaparin injection 40 mg  Daily         12/04/21 2306     IP VTE LOW RISK PATIENT  Once         11/30/21 0403     Place sequential compression device  Until discontinued         11/30/21 0403                Discharge Planning   STEVEN:      Code Status: Full Code   Is the patient medically ready for discharge?:     Reason for patient still in hospital (select all that apply): Treatment  Discharge Plan A: Home            Antonio Bernal MD  Department of Hospital Medicine   [facility] - Intensive Care

## 2021-12-08 NOTE — ASSESSMENT & PLAN NOTE
- Status post I&D with General surgery at outside hospital, did not feel like they completely removed abscess  ABx's per ID  Appreciate Surgery and Ortho inpu.  - Underwent debridement in OR 11/30, 12/2, and 12/4, 12/5 - no extension into knee.  OR again on 12/6  - added Amphotericin for concern for fungal infection  - ID consulted for further antimicrobial recommendations  - Pain control  To OR today for wound closure.

## 2021-12-08 NOTE — NURSING
Admit from OR to , connected to bedside monitor and NC 2LPM. VSS. Neuro intact. HUNT. Wound Vac to (R) thigh with continuous suction at 125mmHg. Serosanguinous fluid 250cc noted in canister. Anesthesia at BS. See Critical Care flowsheet for complete assessment.

## 2021-12-08 NOTE — NURSING
[facility] Intensive Care  ICU Shift Summary  Date: 12/8/2021      COVID Test: (--)  Isolation: No active isolations     Prehospitalization: Home  Admit Date / LOS : 11/30/2021/ 8 days    Diagnosis: Necrotizing fasciitis    Consults:        Active: Gen Surg, ID, OT, PT and Wound Care       Needed: N/A     Code Status: Full Code   Advanced Directive: <no information>    LDA: Rajput and PICC       Central Lines/Site/Justification:Unable to Obtain/Maintain PIV       Urinary Cath/Order/Justification:Non Healing Sacral/Perineal Wound and Critically Ill in the ICU and requiring intensive monitoring    Vasopressors/Infusions:    sodium chloride 0.9% 150 mL/hr at 12/08/21 0443          GOALS: Volume/ Hemodynamic: SBP < 180                     RASS: 0  alert and calm    Pain Management: none       Pain Controlled: yes     Rhythm: ST    Respiratory Device: Nasal Cannula                  Most Recent SBT/ SAT: N/A       MOVE Screen: PASS  ICU Liberation: yes    VTE Prophylaxis: Pharm  Mobility: Bedrest  Stress Ulcer Prophylaxis: No    Dietary: NPO after midnight  Tolerance: yes  Advancement: no    I & O (24h):    Intake/Output Summary (Last 24 hours) at 12/8/2021 0524  Last data filed at 12/8/2021 0330  Gross per 24 hour   Intake 3123.85 ml   Output 3150 ml   Net -26.15 ml        Restraints: No    Significant Dates:  Post Op Date: N/A  Rescue Date: N/A  Imaging/ Diagnostics: N/A    Noteworthy Labs:  See chart below    CBC/Anemia Labs: Coags:    Recent Labs   Lab 12/07/21  0453 12/08/21  0358   WBC 33.95* 26.59*   HGB 6.3* 7.3*   HCT 19.6* 22.2*    229   MCV 87 90   RDW 15.1* 16.0*    Recent Labs   Lab 12/02/21  1821   INR 1.0   APTT 23.6        Chemistries:   Recent Labs   Lab 12/06/21  0424 12/07/21  0453   * 137   K 3.9 3.8    105   CO2 24 25   BUN 7 8   CREATININE 0.8 0.9   CALCIUM 6.5* 6.6*   PHOS 2.4* 2.8        Cardiac Enzymes: Ejection Fractions:    No results for input(s): CPK, CPKMB, MB, TROPONINI  in the last 72 hours. No results found for: EF     POCT Glucose: HbA1c:    Recent Labs   Lab 12/07/21  1448 12/07/21  1656 12/07/21  2114   POCTGLUCOSE 115* 124* 191*    Hemoglobin A1C   Date Value Ref Range Status   12/01/2021 >14.0 (H) 4.0 - 5.6 % Final     Comment:     ADA Screening Guidelines:  5.7-6.4%  Consistent with prediabetes  >or=6.5%  Consistent with diabetes    High levels of fetal hemoglobin interfere with the HbA1C  assay. Heterozygous hemoglobin variants (HbS, HgC, etc)do  not significantly interfere with this assay.   However, presence of multiple variants may affect accuracy.             ICU LOS 2d 17h  Level of Care: OK to Transfer    Chart Check: 24 HR Done  Shift Summary/Plan for the shift: see care plan summary

## 2021-12-08 NOTE — SUBJECTIVE & OBJECTIVE
Interval History: No acute events overnight.    Review of Systems   HENT: Negative for ear discharge and ear pain.    Eyes: Negative for discharge and itching.   Endocrine: Negative for cold intolerance and heat intolerance.   Neurological: Negative for seizures and syncope.     Objective:     Vital Signs (Most Recent):  Temp: 98.6 °F (37 °C) (12/08/21 1516)  Pulse: (!) 115 (12/08/21 1516)  Resp: (!) 27 (12/08/21 1516)  BP: 132/87 (12/08/21 1516)  SpO2: 100 % (12/08/21 1516) Vital Signs (24h Range):  Temp:  [98.6 °F (37 °C)-99.3 °F (37.4 °C)] 98.6 °F (37 °C)  Pulse:  [100-121] 115  Resp:  [17-33] 27  SpO2:  [100 %] 100 %  BP: ()/(49-87) 132/87     Weight: (!) 224 kg (493 lb 13.3 oz)  Body mass index is 84.72 kg/m².    Intake/Output Summary (Last 24 hours) at 12/8/2021 1554  Last data filed at 12/8/2021 1508  Gross per 24 hour   Intake 3775.49 ml   Output 2850 ml   Net 925.49 ml      Physical Exam  Vitals and nursing note reviewed.   Constitutional:       General: She is not in acute distress.     Appearance: Normal appearance. She is obese.   HENT:      Head: Normocephalic and atraumatic.   Cardiovascular:      Rate and Rhythm: Normal rate and regular rhythm.   Pulmonary:      Effort: Pulmonary effort is normal. No respiratory distress.      Breath sounds: No wheezing.   Musculoskeletal:         General: No swelling. Normal range of motion.      Cervical back: Normal range of motion and neck supple.   Skin:     General: Skin is warm and dry.      Comments: Right upper thigh incision with packing. Only adipose visible on bedside exam. Some scant drainage   Neurological:      General: No focal deficit present.      Mental Status: She is alert and oriented to person, place, and time.      Motor: No weakness.   Psychiatric:         Mood and Affect: Mood normal.         Behavior: Behavior normal.         Thought Content: Thought content normal.         Significant Labs:   All pertinent labs within the past 24 hours  have been reviewed.  BMP:   Recent Labs   Lab 12/08/21  0358   *  142*     138   K 4.1  4.1     104   CO2 27  27   BUN 8  8   CREATININE 0.9  0.9   CALCIUM 7.1*  7.1*   MG 2.0     CBC:   Recent Labs   Lab 12/07/21  0453 12/08/21  0358   WBC 33.95* 26.59*   HGB 6.3* 7.3*   HCT 19.6* 22.2*    229       Significant Imaging: I have reviewed all pertinent imaging results/findings within the past 24 hours.

## 2021-12-09 LAB
ABO + RH BLD: NORMAL
ANION GAP SERPL CALC-SCNC: 6 MMOL/L (ref 8–16)
BASOPHILS # BLD AUTO: ABNORMAL K/UL (ref 0–0.2)
BASOPHILS NFR BLD: 0 % (ref 0–1.9)
BLD GP AB SCN CELLS X3 SERPL QL: NORMAL
BUN SERPL-MCNC: 7 MG/DL (ref 6–20)
CALCIUM SERPL-MCNC: 7.2 MG/DL (ref 8.7–10.5)
CHLORIDE SERPL-SCNC: 105 MMOL/L (ref 95–110)
CO2 SERPL-SCNC: 26 MMOL/L (ref 23–29)
CREAT SERPL-MCNC: 0.8 MG/DL (ref 0.5–1.4)
DIFFERENTIAL METHOD: ABNORMAL
EOSINOPHIL # BLD AUTO: ABNORMAL K/UL (ref 0–0.5)
EOSINOPHIL NFR BLD: 0 % (ref 0–8)
ERYTHROCYTE [DISTWIDTH] IN BLOOD BY AUTOMATED COUNT: 16 % (ref 11.5–14.5)
EST. GFR  (AFRICAN AMERICAN): >60 ML/MIN/1.73 M^2
EST. GFR  (NON AFRICAN AMERICAN): >60 ML/MIN/1.73 M^2
FINAL PATHOLOGIC DIAGNOSIS: NORMAL
GLUCOSE SERPL-MCNC: 194 MG/DL (ref 70–110)
GROSS: NORMAL
HCT VFR BLD AUTO: 25.8 % (ref 37–48.5)
HGB BLD-MCNC: 8.2 G/DL (ref 12–16)
IMM GRANULOCYTES # BLD AUTO: ABNORMAL K/UL (ref 0–0.04)
IMM GRANULOCYTES NFR BLD AUTO: ABNORMAL % (ref 0–0.5)
LYMPHOCYTES # BLD AUTO: ABNORMAL K/UL (ref 1–4.8)
LYMPHOCYTES NFR BLD: 13 % (ref 18–48)
Lab: NORMAL
MCH RBC QN AUTO: 29.3 PG (ref 27–31)
MCHC RBC AUTO-ENTMCNC: 31.8 G/DL (ref 32–36)
MCV RBC AUTO: 92 FL (ref 82–98)
MONOCYTES # BLD AUTO: ABNORMAL K/UL (ref 0.3–1)
MONOCYTES NFR BLD: 7 % (ref 4–15)
NEUTROPHILS NFR BLD: 67 % (ref 38–73)
NEUTS BAND NFR BLD MANUAL: 13 %
NRBC BLD-RTO: 0 /100 WBC
PHOSPHATE SERPL-MCNC: 3.1 MG/DL (ref 2.7–4.5)
PLATELET # BLD AUTO: 234 K/UL (ref 150–450)
PMV BLD AUTO: 9 FL (ref 9.2–12.9)
POCT GLUCOSE: 111 MG/DL (ref 70–110)
POCT GLUCOSE: 129 MG/DL (ref 70–110)
POCT GLUCOSE: 150 MG/DL (ref 70–110)
POCT GLUCOSE: 164 MG/DL (ref 70–110)
POTASSIUM SERPL-SCNC: 3.3 MMOL/L (ref 3.5–5.1)
RBC # BLD AUTO: 2.8 M/UL (ref 4–5.4)
SODIUM SERPL-SCNC: 137 MMOL/L (ref 136–145)
SUPPLEMENTAL DIAGNOSIS: NORMAL
TOXIC GRANULES BLD QL SMEAR: PRESENT
WBC # BLD AUTO: 19.7 K/UL (ref 3.9–12.7)

## 2021-12-09 PROCEDURE — 63600175 PHARM REV CODE 636 W HCPCS: Performed by: SURGERY

## 2021-12-09 PROCEDURE — 11000001 HC ACUTE MED/SURG PRIVATE ROOM

## 2021-12-09 PROCEDURE — 99900035 HC TECH TIME PER 15 MIN (STAT)

## 2021-12-09 PROCEDURE — 25000003 PHARM REV CODE 250: Performed by: SURGERY

## 2021-12-09 PROCEDURE — 80048 BASIC METABOLIC PNL TOTAL CA: CPT | Performed by: SURGERY

## 2021-12-09 PROCEDURE — 25000003 PHARM REV CODE 250: Performed by: HOSPITALIST

## 2021-12-09 PROCEDURE — 99233 SBSQ HOSP IP/OBS HIGH 50: CPT | Mod: ,,, | Performed by: INTERNAL MEDICINE

## 2021-12-09 PROCEDURE — 27000221 HC OXYGEN, UP TO 24 HOURS

## 2021-12-09 PROCEDURE — 84100 ASSAY OF PHOSPHORUS: CPT | Performed by: SURGERY

## 2021-12-09 PROCEDURE — 85027 COMPLETE CBC AUTOMATED: CPT | Performed by: SURGERY

## 2021-12-09 PROCEDURE — 36415 COLL VENOUS BLD VENIPUNCTURE: CPT | Performed by: SURGERY

## 2021-12-09 PROCEDURE — 99233 PR SUBSEQUENT HOSPITAL CARE,LEVL III: ICD-10-PCS | Mod: ,,, | Performed by: INTERNAL MEDICINE

## 2021-12-09 PROCEDURE — 25000003 PHARM REV CODE 250: Performed by: INTERNAL MEDICINE

## 2021-12-09 PROCEDURE — 85007 BL SMEAR W/DIFF WBC COUNT: CPT | Performed by: SURGERY

## 2021-12-09 PROCEDURE — 63600175 PHARM REV CODE 636 W HCPCS: Performed by: INTERNAL MEDICINE

## 2021-12-09 RX ORDER — ALPRAZOLAM 0.25 MG/1
0.25 TABLET ORAL 2 TIMES DAILY PRN
Status: DISCONTINUED | OUTPATIENT
Start: 2021-12-10 | End: 2021-12-31 | Stop reason: HOSPADM

## 2021-12-09 RX ORDER — ACETAMINOPHEN 325 MG/1
650 TABLET ORAL EVERY 4 HOURS PRN
Status: DISCONTINUED | OUTPATIENT
Start: 2021-12-09 | End: 2021-12-31 | Stop reason: HOSPADM

## 2021-12-09 RX ADMIN — PIPERACILLIN AND TAZOBACTAM 4.5 G: 4; .5 INJECTION, POWDER, LYOPHILIZED, FOR SOLUTION INTRAVENOUS; PARENTERAL at 05:12

## 2021-12-09 RX ADMIN — INSULIN ASPART 8 UNITS: 100 INJECTION, SOLUTION INTRAVENOUS; SUBCUTANEOUS at 05:12

## 2021-12-09 RX ADMIN — INSULIN ASPART 8 UNITS: 100 INJECTION, SOLUTION INTRAVENOUS; SUBCUTANEOUS at 08:12

## 2021-12-09 RX ADMIN — HYDROCODONE BITARTRATE AND ACETAMINOPHEN 1 TABLET: 5; 325 TABLET ORAL at 09:12

## 2021-12-09 RX ADMIN — HYDROCODONE BITARTRATE AND ACETAMINOPHEN 1 TABLET: 5; 325 TABLET ORAL at 02:12

## 2021-12-09 RX ADMIN — Medication 1 PACKET: at 09:12

## 2021-12-09 RX ADMIN — INSULIN ASPART 8 UNITS: 100 INJECTION, SOLUTION INTRAVENOUS; SUBCUTANEOUS at 11:12

## 2021-12-09 RX ADMIN — CALCIUM CARBONATE (ANTACID) CHEW TAB 500 MG 1000 MG: 500 CHEW TAB at 08:12

## 2021-12-09 RX ADMIN — Medication 1 PACKET: at 11:12

## 2021-12-09 RX ADMIN — INSULIN DETEMIR 35 UNITS: 100 INJECTION, SOLUTION SUBCUTANEOUS at 08:12

## 2021-12-09 RX ADMIN — LOPERAMIDE HYDROCHLORIDE 2 MG: 2 CAPSULE ORAL at 02:12

## 2021-12-09 RX ADMIN — INSULIN DETEMIR 35 UNITS: 100 INJECTION, SOLUTION SUBCUTANEOUS at 09:12

## 2021-12-09 RX ADMIN — MORPHINE SULFATE 4 MG: 4 INJECTION, SOLUTION INTRAMUSCULAR; INTRAVENOUS at 11:12

## 2021-12-09 RX ADMIN — Medication 6 MG: at 09:12

## 2021-12-09 RX ADMIN — MUPIROCIN 1 G: 20 OINTMENT TOPICAL at 09:12

## 2021-12-09 RX ADMIN — Medication 1 PACKET: at 05:12

## 2021-12-09 RX ADMIN — ENOXAPARIN SODIUM 40 MG: 40 INJECTION SUBCUTANEOUS at 05:12

## 2021-12-09 RX ADMIN — ALPRAZOLAM 0.25 MG: 0.25 TABLET ORAL at 11:12

## 2021-12-09 RX ADMIN — INSULIN ASPART 2 UNITS: 100 INJECTION, SOLUTION INTRAVENOUS; SUBCUTANEOUS at 08:12

## 2021-12-09 RX ADMIN — SODIUM CHLORIDE: 0.9 INJECTION, SOLUTION INTRAVENOUS at 05:12

## 2021-12-09 RX ADMIN — MUPIROCIN 1 G: 20 OINTMENT TOPICAL at 08:12

## 2021-12-09 RX ADMIN — MORPHINE SULFATE 4 MG: 4 INJECTION, SOLUTION INTRAMUSCULAR; INTRAVENOUS at 05:12

## 2021-12-09 RX ADMIN — PIPERACILLIN AND TAZOBACTAM 4.5 G: 4; .5 INJECTION, POWDER, LYOPHILIZED, FOR SOLUTION INTRAVENOUS; PARENTERAL at 08:12

## 2021-12-09 NOTE — PROGRESS NOTES
"[facility] Intensive Care  [facility]  Progress Note    Patient Name: Shauna Queen  MRN: [id number]  Patient Class: IP- Inpatient   Admission Date: 11/30/2021  Length of Stay: 9 days  Attending Physician: Antonio Bernal MD  Primary Care Provider: Jorge Dennis MD        Subjective:     Principal Problem:Necrotizing fasciitis        HPI:  Ms. Queen is a 36-year-old presents to the ED from outside hospital for abscess.  She has initially presented to outside hospital for DKA which resolved.  Patient was unaware of why she was here in July prompted her.  Per the checkout are received: "36-year-old female history of diabetes mellitus, sleep apnea on CPAP, schizophrenia, hypertension, and hyperlipidemia admitted to [facility] on November 24 from [facility] for treatment of diabetic ketoacidosis. She was initiated on an insulin infusion. DKA improved and she was transitioned off the insulin infusion. She again developed DKA and was placed back on the insulin infusion for a period of time. Currently she is off the insulin infusion and on subcutaneous insulin. She was noted to have right upper/inner thigh pain with erythema, induration, and tenderness to palpation. She was treated with IV clindamycin and vancomycin. Due to her size ((BMI 74.32) they are unable to perform CT or MRI. General Surgery there performed an incision and drainage with purulent material found. She was taken to the OR on November 29 for a second-look at the thigh wound. They were only able to do superficial debridement. Postprocedure she extubated to 2 L nasal cannula and was awake with oxygen saturation 100 percent. Surgery at their facility recommended transfer to a facility better able to perform procedures on a patient with her BMI. Referring provider spoke with General Surgery at [facility]. Hospital Medicine was asked to admit for further treatment. I spoke with her current attending. He noted she has " "no alteration in mentation. Lower extremity is intact from a neurovascular standpoint. She remains in the ICU."      Overview/Hospital Course:  37 y/o female admitted to the hospital as a transfer secondary to R thigh abscess and a BMI of Body mass index is 84.77 kg/m².   Patient transferred here for surgical evaluation.  Patient went for surgical debridement on 11/30/21. Patient continued on Abx per ID recommendations. Blood cultures were NG. Underwent another debridement on 12/2 and again on 12/4 - noting significant necrotizing soft tissue infections concerning for tracking down into her knee. On broad spectrum antibiotics. To OR with Orthopedic surgery to explore right knee on 12/5. Patient again went for surgery on 12/6.  Wound closure with wound vac placement on 12/8.      Interval History: Feeling well.    Review of Systems   HENT: Negative for ear discharge and ear pain.    Eyes: Negative for discharge and itching.   Endocrine: Negative for cold intolerance and heat intolerance.   Neurological: Negative for seizures and syncope.     Objective:     Vital Signs (Most Recent):  Temp: 98.2 °F (36.8 °C) (12/09/21 0700)  Pulse: 104 (12/09/21 1000)  Resp: (!) 31 (12/09/21 1000)  BP: (!) 115/59 (12/09/21 1000)  SpO2: 100 % (12/09/21 1000) Vital Signs (24h Range):  Temp:  [98 °F (36.7 °C)-99.2 °F (37.3 °C)] 98.2 °F (36.8 °C)  Pulse:  [102-121] 104  Resp:  [17-56] 31  SpO2:  [99 %-100 %] 100 %  BP: ()/(47-88) 115/59     Weight: (!) 224 kg (493 lb 13.3 oz)  Body mass index is 84.72 kg/m².    Intake/Output Summary (Last 24 hours) at 12/9/2021 1031  Last data filed at 12/9/2021 1000  Gross per 24 hour   Intake 4769.44 ml   Output 3375 ml   Net 1394.44 ml      Physical Exam  Vitals and nursing note reviewed.   Constitutional:       General: She is not in acute distress.     Appearance: Normal appearance. She is obese.   HENT:      Head: Normocephalic and atraumatic.   Cardiovascular:      Rate and Rhythm: Normal " rate and regular rhythm.   Pulmonary:      Effort: Pulmonary effort is normal. No respiratory distress.      Breath sounds: No wheezing.   Musculoskeletal:         General: No swelling. Normal range of motion.      Cervical back: Normal range of motion and neck supple.   Skin:     General: Skin is warm and dry.      Comments: Right upper thigh wound vac in place   Neurological:      General: No focal deficit present.      Mental Status: She is alert and oriented to person, place, and time.      Motor: No weakness.   Psychiatric:         Mood and Affect: Mood normal.         Behavior: Behavior normal.         Thought Content: Thought content normal.         Significant Labs:   All pertinent labs within the past 24 hours have been reviewed.  BMP:   Recent Labs   Lab 12/08/21 0358 12/08/21 0358 12/09/21  0457   *  142*   < > 194*     138   < > 137   K 4.1  4.1   < > 3.3*     104   < > 105   CO2 27  27   < > 26   BUN 8  8   < > 7   CREATININE 0.9  0.9   < > 0.8   CALCIUM 7.1*  7.1*   < > 7.2*   MG 2.0  --   --     < > = values in this interval not displayed.     CBC:   Recent Labs   Lab 12/08/21 0358 12/09/21 0457   WBC 26.59* 19.70*   HGB 7.3* 8.2*   HCT 22.2* 25.8*    234       Significant Imaging: I have reviewed all pertinent imaging results/findings within the past 24 hours.      Assessment/Plan:      * Necrotizing fasciitis  - Status post I&D with General surgery at outside hospital, did not feel like they completely removed abscess  ABx's per ID  Appreciate Surgery and Ortho inpu.  - Underwent debridement in OR 11/30, 12/2, and 12/4, 12/5 - no extension into knee.  OR again on 12/6  - added Amphotericin for concern for fungal infection.  Continued on Zosyn.  - ID consulted for further antimicrobial recommendations  - Pain control  Wound closure with wound vac placement on 12/8.          Obesity  Morbid obesity  Body mass index is 84.72  kg/m².          Hypophosphatemia  Replace as needed.      Anemia of chronic disease  With anemia of acute blood loss as expected from multiple surgeries.  Transfuse for Hgb<7      Debility  PT/OT once more stable      HLD (hyperlipidemia)        DANG (obstructive sleep apnea)  Patient likely has component of OHS as well  - outpatient sleep study      DM (diabetes mellitus)  A1c:   Lab Results   Component Value Date    HGBA1C >14.0 (H) 12/01/2021     Meds: basal bolus insulin + SSI PRN to maintain goal 140-180  ADA diet, accuchecks ACHS, hypoglycemic protocol  Uncontrolled with hyperglycemia.  Continue current management.        VTE Risk Mitigation (From admission, onward)         Ordered     enoxaparin injection 40 mg  Daily         12/04/21 2306     IP VTE LOW RISK PATIENT  Once         11/30/21 0403     Place sequential compression device  Until discontinued         11/30/21 0403                Discharge Planning   STEVEN:      Code Status: Full Code   Is the patient medically ready for discharge?:     Reason for patient still in hospital (select all that apply): Treatment  Discharge Plan A: Home Health   Discharge Delays: None known at this time      Antonio Bernal MD  Department of Hospital Medicine   [facility] [place] - Intensive Care

## 2021-12-09 NOTE — NURSING TRANSFER
Nursing Transfer Note      12/9/2021     Reason patient is being transferred: change in level of care    Transfer To: 433    Transfer via bed    Transfer with belongings    Transported by transport    Medicines sent: insulin pen    Any special needs or follow-up needed: n/a    Chart send with patient: Yes    Notified: mother    Patient reassessed at: 12/9 1100    Upon arrival to floor: patient oriented to room, call bell in reach and bed in lowest position

## 2021-12-09 NOTE — PLAN OF CARE
Pt remains awake, alert, and oriented in ICU. HR running sinus tach. BP stable and afebrile. Pt on 1L NC sating above 95%. FiO2 weaned from 2L to 1L. IV antibiotics given. Blood sugar monitored per order. Insulin given per order. Pt had 1 bowel movements this shift. Rajput in place with good urine output. Wound Vac remained intact with large amount of output, canister changed twice this shift. PRN medication for diarrhea given. Pt had complaints of pain this shift, prn medication given x 2.  Updated on plan of care. No new falls, injuries, or skin break down at this time.

## 2021-12-09 NOTE — CARE UPDATE
Patient on Sizewise Pulsate JAIR support surface. KCI VAC applied to operative site 12/8. VAC at 125 mmHg continuous suction with light pink fluid in VAC canister. Will assist nursing as needed with wound management.

## 2021-12-09 NOTE — PROGRESS NOTES
[facility]  General Surgery  Progress Note      Interval History:     Patient is s/p serial debridements of right lower extremity (groin to knee) for necrotizing fasciitis, most recently 12/6, at which time the wound appeared healthy with no remaining infection. On 12/8 patient underwent partial closure and wound VAC placement.     Continues to be tachycardic, though improved. No fevers.    Feeling well this AM, but having more pain.    Objective:     Vital Signs (Most Recent):  Temp: 99.1 °F (37.3 °C) (12/09/21 0301)  Pulse: 103 (12/09/21 0600)  Resp: (!) 24 (12/09/21 0600)  BP: (!) 108/56 (12/09/21 0600)  SpO2: 99 % (12/09/21 0600) Vital Signs (24h Range):  Temp:  [98 °F (36.7 °C)-99.2 °F (37.3 °C)] 99.1 °F (37.3 °C)  Pulse:  [102-121] 103  Resp:  [17-56] 24  SpO2:  [99 %-100 %] 99 %  BP: ()/(47-88) 108/56     Weight: (!) 224 kg (493 lb 13.3 oz)  Body mass index is 84.72 kg/m².      Intake/Output Summary (Last 24 hours) at 12/9/2021 0843  Last data filed at 12/9/2021 0600  Gross per 24 hour   Intake 4335.84 ml   Output 3375 ml   Net 960.84 ml       Physical Exam  GEN: alert and oriented, NAD  HEENT: MMM, no scleral icterus  CV: Tachycardic to 100s, distant heart sounds due to body habitus, regular rhythm  PULM: Mildly increased WOB on RA  ABD: soft, non-tender, non-distended, obese  EXT: moves all. Right leg with wound VAC to suction, small serosanguinous drainage in cannister. Closed portion of the incision c/d/i    Significant Labs:  CBC:   Recent Labs   Lab 12/09/21  0457   WBC 19.70*   RBC 2.80*   HGB 8.2*   HCT 25.8*      MCV 92   MCH 29.3   MCHC 31.8*     CMP:   Recent Labs   Lab 12/08/21  0358 12/08/21  0358 12/09/21  0457   *  142*   < > 194*   CALCIUM 7.1*  7.1*   < > 7.2*   ALBUMIN 1.3*  --   --    PROT 4.4*  --   --      138   < > 137   K 4.1  4.1   < > 3.3*   CO2 27  27   < > 26     104   < > 105   BUN 8  8   < > 7   CREATININE 0.9  0.9   < >  0.8   ALKPHOS 96  --   --    ALT 18  --   --    AST 21  --   --    BILITOT 0.4  --   --     < > = values in this interval not displayed.       Significant Diagnostics:  None    Assessment/Plan:     Active Diagnoses:    Diagnosis Date Noted POA    PRINCIPAL PROBLEM:  Necrotizing fasciitis [M72.6] 11/30/2021 Yes    Debility [R53.81] 12/01/2021 Yes    Anemia of chronic disease [D63.8] 12/01/2021 Yes    Hypophosphatemia [E83.39] 12/01/2021 Yes    Obesity [E66.9] 12/01/2021 Yes    DM (diabetes mellitus) [E11.9] 11/30/2021 Yes    DANG (obstructive sleep apnea) [G47.33] 11/30/2021 Yes    HLD (hyperlipidemia) [E78.5] 11/30/2021 Yes      Problems Resolved During this Admission:     This is a 36 year old F with class 3 obesity c/b several medical problems who is transferred to [facility] for treatment of right groin/thigh necrotizing fasciitis.    S/p serial debridements (groin to knee), most recently 12/6. At this time the wound was found to be healthy with minimal necrotic tissues. On 12/8 she underwent partial closure and wound VAC placement.     Will take patient back to OR every 2-3 days for wound VAC changes/further closure. She is having more pain today, likely from closure of wound. Could increase pain medication.    Source control has been achieved. Antibiotics per ID.    Patient discussed with attending surgeon Dr. Nohemi Black MD PGY5  General Surgery  [facility]

## 2021-12-09 NOTE — PROVIDER TRANSFER
Transfer Note    37 y/o morbidly obese female admitted to the hospital as a transfer secondary to R thigh abscess and a BMI of Body mass index is 84.77 kg/m².   Patient transferred here for surgical evaluation.  She has undergone multiple debridements during hospital stay.  Surgery and Ortho consulted.  Underwent wound closure with wound vac placement on 12/8.  On ABx's per ID recommendations.  Has required blood transfusions for Hgb<7.  Insulin adjusted for uncontrolled hyperglycemia.  Per Surgery, patient will go back to OR every 2-3 days for wound vac changes and further closure.  PT/OT evaluation once ok with Surgery.  Will probably need post acute care placement.

## 2021-12-09 NOTE — NURSING
[facility] Intensive Care  ICU Shift Summary  Date: 12/9/2021      COVID Test: (--)  Isolation: No active isolations     Prehospitalization: Home  Admit Date / LOS : 11/30/2021/ 9 days    Diagnosis: Necrotizing fasciitis    Consults:        Active: Gen Surg, ID, OT, PT and Wound Care       Needed: N/A     Code Status: Full Code   Advanced Directive: <no information>    LDA: Rajput, PICC and Wound Vac       Central Lines/Site/Justification:Unable to Obtain/Maintain PIV       Urinary Cath/Order/Justification:Critically Ill in the ICU and requiring intensive monitoring    Vasopressors/Infusions:    sodium chloride 0.9% 150 mL/hr at 12/09/21 0400          GOALS: Volume/ Hemodynamic: SBP < 180                     RASS: 0  alert and calm    Pain Management: PO       Pain Controlled: yes     Rhythm: ST    Respiratory Device: Nasal Cannula    Oxygen Concentration (%):  [30] 30             Most Recent SBT/ SAT: N/A       MOVE Screen: PASS  ICU Liberation: yes    VTE Prophylaxis: Pharm  Mobility: Bedrest  Stress Ulcer Prophylaxis: No    Dietary: PO  Tolerance: yes  Advancement: @ goal    I & O (24h):    Intake/Output Summary (Last 24 hours) at 12/9/2021 0527  Last data filed at 12/9/2021 0440  Gross per 24 hour   Intake 4641.66 ml   Output 2025 ml   Net 2616.66 ml        Restraints: No    Significant Dates:  Post Op Date: 12/8/21  Rescue Date: N/A  Imaging/ Diagnostics: N/A    Noteworthy Labs:  See chart below    CBC/Anemia Labs: Coags:    Recent Labs   Lab 12/08/21 0358 12/09/21 0457   WBC 26.59* 19.70*   HGB 7.3* 8.2*   HCT 22.2* 25.8*    234   MCV 90 92   RDW 16.0* 16.0*    Recent Labs   Lab 12/02/21  1821   INR 1.0   APTT 23.6        Chemistries:   Recent Labs   Lab 12/07/21 0453 12/08/21  0358    138  138   K 3.8 4.1  4.1    104  104   CO2 25 27  27   BUN 8 8  8   CREATININE 0.9 0.9  0.9   CALCIUM 6.6* 7.1*  7.1*   PROT  --  4.4*   BILITOT  --  0.4   ALKPHOS  --  96   ALT  --  18   AST  --   21   MG  --  2.0   PHOS 2.8 3.2        Cardiac Enzymes: Ejection Fractions:    No results for input(s): CPK, CPKMB, MB, TROPONINI in the last 72 hours. No results found for: EF     POCT Glucose: HbA1c:    Recent Labs   Lab 12/08/21  1523 12/08/21 2053 12/08/21 2055   POCTGLUCOSE 168* 431* 407*    Hemoglobin A1C   Date Value Ref Range Status   12/01/2021 >14.0 (H) 4.0 - 5.6 % Final     Comment:     ADA Screening Guidelines:  5.7-6.4%  Consistent with prediabetes  >or=6.5%  Consistent with diabetes    High levels of fetal hemoglobin interfere with the HbA1C  assay. Heterozygous hemoglobin variants (HbS, HgC, etc)do  not significantly interfere with this assay.   However, presence of multiple variants may affect accuracy.             ICU LOS 3d 17h  Level of Care: OK to Transfer    Chart Check: 24 HR Done  Shift Summary/Plan for the shift: see care plan summary

## 2021-12-09 NOTE — ASSESSMENT & PLAN NOTE
36F with h/o morbid obesity (BMI 85), DM, HTN, DANG, HLD admitted 11/24 with R thigh pain, erythema, tenderness and found to be in DKA. Imaging reportedly couldn't be obtained 2/2 size. Noted to have necrotizing soft tissue infection of R upper leg, now s/p  I&D with surgery on 11/30, 12/2, 12/4, 12/5. Surgical Cultures: 11/30 with - amp sensitive e.faecalis. cultures 12/4 with enteroocccus and enterobacter. BCX- NGTD. Path was sent 12/4 and pending. Has been on vancomycin and zosyn. Ampho added 12/4 given surgery's reported concern for mucor. ID consulted for abx recs    Spoke with pathology- saw microabscess formation, acute inflammation and subQ tissue fibrosis. No hyphal elements seen on H&E stain. PAS and GMS stains were done and Neither shows any fungi.     Recommendations:   - appreciate surgery's help getting source control of infection.   - continue zosyn; est duration 2 weeks from last washout (estimated end date: 12/18/21)  - stop amphotericin  - need weight loss and better control of DM  - wound care as per primary team  - please notify ID with any new growth in cultures

## 2021-12-09 NOTE — PLAN OF CARE
Problem: Adult Inpatient Plan of Care  Goal: Plan of Care Review  Outcome: Ongoing, Progressing  Goal: Patient-Specific Goal (Individualization)  Outcome: Ongoing, Progressing  Goal: Absence of Hospital-Acquired Illness or Injury  Outcome: Ongoing, Progressing  Goal: Optimal Comfort and Wellbeing  Outcome: Ongoing, Progressing  Goal: Readiness for Transition of Care  Outcome: Ongoing, Progressing  Goal: Rounds/Family Conference  Outcome: Ongoing, Progressing     Problem: Diabetes Comorbidity  Goal: Blood Glucose Level Within Desired Range  Outcome: Ongoing, Progressing     Problem: Bariatric Environmental Safety  Goal: Safety Maintained with Care  Outcome: Ongoing, Progressing     Problem: Skin Injury Risk Increased  Goal: Skin Health and Integrity  Outcome: Ongoing, Progressing     Problem: Infection  Goal: Infection Symptom Resolution  Outcome: Ongoing, Progressing     Problem: Wound  Goal: Optimal Wound Healing  Outcome: Ongoing, Progressing     Problem: Fall Injury Risk  Goal: Absence of Fall and Fall-Related Injury  Outcome: Ongoing, Progressing

## 2021-12-09 NOTE — SUBJECTIVE & OBJECTIVE
Interval history: NAEO. Stepped down to floor status. Wound vac on R thigh wound. Denies complaints    Review of Systems   Constitutional: Negative for fever.   HENT: Negative.    Eyes: Negative.    Respiratory: Negative.    Cardiovascular: Negative.    Gastrointestinal: Negative for nausea and vomiting.   Endocrine: Negative.    Genitourinary: Negative.    Musculoskeletal: Negative.    Neurological: Negative.    Hematological: Negative.    Psychiatric/Behavioral: Negative.      Objective:     Vital Signs (Most Recent):  Temp: 98.5 °F (36.9 °C) (12/09/21 1100)  Pulse: 105 (12/09/21 1100)  Resp: 18 (12/09/21 1121)  BP: (!) 116/58 (12/09/21 1100)  SpO2: 100 % (12/09/21 1100) Vital Signs (24h Range):  Temp:  [98 °F (36.7 °C)-99.1 °F (37.3 °C)] 98.5 °F (36.9 °C)  Pulse:  [102-121] 105  Resp:  [17-56] 18  SpO2:  [99 %-100 %] 100 %  BP: ()/(47-88) 116/58     Weight: (!) 224 kg (493 lb 13.3 oz)  Body mass index is 84.72 kg/m².    Intake/Output Summary (Last 24 hours) at 12/9/2021 1433  Last data filed at 12/9/2021 1200  Gross per 24 hour   Intake 4459.51 ml   Output 4650 ml   Net -190.49 ml      Physical Exam  Vitals and nursing note reviewed.   Constitutional:       General: She is not in acute distress.     Appearance: Normal appearance. She is obese.   HENT:      Head: Normocephalic and atraumatic.   Cardiovascular:      Rate and Rhythm: Normal rate and regular rhythm.   Pulmonary:      Effort: Pulmonary effort is normal. No respiratory distress.      Breath sounds: No wheezing.   Musculoskeletal:         General: No swelling. Normal range of motion.      Cervical back: Normal range of motion and neck supple.   Skin:     General: Skin is warm and dry.      Comments: Right upper thigh incision with packing. Only adipose visible on bedside exam. Some scant drainage   Neurological:      General: No focal deficit present.      Mental Status: She is alert and oriented to person, place, and time.      Motor: No  weakness.   Psychiatric:         Mood and Affect: Mood normal.         Behavior: Behavior normal.         Thought Content: Thought content normal.         Significant Labs:   All pertinent labs within the past 24 hours have been reviewed.  BMP:   Recent Labs   Lab 12/08/21 0358 12/08/21 0358 12/09/21 0457   *  142*   < > 194*     138   < > 137   K 4.1  4.1   < > 3.3*     104   < > 105   CO2 27  27   < > 26   BUN 8  8   < > 7   CREATININE 0.9  0.9   < > 0.8   CALCIUM 7.1*  7.1*   < > 7.2*   MG 2.0  --   --     < > = values in this interval not displayed.     CBC:   Recent Labs   Lab 12/08/21 0358 12/09/21 0457   WBC 26.59* 19.70*   HGB 7.3* 8.2*   HCT 22.2* 25.8*    234        no

## 2021-12-09 NOTE — ASSESSMENT & PLAN NOTE
- Status post I&D with General surgery at outside hospital, did not feel like they completely removed abscess  ABx's per ID  Appreciate Surgery and Ortho inpu.  - Underwent debridement in OR 11/30, 12/2, and 12/4, 12/5 - no extension into knee.  OR again on 12/6  - added Amphotericin for concern for fungal infection.  Continued on Zosyn.  - ID consulted for further antimicrobial recommendations  - Pain control  Wound closure with wound vac placement on 12/8.

## 2021-12-09 NOTE — SUBJECTIVE & OBJECTIVE
Interval History: Feeling well.    Review of Systems   HENT: Negative for ear discharge and ear pain.    Eyes: Negative for discharge and itching.   Endocrine: Negative for cold intolerance and heat intolerance.   Neurological: Negative for seizures and syncope.     Objective:     Vital Signs (Most Recent):  Temp: 98.2 °F (36.8 °C) (12/09/21 0700)  Pulse: 104 (12/09/21 1000)  Resp: (!) 31 (12/09/21 1000)  BP: (!) 115/59 (12/09/21 1000)  SpO2: 100 % (12/09/21 1000) Vital Signs (24h Range):  Temp:  [98 °F (36.7 °C)-99.2 °F (37.3 °C)] 98.2 °F (36.8 °C)  Pulse:  [102-121] 104  Resp:  [17-56] 31  SpO2:  [99 %-100 %] 100 %  BP: ()/(47-88) 115/59     Weight: (!) 224 kg (493 lb 13.3 oz)  Body mass index is 84.72 kg/m².    Intake/Output Summary (Last 24 hours) at 12/9/2021 1031  Last data filed at 12/9/2021 1000  Gross per 24 hour   Intake 4769.44 ml   Output 3375 ml   Net 1394.44 ml      Physical Exam  Vitals and nursing note reviewed.   Constitutional:       General: She is not in acute distress.     Appearance: Normal appearance. She is obese.   HENT:      Head: Normocephalic and atraumatic.   Cardiovascular:      Rate and Rhythm: Normal rate and regular rhythm.   Pulmonary:      Effort: Pulmonary effort is normal. No respiratory distress.      Breath sounds: No wheezing.   Musculoskeletal:         General: No swelling. Normal range of motion.      Cervical back: Normal range of motion and neck supple.   Skin:     General: Skin is warm and dry.      Comments: Right upper thigh wound vac in place   Neurological:      General: No focal deficit present.      Mental Status: She is alert and oriented to person, place, and time.      Motor: No weakness.   Psychiatric:         Mood and Affect: Mood normal.         Behavior: Behavior normal.         Thought Content: Thought content normal.         Significant Labs:   All pertinent labs within the past 24 hours have been reviewed.  BMP:   Recent Labs   Lab 12/08/21  0358  12/08/21 0358 12/09/21 0457   *  142*   < > 194*     138   < > 137   K 4.1  4.1   < > 3.3*     104   < > 105   CO2 27  27   < > 26   BUN 8  8   < > 7   CREATININE 0.9  0.9   < > 0.8   CALCIUM 7.1*  7.1*   < > 7.2*   MG 2.0  --   --     < > = values in this interval not displayed.     CBC:   Recent Labs   Lab 12/08/21 0358 12/09/21 0457   WBC 26.59* 19.70*   HGB 7.3* 8.2*   HCT 22.2* 25.8*    234       Significant Imaging: I have reviewed all pertinent imaging results/findings within the past 24 hours.

## 2021-12-09 NOTE — NURSING
[facility] [place] -- 4 [place]    Patient Name: Shauna Queen  MRN: [id number]  Attending Provider:  Antonio Bernal MD  12/9/2021        Wounds on : Q Shift     [] No   []Prevention Measures Documented    [x] Yes    [x]LDA's Charted   []Wound Care Consulted (optional)   []Photo Taken (optional)   []MD Notified    Attending RN:  JOHNNA GREEN RN     Second RN:  Calli Feldman RN

## 2021-12-10 LAB
PHOSPHATE SERPL-MCNC: 3.5 MG/DL (ref 2.7–4.5)
POCT GLUCOSE: 114 MG/DL (ref 70–110)
POCT GLUCOSE: 131 MG/DL (ref 70–110)
POCT GLUCOSE: 213 MG/DL (ref 70–110)
POCT GLUCOSE: 218 MG/DL (ref 70–110)
POCT GLUCOSE: 61 MG/DL (ref 70–110)

## 2021-12-10 PROCEDURE — 63600175 PHARM REV CODE 636 W HCPCS: Performed by: HOSPITALIST

## 2021-12-10 PROCEDURE — C9399 UNCLASSIFIED DRUGS OR BIOLOG: HCPCS | Performed by: HOSPITALIST

## 2021-12-10 PROCEDURE — 99900035 HC TECH TIME PER 15 MIN (STAT)

## 2021-12-10 PROCEDURE — 25000003 PHARM REV CODE 250: Performed by: HOSPITALIST

## 2021-12-10 PROCEDURE — 84100 ASSAY OF PHOSPHORUS: CPT | Performed by: HOSPITALIST

## 2021-12-10 PROCEDURE — 94660 CPAP INITIATION&MGMT: CPT

## 2021-12-10 PROCEDURE — 11000001 HC ACUTE MED/SURG PRIVATE ROOM

## 2021-12-10 PROCEDURE — 25000003 PHARM REV CODE 250: Performed by: NURSE PRACTITIONER

## 2021-12-10 PROCEDURE — 27000190 HC CPAP FULL FACE MASK W/VALVE

## 2021-12-10 PROCEDURE — 36415 COLL VENOUS BLD VENIPUNCTURE: CPT | Performed by: HOSPITALIST

## 2021-12-10 RX ADMIN — Medication 1 PACKET: at 04:12

## 2021-12-10 RX ADMIN — MORPHINE SULFATE 4 MG: 4 INJECTION, SOLUTION INTRAMUSCULAR; INTRAVENOUS at 09:12

## 2021-12-10 RX ADMIN — Medication 1 PACKET: at 11:12

## 2021-12-10 RX ADMIN — ENOXAPARIN SODIUM 40 MG: 40 INJECTION SUBCUTANEOUS at 04:12

## 2021-12-10 RX ADMIN — PIPERACILLIN AND TAZOBACTAM 4.5 G: 4; .5 INJECTION, POWDER, LYOPHILIZED, FOR SOLUTION INTRAVENOUS; PARENTERAL at 12:12

## 2021-12-10 RX ADMIN — PIPERACILLIN AND TAZOBACTAM 4.5 G: 4; .5 INJECTION, POWDER, LYOPHILIZED, FOR SOLUTION INTRAVENOUS; PARENTERAL at 06:12

## 2021-12-10 RX ADMIN — CALCIUM CARBONATE (ANTACID) CHEW TAB 500 MG 1000 MG: 500 CHEW TAB at 08:12

## 2021-12-10 RX ADMIN — INSULIN DETEMIR 35 UNITS: 100 INJECTION, SOLUTION SUBCUTANEOUS at 09:12

## 2021-12-10 RX ADMIN — HYDROCODONE BITARTRATE AND ACETAMINOPHEN 1 TABLET: 5; 325 TABLET ORAL at 04:12

## 2021-12-10 RX ADMIN — PIPERACILLIN AND TAZOBACTAM 4.5 G: 4; .5 INJECTION, POWDER, LYOPHILIZED, FOR SOLUTION INTRAVENOUS; PARENTERAL at 01:12

## 2021-12-10 RX ADMIN — Medication 1 PACKET: at 09:12

## 2021-12-10 RX ADMIN — DEXTROSE MONOHYDRATE 12.5 G: 25 INJECTION, SOLUTION INTRAVENOUS at 08:12

## 2021-12-10 RX ADMIN — INSULIN ASPART 4 UNITS: 100 INJECTION, SOLUTION INTRAVENOUS; SUBCUTANEOUS at 05:12

## 2021-12-10 RX ADMIN — MUPIROCIN 1 G: 20 OINTMENT TOPICAL at 09:12

## 2021-12-10 RX ADMIN — INSULIN ASPART 8 UNITS: 100 INJECTION, SOLUTION INTRAVENOUS; SUBCUTANEOUS at 12:12

## 2021-12-10 RX ADMIN — INSULIN ASPART 8 UNITS: 100 INJECTION, SOLUTION INTRAVENOUS; SUBCUTANEOUS at 05:12

## 2021-12-10 RX ADMIN — INSULIN ASPART 2 UNITS: 100 INJECTION, SOLUTION INTRAVENOUS; SUBCUTANEOUS at 09:12

## 2021-12-10 RX ADMIN — MUPIROCIN 1 G: 20 OINTMENT TOPICAL at 08:12

## 2021-12-10 RX ADMIN — Medication 1 PACKET: at 06:12

## 2021-12-10 NOTE — NURSING
Pt reporting 8/10 pain to right leg. Noted indwelling joy in placed, drainage bag to gravity. Wound vac with continuous vacuum to 125mmHg, changed out new drainage canister; stitches and dressing intact. telebox [id number]. Triple lumen picc line to right upper arm, flushes and no blood return present to all three lumens. Will continue to monitor pt.

## 2021-12-10 NOTE — PLAN OF CARE
Pt free from falls, injury or any further trauma throughout shift. Pt AAO x 4, bedbound, indwelling joy in place; drainage bag to gravity. Wound vac with continuous vacuum therapy to 125mmHg.  Placed CPAP as ordered. Continued medications as ordered. Complaints of pain, controlled with medications. Pt in no distress. Bed at lowest position, bed alarm on, call light in reach, instruct pt to call for assistance. Will cont to monitor.      Problem: Adult Inpatient Plan of Care  Goal: Plan of Care Review  Outcome: Ongoing, Progressing     Problem: Diabetes Comorbidity  Goal: Blood Glucose Level Within Desired Range  Outcome: Ongoing, Progressing     Problem: Bariatric Environmental Safety  Goal: Safety Maintained with Care  Outcome: Ongoing, Progressing     Problem: Skin Injury Risk Increased  Goal: Skin Health and Integrity  Outcome: Ongoing, Progressing     Problem: Infection  Goal: Infection Symptom Resolution  Outcome: Ongoing, Progressing     Problem: Wound  Goal: Optimal Wound Healing  Outcome: Ongoing, Progressing     Problem: Fall Injury Risk  Goal: Absence of Fall and Fall-Related Injury  Outcome: Ongoing, Progressing

## 2021-12-11 LAB
ANION GAP SERPL CALC-SCNC: 10 MMOL/L (ref 8–16)
BACTERIA SPEC ANAEROBE CULT: ABNORMAL
BASOPHILS # BLD AUTO: 0.03 K/UL (ref 0–0.2)
BASOPHILS NFR BLD: 0.3 % (ref 0–1.9)
BUN SERPL-MCNC: 8 MG/DL (ref 6–20)
CALCIUM SERPL-MCNC: 7.9 MG/DL (ref 8.7–10.5)
CHLORIDE SERPL-SCNC: 105 MMOL/L (ref 95–110)
CO2 SERPL-SCNC: 27 MMOL/L (ref 23–29)
CREAT SERPL-MCNC: 0.8 MG/DL (ref 0.5–1.4)
DIFFERENTIAL METHOD: ABNORMAL
EOSINOPHIL # BLD AUTO: 0 K/UL (ref 0–0.5)
EOSINOPHIL NFR BLD: 0.3 % (ref 0–8)
ERYTHROCYTE [DISTWIDTH] IN BLOOD BY AUTOMATED COUNT: 16.4 % (ref 11.5–14.5)
EST. GFR  (AFRICAN AMERICAN): >60 ML/MIN/1.73 M^2
EST. GFR  (NON AFRICAN AMERICAN): >60 ML/MIN/1.73 M^2
GLUCOSE SERPL-MCNC: 136 MG/DL (ref 70–110)
HCT VFR BLD AUTO: 27.5 % (ref 37–48.5)
HGB BLD-MCNC: 8.7 G/DL (ref 12–16)
IMM GRANULOCYTES # BLD AUTO: 0.09 K/UL (ref 0–0.04)
IMM GRANULOCYTES NFR BLD AUTO: 0.9 % (ref 0–0.5)
LYMPHOCYTES # BLD AUTO: 1.5 K/UL (ref 1–4.8)
LYMPHOCYTES NFR BLD: 14.3 % (ref 18–48)
MCH RBC QN AUTO: 29.2 PG (ref 27–31)
MCHC RBC AUTO-ENTMCNC: 31.6 G/DL (ref 32–36)
MCV RBC AUTO: 92 FL (ref 82–98)
MONOCYTES # BLD AUTO: 1 K/UL (ref 0.3–1)
MONOCYTES NFR BLD: 9.6 % (ref 4–15)
NEUTROPHILS # BLD AUTO: 7.9 K/UL (ref 1.8–7.7)
NEUTROPHILS NFR BLD: 74.6 % (ref 38–73)
NRBC BLD-RTO: 0 /100 WBC
PHOSPHATE SERPL-MCNC: 3 MG/DL (ref 2.7–4.5)
PLATELET # BLD AUTO: 323 K/UL (ref 150–450)
PMV BLD AUTO: 8.7 FL (ref 9.2–12.9)
POCT GLUCOSE: 101 MG/DL (ref 70–110)
POCT GLUCOSE: 121 MG/DL (ref 70–110)
POCT GLUCOSE: 176 MG/DL (ref 70–110)
POCT GLUCOSE: 271 MG/DL (ref 70–110)
POTASSIUM SERPL-SCNC: 3.4 MMOL/L (ref 3.5–5.1)
RBC # BLD AUTO: 2.98 M/UL (ref 4–5.4)
SODIUM SERPL-SCNC: 142 MMOL/L (ref 136–145)
WBC # BLD AUTO: 10.52 K/UL (ref 3.9–12.7)

## 2021-12-11 PROCEDURE — 25000003 PHARM REV CODE 250: Performed by: NURSE PRACTITIONER

## 2021-12-11 PROCEDURE — 11000001 HC ACUTE MED/SURG PRIVATE ROOM

## 2021-12-11 PROCEDURE — 80048 BASIC METABOLIC PNL TOTAL CA: CPT | Performed by: HOSPITALIST

## 2021-12-11 PROCEDURE — 99900035 HC TECH TIME PER 15 MIN (STAT)

## 2021-12-11 PROCEDURE — 63600175 PHARM REV CODE 636 W HCPCS: Performed by: HOSPITALIST

## 2021-12-11 PROCEDURE — 25000003 PHARM REV CODE 250: Performed by: HOSPITALIST

## 2021-12-11 PROCEDURE — 85025 COMPLETE CBC W/AUTO DIFF WBC: CPT | Performed by: HOSPITALIST

## 2021-12-11 PROCEDURE — C9399 UNCLASSIFIED DRUGS OR BIOLOG: HCPCS | Performed by: INTERNAL MEDICINE

## 2021-12-11 PROCEDURE — 25000003 PHARM REV CODE 250: Performed by: INTERNAL MEDICINE

## 2021-12-11 PROCEDURE — 36415 COLL VENOUS BLD VENIPUNCTURE: CPT | Performed by: HOSPITALIST

## 2021-12-11 PROCEDURE — 84100 ASSAY OF PHOSPHORUS: CPT | Performed by: HOSPITALIST

## 2021-12-11 PROCEDURE — 94660 CPAP INITIATION&MGMT: CPT

## 2021-12-11 RX ADMIN — INSULIN ASPART 6 UNITS: 100 INJECTION, SOLUTION INTRAVENOUS; SUBCUTANEOUS at 09:12

## 2021-12-11 RX ADMIN — MORPHINE SULFATE 4 MG: 4 INJECTION, SOLUTION INTRAMUSCULAR; INTRAVENOUS at 06:12

## 2021-12-11 RX ADMIN — Medication 1 PACKET: at 09:12

## 2021-12-11 RX ADMIN — PIPERACILLIN AND TAZOBACTAM 4.5 G: 4; .5 INJECTION, POWDER, LYOPHILIZED, FOR SOLUTION INTRAVENOUS; PARENTERAL at 05:12

## 2021-12-11 RX ADMIN — ENOXAPARIN SODIUM 40 MG: 40 INJECTION SUBCUTANEOUS at 05:12

## 2021-12-11 RX ADMIN — CALCIUM CARBONATE (ANTACID) CHEW TAB 500 MG 1000 MG: 500 CHEW TAB at 08:12

## 2021-12-11 RX ADMIN — PIPERACILLIN AND TAZOBACTAM 4.5 G: 4; .5 INJECTION, POWDER, LYOPHILIZED, FOR SOLUTION INTRAVENOUS; PARENTERAL at 02:12

## 2021-12-11 RX ADMIN — Medication 6 MG: at 09:12

## 2021-12-11 RX ADMIN — MUPIROCIN 1 G: 20 OINTMENT TOPICAL at 09:12

## 2021-12-11 RX ADMIN — Medication 1 PACKET: at 05:12

## 2021-12-11 RX ADMIN — PIPERACILLIN AND TAZOBACTAM 4.5 G: 4; .5 INJECTION, POWDER, LYOPHILIZED, FOR SOLUTION INTRAVENOUS; PARENTERAL at 11:12

## 2021-12-11 RX ADMIN — MORPHINE SULFATE 4 MG: 4 INJECTION, SOLUTION INTRAMUSCULAR; INTRAVENOUS at 07:12

## 2021-12-11 RX ADMIN — MUPIROCIN 1 G: 20 OINTMENT TOPICAL at 08:12

## 2021-12-11 RX ADMIN — ALPRAZOLAM 0.25 MG: 0.25 TABLET ORAL at 09:12

## 2021-12-11 RX ADMIN — INSULIN DETEMIR 15 UNITS: 100 INJECTION, SOLUTION SUBCUTANEOUS at 09:12

## 2021-12-11 RX ADMIN — Medication 1 PACKET: at 11:12

## 2021-12-11 NOTE — PROGRESS NOTES
"[facility] - [place] - Med Surg [facility]  Progress Note    Patient Name: Shauna Queen  MRN: [id number]  Patient Class: IP- Inpatient   Admission Date: 11/30/2021  Length of Stay: 11 days  Attending Physician: Kenna Joel MD  Primary Care Provider: Jorge Dennis MD        Subjective:     Principal Problem:Necrotizing fasciitis        HPI:  Ms. Queen is a 36-year-old presents to the ED from outside hospital for abscess.  She has initially presented to outside hospital for DKA which resolved.  Patient was unaware of why she was here in July prompted her.  Per the checkout are received: "36-year-old female history of diabetes mellitus, sleep apnea on CPAP, schizophrenia, hypertension, and hyperlipidemia admitted to [facility] on November 24 from [facility] for treatment of diabetic ketoacidosis. She was initiated on an insulin infusion. DKA improved and she was transitioned off the insulin infusion. She again developed DKA and was placed back on the insulin infusion for a period of time. Currently she is off the insulin infusion and on subcutaneous insulin. She was noted to have right upper/inner thigh pain with erythema, induration, and tenderness to palpation. She was treated with IV clindamycin and vancomycin. Due to her size ((BMI 74.32) they are unable to perform CT or MRI. General Surgery there performed an incision and drainage with purulent material found. She was taken to the OR on November 29 for a second-look at the thigh wound. They were only able to do superficial debridement. Postprocedure she extubated to 2 L nasal cannula and was awake with oxygen saturation 100 percent. Surgery at their facility recommended transfer to a facility better able to perform procedures on a patient with her BMI. Referring provider spoke with General Surgery at [facility]. [facility] was asked to admit for further treatment. I spoke with her current attending. He noted she has " "no alteration in mentation. Lower extremity is intact from a neurovascular standpoint. She remains in the ICU."      Overview/Hospital Course:  37 y/o female admitted to the hospital as a transfer secondary to R thigh abscess and a BMI of Body mass index is 84.77 kg/m².   Patient transferred here for surgical evaluation.  Patient went for surgical debridement on 11/30/21. Patient continued on Abx per ID recommendations. Blood cultures were NG. Underwent another debridement on 12/2 and again on 12/4 - noting significant necrotizing soft tissue infections concerning for tracking down into her knee. On broad spectrum antibiotics. To OR with Orthopedic surgery to explore right knee on 12/5. Patient again went for surgery on 12/6.  Wound closure with wound vac placement on 12/8.      Interval History: stable    Review of Systems   Respiratory: Negative.    Cardiovascular: Negative.    Gastrointestinal: Negative.    Skin: Positive for wound.     Objective:     Vital Signs (Most Recent):  Temp: 98.3 °F (36.8 °C) (12/11/21 1603)  Pulse: (!) 123 (12/11/21 1603)  Resp: 20 (12/11/21 1603)  BP: 128/85 (12/11/21 1603)  SpO2: 100 % (12/11/21 1603) Vital Signs (24h Range):  Temp:  [98.2 °F (36.8 °C)-99.4 °F (37.4 °C)] 98.3 °F (36.8 °C)  Pulse:  [100-123] 123  Resp:  [14-20] 20  SpO2:  [96 %-100 %] 100 %  BP: (117-142)/(59-94) 128/85     Weight: (!) 224 kg (493 lb 13.3 oz)  Body mass index is 84.72 kg/m².    Intake/Output Summary (Last 24 hours) at 12/11/2021 1637  Last data filed at 12/11/2021 1230  Gross per 24 hour   Intake 480 ml   Output 3395 ml   Net -2915 ml      Physical Exam  Vitals and nursing note reviewed.   Constitutional:       General: She is not in acute distress.     Appearance: She is obese. She is not toxic-appearing.   Cardiovascular:      Rate and Rhythm: Normal rate and regular rhythm.   Pulmonary:      Effort: Pulmonary effort is normal.   Abdominal:      Palpations: Abdomen is soft.   Skin:     Comments: " Wound vac attached to right thigh/medial wound   Neurological:      Mental Status: She is alert and oriented to person, place, and time.   Psychiatric:         Mood and Affect: Mood normal.         Behavior: Behavior normal.         Thought Content: Thought content normal.         Judgment: Judgment normal.         Significant Labs: All pertinent labs within the past 24 hours have been reviewed.    Significant Imaging: I have reviewed all pertinent imaging results/findings within the past 24 hours.  I have reviewed and interpreted all pertinent imaging results/findings within the past 24 hours.      Assessment/Plan:      * Necrotizing fasciitis  - Status post I&D with General surgery at outside hospital, did not feel like they completely removed abscess  ABx's per ID  Appreciate Surgery and Ortho inpu.  - Underwent debridement in OR 11/30, 12/2, and 12/4, 12/5 - no extension into knee.  OR again on 12/6  - added Amphotericin for concern for fungal infection.  Continued on Zosyn.  - ID consulted for further antimicrobial recommendations  - Pain control  Wound closure with wound vac placement on 12/8.          Obesity  Morbid obesity  Body mass index is 84.72 kg/m².          Hypophosphatemia  Replace as needed.      Anemia of chronic disease  With anemia of acute blood loss as expected from multiple surgeries.  Transfuse for Hgb<7      Debility  PT/OT once more stable      HLD (hyperlipidemia)        DANG (obstructive sleep apnea)  Patient likely has component of OHS as well  - outpatient sleep study      DM (diabetes mellitus)  A1c:   Lab Results   Component Value Date    HGBA1C >14.0 (H) 12/01/2021     Meds: basal bolus insulin + SSI PRN to maintain goal 140-180  ADA diet, accuchecks ACHS, hypoglycemic protocol  Continue current management.      VTE Risk Mitigation (From admission, onward)         Ordered     enoxaparin injection 40 mg  Daily         12/04/21 2306     IP VTE LOW RISK PATIENT  Once         11/30/21  0403     Place sequential compression device  Until discontinued         11/30/21 0403                Discharge Planning   STEVEN:      Code Status: Full Code   Is the patient medically ready for discharge?:     Reason for patient still in hospital (select all that apply): Treatment  Discharge Plan A: Home Health   Discharge Delays: None known at this time        Awaiting OR availability for further closure/wound vac change      Kenna Stein MD  Department of Hospital Medicine   [facility]

## 2021-12-11 NOTE — PROGRESS NOTES
"[facility] - [place] - Med Surg [facility]  Progress Note    Patient Name: Shauna Queen  MRN: [id number]  Patient Class: IP- Inpatient   Admission Date: 11/30/2021  Length of Stay: 10 days  Attending Physician: Kenna Joel MD  Primary Care Provider: Jorge Dennis MD        Subjective:     Principal Problem:Necrotizing fasciitis        HPI:  Ms. Queen is a 36-year-old presents to the ED from outside hospital for abscess.  She has initially presented to outside hospital for DKA which resolved.  Patient was unaware of why she was here in July prompted her.  Per the checkout are received: "36-year-old female history of diabetes mellitus, sleep apnea on CPAP, schizophrenia, hypertension, and hyperlipidemia admitted to [facility] on November 24 from [facility] for treatment of diabetic ketoacidosis. She was initiated on an insulin infusion. DKA improved and she was transitioned off the insulin infusion. She again developed DKA and was placed back on the insulin infusion for a period of time. Currently she is off the insulin infusion and on subcutaneous insulin. She was noted to have right upper/inner thigh pain with erythema, induration, and tenderness to palpation. She was treated with IV clindamycin and vancomycin. Due to her size ((BMI 74.32) they are unable to perform CT or MRI. General Surgery there performed an incision and drainage with purulent material found. She was taken to the OR on November 29 for a second-look at the thigh wound. They were only able to do superficial debridement. Postprocedure she extubated to 2 L nasal cannula and was awake with oxygen saturation 100 percent. Surgery at their facility recommended transfer to a facility better able to perform procedures on a patient with her BMI. Referring provider spoke with General Surgery at [facility]. Hospital Medicine was asked to admit for further treatment. I spoke with her current attending. He noted she has " "no alteration in mentation. Lower extremity is intact from a neurovascular standpoint. She remains in the ICU."      Overview/Hospital Course:  37 y/o female admitted to the hospital as a transfer secondary to R thigh abscess and a BMI of Body mass index is 84.77 kg/m².   Patient transferred here for surgical evaluation.  Patient went for surgical debridement on 11/30/21. Patient continued on Abx per ID recommendations. Blood cultures were NG. Underwent another debridement on 12/2 and again on 12/4 - noting significant necrotizing soft tissue infections concerning for tracking down into her knee. On broad spectrum antibiotics. To OR with Orthopedic surgery to explore right knee on 12/5. Patient again went for surgery on 12/6.  Wound closure with wound vac placement on 12/8.      Interval History: stable    Review of Systems   Respiratory: Negative.    Cardiovascular: Negative.    Gastrointestinal: Negative.    Skin: Positive for wound.     Objective:     Vital Signs (Most Recent):  Temp: 99.4 °F (37.4 °C) (12/10/21 1700)  Pulse: (!) 120 (12/10/21 1700)  Resp: 17 (12/10/21 1700)  BP: 118/66 (12/10/21 1700)  SpO2: 98 % (12/10/21 1700) Vital Signs (24h Range):  Temp:  [98.1 °F (36.7 °C)-99.6 °F (37.6 °C)] 99.4 °F (37.4 °C)  Pulse:  [101-120] 120  Resp:  [16-32] 17  SpO2:  [95 %-100 %] 98 %  BP: (118-133)/(66-82) 118/66     Weight: (!) 224 kg (493 lb 13.3 oz)  Body mass index is 84.72 kg/m².    Intake/Output Summary (Last 24 hours) at 12/10/2021 1815  Last data filed at 12/10/2021 1500  Gross per 24 hour   Intake 1529.25 ml   Output 5475 ml   Net -3945.75 ml      Physical Exam  Vitals and nursing note reviewed.   Constitutional:       General: She is not in acute distress.     Appearance: She is obese. She is not toxic-appearing.   Cardiovascular:      Rate and Rhythm: Normal rate and regular rhythm.   Pulmonary:      Effort: Pulmonary effort is normal.   Abdominal:      Palpations: Abdomen is soft.   Skin:     " Comments: Wound vac attached to right thigh/medial wound   Neurological:      Mental Status: She is alert and oriented to person, place, and time.   Psychiatric:         Mood and Affect: Mood normal.         Behavior: Behavior normal.         Thought Content: Thought content normal.         Judgment: Judgment normal.         Significant Labs: All pertinent labs within the past 24 hours have been reviewed.    Significant Imaging: I have reviewed all pertinent imaging results/findings within the past 24 hours.  I have reviewed and interpreted all pertinent imaging results/findings within the past 24 hours.      Assessment/Plan:      * Necrotizing fasciitis  - Status post I&D with General surgery at outside hospital, did not feel like they completely removed abscess  ABx's per ID  Appreciate Surgery and Ortho inpu.  - Underwent debridement in OR 11/30, 12/2, and 12/4, 12/5 - no extension into knee.  OR again on 12/6  - added Amphotericin for concern for fungal infection.  Continued on Zosyn.  - ID consulted for further antimicrobial recommendations  - Pain control  Wound closure with wound vac placement on 12/8.          Obesity  Morbid obesity  Body mass index is 84.72 kg/m².          Hypophosphatemia  Replace as needed.      Anemia of chronic disease  With anemia of acute blood loss as expected from multiple surgeries.  Transfuse for Hgb<7      Debility  PT/OT once more stable      HLD (hyperlipidemia)        DANG (obstructive sleep apnea)  Patient likely has component of OHS as well  - outpatient sleep study      DM (diabetes mellitus)  A1c:   Lab Results   Component Value Date    HGBA1C >14.0 (H) 12/01/2021     Meds: basal bolus insulin + SSI PRN to maintain goal 140-180  ADA diet, accuchecks ACHS, hypoglycemic protocol  Uncontrolled with hyperglycemia.  Continue current management.      VTE Risk Mitigation (From admission, onward)         Ordered     enoxaparin injection 40 mg  Daily         12/04/21 2306     IP  VTE LOW RISK PATIENT  Once         11/30/21 0403     Place sequential compression device  Until discontinued         11/30/21 0403                Discharge Planning   STEVEN:      Code Status: Full Code   Is the patient medically ready for discharge?:     Reason for patient still in hospital (select all that apply): Treatment  Discharge Plan A: Home Health   Discharge Delays: None known at this time              Kenna Stein MD  Department of Hospital Medicine   [facility]

## 2021-12-11 NOTE — PROGRESS NOTES
[facility]  General Surgery  Progress Note      Interval History:     Patient is s/p serial debridements of right lower extremity (groin to knee) for necrotizing fasciitis, most recently 12/6, at which time the wound appeared healthy with no remaining infection. On 12/8 patient underwent partial closure and wound VAC placement.     Vital signs are improving. Transferred to floor. Feeling pretty well overall.    Objective:     Vital Signs (Most Recent):  Temp: 98.4 °F (36.9 °C) (12/10/21 2031)  Pulse: 100 (12/10/21 2031)  Resp: 16 (12/10/21 2119)  BP: (!) 122/59 (12/10/21 2031)  SpO2: 97 % (12/10/21 2031) Vital Signs (24h Range):  Temp:  [98.1 °F (36.7 °C)-99.4 °F (37.4 °C)] 98.4 °F (36.9 °C)  Pulse:  [100-120] 100  Resp:  [16-32] 16  SpO2:  [95 %-100 %] 97 %  BP: (118-132)/(59-82) 122/59     Weight: (!) 224 kg (493 lb 13.3 oz)  Body mass index is 84.72 kg/m².      Intake/Output Summary (Last 24 hours) at 12/10/2021 2253  Last data filed at 12/10/2021 1800  Gross per 24 hour   Intake 1449.25 ml   Output 3175 ml   Net -1725.75 ml       Physical Exam  GEN: alert and oriented, NAD  HEENT: MMM, no scleral icterus  CV: Tachycardic to 100s, distant heart sounds due to body habitus, regular rhythm  PULM: Mildly increased WOB on RA  ABD: soft, non-tender, non-distended, obese  EXT: moves all. Right leg with wound VAC to suction, small serosanguinous drainage in cannister. Closed portion of the incision c/d/i    Significant Labs:  CBC:   Recent Labs   Lab 12/09/21  0457   WBC 19.70*   RBC 2.80*   HGB 8.2*   HCT 25.8*      MCV 92   MCH 29.3   MCHC 31.8*     CMP:   Recent Labs   Lab 12/09/21  0457   *   CALCIUM 7.2*      K 3.3*   CO2 26      BUN 7   CREATININE 0.8       Significant Diagnostics:  None    Assessment/Plan:     Active Diagnoses:    Diagnosis Date Noted POA    PRINCIPAL PROBLEM:  Necrotizing fasciitis [M72.6] 11/30/2021 Yes    Debility [R53.81] 12/01/2021 Yes    Anemia  of chronic disease [D63.8] 12/01/2021 Yes    Hypophosphatemia [E83.39] 12/01/2021 Yes    Obesity [E66.9] 12/01/2021 Yes    DM (diabetes mellitus) [E11.9] 11/30/2021 Yes    DANG (obstructive sleep apnea) [G47.33] 11/30/2021 Yes    HLD (hyperlipidemia) [E78.5] 11/30/2021 Yes      Problems Resolved During this Admission:     This is a 36 year old F with class 3 obesity c/b several medical problems who is transferred to [facility] for treatment of right groin/thigh necrotizing fasciitis.    S/p serial debridements (groin to knee), most recently 12/6. At this time the wound was found to be healthy with minimal necrotic tissues. On 12/8 she underwent partial closure and wound VAC placement.     To OR tomorrow for further closure/wound VAC change    Source control has been achieved. Antibiotics per ID.    Patient discussed with attending surgeon Dr. Joon Black MD PGY5  General Surgery  [facility]

## 2021-12-11 NOTE — SUBJECTIVE & OBJECTIVE
Interval History: stable    Review of Systems   Respiratory: Negative.    Cardiovascular: Negative.    Gastrointestinal: Negative.    Skin: Positive for wound.     Objective:     Vital Signs (Most Recent):  Temp: 99.4 °F (37.4 °C) (12/10/21 1700)  Pulse: (!) 120 (12/10/21 1700)  Resp: 17 (12/10/21 1700)  BP: 118/66 (12/10/21 1700)  SpO2: 98 % (12/10/21 1700) Vital Signs (24h Range):  Temp:  [98.1 °F (36.7 °C)-99.6 °F (37.6 °C)] 99.4 °F (37.4 °C)  Pulse:  [101-120] 120  Resp:  [16-32] 17  SpO2:  [95 %-100 %] 98 %  BP: (118-133)/(66-82) 118/66     Weight: (!) 224 kg (493 lb 13.3 oz)  Body mass index is 84.72 kg/m².    Intake/Output Summary (Last 24 hours) at 12/10/2021 1815  Last data filed at 12/10/2021 1500  Gross per 24 hour   Intake 1529.25 ml   Output 5475 ml   Net -3945.75 ml      Physical Exam  Vitals and nursing note reviewed.   Constitutional:       General: She is not in acute distress.     Appearance: She is obese. She is not toxic-appearing.   Cardiovascular:      Rate and Rhythm: Normal rate and regular rhythm.   Pulmonary:      Effort: Pulmonary effort is normal.   Abdominal:      Palpations: Abdomen is soft.   Skin:     Comments: Wound vac attached to right thigh/medial wound   Neurological:      Mental Status: She is alert and oriented to person, place, and time.   Psychiatric:         Mood and Affect: Mood normal.         Behavior: Behavior normal.         Thought Content: Thought content normal.         Judgment: Judgment normal.         Significant Labs: All pertinent labs within the past 24 hours have been reviewed.    Significant Imaging: I have reviewed all pertinent imaging results/findings within the past 24 hours.  I have reviewed and interpreted all pertinent imaging results/findings within the past 24 hours.

## 2021-12-11 NOTE — ASSESSMENT & PLAN NOTE
A1c:   Lab Results   Component Value Date    HGBA1C >14.0 (H) 12/01/2021     Meds: basal bolus insulin + SSI PRN to maintain goal 140-180  ADA diet, accuchecks ACHS, hypoglycemic protocol  Continue current management.

## 2021-12-11 NOTE — SUBJECTIVE & OBJECTIVE
Interval History: stable    Review of Systems   Respiratory: Negative.    Cardiovascular: Negative.    Gastrointestinal: Negative.    Skin: Positive for wound.     Objective:     Vital Signs (Most Recent):  Temp: 98.3 °F (36.8 °C) (12/11/21 1603)  Pulse: (!) 123 (12/11/21 1603)  Resp: 20 (12/11/21 1603)  BP: 128/85 (12/11/21 1603)  SpO2: 100 % (12/11/21 1603) Vital Signs (24h Range):  Temp:  [98.2 °F (36.8 °C)-99.4 °F (37.4 °C)] 98.3 °F (36.8 °C)  Pulse:  [100-123] 123  Resp:  [14-20] 20  SpO2:  [96 %-100 %] 100 %  BP: (117-142)/(59-94) 128/85     Weight: (!) 224 kg (493 lb 13.3 oz)  Body mass index is 84.72 kg/m².    Intake/Output Summary (Last 24 hours) at 12/11/2021 1637  Last data filed at 12/11/2021 1230  Gross per 24 hour   Intake 480 ml   Output 3395 ml   Net -2915 ml      Physical Exam  Vitals and nursing note reviewed.   Constitutional:       General: She is not in acute distress.     Appearance: She is obese. She is not toxic-appearing.   Cardiovascular:      Rate and Rhythm: Normal rate and regular rhythm.   Pulmonary:      Effort: Pulmonary effort is normal.   Abdominal:      Palpations: Abdomen is soft.   Skin:     Comments: Wound vac attached to right thigh/medial wound   Neurological:      Mental Status: She is alert and oriented to person, place, and time.   Psychiatric:         Mood and Affect: Mood normal.         Behavior: Behavior normal.         Thought Content: Thought content normal.         Judgment: Judgment normal.         Significant Labs: All pertinent labs within the past 24 hours have been reviewed.    Significant Imaging: I have reviewed all pertinent imaging results/findings within the past 24 hours.  I have reviewed and interpreted all pertinent imaging results/findings within the past 24 hours.

## 2021-12-11 NOTE — PLAN OF CARE
Problem: Adult Inpatient Plan of Care  Goal: Plan of Care Review  Outcome: Ongoing, Progressing     Problem: Bariatric Environmental Safety  Goal: Safety Maintained with Care  Outcome: Ongoing, Progressing  Intervention: Promote Safety and Comfort  Flowsheets (Taken 12/11/2021 0301)  Bariatric Safety: specialty bed utilized     Problem: Wound  Goal: Optimal Wound Healing  Outcome: Ongoing, Progressing  Intervention: Promote Effective Wound Healing  Flowsheets (Taken 12/11/2021 0301)  Oral Nutrition Promotion:   rest periods promoted   medicated  Sleep/Rest Enhancement:   regular sleep/rest pattern promoted   relaxation techniques promoted  Activity Management: Arm raise - L1  Pain Management Interventions:   medication offered   position adjusted   relaxation techniques promoted

## 2021-12-11 NOTE — PLAN OF CARE
AAOx3, medication administered per MD orders, BG monitored, prn pain medication administered with moderate relief, joy care provided, incontinent care provided, wound vac to right upper thigh continuous suction at 125 mmgh, patient is able to make needs known through out shift, Sacral dressing placed for preventive, encourage patient to move q two hours, patient demonstrated good use of incentive spirometer with no adverse effects, patient safety maintained, bed in low locked position with call bell in use, will continue to monitor.

## 2021-12-12 ENCOUNTER — ANESTHESIA EVENT (OUTPATIENT)
Dept: SURGERY | Facility: HOSPITAL | Age: 36
DRG: 463 | End: 2021-12-12
Payer: COMMERCIAL

## 2021-12-12 ENCOUNTER — ANESTHESIA (OUTPATIENT)
Dept: SURGERY | Facility: HOSPITAL | Age: 36
DRG: 463 | End: 2021-12-12
Payer: MEDICAID

## 2021-12-12 LAB
PHOSPHATE SERPL-MCNC: 3.3 MG/DL (ref 2.7–4.5)
POCT GLUCOSE: 255 MG/DL (ref 70–110)
POCT GLUCOSE: 284 MG/DL (ref 70–110)
POCT GLUCOSE: 295 MG/DL (ref 70–110)

## 2021-12-12 PROCEDURE — 36415 COLL VENOUS BLD VENIPUNCTURE: CPT | Performed by: HOSPITALIST

## 2021-12-12 PROCEDURE — 25000003 PHARM REV CODE 250: Performed by: REGISTERED NURSE

## 2021-12-12 PROCEDURE — 84100 ASSAY OF PHOSPHORUS: CPT | Performed by: HOSPITALIST

## 2021-12-12 PROCEDURE — 63600175 PHARM REV CODE 636 W HCPCS: Performed by: ANESTHESIOLOGY

## 2021-12-12 PROCEDURE — 63600175 PHARM REV CODE 636 W HCPCS: Performed by: HOSPITALIST

## 2021-12-12 PROCEDURE — 97605 NEG PRS WND THER DME<=50SQCM: CPT | Mod: ,,, | Performed by: SURGERY

## 2021-12-12 PROCEDURE — D9220A PRA ANESTHESIA: Mod: ANES,,, | Performed by: ANESTHESIOLOGY

## 2021-12-12 PROCEDURE — 97605 PR NEG PRESS WOUND THERAPY (NPWT) W/NON-DISPOSABLE WOUND VAC DEVICE (DME), <=50 CM: ICD-10-PCS | Mod: ,,, | Performed by: SURGERY

## 2021-12-12 PROCEDURE — D9220A PRA ANESTHESIA: ICD-10-PCS | Mod: CRNA,,, | Performed by: REGISTERED NURSE

## 2021-12-12 PROCEDURE — D9220A PRA ANESTHESIA: Mod: CRNA,,, | Performed by: REGISTERED NURSE

## 2021-12-12 PROCEDURE — 25000003 PHARM REV CODE 250: Performed by: INTERNAL MEDICINE

## 2021-12-12 PROCEDURE — 25000003 PHARM REV CODE 250: Performed by: HOSPITALIST

## 2021-12-12 PROCEDURE — 11000001 HC ACUTE MED/SURG PRIVATE ROOM

## 2021-12-12 PROCEDURE — 99900035 HC TECH TIME PER 15 MIN (STAT)

## 2021-12-12 PROCEDURE — 36000706: Performed by: SURGERY

## 2021-12-12 PROCEDURE — C9399 UNCLASSIFIED DRUGS OR BIOLOG: HCPCS | Performed by: INTERNAL MEDICINE

## 2021-12-12 PROCEDURE — D9220A PRA ANESTHESIA: ICD-10-PCS | Mod: ANES,,, | Performed by: ANESTHESIOLOGY

## 2021-12-12 PROCEDURE — 63600175 PHARM REV CODE 636 W HCPCS: Performed by: REGISTERED NURSE

## 2021-12-12 PROCEDURE — 37000008 HC ANESTHESIA 1ST 15 MINUTES: Performed by: SURGERY

## 2021-12-12 PROCEDURE — 37000009 HC ANESTHESIA EA ADD 15 MINS: Performed by: SURGERY

## 2021-12-12 PROCEDURE — 71000033 HC RECOVERY, INTIAL HOUR: Performed by: SURGERY

## 2021-12-12 PROCEDURE — 25000003 PHARM REV CODE 250: Performed by: NURSE PRACTITIONER

## 2021-12-12 PROCEDURE — 36000707: Performed by: SURGERY

## 2021-12-12 RX ORDER — LABETALOL HYDROCHLORIDE 5 MG/ML
INJECTION, SOLUTION INTRAVENOUS
Status: DISCONTINUED | OUTPATIENT
Start: 2021-12-12 | End: 2021-12-12

## 2021-12-12 RX ORDER — LIDOCAINE HYDROCHLORIDE 20 MG/ML
INJECTION INTRAVENOUS
Status: DISCONTINUED | OUTPATIENT
Start: 2021-12-12 | End: 2021-12-12

## 2021-12-12 RX ORDER — SODIUM CHLORIDE 0.9 % (FLUSH) 0.9 %
10 SYRINGE (ML) INJECTION
Status: DISCONTINUED | OUTPATIENT
Start: 2021-12-12 | End: 2021-12-12 | Stop reason: HOSPADM

## 2021-12-12 RX ORDER — FENTANYL CITRATE 50 UG/ML
INJECTION, SOLUTION INTRAMUSCULAR; INTRAVENOUS
Status: DISCONTINUED | OUTPATIENT
Start: 2021-12-12 | End: 2021-12-12

## 2021-12-12 RX ORDER — SUCCINYLCHOLINE CHLORIDE 20 MG/ML
INJECTION INTRAMUSCULAR; INTRAVENOUS
Status: DISCONTINUED | OUTPATIENT
Start: 2021-12-12 | End: 2021-12-12

## 2021-12-12 RX ORDER — DEXAMETHASONE SODIUM PHOSPHATE 4 MG/ML
INJECTION, SOLUTION INTRA-ARTICULAR; INTRALESIONAL; INTRAMUSCULAR; INTRAVENOUS; SOFT TISSUE
Status: DISCONTINUED | OUTPATIENT
Start: 2021-12-12 | End: 2021-12-12

## 2021-12-12 RX ORDER — ACETAMINOPHEN 10 MG/ML
1000 INJECTION, SOLUTION INTRAVENOUS ONCE
Status: COMPLETED | OUTPATIENT
Start: 2021-12-12 | End: 2021-12-12

## 2021-12-12 RX ORDER — ONDANSETRON 2 MG/ML
4 INJECTION INTRAMUSCULAR; INTRAVENOUS DAILY PRN
Status: DISCONTINUED | OUTPATIENT
Start: 2021-12-12 | End: 2021-12-12 | Stop reason: HOSPADM

## 2021-12-12 RX ORDER — PROPOFOL 10 MG/ML
VIAL (ML) INTRAVENOUS
Status: DISCONTINUED | OUTPATIENT
Start: 2021-12-12 | End: 2021-12-12

## 2021-12-12 RX ORDER — ONDANSETRON 2 MG/ML
INJECTION INTRAMUSCULAR; INTRAVENOUS
Status: DISCONTINUED | OUTPATIENT
Start: 2021-12-12 | End: 2021-12-12

## 2021-12-12 RX ORDER — KETAMINE HYDROCHLORIDE 100 MG/ML
INJECTION, SOLUTION INTRAMUSCULAR; INTRAVENOUS
Status: DISCONTINUED | OUTPATIENT
Start: 2021-12-12 | End: 2021-12-12

## 2021-12-12 RX ADMIN — Medication 1 PACKET: at 05:12

## 2021-12-12 RX ADMIN — LOPERAMIDE HYDROCHLORIDE 2 MG: 2 CAPSULE ORAL at 09:12

## 2021-12-12 RX ADMIN — MORPHINE SULFATE 4 MG: 4 INJECTION, SOLUTION INTRAMUSCULAR; INTRAVENOUS at 05:12

## 2021-12-12 RX ADMIN — LABETALOL HYDROCHLORIDE 10 MG: 5 INJECTION, SOLUTION INTRAVENOUS at 12:12

## 2021-12-12 RX ADMIN — MORPHINE SULFATE 4 MG: 4 INJECTION, SOLUTION INTRAMUSCULAR; INTRAVENOUS at 09:12

## 2021-12-12 RX ADMIN — DEXAMETHASONE SODIUM PHOSPHATE 4 MG: 4 INJECTION, SOLUTION INTRAMUSCULAR; INTRAVENOUS at 11:12

## 2021-12-12 RX ADMIN — PROPOFOL 50 MG: 10 INJECTION, EMULSION INTRAVENOUS at 12:12

## 2021-12-12 RX ADMIN — LIDOCAINE HYDROCHLORIDE 100 MG: 20 INJECTION, SOLUTION INTRAVENOUS at 11:12

## 2021-12-12 RX ADMIN — PIPERACILLIN AND TAZOBACTAM 4.5 G: 4; .5 INJECTION, POWDER, LYOPHILIZED, FOR SOLUTION INTRAVENOUS; PARENTERAL at 05:12

## 2021-12-12 RX ADMIN — KETAMINE HYDROCHLORIDE 10 MG: 100 INJECTION, SOLUTION, CONCENTRATE INTRAMUSCULAR; INTRAVENOUS at 11:12

## 2021-12-12 RX ADMIN — PIPERACILLIN AND TAZOBACTAM 4.5 G: 4; .5 INJECTION, POWDER, LYOPHILIZED, FOR SOLUTION INTRAVENOUS; PARENTERAL at 09:12

## 2021-12-12 RX ADMIN — LOPERAMIDE HYDROCHLORIDE 2 MG: 2 CAPSULE ORAL at 06:12

## 2021-12-12 RX ADMIN — MORPHINE SULFATE 4 MG: 4 INJECTION, SOLUTION INTRAMUSCULAR; INTRAVENOUS at 12:12

## 2021-12-12 RX ADMIN — INSULIN ASPART 6 UNITS: 100 INJECTION, SOLUTION INTRAVENOUS; SUBCUTANEOUS at 09:12

## 2021-12-12 RX ADMIN — INSULIN DETEMIR 15 UNITS: 100 INJECTION, SOLUTION SUBCUTANEOUS at 09:12

## 2021-12-12 RX ADMIN — ALPRAZOLAM 0.25 MG: 0.25 TABLET ORAL at 09:12

## 2021-12-12 RX ADMIN — FENTANYL CITRATE 25 MCG: 50 INJECTION, SOLUTION INTRAMUSCULAR; INTRAVENOUS at 11:12

## 2021-12-12 RX ADMIN — SUCCINYLCHOLINE CHLORIDE 180 MG: 20 INJECTION, SOLUTION INTRAMUSCULAR; INTRAVENOUS at 11:12

## 2021-12-12 RX ADMIN — ACETAMINOPHEN 1000 MG: 10 INJECTION INTRAVENOUS at 12:12

## 2021-12-12 RX ADMIN — INSULIN ASPART 6 UNITS: 100 INJECTION, SOLUTION INTRAVENOUS; SUBCUTANEOUS at 05:12

## 2021-12-12 RX ADMIN — ENOXAPARIN SODIUM 40 MG: 40 INJECTION SUBCUTANEOUS at 05:12

## 2021-12-12 RX ADMIN — KETAMINE HYDROCHLORIDE 30 MG: 100 INJECTION, SOLUTION, CONCENTRATE INTRAMUSCULAR; INTRAVENOUS at 11:12

## 2021-12-12 RX ADMIN — Medication 6 MG: at 09:12

## 2021-12-12 RX ADMIN — ONDANSETRON 4 MG: 2 INJECTION, SOLUTION INTRAMUSCULAR; INTRAVENOUS at 11:12

## 2021-12-12 RX ADMIN — MORPHINE SULFATE 4 MG: 4 INJECTION, SOLUTION INTRAMUSCULAR; INTRAVENOUS at 02:12

## 2021-12-12 RX ADMIN — PIPERACILLIN AND TAZOBACTAM 4.5 G: 4; .5 INJECTION, POWDER, LYOPHILIZED, FOR SOLUTION INTRAVENOUS; PARENTERAL at 02:12

## 2021-12-12 RX ADMIN — PROPOFOL 200 MG: 10 INJECTION, EMULSION INTRAVENOUS at 11:12

## 2021-12-12 RX ADMIN — CALCIUM CARBONATE (ANTACID) CHEW TAB 500 MG 1000 MG: 500 CHEW TAB at 09:12

## 2021-12-12 RX ADMIN — FENTANYL CITRATE 50 MCG: 50 INJECTION, SOLUTION INTRAMUSCULAR; INTRAVENOUS at 11:12

## 2021-12-12 NOTE — OP NOTE
[facility] [place]  General Surgery  Operative Note    SUMMARY     Date of Procedure: 12/12/2021     Procedure: Procedure(s) (LRB):  CLOSURE, WOUND (Right)  REPLACEMENT, WOUND VAC (Right)       Surgeon(s) and Role:     * Jorge Dupree MD - Primary     * Serena Black MD - Resident - Assisting    AS DOCUMENTED THIS WAS NOT AN EXICSIONAL BIOPSY AND DOES NOT NEED MEASUREMENTS, THIS PROCEDURE SHOULD BE CODED AS A WOUND VAC CHANGE OR WOUND EXPLORATION    Pre-Operative Diagnosis: Necrotizing soft tissue infection [M79.89]    Post-Operative Diagnosis: Post-Op Diagnosis Codes:     * Necrotizing soft tissue infection [M79.89]    Anesthesia: General    Operative Findings (including complications, if any): Wound bed mostly clean, with small amounts of necrotic fat and substantial amount of fibrinous slough near groin crease. The superior-most border of skin which is significantly undermined does have some dusky areas, no kaylene necrosis.     Description of Technical Procedures:  All risks/benefits/alternatives were explained to the patient. She expressed understanding and gave written consent. She was brought to the operating room and placed on the OR table in the lithotomy position. After the induction of general anesthesia the wound VAC sponges were removed and the wound was prepped and draped in the usual sterile fashion. Prior to beginning a WHO timeout was performed. The wound was thoroughly inspected. For the most part it appeared to be healing well. The areas which had been previously closed were starting to heal, though were very fragile and did have some fibrinous exudate. The wound bed was mostly clean, with small amounts of necrotic fat and substantial amount of fibrinous slough near groin crease. These were bluntly debrided The superior-most border of skin which is significantly undermined does have some dusky areas, no kaylene necrosis. This was left in place. Black sponges were placed covering the  entire wound bed. The skin was prepped with benzoin and adherent film applied. The wound VAC was connected and kept a good seal. The patient was awakened from anesthesia and brought to the PACU in stable condition. There were no complications and the patient tolerated the procedure well.    Estimated Blood Loss (EBL): minimal           Implants: black wound VAC sponges (4 pieces)    Specimens:   Specimen (24h ago, onward)            None               Condition: Good    Disposition: PACU - hemodynamically stable.

## 2021-12-12 NOTE — PLAN OF CARE
AAOx4, medication administered per MD orders, prn pain medication administered with mild relief, patient able to make needs known through out shift, scaral dressing in place, joy care provided, patient is on menses, went to OR and had wound vac replaced, patient tolerated procedure well, wound vac at continuous 125 mmgh with red serosanguineous drainage, patient safety maintained, bed in low locked position with call bell in reach, will continue to monitor

## 2021-12-12 NOTE — PLAN OF CARE
Problem: Adult Inpatient Plan of Care  Goal: Plan of Care Review  Outcome: Ongoing, Progressing  Goal: Patient-Specific Goal (Individualization)  Outcome: Ongoing, Progressing  Goal: Absence of Hospital-Acquired Illness or Injury  Outcome: Ongoing, Progressing  Intervention: Identify and Manage Fall Risk  Flowsheets (Taken 12/12/2021 6781)  Safety Promotion/Fall Prevention: bed alarm set  Goal: Optimal Comfort and Wellbeing  Outcome: Ongoing, Progressing  Goal: Readiness for Transition of Care  Outcome: Ongoing, Progressing  Goal: Rounds/Family Conference  Outcome: Ongoing, Progressing

## 2021-12-12 NOTE — NURSING
Pt Oriented x 4. voids per joy cath, draining clear yellow urine, Cardiac monitoring (telebox #[id number] ), bipap at night. Blood glucose monitoring (q4h), diabetic Diet,  bed locked on lowest position, call light within reach, bed alarm on, NAD, no shortness of breath or chest pain. Pt black complain of pain at this time, medicated. Xanax for sleep and anxiety. will continue to monitor. Patient remains free from falls and hospital acquired injury/ illness. VSS. Medications given this shift per MD order. Care plan explained throughout shift and reinforced.  Wound vac full emptied container.

## 2021-12-12 NOTE — SUBJECTIVE & OBJECTIVE
Interval History: stable    Review of Systems   Respiratory: Negative.    Cardiovascular: Negative.    Gastrointestinal: Negative.    Skin: Positive for wound.     Objective:     Vital Signs (Most Recent):  Temp: 97.8 °F (36.6 °C) (12/12/21 1330)  Pulse: 96 (12/12/21 1330)  Resp: (!) 22 (12/12/21 1407)  BP: (!) 134/95 (12/12/21 1330)  SpO2: 97 % (12/12/21 1330) Vital Signs (24h Range):  Temp:  [97.5 °F (36.4 °C)-99.1 °F (37.3 °C)] 97.8 °F (36.6 °C)  Pulse:  [] 96  Resp:  [16-25] 22  SpO2:  [97 %-100 %] 97 %  BP: (126-140)/(77-99) 134/95     Weight: (!) 224 kg (493 lb 13.3 oz)  Body mass index is 84.72 kg/m².    Intake/Output Summary (Last 24 hours) at 12/12/2021 1543  Last data filed at 12/12/2021 1250  Gross per 24 hour   Intake 760 ml   Output 3075 ml   Net -2315 ml      Physical Exam  Vitals and nursing note reviewed.   Constitutional:       General: She is not in acute distress.     Appearance: She is obese. She is not toxic-appearing.   Cardiovascular:      Rate and Rhythm: Normal rate and regular rhythm.   Pulmonary:      Effort: Pulmonary effort is normal.   Abdominal:      Palpations: Abdomen is soft.   Skin:     Comments: Wound vac attached to right thigh/medial wound   Neurological:      Mental Status: She is alert and oriented to person, place, and time.   Psychiatric:         Mood and Affect: Mood normal.         Behavior: Behavior normal.         Thought Content: Thought content normal.         Judgment: Judgment normal.         Significant Labs: All pertinent labs within the past 24 hours have been reviewed.    Significant Imaging: I have reviewed all pertinent imaging results/findings within the past 24 hours.  I have reviewed and interpreted all pertinent imaging results/findings within the past 24 hours.

## 2021-12-12 NOTE — PROGRESS NOTES
"[facility] - Med Surg [facility] Medicine  Progress Note    Patient Name: Shauna Queen  MRN: [id number]  Patient Class: IP- Inpatient   Admission Date: 11/30/2021  Length of Stay: 12 days  Attending Physician: Kenna Joel MD  Primary Care Provider: Jorge Dennis MD        Subjective:     Principal Problem:Necrotizing fasciitis        HPI:  Ms. Queen is a 36-year-old presents to the ED from outside hospital for abscess.  She has initially presented to outside hospital for DKA which resolved.  Patient was unaware of why she was here in July prompted her.  Per the checkout are received: "36-year-old female history of diabetes mellitus, sleep apnea on CPAP, schizophrenia, hypertension, and hyperlipidemia admitted to [facility] on November 24 from [facility] for treatment of diabetic ketoacidosis. She was initiated on an insulin infusion. DKA improved and she was transitioned off the insulin infusion. She again developed DKA and was placed back on the insulin infusion for a period of time. Currently she is off the insulin infusion and on subcutaneous insulin. She was noted to have right upper/inner thigh pain with erythema, induration, and tenderness to palpation. She was treated with IV clindamycin and vancomycin. Due to her size ((BMI 74.32) they are unable to perform CT or MRI. General Surgery there performed an incision and drainage with purulent material found. She was taken to the OR on November 29 for a second-look at the thigh wound. They were only able to do superficial debridement. Postprocedure she extubated to 2 L nasal cannula and was awake with oxygen saturation 100 percent. Surgery at their facility recommended transfer to a facility better able to perform procedures on a patient with her BMI. Referring provider spoke with General Surgery at [facility]. [facility] Medicine was asked to admit for further treatment. I spoke with her current attending. He noted she has " "no alteration in mentation. Lower extremity is intact from a neurovascular standpoint. She remains in the ICU."      Overview/Hospital Course:  37 y/o female admitted to the hospital as a transfer secondary to R thigh abscess and a BMI of Body mass index is 84.77 kg/m².   Patient transferred here for surgical evaluation.  Patient went for surgical debridement on 11/30/21. Patient continued on Abx per ID recommendations. Blood cultures were NG. Underwent another debridement on 12/2 and again on 12/4 - noting significant necrotizing soft tissue infections concerning for tracking down into her knee. On broad spectrum antibiotics. To OR with Orthopedic surgery to explore right knee on 12/5. Patient again went for surgery on 12/6.  Wound closure with wound vac placement on 12/8.      Interval History: stable    Review of Systems   Respiratory: Negative.    Cardiovascular: Negative.    Gastrointestinal: Negative.    Skin: Positive for wound.     Objective:     Vital Signs (Most Recent):  Temp: 97.8 °F (36.6 °C) (12/12/21 1330)  Pulse: 96 (12/12/21 1330)  Resp: (!) 22 (12/12/21 1407)  BP: (!) 134/95 (12/12/21 1330)  SpO2: 97 % (12/12/21 1330) Vital Signs (24h Range):  Temp:  [97.5 °F (36.4 °C)-99.1 °F (37.3 °C)] 97.8 °F (36.6 °C)  Pulse:  [] 96  Resp:  [16-25] 22  SpO2:  [97 %-100 %] 97 %  BP: (126-140)/(77-99) 134/95     Weight: (!) 224 kg (493 lb 13.3 oz)  Body mass index is 84.72 kg/m².    Intake/Output Summary (Last 24 hours) at 12/12/2021 1543  Last data filed at 12/12/2021 1250  Gross per 24 hour   Intake 760 ml   Output 3075 ml   Net -2315 ml      Physical Exam  Vitals and nursing note reviewed.   Constitutional:       General: She is not in acute distress.     Appearance: She is obese. She is not toxic-appearing.   Cardiovascular:      Rate and Rhythm: Normal rate and regular rhythm.   Pulmonary:      Effort: Pulmonary effort is normal.   Abdominal:      Palpations: Abdomen is soft.   Skin:     Comments: " Wound vac attached to right thigh/medial wound   Neurological:      Mental Status: She is alert and oriented to person, place, and time.   Psychiatric:         Mood and Affect: Mood normal.         Behavior: Behavior normal.         Thought Content: Thought content normal.         Judgment: Judgment normal.         Significant Labs: All pertinent labs within the past 24 hours have been reviewed.    Significant Imaging: I have reviewed all pertinent imaging results/findings within the past 24 hours.  I have reviewed and interpreted all pertinent imaging results/findings within the past 24 hours.      Assessment/Plan:      * Necrotizing fasciitis  - Status post I&D with General surgery at outside hospital, did not feel like they completely removed abscess  ABx's per ID  Appreciate Surgery and Ortho inpu.  - Underwent debridement in OR 11/30, 12/2, and 12/4, 12/5 - no extension into knee.  OR again on 12/6  - added Amphotericin for concern for fungal infection.  Continued on Zosyn.  - ID consulted for further antimicrobial recommendations  - Pain control  Wound closure with wound vac placement on 12/8.  Wound vac changed in OR today          Obesity  Morbid obesity  Body mass index is 84.72 kg/m².          Hypophosphatemia  Replace as needed.      Anemia of chronic disease  With anemia of acute blood loss as expected from multiple surgeries.  Transfuse for Hgb<7      Debility  PT/OT once more stable      HLD (hyperlipidemia)        DANG (obstructive sleep apnea)  Patient likely has component of OHS as well  - outpatient sleep study      DM (diabetes mellitus)  A1c:   Lab Results   Component Value Date    HGBA1C >14.0 (H) 12/01/2021     Meds: basal bolus insulin + SSI PRN to maintain goal 140-180  ADA diet, accuchecks ACHS, hypoglycemic protocol  Continue current management.        VTE Risk Mitigation (From admission, onward)         Ordered     enoxaparin injection 40 mg  Daily         12/04/21 2306     IP VTE LOW RISK  PATIENT  Once         11/30/21 0403     Place sequential compression device  Until discontinued         11/30/21 0403                Discharge Planning   STEVEN:      Code Status: Full Code   Is the patient medically ready for discharge?:     Reason for patient still in hospital (select all that apply): Treatment  Discharge Plan A: Home Health   Discharge Delays: None known at this time              Kenna Stein MD  Department of Hospital Medicine   [facility] [place] [facility] [place]

## 2021-12-12 NOTE — ASSESSMENT & PLAN NOTE
- Status post I&D with General surgery at outside hospital, did not feel like they completely removed abscess  ABx's per ID  Appreciate Surgery and Ortho inpu.  - Underwent debridement in OR 11/30, 12/2, and 12/4, 12/5 - no extension into knee.  OR again on 12/6  - added Amphotericin for concern for fungal infection.  Continued on Zosyn.  - ID consulted for further antimicrobial recommendations  - Pain control  Wound closure with wound vac placement on 12/8.  Wound vac changed in OR today

## 2021-12-12 NOTE — PROGRESS NOTES
[facility]  General Surgery  Progress Note      Interval History:     Patient is s/p serial debridements of right lower extremity (groin to knee) for necrotizing fasciitis, most recently 12/6, at which time the wound appeared healthy with no remaining infection. On 12/8 patient underwent partial closure and wound VAC placement.     Unfortunately not able to take patient to OR today due to scheduling. She is feeling well. Afebrile, persistent tachycardia.     Objective:     Vital Signs (Most Recent):  Temp: 98.3 °F (36.8 °C) (12/11/21 1603)  Pulse: (!) 123 (12/11/21 1603)  Resp: 16 (12/11/21 1801)  BP: 128/85 (12/11/21 1603)  SpO2: 100 % (12/11/21 1603) Vital Signs (24h Range):  Temp:  [98.2 °F (36.8 °C)-98.5 °F (36.9 °C)] 98.3 °F (36.8 °C)  Pulse:  [100-123] 123  Resp:  [14-20] 16  SpO2:  [96 %-100 %] 100 %  BP: (117-142)/(59-94) 128/85     Weight: (!) 224 kg (493 lb 13.3 oz)  Body mass index is 84.72 kg/m².      Intake/Output Summary (Last 24 hours) at 12/11/2021 1929  Last data filed at 12/11/2021 1801  Gross per 24 hour   Intake 460 ml   Output 4270 ml   Net -3810 ml       Physical Exam  GEN: alert and oriented, NAD  HEENT: MMM, no scleral icterus  CV: Tachycardic to 100s, distant heart sounds due to body habitus, regular rhythm  PULM: Mildly increased WOB on RA  ABD: soft, non-tender, non-distended, obese  EXT: moves all. Right leg with wound VAC to suction, small serosanguinous drainage in cannister. Closed portion of the incision c/d/i    Significant Labs:  CBC:   Recent Labs   Lab 12/11/21 0513   WBC 10.52   RBC 2.98*   HGB 8.7*   HCT 27.5*      MCV 92   MCH 29.2   MCHC 31.6*     CMP:   Recent Labs   Lab 12/11/21  0513   *   CALCIUM 7.9*      K 3.4*   CO2 27      BUN 8   CREATININE 0.8       Significant Diagnostics:  None    Assessment/Plan:     Active Diagnoses:    Diagnosis Date Noted POA    PRINCIPAL PROBLEM:  Necrotizing fasciitis [M72.6] 11/30/2021 Yes     Debility [R53.81] 12/01/2021 Yes    Anemia of chronic disease [D63.8] 12/01/2021 Yes    Hypophosphatemia [E83.39] 12/01/2021 Yes    Obesity [E66.9] 12/01/2021 Yes    DM (diabetes mellitus) [E11.9] 11/30/2021 Yes    DANG (obstructive sleep apnea) [G47.33] 11/30/2021 Yes    HLD (hyperlipidemia) [E78.5] 11/30/2021 Yes      Problems Resolved During this Admission:     This is a 36 year old F with class 3 obesity c/b several medical problems who is transferred to [facility] for treatment of right groin/thigh necrotizing fasciitis.    S/p serial debridements (groin to knee), most recently 12/6. At this time the wound was found to be healthy with minimal necrotic tissues. On 12/8 she underwent partial closure and wound VAC placement.     Leukocytosis resolved as of today. Unfortunately unable to take patient to OR today due to scheduling. Plan for OR tomorrow for further closure/wound VAC change    Source control has been achieved. Antibiotics per ID.    Patient discussed with attending surgeon Dr. Joon Black MD PGY5  General Surgery  [facility]

## 2021-12-13 ENCOUNTER — ANESTHESIA EVENT (OUTPATIENT)
Dept: SURGERY | Facility: HOSPITAL | Age: 36
DRG: 463 | End: 2021-12-13
Payer: COMMERCIAL

## 2021-12-13 LAB
ANION GAP SERPL CALC-SCNC: 12 MMOL/L (ref 8–16)
BASOPHILS # BLD AUTO: 0.04 K/UL (ref 0–0.2)
BASOPHILS NFR BLD: 0.3 % (ref 0–1.9)
BUN SERPL-MCNC: 11 MG/DL (ref 6–20)
CALCIUM SERPL-MCNC: 8.5 MG/DL (ref 8.7–10.5)
CHLORIDE SERPL-SCNC: 102 MMOL/L (ref 95–110)
CO2 SERPL-SCNC: 25 MMOL/L (ref 23–29)
CREAT SERPL-MCNC: 0.8 MG/DL (ref 0.5–1.4)
DIFFERENTIAL METHOD: ABNORMAL
EOSINOPHIL # BLD AUTO: 0 K/UL (ref 0–0.5)
EOSINOPHIL NFR BLD: 0.3 % (ref 0–8)
ERYTHROCYTE [DISTWIDTH] IN BLOOD BY AUTOMATED COUNT: 16.2 % (ref 11.5–14.5)
EST. GFR  (AFRICAN AMERICAN): >60 ML/MIN/1.73 M^2
EST. GFR  (NON AFRICAN AMERICAN): >60 ML/MIN/1.73 M^2
GLUCOSE SERPL-MCNC: 185 MG/DL (ref 70–110)
HCT VFR BLD AUTO: 29.1 % (ref 37–48.5)
HGB BLD-MCNC: 8.8 G/DL (ref 12–16)
IMM GRANULOCYTES # BLD AUTO: 0.08 K/UL (ref 0–0.04)
IMM GRANULOCYTES NFR BLD AUTO: 0.7 % (ref 0–0.5)
LYMPHOCYTES # BLD AUTO: 1.9 K/UL (ref 1–4.8)
LYMPHOCYTES NFR BLD: 16.2 % (ref 18–48)
MCH RBC QN AUTO: 28.3 PG (ref 27–31)
MCHC RBC AUTO-ENTMCNC: 30.2 G/DL (ref 32–36)
MCV RBC AUTO: 94 FL (ref 82–98)
MONOCYTES # BLD AUTO: 1.4 K/UL (ref 0.3–1)
MONOCYTES NFR BLD: 11.9 % (ref 4–15)
NEUTROPHILS # BLD AUTO: 8.3 K/UL (ref 1.8–7.7)
NEUTROPHILS NFR BLD: 70.6 % (ref 38–73)
NRBC BLD-RTO: 0 /100 WBC
PLATELET # BLD AUTO: 466 K/UL (ref 150–450)
PMV BLD AUTO: 8.9 FL (ref 9.2–12.9)
POCT GLUCOSE: 200 MG/DL (ref 70–110)
POCT GLUCOSE: 204 MG/DL (ref 70–110)
POCT GLUCOSE: 211 MG/DL (ref 70–110)
POCT GLUCOSE: 221 MG/DL (ref 70–110)
POTASSIUM SERPL-SCNC: 4 MMOL/L (ref 3.5–5.1)
RBC # BLD AUTO: 3.11 M/UL (ref 4–5.4)
SODIUM SERPL-SCNC: 139 MMOL/L (ref 136–145)
WBC # BLD AUTO: 11.78 K/UL (ref 3.9–12.7)

## 2021-12-13 PROCEDURE — 99900035 HC TECH TIME PER 15 MIN (STAT)

## 2021-12-13 PROCEDURE — 25000003 PHARM REV CODE 250: Performed by: HOSPITALIST

## 2021-12-13 PROCEDURE — 36415 COLL VENOUS BLD VENIPUNCTURE: CPT | Performed by: HOSPITALIST

## 2021-12-13 PROCEDURE — 63600175 PHARM REV CODE 636 W HCPCS: Performed by: HOSPITALIST

## 2021-12-13 PROCEDURE — 99233 SBSQ HOSP IP/OBS HIGH 50: CPT | Mod: ,,, | Performed by: SURGERY

## 2021-12-13 PROCEDURE — 85025 COMPLETE CBC W/AUTO DIFF WBC: CPT | Performed by: HOSPITALIST

## 2021-12-13 PROCEDURE — 11000001 HC ACUTE MED/SURG PRIVATE ROOM

## 2021-12-13 PROCEDURE — 80048 BASIC METABOLIC PNL TOTAL CA: CPT | Performed by: HOSPITALIST

## 2021-12-13 PROCEDURE — 99233 PR SUBSEQUENT HOSPITAL CARE,LEVL III: ICD-10-PCS | Mod: ,,, | Performed by: SURGERY

## 2021-12-13 PROCEDURE — 63600175 PHARM REV CODE 636 W HCPCS: Performed by: INTERNAL MEDICINE

## 2021-12-13 RX ORDER — INSULIN ASPART 100 [IU]/ML
8 INJECTION, SOLUTION INTRAVENOUS; SUBCUTANEOUS
Status: DISCONTINUED | OUTPATIENT
Start: 2021-12-13 | End: 2021-12-27

## 2021-12-13 RX ADMIN — Medication 1 PACKET: at 12:12

## 2021-12-13 RX ADMIN — Medication 1 PACKET: at 03:12

## 2021-12-13 RX ADMIN — INSULIN ASPART 1 UNITS: 100 INJECTION, SOLUTION INTRAVENOUS; SUBCUTANEOUS at 09:12

## 2021-12-13 RX ADMIN — PIPERACILLIN AND TAZOBACTAM 4.5 G: 4; .5 INJECTION, POWDER, LYOPHILIZED, FOR SOLUTION INTRAVENOUS; PARENTERAL at 09:12

## 2021-12-13 RX ADMIN — LOPERAMIDE HYDROCHLORIDE 2 MG: 2 CAPSULE ORAL at 09:12

## 2021-12-13 RX ADMIN — Medication 1 PACKET: at 04:12

## 2021-12-13 RX ADMIN — Medication 1 PACKET: at 09:12

## 2021-12-13 RX ADMIN — INSULIN ASPART 8 UNITS: 100 INJECTION, SOLUTION INTRAVENOUS; SUBCUTANEOUS at 05:12

## 2021-12-13 RX ADMIN — INSULIN ASPART 4 UNITS: 100 INJECTION, SOLUTION INTRAVENOUS; SUBCUTANEOUS at 09:12

## 2021-12-13 RX ADMIN — PIPERACILLIN AND TAZOBACTAM 4.5 G: 4; .5 INJECTION, POWDER, LYOPHILIZED, FOR SOLUTION INTRAVENOUS; PARENTERAL at 05:12

## 2021-12-13 RX ADMIN — INSULIN ASPART 4 UNITS: 100 INJECTION, SOLUTION INTRAVENOUS; SUBCUTANEOUS at 12:12

## 2021-12-13 RX ADMIN — ENOXAPARIN SODIUM 40 MG: 40 INJECTION SUBCUTANEOUS at 04:12

## 2021-12-13 RX ADMIN — PIPERACILLIN AND TAZOBACTAM 4.5 G: 4; .5 INJECTION, POWDER, LYOPHILIZED, FOR SOLUTION INTRAVENOUS; PARENTERAL at 03:12

## 2021-12-13 RX ADMIN — INSULIN DETEMIR 25 UNITS: 100 INJECTION, SOLUTION SUBCUTANEOUS at 09:12

## 2021-12-13 RX ADMIN — CALCIUM CARBONATE (ANTACID) CHEW TAB 500 MG 1000 MG: 500 CHEW TAB at 09:12

## 2021-12-13 RX ADMIN — HYDROCODONE BITARTRATE AND ACETAMINOPHEN 1 TABLET: 5; 325 TABLET ORAL at 09:12

## 2021-12-13 RX ADMIN — INSULIN ASPART 4 UNITS: 100 INJECTION, SOLUTION INTRAVENOUS; SUBCUTANEOUS at 05:12

## 2021-12-13 RX ADMIN — INSULIN DETEMIR 15 UNITS: 100 INJECTION, SOLUTION SUBCUTANEOUS at 09:12

## 2021-12-13 NOTE — ASSESSMENT & PLAN NOTE
- Status post I&D with General surgery at outside hospital, did not feel like they completely removed abscess.  - Underwent debridement in OR 11/30, 12/2, and 12/4, 12/5 - no extension into knee.  OR again on 12/6, 12/8 and 12/11.   - Continued on Zosyn till 12/18 for 2 weeks of treatment. Pain control. Plan for wound vac change in OR tomorrow again.   - to LTAC once wound vac can be changed at bedside.

## 2021-12-13 NOTE — PROGRESS NOTES
[facility]  General Surgery  Progress Note      Interval History:     Patient is s/p serial debridements of right lower extremity (groin to knee) for necrotizing fasciitis, most recently 12/6, at which time the wound appeared healthy with no remaining infection. On 12/8 patient underwent partial closure and wound VAC placement. Wound VAC exchanged in OR on 12/12.     Feeling pretty well today. When we saw the patient her cannister was full (serosanguinous drainage), so the wound was starting to accumulate fluid and cause a leak. We replaced the adhesive film and asked the nurse to change the cannister.     Objective:     Vital Signs (Most Recent):  Temp: 98.3 °F (36.8 °C) (12/13/21 1153)  Pulse: (!) 117 (12/13/21 1153)  Resp: 19 (12/13/21 1153)  BP: 119/62 (12/13/21 1153)  SpO2: 95 % (12/13/21 1153) Vital Signs (24h Range):  Temp:  [98 °F (36.7 °C)-98.4 °F (36.9 °C)] 98.3 °F (36.8 °C)  Pulse:  [112-121] 117  Resp:  [16-20] 19  SpO2:  [95 %-99 %] 95 %  BP: (106-134)/(62-89) 119/62     Weight: (!) 224 kg (493 lb 13.3 oz)  Body mass index is 84.72 kg/m².      Intake/Output Summary (Last 24 hours) at 12/13/2021 1556  Last data filed at 12/13/2021 1417  Gross per 24 hour   Intake 1506.26 ml   Output 2000 ml   Net -493.74 ml       Physical Exam  GEN: alert and oriented, NAD  HEENT: MMM, no scleral icterus  CV: Tachycardic to 100s, distant heart sounds due to body habitus, regular rhythm  PULM: Mildly increased WOB on RA  ABD: soft, non-tender, non-distended, obese  EXT: moves all. Right leg with wound VAC to suction, serosanguinous drainage in cannister. Closed portion of the incision c/d/i    Significant Labs:  CBC:   Recent Labs   Lab 12/13/21  0401   WBC 11.78   RBC 3.11*   HGB 8.8*   HCT 29.1*   *   MCV 94   MCH 28.3   MCHC 30.2*     CMP:   Recent Labs   Lab 12/13/21  0401   *   CALCIUM 8.5*      K 4.0   CO2 25      BUN 11   CREATININE 0.8       Significant  Diagnostics:  None    Assessment/Plan:     Active Diagnoses:    Diagnosis Date Noted POA    PRINCIPAL PROBLEM:  Necrotizing fasciitis [M72.6] 11/30/2021 Yes    Debility [R53.81] 12/01/2021 Yes    Anemia of chronic disease [D63.8] 12/01/2021 Yes    Hypophosphatemia [E83.39] 12/01/2021 Yes    Obesity [E66.9] 12/01/2021 Yes    DM (diabetes mellitus) [E11.9] 11/30/2021 Yes    DANG (obstructive sleep apnea) [G47.33] 11/30/2021 Yes    HLD (hyperlipidemia) [E78.5] 11/30/2021 Yes      Problems Resolved During this Admission:     This is a 36 year old F with class 3 obesity c/b several medical problems who is transferred to [facility] for treatment of right groin/thigh necrotizing fasciitis.    S/p serial debridements (groin to knee), most recently 12/6. At this time the wound was found to be healthy with minimal necrotic tissues. On 12/8 she underwent partial closure and wound VAC placement, s/p wound VAC change on 12/12.    Wound appeared to be healing well overall. WBC normal, Hb stable    Plan for OR tomorrow for another wound VAC exchange. Cannister must be changed in a timely fashion so that the wound VAC will hold suction.    Patient examined with attending surgeon Dr. Joslyn Black MD PGY5  General Surgery  [facility]

## 2021-12-13 NOTE — PLAN OF CARE
[facility] [place] - Med Surg [place]  Discharge Reassessment    TN spoke with patient regarding discharge planning and possible discharge to LTAC. Patient stated she would prefer [facility]. TN explained placement is based on medical acceptance and bed availability upon discharge. TN to follow up.     Primary Care Provider: Jorge Dennis MD    Expected Discharge Date:     Reassessment (most recent)     Discharge Reassessment - 12/13/21 1501        Discharge Reassessment    Assessment Type Discharge Planning Reassessment     Did the patient's condition or plan change since previous assessment? Yes     Discharge Plan discussed with: Spouse/sig other     Name(s) and Number(s) Deisy Elliott (Mother)   [phone]     Communicated STEVEN with patient/caregiver Date not available/Unable to determine     Discharge Plan A Long-term acute care facility (LTAC)     Discharge Plan B Home with family     DME Needed Upon Discharge  --   TBD    Discharge Barriers Identified Bariatric     Why the patient remains in the hospital Requires continued medical care        Post-Acute Status    [place] connection DANIELLE     Patient choice form signed by patient/caregiver List from System Post-Acute Care     Discharge Delays Post-Acute Set-up

## 2021-12-13 NOTE — PLAN OF CARE
Pt AAOx4, VSS on RA, voids per joy, Cardiac monitoring (telebox # [id number]) - ST, Blood glucose monitoring (ac&hs), Diet diabetic, Scheduled meds administered, bariatric bed utilized, bed locked on lowest position, bed alarm on, call light within reach, Remains free of fall. Dressing to RLE clean dry intact, Wound vac patent and intact.    Problem: Adult Inpatient Plan of Care  Goal: Plan of Care Review  Outcome: Ongoing, Progressing  Goal: Patient-Specific Goal (Individualization)  Outcome: Ongoing, Progressing  Goal: Absence of Hospital-Acquired Illness or Injury  Outcome: Ongoing, Progressing  Goal: Optimal Comfort and Wellbeing  Outcome: Ongoing, Progressing  Goal: Readiness for Transition of Care  Outcome: Ongoing, Progressing  Goal: Rounds/Family Conference  Outcome: Ongoing, Progressing     Problem: Diabetes Comorbidity  Goal: Blood Glucose Level Within Desired Range  Outcome: Ongoing, Progressing     Problem: Bariatric Environmental Safety  Goal: Safety Maintained with Care  Outcome: Ongoing, Progressing     Problem: Infection  Goal: Infection Symptom Resolution  Outcome: Ongoing, Progressing     Problem: Wound  Goal: Optimal Wound Healing  Outcome: Ongoing, Progressing     Problem: Fall Injury Risk  Goal: Absence of Fall and Fall-Related Injury  Outcome: Ongoing, Progressing

## 2021-12-13 NOTE — NURSING
Pt Oriented x 4. Withdrawn. voids per joy cath, draining clear yellow urine, Cardiac monitoring (telebox #[id number] ), bipap at night. Blood glucose monitoring (q4h), diabetic Diet,  bed locked on lowest position, call light within reach, bed alarm on, NAD, no shortness of breath or chest pain. Pt does complain of pain at this time, medicated. Xanax for sleep and anxiety. will continue to monitor. Patient remains free from falls and hospital acquired injury/ illness. VSS. Medications given this shift per MD order. Care plan explained throughout shift and reinforced.  Wound vac full emptied container. 500 cc

## 2021-12-13 NOTE — PLAN OF CARE
Problem: Adult Inpatient Plan of Care  Goal: Plan of Care Review  Outcome: Ongoing, Progressing  Goal: Patient-Specific Goal (Individualization)  Outcome: Ongoing, Progressing  Goal: Absence of Hospital-Acquired Illness or Injury  Outcome: Ongoing, Progressing  Intervention: Identify and Manage Fall Risk  Flowsheets (Taken 12/12/2021 2309)  Safety Promotion/Fall Prevention: bed alarm set  Goal: Optimal Comfort and Wellbeing  Outcome: Ongoing, Progressing  Goal: Readiness for Transition of Care  Outcome: Ongoing, Progressing  Goal: Rounds/Family Conference  Outcome: Ongoing, Progressing

## 2021-12-13 NOTE — PROGRESS NOTES
"[facility] - [place] - Med Surg [facility] Medicine  Progress Note    Patient Name: Shauna Queen  MRN: [id number]  Patient Class: IP- Inpatient   Admission Date: 11/30/2021  Length of Stay: 13 days  Attending Physician: Kenna Joel MD  Primary Care Provider: Jorge Dennis MD        Subjective:     Principal Problem:Necrotizing fasciitis        HPI:  Ms. Queen is a 36-year-old presents to the ED from outside hospital for abscess.  She has initially presented to outside hospital for DKA which resolved.  Patient was unaware of why she was here in July prompted her.  Per the checkout are received: "36-year-old female history of diabetes mellitus, sleep apnea on CPAP, schizophrenia, hypertension, and hyperlipidemia admitted to [facility] on November 24 from [facility] for treatment of diabetic ketoacidosis. She was initiated on an insulin infusion. DKA improved and she was transitioned off the insulin infusion. She again developed DKA and was placed back on the insulin infusion for a period of time. Currently she is off the insulin infusion and on subcutaneous insulin. She was noted to have right upper/inner thigh pain with erythema, induration, and tenderness to palpation. She was treated with IV clindamycin and vancomycin. Due to her size ((BMI 74.32) they are unable to perform CT or MRI. General Surgery there performed an incision and drainage with purulent material found. She was taken to the OR on November 29 for a second-look at the thigh wound. They were only able to do superficial debridement. Postprocedure she extubated to 2 L nasal cannula and was awake with oxygen saturation 100 percent. Surgery at their facility recommended transfer to a facility better able to perform procedures on a patient with her BMI. Referring provider spoke with General Surgery at [facility]. [facility] Medicine was asked to admit for further treatment. I spoke with her current attending. He noted she has " "no alteration in mentation. Lower extremity is intact from a neurovascular standpoint. She remains in the ICU."      Overview/Hospital Course:  35 y/o female admitted to the hospital as a transfer secondary to R thigh abscess and a BMI of Body mass index is 84.77 kg/m².   Patient transferred here for surgical evaluation.  Patient went for surgical debridement on 11/30/21. Patient continued on Abx per ID recommendations. Blood cultures were NG. Underwent another debridement on 12/2 and again on 12/4 - noting significant necrotizing soft tissue infections concerning for tracking down into her knee. On broad spectrum antibiotics. To OR with Orthopedic surgery to explore right knee on 12/5. Patient again went for surgery on 12/6.  Wound closure with wound vac placement on 12/8.      Interval History: stable    Review of Systems   Respiratory: Negative.    Cardiovascular: Negative.    Gastrointestinal: Negative.    Skin: Positive for wound.     Objective:     Vital Signs (Most Recent):  Temp: 99.2 °F (37.3 °C) (12/13/21 1701)  Pulse: (!) 126 (12/13/21 1701)  Resp: 18 (12/13/21 1701)  BP: 129/81 (12/13/21 1701)  SpO2: 99 % (12/13/21 1701) Vital Signs (24h Range):  Temp:  [98 °F (36.7 °C)-99.2 °F (37.3 °C)] 99.2 °F (37.3 °C)  Pulse:  [113-126] 126  Resp:  [16-20] 18  SpO2:  [95 %-99 %] 99 %  BP: (106-129)/(62-84) 129/81     Weight: (!) 224 kg (493 lb 13.3 oz)  Body mass index is 84.72 kg/m².    Intake/Output Summary (Last 24 hours) at 12/13/2021 1714  Last data filed at 12/13/2021 1417  Gross per 24 hour   Intake 1266.26 ml   Output 2000 ml   Net -733.74 ml      Physical Exam  Vitals and nursing note reviewed.   Constitutional:       General: She is not in acute distress.     Appearance: She is obese. She is not toxic-appearing.   Cardiovascular:      Rate and Rhythm: Normal rate and regular rhythm.   Pulmonary:      Effort: Pulmonary effort is normal.   Abdominal:      Palpations: Abdomen is soft.   Skin:     Comments: " Wound vac attached to right thigh/medial wound   Neurological:      Mental Status: She is alert and oriented to person, place, and time.   Psychiatric:         Mood and Affect: Mood normal.         Behavior: Behavior normal.         Thought Content: Thought content normal.         Judgment: Judgment normal.         Significant Labs: All pertinent labs within the past 24 hours have been reviewed.    Significant Imaging: I have reviewed all pertinent imaging results/findings within the past 24 hours.  I have reviewed and interpreted all pertinent imaging results/findings within the past 24 hours.      Assessment/Plan:      * Necrotizing fasciitis  - Status post I&D with General surgery at outside hospital, did not feel like they completely removed abscess.  - Underwent debridement in OR 11/30, 12/2, and 12/4, 12/5 - no extension into knee.  OR again on 12/6, 12/8 and 12/11.   - Continued on Zosyn till 12/18 for 2 weeks of treatment. Pain control. Plan for wound vac change in OR tomorrow again.   - to LTAC once wound vac can be changed at bedside.           Obesity  Morbid obesity  Body mass index is 84.72 kg/m².          Hypophosphatemia  Replace as needed.      Anemia of chronic disease  With anemia of acute blood loss as expected from multiple surgeries.  Transfuse for Hgb<7      Debility  PT/OT once more stable      HLD (hyperlipidemia)        DANG (obstructive sleep apnea)  Patient likely has component of OHS as well  - outpatient sleep study      DM (diabetes mellitus)  A1c:   Lab Results   Component Value Date    HGBA1C >14.0 (H) 12/01/2021     Meds: basal bolus insulin + SSI PRN to maintain goal 140-180  ADA diet, accuchecks ACHS, hypoglycemic protocol  Continue current management.      VTE Risk Mitigation (From admission, onward)         Ordered     enoxaparin injection 40 mg  Daily         12/04/21 2306     IP VTE LOW RISK PATIENT  Once         11/30/21 0403     Place sequential compression device  Until  discontinued         11/30/21 0403                Discharge Planning   STEVEN:      Code Status: Full Code   Is the patient medically ready for discharge?:     Reason for patient still in hospital (select all that apply): Treatment  Discharge Plan A: Long-term acute care facility (LTAC)   Discharge Delays: (!) Post-Acute Set-up              Kenna Stein MD  Department of Hospital Medicine   [facility] [place] [facility] [place]

## 2021-12-13 NOTE — SUBJECTIVE & OBJECTIVE
Interval History: stable    Review of Systems   Respiratory: Negative.    Cardiovascular: Negative.    Gastrointestinal: Negative.    Skin: Positive for wound.     Objective:     Vital Signs (Most Recent):  Temp: 99.2 °F (37.3 °C) (12/13/21 1701)  Pulse: (!) 126 (12/13/21 1701)  Resp: 18 (12/13/21 1701)  BP: 129/81 (12/13/21 1701)  SpO2: 99 % (12/13/21 1701) Vital Signs (24h Range):  Temp:  [98 °F (36.7 °C)-99.2 °F (37.3 °C)] 99.2 °F (37.3 °C)  Pulse:  [113-126] 126  Resp:  [16-20] 18  SpO2:  [95 %-99 %] 99 %  BP: (106-129)/(62-84) 129/81     Weight: (!) 224 kg (493 lb 13.3 oz)  Body mass index is 84.72 kg/m².    Intake/Output Summary (Last 24 hours) at 12/13/2021 1714  Last data filed at 12/13/2021 1417  Gross per 24 hour   Intake 1266.26 ml   Output 2000 ml   Net -733.74 ml      Physical Exam  Vitals and nursing note reviewed.   Constitutional:       General: She is not in acute distress.     Appearance: She is obese. She is not toxic-appearing.   Cardiovascular:      Rate and Rhythm: Normal rate and regular rhythm.   Pulmonary:      Effort: Pulmonary effort is normal.   Abdominal:      Palpations: Abdomen is soft.   Skin:     Comments: Wound vac attached to right thigh/medial wound   Neurological:      Mental Status: She is alert and oriented to person, place, and time.   Psychiatric:         Mood and Affect: Mood normal.         Behavior: Behavior normal.         Thought Content: Thought content normal.         Judgment: Judgment normal.         Significant Labs: All pertinent labs within the past 24 hours have been reviewed.    Significant Imaging: I have reviewed all pertinent imaging results/findings within the past 24 hours.  I have reviewed and interpreted all pertinent imaging results/findings within the past 24 hours.

## 2021-12-14 ENCOUNTER — ANESTHESIA (OUTPATIENT)
Dept: SURGERY | Facility: HOSPITAL | Age: 36
DRG: 463 | End: 2021-12-14
Payer: COMMERCIAL

## 2021-12-14 LAB
POCT GLUCOSE: 132 MG/DL (ref 70–110)
POCT GLUCOSE: 152 MG/DL (ref 70–110)
POCT GLUCOSE: 210 MG/DL (ref 70–110)
POCT GLUCOSE: 275 MG/DL (ref 70–110)
POCT GLUCOSE: 290 MG/DL (ref 70–110)
POCT GLUCOSE: 300 MG/DL (ref 70–110)
SARS-COV-2 RDRP RESP QL NAA+PROBE: NEGATIVE

## 2021-12-14 PROCEDURE — 11042 DBRDMT SUBQ TIS 1ST 20SQCM/<: CPT | Mod: ,,, | Performed by: SURGERY

## 2021-12-14 PROCEDURE — 97607 PR NEG PRESS WOUND THERAPY (NPWT) W/DISPOSABLE WOUND VAC DEVICE, <=50 CM: ICD-10-PCS | Mod: ,,, | Performed by: SURGERY

## 2021-12-14 PROCEDURE — D9220A PRA ANESTHESIA: Mod: ANES,,, | Performed by: ANESTHESIOLOGY

## 2021-12-14 PROCEDURE — 11042 PR DEBRIDEMENT, SKIN, SUB-Q TISSUE,=<20 SQ CM: ICD-10-PCS | Mod: ,,, | Performed by: SURGERY

## 2021-12-14 PROCEDURE — 36000707: Performed by: SURGERY

## 2021-12-14 PROCEDURE — 63600175 PHARM REV CODE 636 W HCPCS: Performed by: HOSPITALIST

## 2021-12-14 PROCEDURE — 25000003 PHARM REV CODE 250: Performed by: HOSPITALIST

## 2021-12-14 PROCEDURE — U0002 COVID-19 LAB TEST NON-CDC: HCPCS

## 2021-12-14 PROCEDURE — 37000009 HC ANESTHESIA EA ADD 15 MINS: Performed by: SURGERY

## 2021-12-14 PROCEDURE — 11000001 HC ACUTE MED/SURG PRIVATE ROOM

## 2021-12-14 PROCEDURE — 36000706: Performed by: SURGERY

## 2021-12-14 PROCEDURE — 99900035 HC TECH TIME PER 15 MIN (STAT)

## 2021-12-14 PROCEDURE — 25000003 PHARM REV CODE 250: Performed by: STUDENT IN AN ORGANIZED HEALTH CARE EDUCATION/TRAINING PROGRAM

## 2021-12-14 PROCEDURE — D9220A PRA ANESTHESIA: Mod: CRNA,,, | Performed by: STUDENT IN AN ORGANIZED HEALTH CARE EDUCATION/TRAINING PROGRAM

## 2021-12-14 PROCEDURE — D9220A PRA ANESTHESIA: ICD-10-PCS | Mod: CRNA,,, | Performed by: STUDENT IN AN ORGANIZED HEALTH CARE EDUCATION/TRAINING PROGRAM

## 2021-12-14 PROCEDURE — 63600175 PHARM REV CODE 636 W HCPCS: Performed by: STUDENT IN AN ORGANIZED HEALTH CARE EDUCATION/TRAINING PROGRAM

## 2021-12-14 PROCEDURE — 37000008 HC ANESTHESIA 1ST 15 MINUTES: Performed by: SURGERY

## 2021-12-14 PROCEDURE — D9220A PRA ANESTHESIA: ICD-10-PCS | Mod: ANES,,, | Performed by: ANESTHESIOLOGY

## 2021-12-14 PROCEDURE — C9399 UNCLASSIFIED DRUGS OR BIOLOG: HCPCS | Performed by: INTERNAL MEDICINE

## 2021-12-14 PROCEDURE — 71000033 HC RECOVERY, INTIAL HOUR: Performed by: SURGERY

## 2021-12-14 PROCEDURE — 25000003 PHARM REV CODE 250: Performed by: INTERNAL MEDICINE

## 2021-12-14 PROCEDURE — 97607 NEG PRS WND THR NDME<=50SQCM: CPT | Mod: ,,, | Performed by: SURGERY

## 2021-12-14 PROCEDURE — 25000003 PHARM REV CODE 250: Performed by: ANESTHESIOLOGY

## 2021-12-14 RX ORDER — ROCURONIUM BROMIDE 10 MG/ML
INJECTION, SOLUTION INTRAVENOUS
Status: DISCONTINUED | OUTPATIENT
Start: 2021-12-14 | End: 2021-12-14

## 2021-12-14 RX ORDER — PHENYLEPHRINE HYDROCHLORIDE 10 MG/ML
INJECTION INTRAVENOUS
Status: DISCONTINUED | OUTPATIENT
Start: 2021-12-14 | End: 2021-12-14

## 2021-12-14 RX ORDER — SODIUM CHLORIDE 0.9 % (FLUSH) 0.9 %
10 SYRINGE (ML) INJECTION
Status: DISCONTINUED | OUTPATIENT
Start: 2021-12-14 | End: 2021-12-17

## 2021-12-14 RX ORDER — FENTANYL CITRATE 50 UG/ML
INJECTION, SOLUTION INTRAMUSCULAR; INTRAVENOUS
Status: DISCONTINUED | OUTPATIENT
Start: 2021-12-14 | End: 2021-12-14

## 2021-12-14 RX ORDER — DEXAMETHASONE SODIUM PHOSPHATE 4 MG/ML
INJECTION, SOLUTION INTRA-ARTICULAR; INTRALESIONAL; INTRAMUSCULAR; INTRAVENOUS; SOFT TISSUE
Status: DISCONTINUED | OUTPATIENT
Start: 2021-12-14 | End: 2021-12-14

## 2021-12-14 RX ORDER — ONDANSETRON 2 MG/ML
4 INJECTION INTRAMUSCULAR; INTRAVENOUS DAILY PRN
Status: DISCONTINUED | OUTPATIENT
Start: 2021-12-14 | End: 2021-12-14 | Stop reason: HOSPADM

## 2021-12-14 RX ORDER — LIDOCAINE HYDROCHLORIDE 20 MG/ML
INJECTION INTRAVENOUS
Status: DISCONTINUED | OUTPATIENT
Start: 2021-12-14 | End: 2021-12-14

## 2021-12-14 RX ORDER — KETAMINE HYDROCHLORIDE 100 MG/ML
INJECTION, SOLUTION INTRAMUSCULAR; INTRAVENOUS
Status: DISCONTINUED | OUTPATIENT
Start: 2021-12-14 | End: 2021-12-14

## 2021-12-14 RX ORDER — MIDAZOLAM HYDROCHLORIDE 1 MG/ML
INJECTION, SOLUTION INTRAMUSCULAR; INTRAVENOUS
Status: DISCONTINUED | OUTPATIENT
Start: 2021-12-14 | End: 2021-12-14

## 2021-12-14 RX ORDER — LABETALOL HYDROCHLORIDE 5 MG/ML
10 INJECTION, SOLUTION INTRAVENOUS ONCE
Status: COMPLETED | OUTPATIENT
Start: 2021-12-14 | End: 2021-12-14

## 2021-12-14 RX ORDER — FENTANYL CITRATE 50 UG/ML
25 INJECTION, SOLUTION INTRAMUSCULAR; INTRAVENOUS EVERY 5 MIN PRN
Status: DISCONTINUED | OUTPATIENT
Start: 2021-12-14 | End: 2021-12-14 | Stop reason: HOSPADM

## 2021-12-14 RX ORDER — ONDANSETRON 2 MG/ML
INJECTION INTRAMUSCULAR; INTRAVENOUS
Status: DISCONTINUED | OUTPATIENT
Start: 2021-12-14 | End: 2021-12-14

## 2021-12-14 RX ORDER — SUCCINYLCHOLINE CHLORIDE 20 MG/ML
INJECTION INTRAMUSCULAR; INTRAVENOUS
Status: DISCONTINUED | OUTPATIENT
Start: 2021-12-14 | End: 2021-12-14

## 2021-12-14 RX ORDER — PROPOFOL 10 MG/ML
VIAL (ML) INTRAVENOUS
Status: DISCONTINUED | OUTPATIENT
Start: 2021-12-14 | End: 2021-12-14

## 2021-12-14 RX ADMIN — FENTANYL CITRATE 50 MCG: 50 INJECTION, SOLUTION INTRAMUSCULAR; INTRAVENOUS at 11:12

## 2021-12-14 RX ADMIN — FENTANYL CITRATE 25 MCG: 50 INJECTION, SOLUTION INTRAMUSCULAR; INTRAVENOUS at 12:12

## 2021-12-14 RX ADMIN — ESMOLOL HYDROCHLORIDE 10 MG: 10 INJECTION INTRAVENOUS at 01:12

## 2021-12-14 RX ADMIN — GLYCOPYRROLATE 0.2 MG: 0.2 INJECTION, SOLUTION INTRAMUSCULAR; INTRAVITREAL at 12:12

## 2021-12-14 RX ADMIN — CALCIUM CARBONATE (ANTACID) CHEW TAB 500 MG 1000 MG: 500 CHEW TAB at 08:12

## 2021-12-14 RX ADMIN — PHENYLEPHRINE HYDROCHLORIDE 200 MCG: 10 INJECTION INTRAVENOUS at 12:12

## 2021-12-14 RX ADMIN — INSULIN DETEMIR 25 UNITS: 100 INJECTION, SOLUTION SUBCUTANEOUS at 09:12

## 2021-12-14 RX ADMIN — ESMOLOL HYDROCHLORIDE 10 MG: 10 INJECTION INTRAVENOUS at 12:12

## 2021-12-14 RX ADMIN — MIDAZOLAM HYDROCHLORIDE 2 MG: 1 INJECTION, SOLUTION INTRAMUSCULAR; INTRAVENOUS at 11:12

## 2021-12-14 RX ADMIN — SUGAMMADEX 200 MG: 100 INJECTION, SOLUTION INTRAVENOUS at 12:12

## 2021-12-14 RX ADMIN — PROPOFOL 200 MG: 10 INJECTION, EMULSION INTRAVENOUS at 11:12

## 2021-12-14 RX ADMIN — Medication 1 PACKET: at 05:12

## 2021-12-14 RX ADMIN — KETAMINE HYDROCHLORIDE 20 MG: 100 INJECTION, SOLUTION, CONCENTRATE INTRAMUSCULAR; INTRAVENOUS at 11:12

## 2021-12-14 RX ADMIN — PIPERACILLIN AND TAZOBACTAM 4.5 G: 4; .5 INJECTION, POWDER, LYOPHILIZED, FOR SOLUTION INTRAVENOUS; PARENTERAL at 05:12

## 2021-12-14 RX ADMIN — HYDROCODONE BITARTRATE AND ACETAMINOPHEN 1 TABLET: 5; 325 TABLET ORAL at 05:12

## 2021-12-14 RX ADMIN — INSULIN ASPART 8 UNITS: 100 INJECTION, SOLUTION INTRAVENOUS; SUBCUTANEOUS at 05:12

## 2021-12-14 RX ADMIN — PIPERACILLIN AND TAZOBACTAM 4.5 G: 4; .5 INJECTION, POWDER, LYOPHILIZED, FOR SOLUTION INTRAVENOUS; PARENTERAL at 01:12

## 2021-12-14 RX ADMIN — ONDANSETRON 4 MG: 2 INJECTION, SOLUTION INTRAMUSCULAR; INTRAVENOUS at 12:12

## 2021-12-14 RX ADMIN — SUCCINYLCHOLINE CHLORIDE 200 MG: 20 INJECTION, SOLUTION INTRAMUSCULAR; INTRAVENOUS at 11:12

## 2021-12-14 RX ADMIN — Medication 1 PACKET: at 09:12

## 2021-12-14 RX ADMIN — MORPHINE SULFATE 4 MG: 4 INJECTION, SOLUTION INTRAMUSCULAR; INTRAVENOUS at 09:12

## 2021-12-14 RX ADMIN — ROCURONIUM BROMIDE 20 MG: 10 INJECTION, SOLUTION INTRAVENOUS at 12:12

## 2021-12-14 RX ADMIN — LIDOCAINE HYDROCHLORIDE 100 MG: 20 INJECTION, SOLUTION INTRAVENOUS at 11:12

## 2021-12-14 RX ADMIN — INSULIN ASPART 3 UNITS: 100 INJECTION, SOLUTION INTRAVENOUS; SUBCUTANEOUS at 09:12

## 2021-12-14 RX ADMIN — ENOXAPARIN SODIUM 40 MG: 40 INJECTION SUBCUTANEOUS at 05:12

## 2021-12-14 RX ADMIN — HYDROCODONE BITARTRATE AND ACETAMINOPHEN 1 TABLET: 5; 325 TABLET ORAL at 09:12

## 2021-12-14 RX ADMIN — INSULIN ASPART 6 UNITS: 100 INJECTION, SOLUTION INTRAVENOUS; SUBCUTANEOUS at 05:12

## 2021-12-14 RX ADMIN — LABETALOL HYDROCHLORIDE 10 MG: 5 INJECTION INTRAVENOUS at 01:12

## 2021-12-14 RX ADMIN — MORPHINE SULFATE 4 MG: 4 INJECTION, SOLUTION INTRAMUSCULAR; INTRAVENOUS at 11:12

## 2021-12-14 RX ADMIN — SUGAMMADEX 200 MG: 100 INJECTION, SOLUTION INTRAVENOUS at 01:12

## 2021-12-14 RX ADMIN — DEXAMETHASONE SODIUM PHOSPHATE 8 MG: 4 INJECTION, SOLUTION INTRAMUSCULAR; INTRAVENOUS at 12:12

## 2021-12-14 RX ADMIN — PIPERACILLIN AND TAZOBACTAM 4.5 G: 4; .5 INJECTION, POWDER, LYOPHILIZED, FOR SOLUTION INTRAVENOUS; PARENTERAL at 09:12

## 2021-12-14 NOTE — NURSING
"Wound vac alarming "Leak detected". Dr. Black notified & came to bedside, stated that cannister must be changed in a timely fashion to keep the seal intact. Pt will be taken for scheduled surgery today for wound vac exchanged.  "

## 2021-12-14 NOTE — ASSESSMENT & PLAN NOTE
- Status post I&D with General surgery at outside hospital, did not feel like they completely removed abscess.  - Underwent debridement in OR 11/30, 12/2, and 12/4, 12/5 - no extension into knee.  OR again on 12/6, 12/8 and 12/11.   - Continued on Zosyn till 12/18 for 2 weeks of treatment per ID. (estimated end date: 12/18/21)  - Pain control.   - Plan for wound vac change in OR on 12/14.   - to LTAC once wound vac can be changed at bedside.     Antibiotics (From admission, onward)            Start     Stop Route Frequency Ordered    12/04/21 1530  piperacillin-tazobactam 4.5 g in dextrose 5 % 100 mL IVPB (ready to mix system)         12/19 0959 IV Every 8 hours (non-standard times) 12/04/21 1429        Cultures were taken-   Microbiology Results (last 7 days)     Procedure Component Value Units Date/Time    Fungus culture [[id number]] Collected: 12/04/21 1312    Order Status: Completed Specimen: Wound from Leg, Right Updated: 12/13/21 1409     Fungus (Mycology) Culture No fungal growth to date    Narrative:      thigh    Fungus culture [[id number]] Collected: 11/30/21 1635    Order Status: Completed Specimen: Abscess from Leg, Right Updated: 12/13/21 1409     Fungus (Mycology) Culture No fungal growth to date    Narrative:      Right Inner thigh abcess    Culture, Anaerobe [[id number]]  (Abnormal) Collected: 12/04/21 1312    Order Status: Completed Specimen: Wound from Leg, Right Updated: 12/11/21 1350     Anaerobic Culture BACTEROIDES THETAIOTAOMICRON  Few      Narrative:      thigh

## 2021-12-14 NOTE — SUBJECTIVE & OBJECTIVE
Interval History: no acute events overnight. Plan for OR this morning for wound vac exchange.     Review of Systems   Respiratory: Negative.    Cardiovascular: Negative.    Gastrointestinal: Negative.    Skin: Positive for wound.     Objective:     Vital Signs (Most Recent):  Temp: 97.9 °F (36.6 °C) (12/14/21 0758)  Pulse: 105 (12/14/21 0758)  Resp: 18 (12/14/21 0903)  BP: 138/77 (12/14/21 0758)  SpO2: 97 % (12/14/21 0758) Vital Signs (24h Range):  Temp:  [97.6 °F (36.4 °C)-99.2 °F (37.3 °C)] 97.9 °F (36.6 °C)  Pulse:  [101-126] 105  Resp:  [16-20] 18  SpO2:  [95 %-99 %] 97 %  BP: (119-138)/(62-81) 138/77     Weight: (!) 224 kg (493 lb 13.3 oz)  Body mass index is 84.72 kg/m².    Intake/Output Summary (Last 24 hours) at 12/14/2021 0923  Last data filed at 12/14/2021 0723  Gross per 24 hour   Intake 861.1 ml   Output 1900 ml   Net -1038.9 ml      Physical Exam  Vitals and nursing note reviewed.   Constitutional:       General: She is not in acute distress.     Appearance: She is obese. She is not toxic-appearing.   Cardiovascular:      Rate and Rhythm: Normal rate and regular rhythm.   Pulmonary:      Effort: Pulmonary effort is normal.   Abdominal:      Palpations: Abdomen is soft.   Skin:     Comments: Wound vac attached to right thigh/medial wound   Neurological:      Mental Status: She is alert and oriented to person, place, and time.   Psychiatric:         Mood and Affect: Mood normal.         Behavior: Behavior normal.         Thought Content: Thought content normal.         Judgment: Judgment normal.         Significant Labs: All pertinent labs within the past 24 hours have been reviewed.    Significant Imaging: I have reviewed all pertinent imaging results/findings within the past 24 hours.  I have reviewed and interpreted all pertinent imaging results/findings within the past 24 hours.

## 2021-12-14 NOTE — PLAN OF CARE
Problem: Adult Inpatient Plan of Care  Goal: Plan of Care Review  Outcome: Ongoing, Progressing  Goal: Patient-Specific Goal (Individualization)  Outcome: Ongoing, Progressing  Goal: Optimal Comfort and Wellbeing  Outcome: Ongoing, Progressing  Goal: Readiness for Transition of Care  Outcome: Ongoing, Progressing   Pt alert and oriented. Pt free from falls, injury or any further trauma throughout shift. Continued medications as ordered. Complaints of pain, prn pain medication given. Wound vac in place.Pt in no distress. Will cont to monitor.

## 2021-12-14 NOTE — NURSING
"Wound vac alarming "canister not engaged" even when new canister is replaced and properly attached.. New wound vac acquired from sterile processing/central supply and replaced at bedside. No alarm since changed.. will continue to monitor  "

## 2021-12-14 NOTE — PLAN OF CARE
Recommendations    1) Continue current diet regimen as tolerated by patient.  2) Add Moody BID to assist in wound healing    Goals:   1) Patient to continue to meet > 75% EEN/EPN via PO intake    Nutrition Goal Status: new  Communication of RD Recs: other (comment) (POC)

## 2021-12-14 NOTE — PROGRESS NOTES
"      [facility] Medicine  Telemedicine Progress Note    Patient Name: Shauna Queen  MRN: [id number]  Patient Class: IP- Inpatient   Admission Date: 11/30/2021  Length of Stay: 14 days  Attending Physician: Richar Stoll MD  Primary Care Provider: Jorge Dennis MD          Subjective:     Principal Problem:Necrotizing fasciitis        HPI:  Ms. Queen is a 36-year-old presents to the ED from outside hospital for abscess.  She has initially presented to outside hospital for DKA which resolved.  Patient was unaware of why she was here in July prompted her.  Per the checkout are received: "36-year-old female history of diabetes mellitus, sleep apnea on CPAP, schizophrenia, hypertension, and hyperlipidemia admitted to [facility] on November 24 from [facility] for treatment of diabetic ketoacidosis. She was initiated on an insulin infusion. DKA improved and she was transitioned off the insulin infusion. She again developed DKA and was placed back on the insulin infusion for a period of time. Currently she is off the insulin infusion and on subcutaneous insulin. She was noted to have right upper/inner thigh pain with erythema, induration, and tenderness to palpation. She was treated with IV clindamycin and vancomycin. Due to her size ((BMI 74.32) they are unable to perform CT or MRI. General Surgery there performed an incision and drainage with purulent material found. She was taken to the OR on November 29 for a second-look at the thigh wound. They were only able to do superficial debridement. Postprocedure she extubated to 2 L nasal cannula and was awake with oxygen saturation 100 percent. Surgery at their facility recommended transfer to a facility better able to perform procedures on a patient with her BMI. Referring provider spoke with General Surgery at [facility]. [facility] Medicine was asked to admit for further treatment. I spoke with her current attending. " "He noted she has no alteration in mentation. Lower extremity is intact from a neurovascular standpoint. She remains in the ICU."      Overview/Hospital Course:  35 y/o female admitted to the hospital as a transfer secondary to R thigh abscess and a BMI of Body mass index is 84.77 kg/m².   Patient transferred here for surgical evaluation.  Patient went for surgical debridement on 11/30/21. Patient continued on Abx per ID recommendations. Blood cultures were NG. Underwent another debridement on 12/2 and again on 12/4 - noting significant necrotizing soft tissue infections concerning for tracking down into her knee. On broad spectrum antibiotics. To OR with Orthopedic surgery to explore right knee on 12/5. Patient again went for surgery on 12/6.  Wound closure with wound vac placement on 12/8.      Interval History: no acute events overnight. Plan for OR this morning for wound vac exchange.     Review of Systems   Respiratory: Negative.    Cardiovascular: Negative.    Gastrointestinal: Negative.    Skin: Positive for wound.     Objective:     Vital Signs (Most Recent):  Temp: 97.9 °F (36.6 °C) (12/14/21 0758)  Pulse: 105 (12/14/21 0758)  Resp: 18 (12/14/21 0903)  BP: 138/77 (12/14/21 0758)  SpO2: 97 % (12/14/21 0758) Vital Signs (24h Range):  Temp:  [97.6 °F (36.4 °C)-99.2 °F (37.3 °C)] 97.9 °F (36.6 °C)  Pulse:  [101-126] 105  Resp:  [16-20] 18  SpO2:  [95 %-99 %] 97 %  BP: (119-138)/(62-81) 138/77     Weight: (!) 224 kg (493 lb 13.3 oz)  Body mass index is 84.72 kg/m².    Intake/Output Summary (Last 24 hours) at 12/14/2021 0923  Last data filed at 12/14/2021 0723  Gross per 24 hour   Intake 861.1 ml   Output 1900 ml   Net -1038.9 ml      Physical Exam  Vitals and nursing note reviewed.   Constitutional:       General: She is not in acute distress.     Appearance: She is obese. She is not toxic-appearing.   Cardiovascular:      Rate and Rhythm: Normal rate and regular rhythm.   Pulmonary:      Effort: Pulmonary effort " is normal.   Abdominal:      Palpations: Abdomen is soft.   Skin:     Comments: Wound vac attached to right thigh/medial wound   Neurological:      Mental Status: She is alert and oriented to person, place, and time.   Psychiatric:         Mood and Affect: Mood normal.         Behavior: Behavior normal.         Thought Content: Thought content normal.         Judgment: Judgment normal.         Significant Labs: All pertinent labs within the past 24 hours have been reviewed.    Significant Imaging: I have reviewed all pertinent imaging results/findings within the past 24 hours.  I have reviewed and interpreted all pertinent imaging results/findings within the past 24 hours.      Assessment/Plan:      * Necrotizing fasciitis  - Status post I&D with General surgery at outside hospital, did not feel like they completely removed abscess.  - Underwent debridement in OR 11/30, 12/2, and 12/4, 12/5 - no extension into knee.  OR again on 12/6, 12/8 and 12/11.   - Continued on Zosyn till 12/18 for 2 weeks of treatment per ID. (estimated end date: 12/18/21)  - Pain control.   - Plan for wound vac change in OR on 12/14.   - to LTAC once wound vac can be changed at bedside.     Antibiotics (From admission, onward)            Start     Stop Route Frequency Ordered    12/04/21 1530  piperacillin-tazobactam 4.5 g in dextrose 5 % 100 mL IVPB (ready to mix system)         12/19 0959 IV Every 8 hours (non-standard times) 12/04/21 1429        Cultures were taken-   Microbiology Results (last 7 days)     Procedure Component Value Units Date/Time    Fungus culture [[id number]] Collected: 12/04/21 1312    Order Status: Completed Specimen: Wound from Leg, Right Updated: 12/13/21 1409     Fungus (Mycology) Culture No fungal growth to date    Narrative:      thigh    Fungus culture [[id number]] Collected: 11/30/21 1635    Order Status: Completed Specimen: Abscess from Leg, Right Updated: 12/13/21 1409     Fungus (Mycology) Culture No fungal  growth to date    Narrative:      Right Inner thigh abcess    Culture, Anaerobe [[id number]]  (Abnormal) Collected: 12/04/21 1312    Order Status: Completed Specimen: Wound from Leg, Right Updated: 12/11/21 1350     Anaerobic Culture BACTEROIDES THETAIOTAOMICRON  Few      Narrative:      thigh          Obesity  Morbid obesity  Body mass index is 84.72 kg/m².          Hypophosphatemia  Replace as needed.      Anemia of chronic disease  With anemia of acute blood loss as expected from multiple surgeries.  Transfuse for Hgb<7      Debility  PT/OT once more stable      DANG (obstructive sleep apnea)  Patient likely has component of OHS as well  - outpatient sleep study      DM (diabetes mellitus)  A1c:   Lab Results   Component Value Date    HGBA1C >14.0 (H) 12/01/2021     Meds: basal bolus insulin + SSI PRN to maintain goal 140-180  ADA diet, accuchecks ACHS, hypoglycemic protocol  Continue current management.        VTE Risk Mitigation (From admission, onward)         Ordered     enoxaparin injection 40 mg  Daily         12/04/21 2306     IP VTE LOW RISK PATIENT  Once         11/30/21 0403     Place sequential compression device  Until discontinued         11/30/21 0403                      I have assessed these finding virtually using telemed platform and with assistance of bedside nurse                 The attending portion of this evaluation, treatment, and documentation was performed per Richar Wild MD via Telemedicine AudioVisual using the secure [employer] software platform with 2 way audio/video. The provider was located off-site and the patient is located in the hospital. The aforementioned video software was utilized to document the relevant history and physical exam    Richar Wild MD  Department of Hospital Medicine   [facility]

## 2021-12-14 NOTE — OP NOTE
[facility] - Surgery  General Surgery  Operative Note    SUMMARY     Date of Procedure: 12/14/2021     Procedure: debridement of necrotic skin, washout, partial closure, wound VAC change    Surgeon(s) and Role:     * Brant Jorgensen MD - Primary     * Serena Black MD - Resident - Assisting        Pre-Operative Diagnosis: Necrotizing soft tissue infection [M79.89]    Post-Operative Diagnosis: Post-Op Diagnosis Codes:     * Necrotizing soft tissue infection [M79.89]    Anesthesia: General    Operative Findings (including complications, if any):  Wound bed mostly clean, with small amounts of necrotic fat and substantial amount of fibrinous slough near groin crease. The superior-most border of skin which is significantly undermined was dusky along most of the suture line, with one 4 cm area of frankly necrotic skin inferior to the suture line. .     Description of Technical Procedures: All risks/benefits/alternatives were explained to the patient. She expressed understanding and gave written consent. She was brought to the operating room and placed on the OR table in the lithotomy position. After the induction of general anesthesia the wound VAC sponges were removed and the wound was prepped and draped in the usual sterile fashion. Prior to beginning a WHO timeout was performed. The wound was thoroughly inspected. For the most part it was healing well with good granulation tissue. The inferior and posterior parts of the wound which had been previously closed were starting to heal, though were very fragile and did have some fibrinous exudate. The superior portion of the wound which has been previously closed had dusky skin throughout most of the suture line. There was one 4 cm area of kaylene necrosis just inferior to the suture line which was debrided with electrocautery. The wound bed was mostly clean with good granulation tissue, with small amounts of necrotic fat and substantial amount of fibrinous slough near  groin crease. This was improved from previously. A small amount of necrotic fat was bluntly debrided. No evidence of infection. Black sponges were placed covering the entire wound bed. The skin was prepped with benzoin and adherent film applied. The wound VAC was connected and kept a good seal. The patient was awakened from anesthesia and brought to the PACU in stable condition. There were no complications and shetolerated the procedure well.    Estimated Blood Loss (EBL): minimal           Implants: black sponges x 4 pieces    Specimens:   Specimen (24h ago, onward)            None                  Condition: Good    Disposition: PACU - hemodynamically stable.

## 2021-12-14 NOTE — PLAN OF CARE
[place] sent LTAC referral packet to [facility] via care port. Patient stated preferred choice would be [facility], pending review.    12/14/21 4556   Post-Acute Status   Post-Acute Authorization Placement  (LTAC)   Post-Acute Placement Status Referrals Sent   Coverage DANIELLE   Discharge Delays (!) Post-Acute Set-up   Discharge Plan   Discharge Plan A Long-term acute care facility (LTAC)

## 2021-12-14 NOTE — CONSULTS
Thank you for your consult to [facility]. We have reviewed the patient chart. This patient does meet criteria for [facility] service at this time. Will assume care on 12/14/21 at 6AM.

## 2021-12-14 NOTE — PLAN OF CARE
Pt AAOx4, VSS on RA, voids per Rajput, Blood glucose monitoring (ac&hs), Diet diabetic, Scheduled meds administered, pain managed with prn meds, bariatric bed utilized, bed locked on lowest position, call light within reach, Remains free of fall. RLE dressing clean dry intact, Wound vac patent and intact.    Problem: Adult Inpatient Plan of Care  Goal: Plan of Care Review  Outcome: Ongoing, Progressing  Goal: Patient-Specific Goal (Individualization)  Outcome: Ongoing, Progressing  Goal: Absence of Hospital-Acquired Illness or Injury  Outcome: Ongoing, Progressing  Goal: Optimal Comfort and Wellbeing  Outcome: Ongoing, Progressing  Goal: Readiness for Transition of Care  Outcome: Ongoing, Progressing  Goal: Rounds/Family Conference  Outcome: Ongoing, Progressing     Problem: Diabetes Comorbidity  Goal: Blood Glucose Level Within Desired Range  Outcome: Ongoing, Progressing     Problem: Bariatric Environmental Safety  Goal: Safety Maintained with Care  Outcome: Ongoing, Progressing     Problem: Skin Injury Risk Increased  Goal: Skin Health and Integrity  Outcome: Ongoing, Progressing     Problem: Infection  Goal: Infection Symptom Resolution  Outcome: Ongoing, Progressing     Problem: Wound  Goal: Optimal Wound Healing  Outcome: Ongoing, Progressing     Problem: Fall Injury Risk  Goal: Absence of Fall and Fall-Related Injury  Outcome: Ongoing, Progressing

## 2021-12-14 NOTE — PROGRESS NOTES
"[facility] [place]  Adult Nutrition  Progress Note    SUMMARY       Recommendations    1) Continue current diet regimen as tolerated by patient.  2) Add Moody BID to assist in wound healing    Goals:   1) Patient to continue to meet > 75% EEN/EPN via PO intake    Nutrition Goal Status: new  Communication of RD Recs: other (comment) (POC)    Dietitian Rounds Brief    12/13 - Patient seen 2' f/u patient was having would vac care/dressing changes during initial visit. Per chart patient PO intake has increasingly improved currently eating ~ 75%. Will continue to follow.       12/8 - LOS, Patient not seen today d/t being out of room per washout procedure for wound. Per chart PO intake has been 0 - 25%, will follow up.     Assessment and Plan  Nutrition Problem  Increased nutrient needs     Related to (etiology):   Catabolic condition     Signs and Symptoms (as evidenced by):   Necrotizing faciitis  Large wound     Interventions/Recommendations (treatment strategy):  Carbohydrate Controlled Diet  Commercial Beverage  Collaboration of care with other providers     Nutrition Diagnosis Status:   New    Reason for Assessment    Reason For Assessment: RD follow-up  Diagnosis: infection/sepsis (Necrotizing fasciitis)  Relevant Medical History: DM, DANG, HLD, Anemia, HypoPO4, Obesity, Necrotizing Fasciitis  Nutrition Discharge Planning: Diabetic Diet    Nutrition Risk Screen    Nutrition Risk Screen: large or nonhealing wound, burn or pressure injury    Nutrition/Diet History    Spiritual, Cultural Beliefs, Restorationism Practices, Values that Affect Care: no  Food Allergies: NKFA  Factors Affecting Nutritional Intake: None identified at this time    Anthropometrics    Temp: 97.6 °F (36.4 °C)  Height Method: Stated  Height: 5' 4.02" (162.6 cm)  Height (inches): 64.02 in  Weight Method: Bed Scale  Weight: (!) 224 kg (493 lb 13.3 oz)  Weight (lb): (!) 493.84 lb  Ideal Body Weight (IBW), Female: 120.1 lb  % Ideal Body Weight, " Female (lb): 411.19 %  BMI (Calculated): 84.7  BMI Grade: greater than 40 - morbid obesity       Lab/Procedures/Meds    Pertinent Labs Reviewed: reviewed  CBC:  Recent Labs   Lab 12/13/21  0401   WBC 11.78   HGB 8.8*   HCT 29.1*   *     CMP:  Recent Labs   Lab 12/13/21  0401   CALCIUM 8.5*      K 4.0   CO2 25      BUN 11   CREATININE 0.8     Pertinent Medications Reviewed: reviewed  Scheduled Meds:   calcium carbonate  1,000 mg Oral Daily    enoxaparin  40 mg Subcutaneous Daily    insulin aspart U-100  8 Units Subcutaneous TIDWM    insulin detemir U-100  25 Units Subcutaneous BID    piperacillin-tazobactam (ZOSYN) IVPB  4.5 g Intravenous Q8H    potassium, sodium phosphates  1 packet Oral QID (AC & HS)     Continuous Infusions:  PRN Meds:.acetaminophen, ALPRAZolam, dextrose 50%, glucagon (human recombinant), HYDROcodone-acetaminophen, insulin aspart U-100, loperamide, melatonin, morphine, ondansetron, sodium chloride 0.9%    Estimated/Assessed Needs    Weight Used For Calorie Calculations: 54.5 kg (120 lb 1.6 oz)  Energy Calorie Requirements (kcal): 1635  Energy Need Method: Kcal/kg (30 kcal/kg IBW)  Protein Requirements: 81 - 109g (1.5 - 2.0 g/kg IBW)  Weight Used For Protein Calculations: 54.5 kg (120 lb 1.6 oz)     Estimated Fluid Requirement Method: RDA Method (or per MD)  RDA Method (mL): 1635  CHO Requirement: 204g      Nutrition Prescription Ordered    Current Diet Order: Diabetic  Nutrition Order Comments: 2000kcal    Evaluation of Received Nutrient/Fluid Intake    Energy Calories Required: meeting needs  Protein Required: meeting needs  Fluid Required: meeting needs  % Intake of Estimated Energy Needs: 75 - 100 %  % Meal Intake: 50 - 75 %    Nutrition Risk    Level of Risk/Frequency of Follow-up:  (1-2x/week)     Monitor and Evaluation    Food and Nutrient Intake: energy intake,food and beverage intake  Food and Nutrient Adminstration: diet order  Knowledge/Beliefs/Attitudes: food  and nutrition knowledge/skill,beliefs and attitudes  Physical Activity and Function: nutrition-related ADLs and IADLs  Anthropometric Measurements: weight,weight change,body mass index  Biochemical Data, Medical Tests and Procedures: electrolyte and renal panel,gastrointestinal profile,glucose/endocrine profile,inflammatory profile,lipid profile  Nutrition-Focused Physical Findings: overall appearance,head and eyes,extremities, muscles and bones,skin     Nutrition Follow-Up    RD Follow-up?: Yes

## 2021-12-15 LAB
ANION GAP SERPL CALC-SCNC: 11 MMOL/L (ref 8–16)
BASOPHILS # BLD AUTO: 0.04 K/UL (ref 0–0.2)
BASOPHILS NFR BLD: 0.3 % (ref 0–1.9)
BUN SERPL-MCNC: 12 MG/DL (ref 6–20)
CALCIUM SERPL-MCNC: 8.2 MG/DL (ref 8.7–10.5)
CHLORIDE SERPL-SCNC: 102 MMOL/L (ref 95–110)
CO2 SERPL-SCNC: 28 MMOL/L (ref 23–29)
CREAT SERPL-MCNC: 0.7 MG/DL (ref 0.5–1.4)
DIFFERENTIAL METHOD: ABNORMAL
EOSINOPHIL # BLD AUTO: 0 K/UL (ref 0–0.5)
EOSINOPHIL NFR BLD: 0.1 % (ref 0–8)
ERYTHROCYTE [DISTWIDTH] IN BLOOD BY AUTOMATED COUNT: 16.4 % (ref 11.5–14.5)
EST. GFR  (AFRICAN AMERICAN): >60 ML/MIN/1.73 M^2
EST. GFR  (NON AFRICAN AMERICAN): >60 ML/MIN/1.73 M^2
GLUCOSE SERPL-MCNC: 156 MG/DL (ref 70–110)
HCT VFR BLD AUTO: 28 % (ref 37–48.5)
HGB BLD-MCNC: 8.8 G/DL (ref 12–16)
IMM GRANULOCYTES # BLD AUTO: 0.26 K/UL (ref 0–0.04)
IMM GRANULOCYTES NFR BLD AUTO: 2 % (ref 0–0.5)
LYMPHOCYTES # BLD AUTO: 2.5 K/UL (ref 1–4.8)
LYMPHOCYTES NFR BLD: 19.2 % (ref 18–48)
MCH RBC QN AUTO: 28.9 PG (ref 27–31)
MCHC RBC AUTO-ENTMCNC: 31.4 G/DL (ref 32–36)
MCV RBC AUTO: 92 FL (ref 82–98)
MONOCYTES # BLD AUTO: 1.9 K/UL (ref 0.3–1)
MONOCYTES NFR BLD: 14.5 % (ref 4–15)
NEUTROPHILS # BLD AUTO: 8.4 K/UL (ref 1.8–7.7)
NEUTROPHILS NFR BLD: 63.9 % (ref 38–73)
NRBC BLD-RTO: 0 /100 WBC
PLATELET # BLD AUTO: 507 K/UL (ref 150–450)
PMV BLD AUTO: 8.9 FL (ref 9.2–12.9)
POCT GLUCOSE: 105 MG/DL (ref 70–110)
POCT GLUCOSE: 108 MG/DL (ref 70–110)
POCT GLUCOSE: 126 MG/DL (ref 70–110)
POCT GLUCOSE: 164 MG/DL (ref 70–110)
POCT GLUCOSE: 177 MG/DL (ref 70–110)
POTASSIUM SERPL-SCNC: 4 MMOL/L (ref 3.5–5.1)
RBC # BLD AUTO: 3.05 M/UL (ref 4–5.4)
SODIUM SERPL-SCNC: 141 MMOL/L (ref 136–145)
WBC # BLD AUTO: 13.1 K/UL (ref 3.9–12.7)

## 2021-12-15 PROCEDURE — 11000001 HC ACUTE MED/SURG PRIVATE ROOM

## 2021-12-15 PROCEDURE — 63600175 PHARM REV CODE 636 W HCPCS: Performed by: HOSPITALIST

## 2021-12-15 PROCEDURE — 94799 UNLISTED PULMONARY SVC/PX: CPT

## 2021-12-15 PROCEDURE — 85025 COMPLETE CBC W/AUTO DIFF WBC: CPT | Performed by: HOSPITALIST

## 2021-12-15 PROCEDURE — 80048 BASIC METABOLIC PNL TOTAL CA: CPT | Performed by: HOSPITALIST

## 2021-12-15 PROCEDURE — 94761 N-INVAS EAR/PLS OXIMETRY MLT: CPT

## 2021-12-15 PROCEDURE — 99232 PR SUBSEQUENT HOSPITAL CARE,LEVL II: ICD-10-PCS | Mod: ,,, | Performed by: SURGERY

## 2021-12-15 PROCEDURE — 99232 SBSQ HOSP IP/OBS MODERATE 35: CPT | Mod: ,,, | Performed by: SURGERY

## 2021-12-15 PROCEDURE — 99900035 HC TECH TIME PER 15 MIN (STAT)

## 2021-12-15 PROCEDURE — 25000003 PHARM REV CODE 250: Performed by: HOSPITALIST

## 2021-12-15 PROCEDURE — 36415 COLL VENOUS BLD VENIPUNCTURE: CPT | Performed by: HOSPITALIST

## 2021-12-15 RX ADMIN — INSULIN ASPART 8 UNITS: 100 INJECTION, SOLUTION INTRAVENOUS; SUBCUTANEOUS at 09:12

## 2021-12-15 RX ADMIN — INSULIN ASPART 2 UNITS: 100 INJECTION, SOLUTION INTRAVENOUS; SUBCUTANEOUS at 09:12

## 2021-12-15 RX ADMIN — Medication 1 PACKET: at 09:12

## 2021-12-15 RX ADMIN — Medication 1 PACKET: at 11:12

## 2021-12-15 RX ADMIN — INSULIN DETEMIR 25 UNITS: 100 INJECTION, SOLUTION SUBCUTANEOUS at 09:12

## 2021-12-15 RX ADMIN — PIPERACILLIN AND TAZOBACTAM 4.5 G: 4; .5 INJECTION, POWDER, LYOPHILIZED, FOR SOLUTION INTRAVENOUS; PARENTERAL at 10:12

## 2021-12-15 RX ADMIN — INSULIN ASPART 8 UNITS: 100 INJECTION, SOLUTION INTRAVENOUS; SUBCUTANEOUS at 12:12

## 2021-12-15 RX ADMIN — ENOXAPARIN SODIUM 40 MG: 40 INJECTION SUBCUTANEOUS at 05:12

## 2021-12-15 RX ADMIN — CALCIUM CARBONATE (ANTACID) CHEW TAB 500 MG 1000 MG: 500 CHEW TAB at 09:12

## 2021-12-15 RX ADMIN — MORPHINE SULFATE 4 MG: 4 INJECTION, SOLUTION INTRAMUSCULAR; INTRAVENOUS at 10:12

## 2021-12-15 RX ADMIN — PIPERACILLIN AND TAZOBACTAM 4.5 G: 4; .5 INJECTION, POWDER, LYOPHILIZED, FOR SOLUTION INTRAVENOUS; PARENTERAL at 02:12

## 2021-12-15 RX ADMIN — INSULIN ASPART 8 UNITS: 100 INJECTION, SOLUTION INTRAVENOUS; SUBCUTANEOUS at 05:12

## 2021-12-15 RX ADMIN — Medication 1 PACKET: at 06:12

## 2021-12-15 RX ADMIN — Medication 1 PACKET: at 05:12

## 2021-12-15 RX ADMIN — PIPERACILLIN AND TAZOBACTAM 4.5 G: 4; .5 INJECTION, POWDER, LYOPHILIZED, FOR SOLUTION INTRAVENOUS; PARENTERAL at 06:12

## 2021-12-15 RX ADMIN — MORPHINE SULFATE 4 MG: 4 INJECTION, SOLUTION INTRAMUSCULAR; INTRAVENOUS at 05:12

## 2021-12-15 NOTE — SUBJECTIVE & OBJECTIVE
Interval History: no acute events overnight. Underwent debridement of necrotic skin, washout, partial closure, wound VAC change yesterday. No post op complication. Now resting in bed with no complaints.     Review of Systems   Constitutional: Negative.    HENT: Negative.    Respiratory: Negative.    Cardiovascular: Negative.    Gastrointestinal: Negative.    Skin: Positive for wound.   Neurological: Negative.    Psychiatric/Behavioral: Negative.      Objective:     Vital Signs (Most Recent):  Temp: 98.5 °F (36.9 °C) (12/15/21 0715)  Pulse: 105 (12/15/21 0715)  Resp: 20 (12/15/21 0715)  BP: 131/75 (12/15/21 0715)  SpO2: 96 % (12/15/21 0715) Vital Signs (24h Range):  Temp:  [97 °F (36.1 °C)-99.2 °F (37.3 °C)] 98.5 °F (36.9 °C)  Pulse:  [] 105  Resp:  [17-20] 20  SpO2:  [96 %-100 %] 96 %  BP: (124-162)/() 131/75     Weight: (!) 224 kg (493 lb 13.3 oz)  Body mass index is 84.72 kg/m².    Intake/Output Summary (Last 24 hours) at 12/15/2021 0857  Last data filed at 12/15/2021 0700  Gross per 24 hour   Intake 876.47 ml   Output 1590 ml   Net -713.53 ml      Physical Exam  Vitals and nursing note reviewed.   Constitutional:       General: She is not in acute distress.     Appearance: She is obese. She is not toxic-appearing.   Cardiovascular:      Rate and Rhythm: Normal rate and regular rhythm.   Pulmonary:      Effort: Pulmonary effort is normal.   Abdominal:      Palpations: Abdomen is soft.   Skin:     Comments: Wound vac attached to right thigh/medial wound   Neurological:      Mental Status: She is alert and oriented to person, place, and time.   Psychiatric:         Mood and Affect: Mood normal.         Behavior: Behavior normal.         Thought Content: Thought content normal.         Judgment: Judgment normal.         Significant Labs: All pertinent labs within the past 24 hours have been reviewed.    Significant Imaging: I have reviewed all pertinent imaging results/findings within the past 24  hours.  I have reviewed and interpreted all pertinent imaging results/findings within the past 24 hours.

## 2021-12-15 NOTE — ASSESSMENT & PLAN NOTE
- Status post I&D with General surgery at outside hospital, did not feel like they completely removed abscess.  - Underwent debridement in OR 11/30, 12/2, and 12/4, 12/5 - no extension into knee.    - OR again on 12/6, 12/8 and 12/11.   - OR again on 12/14 for debridement of necrotic skin, washout, partial closure, wound VAC change  - Continued on Zosyn till 12/18 for 2 weeks of treatment per ID. (estimated end date: 12/18/21)  - Pain control.   - to LTAC once wound vac can be changed at bedside.     Antibiotics (From admission, onward)            Start     Stop Route Frequency Ordered    12/04/21 1530  piperacillin-tazobactam 4.5 g in dextrose 5 % 100 mL IVPB (ready to mix system)         12/19 0959 IV Every 8 hours (non-standard times) 12/04/21 1429        Cultures were taken-   Microbiology Results (last 7 days)     Procedure Component Value Units Date/Time    Fungus culture [[id number]] Collected: 12/04/21 1312    Order Status: Completed Specimen: Wound from Leg, Right Updated: 12/13/21 1409     Fungus (Mycology) Culture No fungal growth to date    Narrative:      thigh    Fungus culture [[id number]] Collected: 11/30/21 1635    Order Status: Completed Specimen: Abscess from Leg, Right Updated: 12/13/21 1409     Fungus (Mycology) Culture No fungal growth to date    Narrative:      Right Inner thigh abcess    Culture, Anaerobe [[id number]]  (Abnormal) Collected: 12/04/21 1312    Order Status: Completed Specimen: Wound from Leg, Right Updated: 12/11/21 1350     Anaerobic Culture BACTEROIDES THETAIOTAOMICRON  Few      Narrative:      thigh

## 2021-12-15 NOTE — PLAN OF CARE
Per care port, patient's preferred facility, [facility], Not in network with [facility]. [place] sent additional referrals to [facility], [facility] and [facility], pending review.    12/15/21 0821   Post-Acute Status   Post-Acute Authorization Placement  (LTAC)   Post-Acute Placement Status Referrals Sent   Coverage DANIELLE   Patient choice form signed by patient/caregiver List from System Post-Acute Care   Discharge Delays (!) Post-Acute Set-up   Discharge Plan   Discharge Plan A Long-term acute care facility (LTAC)

## 2021-12-15 NOTE — PROGRESS NOTES
"      [facility]  Telemedicine Progress Note    Patient Name: Shauna Queen  MRN: [id number]  Patient Class: IP- Inpatient   Admission Date: 11/30/2021  Length of Stay: 15 days  Attending Physician: Richar Stoll MD  Primary Care Provider: Jorge Dennis MD          Subjective:     Principal Problem:Necrotizing fasciitis        HPI:  Ms. Queen is a 36-year-old presents to the ED from outside hospital for abscess.  She has initially presented to outside hospital for DKA which resolved.  Patient was unaware of why she was here in July prompted her.  Per the checkout are received: "36-year-old female history of diabetes mellitus, sleep apnea on CPAP, schizophrenia, hypertension, and hyperlipidemia admitted to [facility] on November 24 from [facility] for treatment of diabetic ketoacidosis. She was initiated on an insulin infusion. DKA improved and she was transitioned off the insulin infusion. She again developed DKA and was placed back on the insulin infusion for a period of time. Currently she is off the insulin infusion and on subcutaneous insulin. She was noted to have right upper/inner thigh pain with erythema, induration, and tenderness to palpation. She was treated with IV clindamycin and vancomycin. Due to her size ((BMI 74.32) they are unable to perform CT or MRI. General Surgery there performed an incision and drainage with purulent material found. She was taken to the OR on November 29 for a second-look at the thigh wound. They were only able to do superficial debridement. Postprocedure she extubated to 2 L nasal cannula and was awake with oxygen saturation 100 percent. Surgery at their facility recommended transfer to a facility better able to perform procedures on a patient with her BMI. Referring provider spoke with General Surgery at [facility]. [facility] was asked to admit for further treatment. I spoke with her current attending. " "He noted she has no alteration in mentation. Lower extremity is intact from a neurovascular standpoint. She remains in the ICU."      Overview/Hospital Course:  37 y/o female admitted to the hospital as a transfer secondary to R thigh abscess and a BMI of Body mass index is 84.77 kg/m².   Patient transferred here for surgical evaluation.  Patient went for surgical debridement on 11/30/21. Patient continued on Abx per ID recommendations. Blood cultures were NG. Underwent another debridement on 12/2 and again on 12/4 - noting significant necrotizing soft tissue infections concerning for tracking down into her knee. On broad spectrum antibiotics. To OR with Orthopedic surgery to explore right knee on 12/5. Patient again went for surgery on 12/6.  Wound closure with wound vac placement on 12/8.      Interval History: no acute events overnight. Underwent debridement of necrotic skin, washout, partial closure, wound VAC change yesterday. No post op complication. Now resting in bed with no complaints.     Review of Systems   Constitutional: Negative.    HENT: Negative.    Respiratory: Negative.    Cardiovascular: Negative.    Gastrointestinal: Negative.    Skin: Positive for wound.   Neurological: Negative.    Psychiatric/Behavioral: Negative.      Objective:     Vital Signs (Most Recent):  Temp: 98.5 °F (36.9 °C) (12/15/21 0715)  Pulse: 105 (12/15/21 0715)  Resp: 20 (12/15/21 0715)  BP: 131/75 (12/15/21 0715)  SpO2: 96 % (12/15/21 0715) Vital Signs (24h Range):  Temp:  [97 °F (36.1 °C)-99.2 °F (37.3 °C)] 98.5 °F (36.9 °C)  Pulse:  [] 105  Resp:  [17-20] 20  SpO2:  [96 %-100 %] 96 %  BP: (124-162)/() 131/75     Weight: (!) 224 kg (493 lb 13.3 oz)  Body mass index is 84.72 kg/m².    Intake/Output Summary (Last 24 hours) at 12/15/2021 0857  Last data filed at 12/15/2021 0700  Gross per 24 hour   Intake 876.47 ml   Output 1590 ml   Net -713.53 ml      Physical Exam  Vitals and nursing note reviewed. "   Constitutional:       General: She is not in acute distress.     Appearance: She is obese. She is not toxic-appearing.   Cardiovascular:      Rate and Rhythm: Normal rate and regular rhythm.   Pulmonary:      Effort: Pulmonary effort is normal.   Abdominal:      Palpations: Abdomen is soft.   Skin:     Comments: Wound vac attached to right thigh/medial wound   Neurological:      Mental Status: She is alert and oriented to person, place, and time.   Psychiatric:         Mood and Affect: Mood normal.         Behavior: Behavior normal.         Thought Content: Thought content normal.         Judgment: Judgment normal.         Significant Labs: All pertinent labs within the past 24 hours have been reviewed.    Significant Imaging: I have reviewed all pertinent imaging results/findings within the past 24 hours.  I have reviewed and interpreted all pertinent imaging results/findings within the past 24 hours.      Assessment/Plan:      * Necrotizing fasciitis  - Status post I&D with General surgery at outside hospital, did not feel like they completely removed abscess.  - Underwent debridement in OR 11/30, 12/2, and 12/4, 12/5 - no extension into knee.    - OR again on 12/6, 12/8 and 12/11.   - OR again on 12/14 for debridement of necrotic skin, washout, partial closure, wound VAC change  - Continued on Zosyn till 12/18 for 2 weeks of treatment per ID. (estimated end date: 12/18/21)  - Pain control.   - to LTAC once wound vac can be changed at bedside.     Antibiotics (From admission, onward)            Start     Stop Route Frequency Ordered    12/04/21 1530  piperacillin-tazobactam 4.5 g in dextrose 5 % 100 mL IVPB (ready to mix system)         12/19 0959 IV Every 8 hours (non-standard times) 12/04/21 1429        Cultures were taken-   Microbiology Results (last 7 days)     Procedure Component Value Units Date/Time    Fungus culture [[id number]] Collected: 12/04/21 1312    Order Status: Completed Specimen: Wound from  Leg, Right Updated: 12/13/21 1409     Fungus (Mycology) Culture No fungal growth to date    Narrative:      thigh    Fungus culture [[id number]] Collected: 11/30/21 1635    Order Status: Completed Specimen: Abscess from Leg, Right Updated: 12/13/21 1409     Fungus (Mycology) Culture No fungal growth to date    Narrative:      Right Inner thigh abcess    Culture, Anaerobe [[id number]]  (Abnormal) Collected: 12/04/21 1312    Order Status: Completed Specimen: Wound from Leg, Right Updated: 12/11/21 1350     Anaerobic Culture BACTEROIDES THETAIOTAOMICRON  Few      Narrative:      thigh          Obesity  Morbid obesity  Body mass index is 84.72 kg/m².          Hypophosphatemia  Replace as needed.      Anemia of chronic disease  With anemia of acute blood loss as expected from multiple surgeries.  Transfuse for Hgb<7      Debility  PT/OT once more stable      DANG (obstructive sleep apnea)  Patient likely has component of OHS as well  - outpatient sleep study      DM (diabetes mellitus)  A1c:   Lab Results   Component Value Date    HGBA1C >14.0 (H) 12/01/2021     Meds: basal bolus insulin + SSI PRN to maintain goal 140-180  ADA diet, accuchecks ACHS, hypoglycemic protocol  Continue current management.        VTE Risk Mitigation (From admission, onward)         Ordered     enoxaparin injection 40 mg  Daily         12/04/21 2306     IP VTE LOW RISK PATIENT  Once         11/30/21 0403     Place sequential compression device  Until discontinued         11/30/21 0403                      I have assessed these finding virtually using telemed platform and with assistance of bedside nurse                 The attending portion of this evaluation, treatment, and documentation was performed per Richar Wild MD via Telemedicine AudioVisual using the secure [employer] software platform with 2 way audio/video. The provider was located off-site and the patient is located in the hospital. The aforementioned video software was utilized to  document the relevant history and physical exam    Richar Wild MD  Department of Hospital Medicine   [facility] - Med Surg [place]

## 2021-12-15 NOTE — ASSESSMENT & PLAN NOTE
Son called again to schedule, advised a coordinator will call with further info about appointment times and dates. They are open to a phone visit if possible. Please contact Ash at [phone]. They would like the pt to see PCP.    Opal Montez on 7/6/2020 at 11:45 AM     With anemia of acute blood loss as expected from multiple surgeries.  Transfuse for Hgb<7

## 2021-12-15 NOTE — PHYSICIAN QUERY
PT Name: Shauna Queen  MR #: [id number]    DOCUMENTATION CLARIFICATION     CDS: Iveth Cordova RN, CCDS         Contact information :ext [phone] [email or web address]     This form is a permanent document in the medical record.     Query Date: December 15, 2021    By submitting this query, we are merely seeking further clarification of documentation. Please utilize your independent clinical judgment when addressing the question(s) below.    The Medical Record contains the following:   Indicators   Supporting Clinical Findings Location in Medical Record   x AMS, Confusion,  LOC, etc.  Patient groggy   Patient with AMS- likely infectious encephalopathy  When we saw the patient she was very somnolent and disoriented.  Patient is oriented x 4 able to follow commands but drowsy,  falls back to sleep during conversations .  H&P 11/30/21  HM Care Update 11/30/31  Surgery Consult 11/30/21    ICU RN Note 12/1/21   x Acute/Chronic Illness Abscess of right thigh  Status post I&D with General surgery at outside hospital, did not feel like they completely removed abscess  She has initially presented to outside hospital for DKA which resolved.   H&P 11/30/21    Radiology Findings     x Electrolyte Imbalance Hypophosphatemia Hospital medicine PN 12/1/21    Medication     x Treatment         Started on vanc Zosyn  Consult general surgery  NPO pending surgical procedure  Norco and Tylenol as needed for pain H&P 11/30/21   x Other New admit to the ICU, alert and oriented   More alert this morning. Now oriented to person, place, and time.  ICU RN 11/30/21    Surgery PN 12/1/21     The noted clinical guidelines are only system guidelines and do not replace the providers clinical judgment.    The National [place] of Neurologic Disorders and Stroke (NINDS) of the NIH describes encephalopathy as any diffuse disease of the brain that alters brain function or structure.    Provider, please specify the Encephalopathy  diagnosis.  [  x ] Metabolic Encephalopathy - (Due to electrolyte imbalance, metabolic derangements, or infectious processes, includes Septic Encephalopathy, Uremic Encephalopathy)   [   ] Infectious Encephalopathy   [   ] Other Encephalopathy (please specify): ____________________   [   ] Encephalopathy ruled out   [   ] Other neurological condition- (please specify condition): __________   [   ]  Clinically Undetermined           Please document in your progress notes daily for the duration of treatment until resolved, and include in your discharge summary.    References:  SHERRI Gray RN, CCDS. (2018, June 9). Notes from the Instructor: Encephalopathy tips. Retrieved October 22, 2020, from [email or web address]-[email or web address]-[email or web address]    ICD-9-CM Coding Clinic First Quarter 2013, Effective with discharges: October 21, 2013 [facility] § Seizure with encephalopathy due to postictal state (2013).    ICD-10-CM/[employer] Integrated Codebook (Version V.20.8.10.0) [Computer software]. (2020). Retrieved October 21, 2020.    National [place] of Neurological Disorders and Stroke. (2019, March 27). Retrieved October 22, 2020, from https://www.ninds.nih.gov/Disorders/All-Disorders/Qofdjclqsdtbum-Xwkywtdqqot-Pmad    Form No. 69594

## 2021-12-15 NOTE — PHYSICIAN QUERY
"PT Name: Shauna Queen  MR #: [id number]    DOCUMENTATION CLARIFICATION     CDS: Iveth Cordova RN, CCDS         Contact information :ext [phone] [email or web address]     This form is a permanent document in the medical record.    Query Date: December 15, 2021  By submitting this query, we are merely seeking further clarification of documentation. Please utilize your independent clinical judgment when addressing the question(s) below.    The Medical Record contains the following:   Indicator Supporting Clinical Findings Location in Medical Record   x Documentation of "Debridement" DEBRIDEMENT, LOWER EXTREMITY (Right)     Operative Findings (including complications, if any): significant quantity of necrotic fat and skin, small amount of necrotic fascia and muscle.     There was a significant amount of foul smelling, necrotic skin and fat. These were removed with a combination of blunt dissection and electrocautery. Due to the extensive dissection, this was a high blood loss procedure. During our dissection, the saphenous vein was identified and suture ligated. Multiple pockets of purulence were encountered. Cultures were sent. Additionally, a sample of necrotic fat was sent for culture. After all necrotic tissues had been debrided, the wound was copiously irrigated with sterile saline and packed with Dakin's-soaked Kerlix.    Op Note 11/30/21 Dr Black    Documentation of "I&D"      Other       Excisional debridement is the surgical removal or cutting away of such tissue, necrosis, or slough and is classified to the root operation "Excision." Use of a sharp instrument does not always indicate that an excisional debridement was performed. Minor removal of loose fragments with scissors or using a sharp instrument to scrape away tissue is not an excisional debridement. Excisional debridement involves the use of a scalpel to remove devitalized tissue.    Nonexcisional debridement is the nonoperative brushing, " "irrigating, scrubbing, or washing of devitalized tissue, necrosis, slough, or foreign material. Most nonexcisional debridement procedures are classified to the root operation "Extraction" (pulling or stripping out or off all or a portion of a body part by the use of force).     Provider, please provide further clarification on the Type and depth of Debridement  performed on LOWER EXTREMITY (Right) on 11/30/21:    [ x  ] Excisional Debridement of subcutaneous tissue/fascia   [   ] Excisional Debridement of muscle   [   ] Excisional Debridement of other tissue, please specify,_______________        [   ] Nonexcisional Debridement of subcutaneous tissue/fascia   [   ] Nonexcisional Debridement of muscle   [   ] Nonexcisional Debridement of other tissue, please specify,__________     [   ] Other Procedure (please specify): _____________   [  ] Clinically Undetermined     Reference:    ICD-10-CM/PCS Coding Clinic Third Quarter ICD-10, Effective with discharges: October 7, 2015 [facility] [employer] § Excisional and nonexcisional debridement (2015).    Form No. 79826   "

## 2021-12-15 NOTE — PLAN OF CARE
Plan of care  continued with  no new concerns noted or voiced. Pt remained awake,alert and oriented  to person,place, time and situation. Analgesics given for pain.Rested intermittently in bed with eyes closed with  even rise and fall of chest noted. Right thigh  wound vac dressing reinforced due to leak. Afebrile.Remains on  room air.Scheduled antibiotics given per order with no adverse reactions.Pt tolerating diet with no nausea noted. Rajput catheter to gravity;patency maintained and UOP recorded.. Safety measures  in place all shift ;Bed in lowest position  with wheels locked and alarm on. Call bell within reach at all times;Pt able to make needs known to staff.  Verbalization of feelings promoted and validated. Will continue to monitor and follow treatment plan.       Problem: Adult Inpatient Plan of Care  Goal: Plan of Care Review  Outcome: Ongoing, Progressing  Goal: Patient-Specific Goal (Individualization)  Outcome: Ongoing, Progressing  Goal: Absence of Hospital-Acquired Illness or Injury  Outcome: Ongoing, Progressing  Goal: Optimal Comfort and Wellbeing  Outcome: Ongoing, Progressing  Goal: Readiness for Transition of Care  Outcome: Ongoing, Progressing  Goal: Rounds/Family Conference  Outcome: Ongoing, Progressing     Problem: Skin Injury Risk Increased  Goal: Skin Health and Integrity  Outcome: Ongoing, Progressing     Problem: Infection  Goal: Infection Symptom Resolution  Outcome: Ongoing, Progressing     Problem: Wound  Goal: Optimal Wound Healing  Outcome: Ongoing, Progressing

## 2021-12-15 NOTE — PROGRESS NOTES
[facility]  General Surgery  Progress Note      Interval History:     Patient is s/p serial debridements of right lower extremity (groin to knee) for necrotizing fasciitis, most recently 12/6, at which time the wound appeared healthy with no remaining infection. On 12/8 patient underwent partial closure and wound VAC placement. Wound VAC exchanged in OR on 12/12. On 12/14 she was taken for further debridement (necrotic skin), partial closure, and WV change.    Feeling okay, pain controlled. Wound VAC again leaking overnight, placed to wall suction today.    Objective:     Vital Signs (Most Recent):  Temp: 98.8 °F (37.1 °C) (12/15/21 1602)  Pulse: (!) 111 (12/15/21 1602)  Resp: 20 (12/15/21 1602)  BP: 127/66 (12/15/21 1602)  SpO2: 97 % (12/15/21 1602) Vital Signs (24h Range):  Temp:  [97 °F (36.1 °C)-99 °F (37.2 °C)] 98.8 °F (37.1 °C)  Pulse:  [] 111  Resp:  [18-20] 20  SpO2:  [96 %-99 %] 97 %  BP: (117-132)/(60-83) 127/66     Weight: (!) 224 kg (493 lb 13.3 oz)  Body mass index is 84.72 kg/m².      Intake/Output Summary (Last 24 hours) at 12/15/2021 1628  Last data filed at 12/15/2021 1151  Gross per 24 hour   Intake 23.57 ml   Output 1112.5 ml   Net -1088.93 ml       Physical Exam  GEN: alert and oriented, NAD  HEENT: MMM, no scleral icterus  CV: Tachycardic to 110s, distant heart sounds due to body habitus, regular rhythm  PULM: Mildly increased WOB on RA  ABD: soft, non-tender, non-distended, obese  EXT: moves all. Right leg with wound VAC to suction, serosanguinous drainage in cannister. Closed portion of the incision c/d/i    Significant Labs:  CBC:   Recent Labs   Lab 12/15/21  0354   WBC 13.10*   RBC 3.05*   HGB 8.8*   HCT 28.0*   *   MCV 92   MCH 28.9   MCHC 31.4*     CMP:   Recent Labs   Lab 12/15/21  0354   *   CALCIUM 8.2*      K 4.0   CO2 28      BUN 12   CREATININE 0.7       Significant Diagnostics:  None    Assessment/Plan:     Active Diagnoses:     Diagnosis Date Noted POA    PRINCIPAL PROBLEM:  Necrotizing fasciitis [M72.6] 11/30/2021 Yes    Debility [R53.81] 12/01/2021 Yes    Anemia of chronic disease [D63.8] 12/01/2021 Yes    Hypophosphatemia [E83.39] 12/01/2021 Yes    Obesity [E66.9] 12/01/2021 Yes    DM (diabetes mellitus) [E11.9] 11/30/2021 Yes    DANG (obstructive sleep apnea) [G47.33] 11/30/2021 Yes      Problems Resolved During this Admission:     This is a 36 year old F with class 3 obesity c/b several medical problems who is transferred to [facility] for treatment of right groin/thigh necrotizing fasciitis.    S/p serial debridements (groin to knee), most recently 12/6. At this time the wound was found to be healthy with minimal necrotic tissues. On 12/8 she underwent partial closure and wound VAC placement, s/p wound VAC change on 12/12 and further debridement (necrotic skin), partial closure, and WV change on 12/14.    Wound appears to be healing well overall. HD stable, labs unremarkable.     Wound VAC placed to wall suction as cannister was filling quickly and causing wound VAC to leak.    Patient may benefit from evaluation by plastic surgery for wound closure/skin grafting.     Patient discussed with attending surgeon Dr. Joslyn Black MD PGY5  General Surgery  [facility]

## 2021-12-15 NOTE — NURSING
CASH PICC drsg changed using sterile technique, all ports flushed (red w/o difficulty) both white ports flush but w/minimal difficulty ie sluggishness, pt tolerated drsg change w/nadn, education provided to pt & all questions addressed, safety measures in place, canister to wound vac changed & output noted in doc flow sheet

## 2021-12-16 LAB
ANION GAP SERPL CALC-SCNC: 8 MMOL/L (ref 8–16)
BASOPHILS # BLD AUTO: 0.03 K/UL (ref 0–0.2)
BASOPHILS NFR BLD: 0.3 % (ref 0–1.9)
BUN SERPL-MCNC: 10 MG/DL (ref 6–20)
C DIFF GDH STL QL: NEGATIVE
C DIFF TOX A+B STL QL IA: NEGATIVE
CALCIUM SERPL-MCNC: 8.2 MG/DL (ref 8.7–10.5)
CHLORIDE SERPL-SCNC: 105 MMOL/L (ref 95–110)
CO2 SERPL-SCNC: 29 MMOL/L (ref 23–29)
CREAT SERPL-MCNC: 0.8 MG/DL (ref 0.5–1.4)
DIFFERENTIAL METHOD: ABNORMAL
EOSINOPHIL # BLD AUTO: 0.1 K/UL (ref 0–0.5)
EOSINOPHIL NFR BLD: 1 % (ref 0–8)
ERYTHROCYTE [DISTWIDTH] IN BLOOD BY AUTOMATED COUNT: 16.8 % (ref 11.5–14.5)
EST. GFR  (AFRICAN AMERICAN): >60 ML/MIN/1.73 M^2
EST. GFR  (NON AFRICAN AMERICAN): >60 ML/MIN/1.73 M^2
GLUCOSE SERPL-MCNC: 112 MG/DL (ref 70–110)
HCT VFR BLD AUTO: 28.3 % (ref 37–48.5)
HGB BLD-MCNC: 8.6 G/DL (ref 12–16)
IMM GRANULOCYTES # BLD AUTO: 0.1 K/UL (ref 0–0.04)
IMM GRANULOCYTES NFR BLD AUTO: 1 % (ref 0–0.5)
LYMPHOCYTES # BLD AUTO: 1.6 K/UL (ref 1–4.8)
LYMPHOCYTES NFR BLD: 15.7 % (ref 18–48)
MCH RBC QN AUTO: 28.6 PG (ref 27–31)
MCHC RBC AUTO-ENTMCNC: 30.4 G/DL (ref 32–36)
MCV RBC AUTO: 94 FL (ref 82–98)
MONOCYTES # BLD AUTO: 1.3 K/UL (ref 0.3–1)
MONOCYTES NFR BLD: 13.2 % (ref 4–15)
NEUTROPHILS # BLD AUTO: 6.9 K/UL (ref 1.8–7.7)
NEUTROPHILS NFR BLD: 68.8 % (ref 38–73)
NRBC BLD-RTO: 1 /100 WBC
PLATELET # BLD AUTO: 414 K/UL (ref 150–450)
PMV BLD AUTO: 8.4 FL (ref 9.2–12.9)
POCT GLUCOSE: 105 MG/DL (ref 70–110)
POCT GLUCOSE: 106 MG/DL (ref 70–110)
POCT GLUCOSE: 141 MG/DL (ref 70–110)
POCT GLUCOSE: 85 MG/DL (ref 70–110)
POTASSIUM SERPL-SCNC: 3 MMOL/L (ref 3.5–5.1)
RBC # BLD AUTO: 3.01 M/UL (ref 4–5.4)
SODIUM SERPL-SCNC: 142 MMOL/L (ref 136–145)
WBC # BLD AUTO: 10.06 K/UL (ref 3.9–12.7)

## 2021-12-16 PROCEDURE — 25000003 PHARM REV CODE 250: Performed by: HOSPITALIST

## 2021-12-16 PROCEDURE — 85025 COMPLETE CBC W/AUTO DIFF WBC: CPT | Performed by: STUDENT IN AN ORGANIZED HEALTH CARE EDUCATION/TRAINING PROGRAM

## 2021-12-16 PROCEDURE — 99231 PR SUBSEQUENT HOSPITAL CARE,LEVL I: ICD-10-PCS | Mod: ,,, | Performed by: SURGERY

## 2021-12-16 PROCEDURE — 87449 NOS EACH ORGANISM AG IA: CPT

## 2021-12-16 PROCEDURE — 27000207 HC ISOLATION

## 2021-12-16 PROCEDURE — 25000003 PHARM REV CODE 250: Performed by: STUDENT IN AN ORGANIZED HEALTH CARE EDUCATION/TRAINING PROGRAM

## 2021-12-16 PROCEDURE — 63600175 PHARM REV CODE 636 W HCPCS: Performed by: HOSPITALIST

## 2021-12-16 PROCEDURE — 80048 BASIC METABOLIC PNL TOTAL CA: CPT | Performed by: STUDENT IN AN ORGANIZED HEALTH CARE EDUCATION/TRAINING PROGRAM

## 2021-12-16 PROCEDURE — 36415 COLL VENOUS BLD VENIPUNCTURE: CPT | Performed by: STUDENT IN AN ORGANIZED HEALTH CARE EDUCATION/TRAINING PROGRAM

## 2021-12-16 PROCEDURE — 99231 SBSQ HOSP IP/OBS SF/LOW 25: CPT | Mod: ,,, | Performed by: SURGERY

## 2021-12-16 PROCEDURE — 11000001 HC ACUTE MED/SURG PRIVATE ROOM

## 2021-12-16 RX ORDER — POLYETHYLENE GLYCOL 3350 17 G/17G
17 POWDER, FOR SOLUTION ORAL DAILY
Status: DISCONTINUED | OUTPATIENT
Start: 2021-12-16 | End: 2021-12-16

## 2021-12-16 RX ORDER — POTASSIUM CHLORIDE 20 MEQ/1
40 TABLET, EXTENDED RELEASE ORAL
Status: COMPLETED | OUTPATIENT
Start: 2021-12-16 | End: 2021-12-16

## 2021-12-16 RX ADMIN — HYDROCODONE BITARTRATE AND ACETAMINOPHEN 1 TABLET: 5; 325 TABLET ORAL at 04:12

## 2021-12-16 RX ADMIN — INSULIN DETEMIR 25 UNITS: 100 INJECTION, SOLUTION SUBCUTANEOUS at 08:12

## 2021-12-16 RX ADMIN — PIPERACILLIN AND TAZOBACTAM 4.5 G: 4; .5 INJECTION, POWDER, LYOPHILIZED, FOR SOLUTION INTRAVENOUS; PARENTERAL at 02:12

## 2021-12-16 RX ADMIN — PIPERACILLIN AND TAZOBACTAM 4.5 G: 4; .5 INJECTION, POWDER, LYOPHILIZED, FOR SOLUTION INTRAVENOUS; PARENTERAL at 10:12

## 2021-12-16 RX ADMIN — Medication 1 PACKET: at 12:12

## 2021-12-16 RX ADMIN — Medication 1 PACKET: at 04:12

## 2021-12-16 RX ADMIN — POTASSIUM CHLORIDE 40 MEQ: 1500 TABLET, EXTENDED RELEASE ORAL at 02:12

## 2021-12-16 RX ADMIN — HYDROCODONE BITARTRATE AND ACETAMINOPHEN 1 TABLET: 5; 325 TABLET ORAL at 08:12

## 2021-12-16 RX ADMIN — MORPHINE SULFATE 4 MG: 4 INJECTION, SOLUTION INTRAMUSCULAR; INTRAVENOUS at 08:12

## 2021-12-16 RX ADMIN — Medication 1 PACKET: at 06:12

## 2021-12-16 RX ADMIN — INSULIN ASPART 8 UNITS: 100 INJECTION, SOLUTION INTRAVENOUS; SUBCUTANEOUS at 08:12

## 2021-12-16 RX ADMIN — ENOXAPARIN SODIUM 40 MG: 40 INJECTION SUBCUTANEOUS at 04:12

## 2021-12-16 RX ADMIN — CALCIUM CARBONATE (ANTACID) CHEW TAB 500 MG 1000 MG: 500 CHEW TAB at 08:12

## 2021-12-16 RX ADMIN — POTASSIUM CHLORIDE 40 MEQ: 1500 TABLET, EXTENDED RELEASE ORAL at 12:12

## 2021-12-16 RX ADMIN — INSULIN ASPART 8 UNITS: 100 INJECTION, SOLUTION INTRAVENOUS; SUBCUTANEOUS at 12:12

## 2021-12-16 RX ADMIN — LOPERAMIDE HYDROCHLORIDE 2 MG: 2 CAPSULE ORAL at 04:12

## 2021-12-16 RX ADMIN — PIPERACILLIN AND TAZOBACTAM 4.5 G: 4; .5 INJECTION, POWDER, LYOPHILIZED, FOR SOLUTION INTRAVENOUS; PARENTERAL at 06:12

## 2021-12-16 RX ADMIN — Medication 1 PACKET: at 08:12

## 2021-12-16 NOTE — PHYSICIAN QUERY
"PT Name: Shauna Queen  MR #: [id number]    DOCUMENTATION CLARIFICATION     CDS: Iveth Cordova RN, CCDS         Contact information :ext [phone] [email or web address]     This form is a permanent document in the medical record.    Query Date: December 16, 2021  By submitting this query, we are merely seeking further clarification of documentation. Please utilize your independent clinical judgment when addressing the question(s) below.    The Medical Record contains the following:   Indicator Supporting Clinical Findings Location in Medical Record   x Documentation of "Debridement" DEBRIDEMENT, LOWER EXTREMITY (Right)     Operative Findings (including complications, if any): Significant amount of necrotic fat, fascia, and muscle. Thin, purulent fluid tracking along fascial planes.      When the dressing was removed we encountered a significant amount of dead tissues, which included skin, fat, fascia, and muscle. Furthermore, purulent drainage was found tracking along fascial planes. The necrotic tissues were extensively debrided bluntly and with electrocautery.    Op Note 12/2/21 Dr Dupree and Dr Black     Documentation of "I&D"      Other       Excisional debridement is the surgical removal or cutting away of such tissue, necrosis, or slough and is classified to the root operation "Excision." Use of a sharp instrument does not always indicate that an excisional debridement was performed. Minor removal of loose fragments with scissors or using a sharp instrument to scrape away tissue is not an excisional debridement. Excisional debridement involves the use of a scalpel to remove devitalized tissue.    Nonexcisional debridement is the nonoperative brushing, irrigating, scrubbing, or washing of devitalized tissue, necrosis, slough, or foreign material. Most nonexcisional debridement procedures are classified to the root operation "Extraction" (pulling or stripping out or off all or a portion of a body part " by the use of force).     Provider, please provide further clarification on the Type and depth of Debridement  performed on LOWER EXTREMITY (Right) on 12/2/21:    [ x  ] Excisional Debridement of subcutaneous tissue/fascia   [   ] Excisional Debridement of muscle   [   ] Excisional Debridement of other tissue, please specify,_______________        [   ] Nonexcisional Debridement of subcutaneous tissue/fascia   [   ] Nonexcisional Debridement of muscle   [   ] Nonexcisional Debridement of other tissue, please specify,__________     [   ] Other Procedure (please specify): _____________   [  ] Clinically Undetermined     Reference:    ICD-10-CM/PCS Coding Clinic Third Quarter ICD-10, Effective with discharges: October 7, 2015 [facility] [employer] § Excisional and nonexcisional debridement (2015).    Form No. [id number]

## 2021-12-16 NOTE — NURSING
Incontinent pads noted to be saturated with wound exudate. Pt cleaned,incontinent pads changed, dressing reinforced  and NPWT switched to wall suction per order.Will continue to monitor

## 2021-12-16 NOTE — PLAN OF CARE
Plan of care  continued with  no new concerns noted or voiced. Pt remained awake,alert and oriented  to person,place, time and situation. Rested intermittently in bed with eyes closed with  even rise and fall of chest noted. Afebrile.Remains on  room air/ and declines CPAP @HS.PRN pain medication per pt request for pain.Scheduled antibiotics given per order with no adverse reactions noted.Pt tolerating diet with no nausea noted. Rajput catheter to gravity;patency maintained.Sacral dresing  remains in place.Conintues to have swelling to BLE/flank areas. Frequent weight shift encouraged. Repositioned  q2h; pillows in use. Safety measures  in place all shift ;Bed in lowest position  with wheels locked and alarm on. Call bell within reach at all times;Pt able to make needs known to staff.  Verbalization of feelings promoted and validated. Will continue to monitor and follow treatment plan.     Problem: Adult Inpatient Plan of Care  Goal: Plan of Care Review  Outcome: Ongoing, Progressing     Problem: Diabetes Comorbidity  Goal: Blood Glucose Level Within Desired Range  Outcome: Ongoing, Progressing  Intervention: Maintain Glycemic Control  Flowsheets (Taken 12/16/2021 9479)  Glycemic Management:   blood glucose monitored   supplemental insulin given     Problem: Skin Injury Risk Increased  Goal: Skin Health and Integrity  Outcome: Ongoing, Progressing     Problem: Infection  Goal: Infection Symptom Resolution  Outcome: Ongoing, Progressing     Problem: Wound  Goal: Optimal Wound Healing  Outcome: Ongoing, Progressing

## 2021-12-16 NOTE — PLAN OF CARE
Problem: Skin Injury Risk Increased  Goal: Skin Health and Integrity  Outcome: Ongoing, Progressing     Problem: Infection  Goal: Infection Symptom Resolution  Outcome: Ongoing, Progressing     Problem: Wound  Goal: Optimal Wound Healing  Outcome: Ongoing, Progressing     Problem: Fall Injury Risk  Goal: Absence of Fall and Fall-Related Injury  Outcome: Ongoing, Progressing

## 2021-12-16 NOTE — PROGRESS NOTES
[facility]  General Surgery  Progress Note      Interval History:     Patient is s/p serial debridements of right lower extremity (groin to knee) for necrotizing fasciitis, most recently 12/6, at which time the wound appeared healthy with no remaining infection. On 12/8 patient underwent partial closure and wound VAC placement. Wound VAC exchanged in OR on 12/12. On 12/14 she was taken for further debridement (necrotic skin), partial closure, and WV change.    Feeling okay but complaining of diarrhea. Tolerating diet, pain controlled. Wound VAC has been leaking some, but improved with wall suction.    Objective:     Vital Signs (Most Recent):  Temp: 98.3 °F (36.8 °C) (12/16/21 0712)  Pulse: 109 (12/16/21 0712)  Resp: 16 (12/16/21 0845)  BP: 104/65 (12/16/21 0712)  SpO2: 100 % (12/16/21 0712) Vital Signs (24h Range):  Temp:  [98.3 °F (36.8 °C)-98.9 °F (37.2 °C)] 98.3 °F (36.8 °C)  Pulse:  [103-117] 109  Resp:  [16-26] 16  SpO2:  [96 %-100 %] 100 %  BP: (104-135)/(65-77) 104/65     Weight: (!) 224 kg (493 lb 13.3 oz)  Body mass index is 84.72 kg/m².      Intake/Output Summary (Last 24 hours) at 12/16/2021 0920  Last data filed at 12/16/2021 0845  Gross per 24 hour   Intake 990 ml   Output 1450 ml   Net -460 ml       Physical Exam  GEN: alert and oriented, NAD  HEENT: MMM, no scleral icterus  CV: Tachycardic to 100s, distant heart sounds due to body habitus, regular rhythm  PULM: Mildly increased WOB on RA  ABD: soft, non-tender, non-distended, obese  EXT: moves all. Right leg with wound VAC to suction, serosanguinous drainage in cannister. Closed portion of the incision c/d/i    Significant Labs:  CBC:   Recent Labs   Lab 12/15/21  0354   WBC 13.10*   RBC 3.05*   HGB 8.8*   HCT 28.0*   *   MCV 92   MCH 28.9   MCHC 31.4*     CMP:   Recent Labs   Lab 12/15/21  0354   *   CALCIUM 8.2*      K 4.0   CO2 28      BUN 12   CREATININE 0.7       Significant  Diagnostics:  None    Assessment/Plan:     Active Diagnoses:    Diagnosis Date Noted POA    PRINCIPAL PROBLEM:  Necrotizing fasciitis [M72.6] 11/30/2021 Yes    Debility [R53.81] 12/01/2021 Yes    Anemia of chronic disease [D63.8] 12/01/2021 Yes    Hypophosphatemia [E83.39] 12/01/2021 Yes    Obesity [E66.9] 12/01/2021 Yes    DM (diabetes mellitus) [E11.9] 11/30/2021 Yes    DANG (obstructive sleep apnea) [G47.33] 11/30/2021 Yes      Problems Resolved During this Admission:     This is a 36 year old F with class 3 obesity c/b several medical problems who is transferred to [facility] for treatment of right groin/thigh necrotizing fasciitis.    S/p serial debridements (groin to knee), most recently 12/6. At this time the wound was found to be healthy with minimal necrotic tissues. On 12/8 she underwent partial closure and wound VAC placement, s/p wound VAC change on 12/12 and further debridement (necrotic skin), partial closure, and WV change on 12/14.    Wound appears to be healing well overall. HD stable excepting persistent low-grade tachycardia. Wound VAC placed to wall suction as cannister was filling quickly and causing wound VAC to leak.    Patient has been having diarrhea and uptrending WBC (13 yesterday, pending today). She has been on broad spectrum antibiotics for necrotizing fasciitis. C.diff ordered.     Patient discussed with attending surgeon Dr. Joon Black MD PGY5  General Surgery  [facility]

## 2021-12-16 NOTE — PHYSICIAN QUERY
"PT Name: Shauna Queen  MR #: [id number]    DOCUMENTATION CLARIFICATION     CDS: Iveth Cordova RN, CCDS         Contact information :ext [phone] [email or web address]     This form is a permanent document in the medical record.    Query Date: December 16, 2021  By submitting this query, we are merely seeking further clarification of documentation. Please utilize your independent clinical judgment when addressing the question(s) below.    The Medical Record contains the following:   Indicator Supporting Clinical Findings Location in Medical Record   x Documentation of "Debridement" -Sharp debridement of necrotic tissue   Procedure: debridement of necrotic skin, washout, partial closure, wound VAC change    There was one 4 cm area of kaylene necrosis just inferior to the suture line which was debrided with electrocautery. The wound bed was mostly clean with good granulation tissue, with small amounts of necrotic fat and substantial amount of fibrinous slough near groin crease. This was improved from previously. A small amount of necrotic fat was bluntly debrided.   Op Note 12/14/21-Dr Jorgensen  and Dr Black    Documentation of "I&D"     x Other Patient is s/p serial debridements of right lower extremity (groin to knee) for necrotizing fasciitis, most recently 12/6, at which time the wound appeared healthy with no remaining infection. On 12/8 patient underwent partial closure and wound VAC placement. Wound VAC exchanged in OR on 12/12. On 12/14 she was taken for further debridement (necrotic skin), partial closure, and WV change Surgery PN 12/15/21     Excisional debridement is the surgical removal or cutting away of such tissue, necrosis, or slough and is classified to the root operation "Excision." Use of a sharp instrument does not always indicate that an excisional debridement was performed. Minor removal of loose fragments with scissors or using a sharp instrument to scrape away tissue is not an excisional " "debridement. Excisional debridement involves the use of a scalpel to remove devitalized tissue.    Nonexcisional debridement is the nonoperative brushing, irrigating, scrubbing, or washing of devitalized tissue, necrosis, slough, or foreign material. Most nonexcisional debridement procedures are classified to the root operation "Extraction" (pulling or stripping out or off all or a portion of a body part by the use of force).     Provider, please provide further clarification of the Site, Type and Depth of Debridement  performed on 12/14/21:    [ x  ] Excisional Debridement of skin, please specify site:   [  x  ] LOWER EXTREMITY (Right)   [    ] Other _____   [   ] Excisional Debridement of subcutaneous tissue/fascia, please specify site:  [    ] LOWER EXTREMITY (Right)   [    ] Other, ____________   [   ] Excisional Debridement of other tissue, please specify,______________, please specify site:  [    ] LOWER EXTREMITY (Right)   [    ] Other ____________        [   ] Nonexcisional Debridement of skin, please specify site:  [    ] LOWER EXTREMITY (Right)   [    ] Other _____   [   ] Nonexcisional Debridement of subcutaneous tissue/fascia, please specify site:  [    ] LOWER EXTREMITY (Right)   [    ] Other _____   [   ] Nonexcisional Debridement of other tissue, please specify,_____________, please specify site:  [    ] LOWER EXTREMITY (Right)   [    ] Other _____     [   ] Other clarification (please specify): _____________   [  ] Clinically Undetermined     Reference:    ICD-10-CM/PCS Coding Clinic Third Quarter ICD-10, Effective with discharges: October 7, 2015 [facility] [employer] § Excisional and nonexcisional debridement (2015).    Form No. [id number]   "

## 2021-12-17 ENCOUNTER — ANESTHESIA EVENT (OUTPATIENT)
Dept: SURGERY | Facility: HOSPITAL | Age: 36
DRG: 463 | End: 2021-12-17
Payer: COMMERCIAL

## 2021-12-17 ENCOUNTER — ANESTHESIA (OUTPATIENT)
Dept: SURGERY | Facility: HOSPITAL | Age: 36
DRG: 463 | End: 2021-12-17
Payer: COMMERCIAL

## 2021-12-17 LAB
ANION GAP SERPL CALC-SCNC: 9 MMOL/L (ref 8–16)
BASOPHILS # BLD AUTO: 0.03 K/UL (ref 0–0.2)
BASOPHILS NFR BLD: 0.4 % (ref 0–1.9)
BUN SERPL-MCNC: 10 MG/DL (ref 6–20)
CALCIUM SERPL-MCNC: 7.8 MG/DL (ref 8.7–10.5)
CHLORIDE SERPL-SCNC: 106 MMOL/L (ref 95–110)
CO2 SERPL-SCNC: 27 MMOL/L (ref 23–29)
CREAT SERPL-MCNC: 0.7 MG/DL (ref 0.5–1.4)
DIFFERENTIAL METHOD: ABNORMAL
EOSINOPHIL # BLD AUTO: 0.1 K/UL (ref 0–0.5)
EOSINOPHIL NFR BLD: 1.5 % (ref 0–8)
ERYTHROCYTE [DISTWIDTH] IN BLOOD BY AUTOMATED COUNT: 16.6 % (ref 11.5–14.5)
EST. GFR  (AFRICAN AMERICAN): >60 ML/MIN/1.73 M^2
EST. GFR  (NON AFRICAN AMERICAN): >60 ML/MIN/1.73 M^2
GLUCOSE SERPL-MCNC: 85 MG/DL (ref 70–110)
HCT VFR BLD AUTO: 25.9 % (ref 37–48.5)
HGB BLD-MCNC: 8 G/DL (ref 12–16)
IMM GRANULOCYTES # BLD AUTO: 0.07 K/UL (ref 0–0.04)
IMM GRANULOCYTES NFR BLD AUTO: 0.9 % (ref 0–0.5)
LYMPHOCYTES # BLD AUTO: 1.5 K/UL (ref 1–4.8)
LYMPHOCYTES NFR BLD: 19.1 % (ref 18–48)
MCH RBC QN AUTO: 29 PG (ref 27–31)
MCHC RBC AUTO-ENTMCNC: 30.9 G/DL (ref 32–36)
MCV RBC AUTO: 94 FL (ref 82–98)
MONOCYTES # BLD AUTO: 1.2 K/UL (ref 0.3–1)
MONOCYTES NFR BLD: 14.6 % (ref 4–15)
NEUTROPHILS # BLD AUTO: 5.1 K/UL (ref 1.8–7.7)
NEUTROPHILS NFR BLD: 63.5 % (ref 38–73)
NRBC BLD-RTO: 0 /100 WBC
PLATELET # BLD AUTO: 328 K/UL (ref 150–450)
PMV BLD AUTO: 8.3 FL (ref 9.2–12.9)
POCT GLUCOSE: 149 MG/DL (ref 70–110)
POCT GLUCOSE: 177 MG/DL (ref 70–110)
POCT GLUCOSE: 87 MG/DL (ref 70–110)
POTASSIUM SERPL-SCNC: 3 MMOL/L (ref 3.5–5.1)
RBC # BLD AUTO: 2.76 M/UL (ref 4–5.4)
SODIUM SERPL-SCNC: 142 MMOL/L (ref 136–145)
WBC # BLD AUTO: 8.03 K/UL (ref 3.9–12.7)

## 2021-12-17 PROCEDURE — 25000003 PHARM REV CODE 250: Performed by: HOSPITALIST

## 2021-12-17 PROCEDURE — 80048 BASIC METABOLIC PNL TOTAL CA: CPT | Performed by: HOSPITALIST

## 2021-12-17 PROCEDURE — D9220A PRA ANESTHESIA: Mod: ANES,,, | Performed by: ANESTHESIOLOGY

## 2021-12-17 PROCEDURE — 97607 NEG PRS WND THR NDME<=50SQCM: CPT | Mod: ,,, | Performed by: SURGERY

## 2021-12-17 PROCEDURE — D9220A PRA ANESTHESIA: Mod: CRNA,,, | Performed by: STUDENT IN AN ORGANIZED HEALTH CARE EDUCATION/TRAINING PROGRAM

## 2021-12-17 PROCEDURE — 11000001 HC ACUTE MED/SURG PRIVATE ROOM

## 2021-12-17 PROCEDURE — 11042 PR DEBRIDEMENT, SKIN, SUB-Q TISSUE,=<20 SQ CM: ICD-10-PCS | Mod: ,,, | Performed by: SURGERY

## 2021-12-17 PROCEDURE — 11042 DBRDMT SUBQ TIS 1ST 20SQCM/<: CPT | Mod: ,,, | Performed by: SURGERY

## 2021-12-17 PROCEDURE — 71000033 HC RECOVERY, INTIAL HOUR: Performed by: SURGERY

## 2021-12-17 PROCEDURE — 36415 COLL VENOUS BLD VENIPUNCTURE: CPT | Performed by: HOSPITALIST

## 2021-12-17 PROCEDURE — 37000008 HC ANESTHESIA 1ST 15 MINUTES: Performed by: SURGERY

## 2021-12-17 PROCEDURE — 25000003 PHARM REV CODE 250: Performed by: STUDENT IN AN ORGANIZED HEALTH CARE EDUCATION/TRAINING PROGRAM

## 2021-12-17 PROCEDURE — D9220A PRA ANESTHESIA: ICD-10-PCS | Mod: ANES,,, | Performed by: ANESTHESIOLOGY

## 2021-12-17 PROCEDURE — 85025 COMPLETE CBC W/AUTO DIFF WBC: CPT | Performed by: HOSPITALIST

## 2021-12-17 PROCEDURE — 63600175 PHARM REV CODE 636 W HCPCS: Performed by: STUDENT IN AN ORGANIZED HEALTH CARE EDUCATION/TRAINING PROGRAM

## 2021-12-17 PROCEDURE — 97607 PR NEG PRESS WOUND THERAPY (NPWT) W/DISPOSABLE WOUND VAC DEVICE, <=50 CM: ICD-10-PCS | Mod: ,,, | Performed by: SURGERY

## 2021-12-17 PROCEDURE — 63600175 PHARM REV CODE 636 W HCPCS: Performed by: HOSPITALIST

## 2021-12-17 PROCEDURE — 37000009 HC ANESTHESIA EA ADD 15 MINS: Performed by: SURGERY

## 2021-12-17 PROCEDURE — 36000707: Performed by: SURGERY

## 2021-12-17 PROCEDURE — 36000706: Performed by: SURGERY

## 2021-12-17 PROCEDURE — D9220A PRA ANESTHESIA: ICD-10-PCS | Mod: CRNA,,, | Performed by: STUDENT IN AN ORGANIZED HEALTH CARE EDUCATION/TRAINING PROGRAM

## 2021-12-17 PROCEDURE — 99900035 HC TECH TIME PER 15 MIN (STAT)

## 2021-12-17 RX ORDER — LIDOCAINE HYDROCHLORIDE 20 MG/ML
INJECTION INTRAVENOUS
Status: DISCONTINUED | OUTPATIENT
Start: 2021-12-17 | End: 2021-12-17

## 2021-12-17 RX ORDER — ONDANSETRON 2 MG/ML
4 INJECTION INTRAMUSCULAR; INTRAVENOUS DAILY PRN
Status: DISCONTINUED | OUTPATIENT
Start: 2021-12-17 | End: 2021-12-20 | Stop reason: HOSPADM

## 2021-12-17 RX ORDER — POTASSIUM CHLORIDE 20 MEQ/1
40 TABLET, EXTENDED RELEASE ORAL
Status: COMPLETED | OUTPATIENT
Start: 2021-12-17 | End: 2021-12-17

## 2021-12-17 RX ORDER — DEXAMETHASONE SODIUM PHOSPHATE 4 MG/ML
INJECTION, SOLUTION INTRA-ARTICULAR; INTRALESIONAL; INTRAMUSCULAR; INTRAVENOUS; SOFT TISSUE
Status: DISCONTINUED | OUTPATIENT
Start: 2021-12-17 | End: 2021-12-17

## 2021-12-17 RX ORDER — ROCURONIUM BROMIDE 10 MG/ML
INJECTION, SOLUTION INTRAVENOUS
Status: DISCONTINUED | OUTPATIENT
Start: 2021-12-17 | End: 2021-12-17

## 2021-12-17 RX ORDER — MIDAZOLAM HYDROCHLORIDE 1 MG/ML
INJECTION, SOLUTION INTRAMUSCULAR; INTRAVENOUS
Status: DISCONTINUED | OUTPATIENT
Start: 2021-12-17 | End: 2021-12-17

## 2021-12-17 RX ORDER — SODIUM CHLORIDE 0.9 % (FLUSH) 0.9 %
10 SYRINGE (ML) INJECTION
Status: DISCONTINUED | OUTPATIENT
Start: 2021-12-17 | End: 2021-12-20 | Stop reason: HOSPADM

## 2021-12-17 RX ORDER — ONDANSETRON 2 MG/ML
INJECTION INTRAMUSCULAR; INTRAVENOUS
Status: DISCONTINUED | OUTPATIENT
Start: 2021-12-17 | End: 2021-12-17

## 2021-12-17 RX ORDER — KETAMINE HYDROCHLORIDE 100 MG/ML
INJECTION, SOLUTION INTRAMUSCULAR; INTRAVENOUS
Status: DISCONTINUED | OUTPATIENT
Start: 2021-12-17 | End: 2021-12-17

## 2021-12-17 RX ORDER — FENTANYL CITRATE 50 UG/ML
INJECTION, SOLUTION INTRAMUSCULAR; INTRAVENOUS
Status: DISCONTINUED | OUTPATIENT
Start: 2021-12-17 | End: 2021-12-17

## 2021-12-17 RX ORDER — SUCCINYLCHOLINE CHLORIDE 20 MG/ML
INJECTION INTRAMUSCULAR; INTRAVENOUS
Status: DISCONTINUED | OUTPATIENT
Start: 2021-12-17 | End: 2021-12-17

## 2021-12-17 RX ORDER — PROCHLORPERAZINE EDISYLATE 5 MG/ML
5 INJECTION INTRAMUSCULAR; INTRAVENOUS EVERY 30 MIN PRN
Status: DISCONTINUED | OUTPATIENT
Start: 2021-12-17 | End: 2021-12-20 | Stop reason: HOSPADM

## 2021-12-17 RX ORDER — PROPOFOL 10 MG/ML
VIAL (ML) INTRAVENOUS
Status: DISCONTINUED | OUTPATIENT
Start: 2021-12-17 | End: 2021-12-17

## 2021-12-17 RX ORDER — HYDROMORPHONE HYDROCHLORIDE 2 MG/ML
0.2 INJECTION, SOLUTION INTRAMUSCULAR; INTRAVENOUS; SUBCUTANEOUS EVERY 5 MIN PRN
Status: DISCONTINUED | OUTPATIENT
Start: 2021-12-17 | End: 2021-12-18

## 2021-12-17 RX ADMIN — Medication 6 MG: at 08:12

## 2021-12-17 RX ADMIN — ENOXAPARIN SODIUM 40 MG: 40 INJECTION SUBCUTANEOUS at 05:12

## 2021-12-17 RX ADMIN — POTASSIUM CHLORIDE 40 MEQ: 1500 TABLET, EXTENDED RELEASE ORAL at 05:12

## 2021-12-17 RX ADMIN — PROPOFOL 200 MG: 10 INJECTION, EMULSION INTRAVENOUS at 11:12

## 2021-12-17 RX ADMIN — KETAMINE HYDROCHLORIDE 20 MG: 100 INJECTION, SOLUTION, CONCENTRATE INTRAMUSCULAR; INTRAVENOUS at 12:12

## 2021-12-17 RX ADMIN — MIDAZOLAM HYDROCHLORIDE 2 MG: 1 INJECTION, SOLUTION INTRAMUSCULAR; INTRAVENOUS at 11:12

## 2021-12-17 RX ADMIN — ACETAMINOPHEN 650 MG: 325 TABLET ORAL at 08:12

## 2021-12-17 RX ADMIN — ONDANSETRON 4 MG: 2 INJECTION, SOLUTION INTRAMUSCULAR; INTRAVENOUS at 12:12

## 2021-12-17 RX ADMIN — DEXAMETHASONE SODIUM PHOSPHATE 4 MG: 4 INJECTION, SOLUTION INTRAMUSCULAR; INTRAVENOUS at 12:12

## 2021-12-17 RX ADMIN — INSULIN ASPART 8 UNITS: 100 INJECTION, SOLUTION INTRAVENOUS; SUBCUTANEOUS at 05:12

## 2021-12-17 RX ADMIN — LIDOCAINE HYDROCHLORIDE 100 MG: 20 INJECTION, SOLUTION INTRAVENOUS at 11:12

## 2021-12-17 RX ADMIN — PROPOFOL 20 MG: 10 INJECTION, EMULSION INTRAVENOUS at 12:12

## 2021-12-17 RX ADMIN — FENTANYL CITRATE 50 MCG: 50 INJECTION, SOLUTION INTRAMUSCULAR; INTRAVENOUS at 11:12

## 2021-12-17 RX ADMIN — SUGAMMADEX 500 MG: 100 INJECTION, SOLUTION INTRAVENOUS at 12:12

## 2021-12-17 RX ADMIN — CALCIUM CARBONATE (ANTACID) CHEW TAB 500 MG 1000 MG: 500 CHEW TAB at 08:12

## 2021-12-17 RX ADMIN — Medication 1 PACKET: at 08:12

## 2021-12-17 RX ADMIN — PIPERACILLIN AND TAZOBACTAM 4.5 G: 4; .5 INJECTION, POWDER, LYOPHILIZED, FOR SOLUTION INTRAVENOUS; PARENTERAL at 02:12

## 2021-12-17 RX ADMIN — PIPERACILLIN AND TAZOBACTAM 4.5 G: 4; .5 INJECTION, POWDER, LYOPHILIZED, FOR SOLUTION INTRAVENOUS; PARENTERAL at 05:12

## 2021-12-17 RX ADMIN — POTASSIUM CHLORIDE 40 MEQ: 1500 TABLET, EXTENDED RELEASE ORAL at 06:12

## 2021-12-17 RX ADMIN — LOPERAMIDE HYDROCHLORIDE 2 MG: 2 CAPSULE ORAL at 11:12

## 2021-12-17 RX ADMIN — Medication 1 PACKET: at 05:12

## 2021-12-17 RX ADMIN — INSULIN DETEMIR 25 UNITS: 100 INJECTION, SOLUTION SUBCUTANEOUS at 08:12

## 2021-12-17 RX ADMIN — LOPERAMIDE HYDROCHLORIDE 2 MG: 2 CAPSULE ORAL at 05:12

## 2021-12-17 RX ADMIN — PIPERACILLIN AND TAZOBACTAM 4.5 G: 4; .5 INJECTION, POWDER, LYOPHILIZED, FOR SOLUTION INTRAVENOUS; PARENTERAL at 09:12

## 2021-12-17 RX ADMIN — LOPERAMIDE HYDROCHLORIDE 2 MG: 2 CAPSULE ORAL at 09:12

## 2021-12-17 RX ADMIN — SUCCINYLCHOLINE CHLORIDE 160 MG: 20 INJECTION, SOLUTION INTRAMUSCULAR; INTRAVENOUS at 11:12

## 2021-12-17 RX ADMIN — MORPHINE SULFATE 4 MG: 4 INJECTION, SOLUTION INTRAMUSCULAR; INTRAVENOUS at 02:12

## 2021-12-17 RX ADMIN — ROCURONIUM BROMIDE 20 MG: 10 INJECTION, SOLUTION INTRAVENOUS at 12:12

## 2021-12-17 RX ADMIN — KETAMINE HYDROCHLORIDE 20 MG: 100 INJECTION, SOLUTION, CONCENTRATE INTRAMUSCULAR; INTRAVENOUS at 11:12

## 2021-12-17 NOTE — NURSING
[facility] Rapid Response Follow-up Note     Followed up with patient for proactive rounding. MEWS 4, , RR 22. Pt is familiar from ICU stay. Pt is usually tachycardic at baseline.   No acute issues at this time. Reviewed plan of care with primary nurse, Lina.   Please call Rapid Response RN with any questions or concerns at  [phone]

## 2021-12-17 NOTE — PLAN OF CARE
AAOx4. VSS per flow sheet. Patient denies pain.  No n/v present.   Wound vac intact to RLE @ 125mm continuous therapy. Sanguinous drainage in tubing. Neurovascular assessment completed. See chart for full assessment. Hand off report to GIOVANY Galarza.

## 2021-12-17 NOTE — NURSING
Patient back on the floor, in bariatric bed, wound vac noted to right upper thigh continuous suction  125 mm, with sanguinous drainage, pain 10/10, no numbness or tingling noted on RLE, skin is warm and dry, sutures intact.

## 2021-12-17 NOTE — PLAN OF CARE
Pt free from falls, injury or any further trauma throughout shift. Pt AAO x4 . Pt on RA. Continued medications as ordered. Complaints of pain, controlled with medications. Blood glucose monitored. pt in no distress. Wound vac in place to wall suction. Rajput in place and intact . Will cont to monitor.      Problem: Adult Inpatient Plan of Care  Goal: Plan of Care Review  Outcome: Ongoing, Progressing

## 2021-12-17 NOTE — NURSING
Patient off the unit to surgery via bariatric bed, wound vac to right upper thigh with continuous suction 125mm, zoysn infusing, liberty AAOx4, no complaint of pain, will continue to follow treatment plan when patient returns.

## 2021-12-18 LAB
POCT GLUCOSE: 114 MG/DL (ref 70–110)
POCT GLUCOSE: 122 MG/DL (ref 70–110)
POCT GLUCOSE: 138 MG/DL (ref 70–110)
POCT GLUCOSE: 151 MG/DL (ref 70–110)
POCT GLUCOSE: 204 MG/DL (ref 70–110)

## 2021-12-18 PROCEDURE — 99900035 HC TECH TIME PER 15 MIN (STAT)

## 2021-12-18 PROCEDURE — 94761 N-INVAS EAR/PLS OXIMETRY MLT: CPT

## 2021-12-18 PROCEDURE — 63600175 PHARM REV CODE 636 W HCPCS: Performed by: HOSPITALIST

## 2021-12-18 PROCEDURE — 63600175 PHARM REV CODE 636 W HCPCS: Performed by: STUDENT IN AN ORGANIZED HEALTH CARE EDUCATION/TRAINING PROGRAM

## 2021-12-18 PROCEDURE — 11000001 HC ACUTE MED/SURG PRIVATE ROOM

## 2021-12-18 PROCEDURE — 97530 THERAPEUTIC ACTIVITIES: CPT

## 2021-12-18 PROCEDURE — 25000003 PHARM REV CODE 250: Performed by: HOSPITALIST

## 2021-12-18 PROCEDURE — 97168 OT RE-EVAL EST PLAN CARE: CPT

## 2021-12-18 PROCEDURE — C9399 UNCLASSIFIED DRUGS OR BIOLOG: HCPCS | Performed by: INTERNAL MEDICINE

## 2021-12-18 PROCEDURE — 25000003 PHARM REV CODE 250: Performed by: INTERNAL MEDICINE

## 2021-12-18 PROCEDURE — 97164 PT RE-EVAL EST PLAN CARE: CPT

## 2021-12-18 PROCEDURE — 97535 SELF CARE MNGMENT TRAINING: CPT

## 2021-12-18 RX ORDER — MORPHINE SULFATE 4 MG/ML
2 INJECTION, SOLUTION INTRAMUSCULAR; INTRAVENOUS EVERY 4 HOURS PRN
Status: DISCONTINUED | OUTPATIENT
Start: 2021-12-18 | End: 2021-12-31 | Stop reason: HOSPADM

## 2021-12-18 RX ORDER — DIPHENOXYLATE HYDROCHLORIDE AND ATROPINE SULFATE 2.5; .025 MG/1; MG/1
1 TABLET ORAL 4 TIMES DAILY PRN
Status: DISCONTINUED | OUTPATIENT
Start: 2021-12-18 | End: 2021-12-31 | Stop reason: HOSPADM

## 2021-12-18 RX ADMIN — Medication 1 PACKET: at 05:12

## 2021-12-18 RX ADMIN — INSULIN ASPART 8 UNITS: 100 INJECTION, SOLUTION INTRAVENOUS; SUBCUTANEOUS at 03:12

## 2021-12-18 RX ADMIN — INSULIN ASPART 8 UNITS: 100 INJECTION, SOLUTION INTRAVENOUS; SUBCUTANEOUS at 08:12

## 2021-12-18 RX ADMIN — CALCIUM CARBONATE (ANTACID) CHEW TAB 500 MG 1000 MG: 500 CHEW TAB at 08:12

## 2021-12-18 RX ADMIN — ENOXAPARIN SODIUM 40 MG: 40 INJECTION SUBCUTANEOUS at 05:12

## 2021-12-18 RX ADMIN — PIPERACILLIN AND TAZOBACTAM 4.5 G: 4; .5 INJECTION, POWDER, LYOPHILIZED, FOR SOLUTION INTRAVENOUS; PARENTERAL at 09:12

## 2021-12-18 RX ADMIN — Medication 1 PACKET: at 12:12

## 2021-12-18 RX ADMIN — PIPERACILLIN AND TAZOBACTAM 4.5 G: 4; .5 INJECTION, POWDER, LYOPHILIZED, FOR SOLUTION INTRAVENOUS; PARENTERAL at 06:12

## 2021-12-18 RX ADMIN — MORPHINE SULFATE 4 MG: 4 INJECTION, SOLUTION INTRAMUSCULAR; INTRAVENOUS at 02:12

## 2021-12-18 RX ADMIN — Medication 1 PACKET: at 06:12

## 2021-12-18 RX ADMIN — INSULIN DETEMIR 25 UNITS: 100 INJECTION, SOLUTION SUBCUTANEOUS at 09:12

## 2021-12-18 RX ADMIN — INSULIN DETEMIR 25 UNITS: 100 INJECTION, SOLUTION SUBCUTANEOUS at 08:12

## 2021-12-18 RX ADMIN — INSULIN ASPART 8 UNITS: 100 INJECTION, SOLUTION INTRAVENOUS; SUBCUTANEOUS at 12:12

## 2021-12-18 RX ADMIN — MORPHINE SULFATE 4 MG: 4 INJECTION, SOLUTION INTRAMUSCULAR; INTRAVENOUS at 06:12

## 2021-12-18 RX ADMIN — INSULIN ASPART 4 UNITS: 100 INJECTION, SOLUTION INTRAVENOUS; SUBCUTANEOUS at 05:12

## 2021-12-18 RX ADMIN — PIPERACILLIN AND TAZOBACTAM 4.5 G: 4; .5 INJECTION, POWDER, LYOPHILIZED, FOR SOLUTION INTRAVENOUS; PARENTERAL at 02:12

## 2021-12-18 RX ADMIN — MORPHINE SULFATE 2 MG: 4 INJECTION, SOLUTION INTRAMUSCULAR; INTRAVENOUS at 05:12

## 2021-12-18 NOTE — PT/OT/SLP RE-EVAL
Occupational Therapy   Re-evaluation    Name: Shauna Queen  MRN: [id number]  Admitting Diagnosis:  Necrotizing fasciitis  Recent Surgery: Procedure(s) (LRB):  CLOSURE, WOUND (Right) 1 Day Post-Op    Recommendations:     Discharge Recommendations:    Discharge Equipment Recommendations:   (TBD)  Barriers to discharge:   (will need LTAC to address medical/wound care needs)    Assessment:     Shauna Queen is a 36 y.o. female with a medical diagnosis of Necrotizing fasciitis.  Performance deficits affecting function are weakness,impaired endurance,impaired self care skills,impaired functional mobilty,gait instability,impaired balance,decreased upper extremity function,decreased lower extremity function,pain,decreased ROM,impaired skin,edema,impaired cardiopulmonary response to activity.    The patient participated in OT re-eval. The patient was cooperative but presented with a flat affect and conversed only to answer questions with minimal responses. The patient required max assist x2 to sit on the EOB and required support of the RW and min assist while seated on the EOB. The patient was unable to release grasp on RW to participate in self care tasks and is limited by generalized weakness. The patient will benefit from OT to address functional deficits.    Rehab Prognosis: fair/ good; patient would benefit from acute skilled OT services to address these deficits and reach maximum level of function.       Plan:     Patient to be seen 3 x/week to address the above listed problems via self-care/home management,therapeutic activities,therapeutic exercises  · Plan of Care Expires: 12/31/21  · Plan of Care Reviewed with: patient    Subjective     Chief Complaint: right thigh/groin pain  Patient/Family stated goals: did not state goals but was agreeable to sit on the EOB with PT/OT      Pain/Comfort:  · Pain Rating 1: 7/10  · Location - Side 1: Right  · Location - Orientation 1: upper  · Location 1: groin  · Pain  Addressed 1: Pre-medicate for activity,Cessation of Activity,Nurse notified,Reposition,Distraction  · Pain Rating Post-Intervention 1: 8/10    Objective:     Communicated with: nurseErin prior to session.  Patient found HOB elevated with: wound vac,telemetry,joy catheter,central line (barriatric bed) upon OT entry to room.    General Precautions: Standard, fall (right leg groin to knee wound with wound vac)   Orthopedic Precautions:N/A   Braces: N/A  Respiratory Status: Room air    Occupational Performance:    Bed Mobility:    · Patient completed Rolling/Turning to Left with  moderate assistance and 2 persons  · Patient completed Scooting/Bridging with maximal assistance  · Patient completed Supine to Sit with maximal assistance and 2 persons  · Patient completed Sit to Supine with maximal assistance and 2 persons    Functional Mobility/Transfers:  · Functional Mobility: The patient sat on the EOB ~15 min with support of the RW. The patient used BUE on the RW to prevent LOB. OT supported the RW to prevent tipping backward.    Activities of Daily Living:  · Grooming: maximal assistance and the patient declined to perform grooming tasks on the EOB 2* unable to sit without support of BUE on the RW    · Upper Body Dressing: max assist to don gown (limited 2* gown too small)    · Lower Body Dressing: dependence to don B socks    Cognitive/Visual Perceptual:  Cognitive/Psychosocial Skills:     -       Oriented to: Person, Place and Situation   -       Follows Commands/attention:Follows two-step commands  -       Communication: clear/fluent  -       Memory: No Deficits noted  -       Safety awareness/insight to disability: impaired and becomes fearfull of falling   · -       Mood/Affect/Coping skills/emotional control: Flat affect with occasional smile    Physical Exam:  Balance: -       fair/fair- sitting  Postural examination/scapula alignment:    -       No postural abnormalities identified  Skin integrity: wound  vac to right upper thigh  Upper Extremity Range of Motion:     -       Right Upper Extremity: WFL  -       Left Upper Extremity: WFL  Upper Extremity Strength:    -       Right Upper Extremity: WFL except shldr 3- with c/o weakness  -       Left Upper Extremity: WFL except shldr weakness   Strength:    -       Right Upper Extremity: WFL  -       Left Upper Extremity: WFL    AMPA 6 Click:  [facility] Total Score: 16    Treatment & Education:  · Education:  · participated in OT re-eval  · Dicussed OT goals and POC  · Allowed patient to ask questions with minimal response  · Sat EOB ~15 min with min assist for balance    Patient left HOB elevated with all lines intact, call button in reach and nurse notified    GOALS:   Multidisciplinary Problems     Occupational Therapy Goals        Problem: Occupational Therapy Goal    Goal Priority Disciplines Outcome Interventions   Occupational Therapy Goal     OT, PT/OT Ongoing, Progressing    Description: Goals to be met by: 12/31/21    Patient will increase functional independence with ADLs by performing:    Feeding with [place].  UE Dressing with Modified [place].  LE Dressing with Minimal Assistance.  Grooming while seated with [place].  Toileting from bedside commode with Minimal Assistance for hygiene and clothing management.   Toilet transfer to bedside commode with Minimal Assistance.    New Goals to be met by: 12/31/21    Patient will increase functional independence with ADLs by performing:    Supine to sit with Stand-by Assistance.  The patient will tolerate sitting on the EOB for 30 min for self care task                   History:     History reviewed. No pertinent past medical history.    Past Surgical History:   Procedure Laterality Date    CLOSURE OF WOUND Right 12/8/2021    Procedure: CLOSURE, WOUND;  Surgeon: Brant Jorgensen MD;  Location: [facility] OR;  Service: General;  Laterality: Right;  washout/closure. Needs Wound Vac.    CLOSURE OF WOUND  Right 12/12/2021    Procedure: CLOSURE, WOUND;  Surgeon: Jorge Dupree MD;  Location: [facility] OR;  Service: General;  Laterality: Right;  wound vac change only    CLOSURE OF WOUND Right 12/14/2021    Procedure: CLOSURE, WOUND;  Surgeon: Brant Jorgensen MD;  Location: [facility] OR;  Service: General;  Laterality: Right;  Wound VAC change, possible partial closure    DEBRIDEMENT OF LOWER EXTREMITY Right 12/2/2021    Procedure: DEBRIDEMENT, LOWER EXTREMITY;  Surgeon: Jorge Dupree MD;  Location: [facility] OR;  Service: General;  Laterality: Right;    DEBRIDEMENT OF LOWER EXTREMITY Right 12/4/2021    Procedure: DEBRIDEMENT, LOWER EXTREMITY;  Surgeon: Brant Jorgensen MD;  Location: [facility] OR;  Service: General;  Laterality: Right;  lithotomy    DEBRIDEMENT OF LOWER EXTREMITY Right 12/5/2021    Procedure: DEBRIDEMENT, LOWER EXTREMITY;  Surgeon: Brant Jorgensen MD;  Location: [facility] OR;  Service: General;  Laterality: Right;    DEBRIDEMENT OF LOWER EXTREMITY Right 12/6/2021    Procedure: DEBRIDEMENT, LOWER EXTREMITY;  Surgeon: Haroldo Song MD;  Location: [facility] OR;  Service: General;  Laterality: Right;    REPLACEMENT OF WOUND VACUUM-ASSISTED CLOSURE DEVICE Right 12/12/2021    Procedure: REPLACEMENT, WOUND VAC;  Surgeon: Jorge Dupree MD;  Location: [facility] OR;  Service: General;  Laterality: Right;       Time Tracking:     OT Date of Treatment: 12/18/21  OT Start Time: 1523  OT Stop Time: 1603  OT Total Time (min): 40 min    Billable Minutes:Re-eval 12 (with PT)  Self Care/Home Management 15  Total Time 40    12/18/2021

## 2021-12-18 NOTE — PLAN OF CARE
Pt free from falls, injury or any further trauma throughout shift. Pt AAO x . Continued medications as ordered. Complaints of pain, controlled with medications.wound vac in place to right upper leg. Rajput and bowel care performed. PICC line in place and intact. IV abx administered throughout shift as ordered. Pt on RA. Pt in no distress. Bariatric bed in use. Call light in reach, bed locked in lowest position. Will cont to monitor.      Problem: Adult Inpatient Plan of Care  Goal: Plan of Care Review  Outcome: Ongoing, Progressing     Problem: Diabetes Comorbidity  Goal: Blood Glucose Level Within Desired Range  Outcome: Ongoing, Progressing     Problem: Bariatric Environmental Safety  Goal: Safety Maintained with Care  Outcome: Ongoing, Progressing     Problem: Skin Injury Risk Increased  Goal: Skin Health and Integrity  Outcome: Ongoing, Progressing     Problem: Infection  Goal: Infection Symptom Resolution  Outcome: Ongoing, Progressing     Problem: Wound  Goal: Optimal Wound Healing  Outcome: Ongoing, Progressing     Problem: Fall Injury Risk  Goal: Absence of Fall and Fall-Related Injury  Outcome: Ongoing, Progressing

## 2021-12-18 NOTE — PROGRESS NOTES
"       [facility] - Med [facility] Hospital Medicine  Telemedicine Progress Note    Patient Name: Shauna Queen  MRN: [id number]  Patient Class: IP- Inpatient   Admission Date: 11/30/2021  Length of Stay: 18 days  Attending Physician: Milvia García MD  Primary Care Provider: Jorge Dennis MD          Subjective:     Principal Problem:Necrotizing fasciitis        HPI:  Ms. Queen is a 36-year-old presents to the ED from outside hospital for abscess.  She has initially presented to outside hospital for DKA which resolved.  Patient was unaware of why she was here in July prompted her.  Per the checkout are received: "36-year-old female history of diabetes mellitus, sleep apnea on CPAP, schizophrenia, hypertension, and hyperlipidemia admitted to [facility] on November 24 from [facility] for treatment of diabetic ketoacidosis. She was initiated on an insulin infusion. DKA improved and she was transitioned off the insulin infusion. She again developed DKA and was placed back on the insulin infusion for a period of time. Currently she is off the insulin infusion and on subcutaneous insulin. She was noted to have right upper/inner thigh pain with erythema, induration, and tenderness to palpation. She was treated with IV clindamycin and vancomycin. Due to her size ((BMI 74.32) they are unable to perform CT or MRI. General Surgery there performed an incision and drainage with purulent material found. She was taken to the OR on November 29 for a second-look at the thigh wound. They were only able to do superficial debridement. Postprocedure she extubated to 2 L nasal cannula and was awake with oxygen saturation 100 percent. Surgery at their facility recommended transfer to a facility better able to perform procedures on a patient with her BMI. Referring provider spoke with General Surgery at [facility]. [facility] was asked to admit for further treatment. I spoke with her current " "attending. He noted she has no alteration in mentation. Lower extremity is intact from a neurovascular standpoint. She remains in the ICU."      Overview/Hospital Course:  37 y/o female admitted to the hospital as a transfer secondary to R thigh abscess and a BMI of Body mass index is 84.77 kg/m².   Patient transferred here for surgical evaluation.  Patient went for surgical debridement on 11/30/21. Patient continued on Abx per ID recommendations. Blood cultures were NG. Underwent another debridement on 12/2 and again on 12/4 - noting significant necrotizing soft tissue infections concerning for tracking down into her knee. On broad spectrum antibiotics. To OR with Orthopedic surgery to explore right knee on 12/5. Patient again went for surgery on 12/6.  Wound closure with wound vac placement on 12/8.      Subjective/Interval History     Patient still remains with low grade tachycardia, went to OR yesterday. Reports diarrhea got worse again today, CDiff negative. Added PRN lomotil. No other complaints.     Review of Systems    Constitutional: Negative for chills, fatigue, fever.   HENT: Negative for sore throat, trouble swallowing.    Eyes: Negative for photophobia, visual disturbance.   Respiratory: Negative for cough, shortness of breath.    Cardiovascular: Negative for chest pain, palpitations, leg swelling.   Gastrointestinal: Negative for abdominal pain, constipation, nausea, vomiting. +diarrhea.   Endocrine: Negative for cold intolerance, heat intolerance.   Genitourinary: Negative for dysuria, frequency.   Musculoskeletal: Negative for arthralgias, myalgias.   Skin: Negative for rash, wound, erythema   Neurological: Negative for dizziness, syncope, weakness, light-headedness.   Psychiatric/Behavioral: Negative for confusion, hallucinations, anxiety    Medications  Scheduled Meds:   calcium carbonate  1,000 mg Oral Daily    enoxaparin  40 mg Subcutaneous Daily    insulin aspart U-100  8 Units Subcutaneous " TIDWM    insulin detemir U-100  25 Units Subcutaneous BID    piperacillin-tazobactam (ZOSYN) IVPB  4.5 g Intravenous Q8H    potassium, sodium phosphates  1 packet Oral QID (AC & HS)     Continuous Infusions:  PRN Meds:.acetaminophen, ALPRAZolam, dextrose 50%, diphenoxylate-atropine 2.5-0.025 mg, glucagon (human recombinant), HYDROcodone-acetaminophen, influenza, insulin aspart U-100, melatonin, morphine, ondansetron, prochlorperazine, sars-cov-2 (covid-19), sodium chloride 0.9%    Objective    Physical Examination    Temp:  [98 °F (36.7 °C)-100.8 °F (38.2 °C)]   Pulse:  [105-122]   Resp:  [16-20]   BP: (107-131)/(57-80)   SpO2:  [97 %-100 %]     Constitutional: NAD, conversant  Head: NC, AT  Eyes: No scleral icterus.  No eye discharge  Pulmonary/Chest: Effort normal. No respiratory distress on room air  GI: No distension  Musculoskeletal: Moving all extremities  Neurological: Alert.  Oriented to person, place, and time.   Psychiatric: Normal behavior, mood, and affect  Skin: No visible erythema or cyanosis    CBC  Recent Labs   Lab 12/15/21  0354 12/16/21  0938 12/17/21  0327   WBC 13.10* 10.06 8.03   HGB 8.8* 8.6* 8.0*   HCT 28.0* 28.3* 25.9*   * 414 328     CMP  Recent Labs   Lab 12/12/21  0351 12/13/21  0401 12/15/21  0354 12/16/21  0938 12/17/21  0327   NA  --    < > 141 142 142   K  --    < > 4.0 3.0* 3.0*   CL  --    < > 102 105 106   CO2  --    < > 28 29 27   BUN  --    < > 12 10 10   CREATININE  --    < > 0.7 0.8 0.7   GLU  --    < > 156* 112* 85   CALCIUM  --    < > 8.2* 8.2* 7.8*   PHOS 3.3  --   --   --   --     < > = values in this interval not displayed.           Assessment/Plan:      * Necrotizing fasciitis  - Status post I&D with General surgery at outside hospital, did not feel like they completely removed abscess.  - Underwent debridement in OR 11/30, 12/2, and 12/4, 12/5 - no extension into knee.    - OR again on 12/6, 12/8 and 12/11.   - OR again on 12/14 for debridement of necrotic  skin, washout, partial closure, wound VAC change and then again on 12/17/21  - Continued on Zosyn till 12/18 for 2 weeks of treatment per ID. (estimated end date: 12/18/21)  - Pain control.   - to LTAC once wound vac can be changed at bedside.     Antibiotics (From admission, onward)            Start     Stop Route Frequency Ordered    12/04/21 1530  piperacillin-tazobactam 4.5 g in dextrose 5 % 100 mL IVPB (ready to mix system)         12/19 0959 IV Every 8 hours (non-standard times) 12/04/21 1429        Cultures were taken-   Microbiology Results (last 7 days)     Procedure Component Value Units Date/Time    Fungus culture [[id number]] Collected: 12/04/21 1312    Order Status: Completed Specimen: Wound from Leg, Right Updated: 12/13/21 1409     Fungus (Mycology) Culture No fungal growth to date    Narrative:      thigh    Fungus culture [[id number]] Collected: 11/30/21 1635    Order Status: Completed Specimen: Abscess from Leg, Right Updated: 12/13/21 1409     Fungus (Mycology) Culture No fungal growth to date    Narrative:      Right Inner thigh abcess    Culture, Anaerobe [[id number]]  (Abnormal) Collected: 12/04/21 1312    Order Status: Completed Specimen: Wound from Leg, Right Updated: 12/11/21 1350     Anaerobic Culture BACTEROIDES THETAIOTAOMICRON  Few      Narrative:      thigh          Obesity  Morbid obesity  Body mass index is 84.72 kg/m².          Hypophosphatemia  Replace as needed.      Anemia of chronic disease  With anemia of acute blood loss as expected from multiple surgeries.  Transfuse for Hgb<7      Diarrhea     Cdiff Negative, PRN lomotil       Debility  PT/OT once more stable      DANG (obstructive sleep apnea)  Patient likely has component of OHS as well  - outpatient sleep study      DM (diabetes mellitus)  A1c:   Lab Results   Component Value Date    HGBA1C >14.0 (H) 12/01/2021     Meds: basal bolus insulin + SSI PRN to maintain goal 140-180  ADA diet, accuchecks ACHS, hypoglycemic  protocol  Continue current management.      VTE Risk Mitigation (From admission, onward)         Ordered     enoxaparin injection 40 mg  Daily         12/04/21 2306     IP VTE LOW RISK PATIENT  Once         11/30/21 0403     Place sequential compression device  Until discontinued         11/30/21 0403                      I have assessed these finding virtually using telemed platform and with assistance of bedside nurse                 The attending portion of this evaluation, treatment, and documentation was performed per Milvia García MD via Telemedicine AudioVisual using the secure [employer] software platform with 2 way audio/video. The provider was located off-site and the patient is located in the hospital. The aforementioned video software was utilized to document the relevant history and physical exam    Milvia García MD  Department of Hospital Medicine   [facility] [place] [facility] [place]

## 2021-12-18 NOTE — PLAN OF CARE
Problem: Physical Therapy Goal  Goal: Physical Therapy Goal  Description: Goals to be met by: 22     Patient will increase functional independence with mobility by performin. Supine to sit with Moderate Assistance  2. Sit to supine with Moderate Assistance  3. Rolling to Left and Right with Minimal Assistance.  4. Sit to stand transfer with Moderate Assistance  5. Bed to chair transfer with Moderate Assistance using Rolling Walker  5. Gait  x 15 feet with Minimal Assistance using Rolling Walker.     Outcome: Ongoing, Progressing     PT evaluation completed today. Pt agreeable to attempt sitting at EOB today. Pt requires max Ax2 to transfer from supine to sit. Pt sat at EOB for approx 15 min with assistance from RW. Pt able to reach minimally outside of base of support with one hand on walker. Pt demonstrates fairly good AROM of L LE but increased difficulty moving R LE against gravity 2/2 weakness vs pain today. Pt educated on importance of continued bed mobility throughout the day to maintain skin integrity and reduce atrophy. Pt with fair activity tolerance today. Pt would benefit from skilled therapy to maximize functional mobility, decrease the risk for skin breakdown and increase strength for transfers and ambulation.

## 2021-12-18 NOTE — PLAN OF CARE
Problem: Occupational Therapy Goal  Goal: Occupational Therapy Goal  Description: Goals to be met by: 12/31/21    Patient will increase functional independence with ADLs by performing:    Feeding with [place].  UE Dressing with Modified [place].  LE Dressing with Minimal Assistance.  Grooming while seated with [place].  Toileting from bedside commode with Minimal Assistance for hygiene and clothing management.   Toilet transfer to bedside commode with Minimal Assistance.    New Goals to be met by: 12/31/21    Patient will increase functional independence with ADLs by performing:    Supine to sit with Stand-by Assistance.  The patient will tolerate sitting on the EOB for 30 min for self care task  Outcome: Ongoing, Progressing     The patient participated in OT re-evaluation.

## 2021-12-18 NOTE — OP NOTE
[facility] [place]  General Surgery  Operative Note    SUMMARY     Date of Procedure: 12/17/2021     Procedure:   1. Debridement   2. Wound VAC change    Surgeon(s) and Role:     * Jorge Dupree MD - Primary     * Serena Black MD - Resident - Assisting        Pre-Operative Diagnosis: Necrotizing soft tissue infection [M79.89]    Post-Operative Diagnosis: Post-Op Diagnosis Codes:     * Necrotizing soft tissue infection [M79.89]    Anesthesia: General    Operative Findings (including complications, if any): Wound bed mostly clean, with small amounts of necrotic fat and substantial amount of fibrinous slough near groin crease (improved). The skin edges at the superior portion of the wound which had previously been closed were found to be necrotic.     Description of Technical Procedures:  All risks/benefits/alternatives were explained to the patient. She expressed understanding and gave written consent. She was brought to the operating room and placed on the OR table in the lithotomy position. After the induction of general anesthesia the wound VAC sponges were removed and the wound was prepped and draped in the usual sterile fashion. Prior to beginning a WHO timeout was performed. The wound was thoroughly inspected. For the most part it appeared to be healing well. The areas which had been previously closed at the inferior portion of the wound were starting to heal, though were very fragile and did have some fibrinous exudate. The wound bed was mostly clean, with small amounts of necrotic fat and substantial amount of fibrinous slough near groin crease. This is improved compared to previous. This area was bluntly debrided. The skin edges at the superior portion of the wound which had previously been closed were found to be necrotic and were removed with electrocautery. Black sponges were placed covering the entire wound bed. The skin was prepped with benzoin and adherent film applied. The wound VAC  was connected and kept a good seal. The patient was awakened from anesthesia and brought to the PACU in stable condition. There were no complications and the patient tolerated the procedure well.    Estimated Blood Loss (EBL): minimal           Implants: black wound VAC sponges (4 pieces)    Specimens:   Specimen (24h ago, onward)            None               Condition: Good    Disposition: PACU - hemodynamically stable.

## 2021-12-18 NOTE — PROGRESS NOTES
[facility]  General Surgery  Progress Note      Interval History:     Patient is s/p serial debridements of right lower extremity (groin to knee) for necrotizing fasciitis, most recently 12/6, at which time the wound appeared healthy with no remaining infection. On 12/8 patient underwent partial closure and wound VAC placement. Wound VAC exchanged in OR on 12/12. On 12/14 she was taken for further debridement (necrotic skin), partial closure, and WV change. On 12/17/21 she underwent wound vac change.    ASHLEYEON  Reports incisional pain around wound vac    Objective:     Vital Signs (Most Recent):  Temp: 98.9 °F (37.2 °C) (12/18/21 0741)  Pulse: (!) 111 (12/18/21 0741)  Resp: 16 (12/18/21 0741)  BP: 131/79 (12/18/21 0741)  SpO2: 99 % (12/18/21 0741) Vital Signs (24h Range):  Temp:  [97.8 °F (36.6 °C)-100.2 °F (37.9 °C)] 98.9 °F (37.2 °C)  Pulse:  [109-122] 111  Resp:  [15-27] 16  SpO2:  [97 %-100 %] 99 %  BP: (119-162)/(66-88) 131/79     Weight: (!) 224 kg (493 lb 13.3 oz)  Body mass index is 84.72 kg/m².      Intake/Output Summary (Last 24 hours) at 12/18/2021 0935  Last data filed at 12/18/2021 0635  Gross per 24 hour   Intake 490 ml   Output 1930 ml   Net -1440 ml       Physical Exam  GEN: alert and oriented, NAD  HEENT: MMM, no scleral icterus  CV: Tachycardic to 100s, distant heart sounds due to body habitus, regular rhythm  PULM: Mildly increased WOB on RA  ABD: soft, non-tender, non-distended, obese  EXT: moves all. Right leg with wound VAC to suction, serosanguinous drainage in cannister. Closed portion of the incision c/d/i    Significant Labs:  CBC:   Recent Labs   Lab 12/17/21  0327   WBC 8.03   RBC 2.76*   HGB 8.0*   HCT 25.9*      MCV 94   MCH 29.0   MCHC 30.9*     CMP:   Recent Labs   Lab 12/17/21  0327   GLU 85   CALCIUM 7.8*      K 3.0*   CO2 27      BUN 10   CREATININE 0.7       Significant Diagnostics:  None    Assessment/Plan:     Active Diagnoses:    Diagnosis  Date Noted POA    PRINCIPAL PROBLEM:  Necrotizing fasciitis [M72.6] 11/30/2021 Yes    Debility [R53.81] 12/01/2021 Yes    Anemia of chronic disease [D63.8] 12/01/2021 Yes    Hypophosphatemia [E83.39] 12/01/2021 Yes    Obesity [E66.9] 12/01/2021 Yes    DM (diabetes mellitus) [E11.9] 11/30/2021 Yes    DANG (obstructive sleep apnea) [G47.33] 11/30/2021 Yes      Problems Resolved During this Admission:     This is a 36 year old F with class 3 obesity c/b several medical problems who is transferred to [facility] for treatment of right groin/thigh necrotizing fasciitis.    S/p serial debridements (groin to knee), most recently 12/6. At this time the wound was found to be healthy with minimal necrotic tissues. On 12/8 she underwent partial closure and wound VAC placement, s/p wound VAC change on 12/12 and further debridement (necrotic skin), partial closure, and WV change on 12/14 and 12/17.    Patient has been having diarrhea and uptrending WBC (13 yesterday, pending today). She has been on broad spectrum antibiotics for necrotizing fasciitis. C.diff negative and WBC downtrending now.    Maintain WV to 125mmHg suction  Will plan on change again in OR beginning of this upcoming week    Patient discussed with attending surgeon Dr. Nohemi Cisneros MD PGY4  General Surgery  [facility]

## 2021-12-18 NOTE — PROGRESS NOTES
"       [facility] - Med Surg [facility] Medicine  Telemedicine Progress Note    Patient Name: Shauna Queen  MRN: [id number]  Patient Class: IP- Inpatient   Admission Date: 11/30/2021  Length of Stay: 17 days  Attending Physician: Milvia García MD  Primary Care Provider: Jorge Dennis MD          Subjective:     Principal Problem:Necrotizing fasciitis        HPI:  Ms. Queen is a 36-year-old presents to the ED from outside hospital for abscess.  She has initially presented to outside hospital for DKA which resolved.  Patient was unaware of why she was here in July prompted her.  Per the checkout are received: "36-year-old female history of diabetes mellitus, sleep apnea on CPAP, schizophrenia, hypertension, and hyperlipidemia admitted to [facility] on November 24 from [facility] for treatment of diabetic ketoacidosis. She was initiated on an insulin infusion. DKA improved and she was transitioned off the insulin infusion. She again developed DKA and was placed back on the insulin infusion for a period of time. Currently she is off the insulin infusion and on subcutaneous insulin. She was noted to have right upper/inner thigh pain with erythema, induration, and tenderness to palpation. She was treated with IV clindamycin and vancomycin. Due to her size ((BMI 74.32) they are unable to perform CT or MRI. General Surgery there performed an incision and drainage with purulent material found. She was taken to the OR on November 29 for a second-look at the thigh wound. They were only able to do superficial debridement. Postprocedure she extubated to 2 L nasal cannula and was awake with oxygen saturation 100 percent. Surgery at their facility recommended transfer to a facility better able to perform procedures on a patient with her BMI. Referring provider spoke with General Surgery at [facility]. [facility] was asked to admit for further treatment. I spoke with her current " "attending. He noted she has no alteration in mentation. Lower extremity is intact from a neurovascular standpoint. She remains in the ICU."      Overview/Hospital Course:  37 y/o female admitted to the hospital as a transfer secondary to R thigh abscess and a BMI of Body mass index is 84.77 kg/m².   Patient transferred here for surgical evaluation.  Patient went for surgical debridement on 11/30/21. Patient continued on Abx per ID recommendations. Blood cultures were NG. Underwent another debridement on 12/2 and again on 12/4 - noting significant necrotizing soft tissue infections concerning for tracking down into her knee. On broad spectrum antibiotics. To OR with Orthopedic surgery to explore right knee on 12/5. Patient again went for surgery on 12/6.  Wound closure with wound vac placement on 12/8.      Subjective/Interval History     Patient to OR today with surgery for debridement, partial closure and wound vac change. Patient doing well post-op, remains with low grade tachycardia. Diarrhea improved, CDiff negative.     Review of Systems    Constitutional: Negative for chills, fatigue, fever.   HENT: Negative for sore throat, trouble swallowing.    Eyes: Negative for photophobia, visual disturbance.   Respiratory: Negative for cough, shortness of breath.    Cardiovascular: Negative for chest pain, palpitations, leg swelling.   Gastrointestinal: Negative for abdominal pain, constipation, diarrhea, nausea, vomiting.   Endocrine: Negative for cold intolerance, heat intolerance.   Genitourinary: Negative for dysuria, frequency.   Musculoskeletal: Negative for arthralgias, myalgias.   Skin: Negative for rash, wound, erythema   Neurological: Negative for dizziness, syncope, weakness, light-headedness.   Psychiatric/Behavioral: Negative for confusion, hallucinations, anxiety    Medications  Scheduled Meds:   calcium carbonate  1,000 mg Oral Daily    enoxaparin  40 mg Subcutaneous Daily    insulin aspart U-100  8 " Units Subcutaneous TIDWM    insulin detemir U-100  25 Units Subcutaneous BID    piperacillin-tazobactam (ZOSYN) IVPB  4.5 g Intravenous Q8H    potassium, sodium phosphates  1 packet Oral QID (AC & HS)     Continuous Infusions:  PRN Meds:.acetaminophen, ALPRAZolam, dextrose 50%, glucagon (human recombinant), HYDROcodone-acetaminophen, HYDROmorphone, influenza, insulin aspart U-100, loperamide, melatonin, morphine, ondansetron, prochlorperazine, sars-cov-2 (covid-19), sodium chloride 0.9%    Objective    Physical Examination    Temp:  [97.8 °F (36.6 °C)-100.2 °F (37.9 °C)]   Pulse:  [105-122]   Resp:  [15-27]   BP: (111-162)/(66-94)   SpO2:  [97 %-100 %]     Constitutional: NAD, conversant  Head: NC, AT  Eyes: No scleral icterus.  No eye discharge  Pulmonary/Chest: Effort normal. No respiratory distress on room air  GI: No distension  Musculoskeletal: Moving all extremities  Neurological: Alert.  Oriented to person, place, and time.   Psychiatric: Normal behavior, mood, and affect  Skin: No visible erythema or cyanosis    CBC  Recent Labs   Lab 12/15/21  0354 12/16/21  0938 12/17/21  0327   WBC 13.10* 10.06 8.03   HGB 8.8* 8.6* 8.0*   HCT 28.0* 28.3* 25.9*   * 414 328     CMP  Recent Labs   Lab 12/11/21  0513 12/12/21  0351 12/13/21  0401 12/15/21  0354 12/16/21  0938 12/17/21  0327     --    < > 141 142 142   K 3.4*  --    < > 4.0 3.0* 3.0*     --    < > 102 105 106   CO2 27  --    < > 28 29 27   BUN 8  --    < > 12 10 10   CREATININE 0.8  --    < > 0.7 0.8 0.7   *  --    < > 156* 112* 85   CALCIUM 7.9*  --    < > 8.2* 8.2* 7.8*   PHOS 3.0 3.3  --   --   --   --     < > = values in this interval not displayed.           Assessment/Plan:      * Necrotizing fasciitis  - Status post I&D with General surgery at outside hospital, did not feel like they completely removed abscess.  - Underwent debridement in OR 11/30, 12/2, and 12/4, 12/5 - no extension into knee.    - OR again on 12/6, 12/8  and 12/11.   - OR again on 12/14 for debridement of necrotic skin, washout, partial closure, wound VAC change  - Continued on Zosyn till 12/18 for 2 weeks of treatment per ID. (estimated end date: 12/18/21)  - Pain control.   - to LTAC once wound vac can be changed at bedside.     Antibiotics (From admission, onward)            Start     Stop Route Frequency Ordered    12/04/21 1530  piperacillin-tazobactam 4.5 g in dextrose 5 % 100 mL IVPB (ready to mix system)         12/19 0959 IV Every 8 hours (non-standard times) 12/04/21 1429        Cultures were taken-   Microbiology Results (last 7 days)     Procedure Component Value Units Date/Time    Fungus culture [[id number]] Collected: 12/04/21 1312    Order Status: Completed Specimen: Wound from Leg, Right Updated: 12/13/21 1409     Fungus (Mycology) Culture No fungal growth to date    Narrative:      thigh    Fungus culture [[id number]] Collected: 11/30/21 1635    Order Status: Completed Specimen: Abscess from Leg, Right Updated: 12/13/21 1409     Fungus (Mycology) Culture No fungal growth to date    Narrative:      Right Inner thigh abcess    Culture, Anaerobe [[id number]]  (Abnormal) Collected: 12/04/21 1312    Order Status: Completed Specimen: Wound from Leg, Right Updated: 12/11/21 1350     Anaerobic Culture BACTEROIDES THETAIOTAOMICRON  Few      Narrative:      thigh          Obesity  Morbid obesity  Body mass index is 84.72 kg/m².          Hypophosphatemia  Replace as needed.      Anemia of chronic disease  With anemia of acute blood loss as expected from multiple surgeries.  Transfuse for Hgb<7      Debility  PT/OT once more stable      DANG (obstructive sleep apnea)  Patient likely has component of OHS as well  - outpatient sleep study      DM (diabetes mellitus)  A1c:   Lab Results   Component Value Date    HGBA1C >14.0 (H) 12/01/2021     Meds: basal bolus insulin + SSI PRN to maintain goal 140-180  ADA diet, accuchecks ACHS, hypoglycemic protocol  Continue  current management.      VTE Risk Mitigation (From admission, onward)         Ordered     enoxaparin injection 40 mg  Daily         12/04/21 2306     IP VTE LOW RISK PATIENT  Once         11/30/21 0403     Place sequential compression device  Until discontinued         11/30/21 0403                      I have assessed these finding virtually using telemed platform and with assistance of bedside nurse                 The attending portion of this evaluation, treatment, and documentation was performed per Milvia García MD via Telemedicine AudioVisual using the secure [employer] software platform with 2 way audio/video. The provider was located off-site and the patient is located in the hospital. The aforementioned video software was utilized to document the relevant history and physical exam    Milvia García MD  Department of Hospital Medicine   [facility] [place] [facility]

## 2021-12-18 NOTE — PT/OT/SLP RE-EVAL
Physical Therapy Re-evaluation    Patient Name:  Shauna Queen   MRN:  [id number]    Recommendations:     Discharge Recommendations:  LTACH (long-term acute care hospital)   Discharge Equipment Recommendations: other (see comments) (TBD at next level of care)   Barriers to discharge: LE wound    Assessment:     Shauna Queen is a 36 y.o. female admitted with a medical diagnosis of Necrotizing fasciitis.  She presents with the following impairments/functional limitations:  weakness,impaired endurance,impaired self care skills,impaired functional mobilty,gait instability,impaired balance,decreased lower extremity function,decreased upper extremity function,decreased safety awareness,pain,impaired skin,impaired cardiopulmonary response to activity   Pt agreeable to attempt sitting at EOB today. Pt requires max Ax2 to transfer from supine to sit. Pt sat at EOB for approx 15 min with assistance from RW. Pt able to reach minimally outside of base of support with one hand on walker. Pt demonstrates fairly good AROM of L LE but increased difficulty moving R LE against gravity 2/2 weakness vs pain today. Pt educated on importance of continued bed mobility throughout the day to maintain skin integrity and reduce atrophy. Pt with fair activity tolerance today. Pt would benefit from skilled therapy to maximize functional mobility, decrease the risk for skin breakdown and increase strength for transfers and ambulation.        Rehab Prognosis:  fair; patient would benefit from acute skilled PT services to address these deficits and reach maximum level of function.      Recent Surgery: Procedure(s) (LRB):  CLOSURE, WOUND (Right) 1 Day Post-Op    Plan:     During this hospitalization, patient to be seen 3 x/week (- 5x/week) to address the above listed problems via gait training,therapeutic activities,therapeutic exercises,neuromuscular re-education,wheelchair management/training  · Plan of Care Expires:  12/17/21   Plan of  Care Reviewed with: patient    Subjective     Communicated with nsg prior to session.  Patient found HOB elevated with wound vac,telemetry,central line,joy catheter upon PT entry to room, agreeable to evaluation.      Chief Complaint: R LE pain  Patient comments/goals: regain strength/ endurance  Pain/Comfort:  Pain Rating 1: 7/10  Location - Side 1: Right  Location - Orientation 1: upper  Location 1: groin  Pain Addressed 1: Pre-medicate for activity,Nurse notified,Reposition,Distraction,Cessation of Activity  Pain Rating Post-Intervention 1: 8/10    Patients cultural, spiritual, Mandaeism conflicts given the current situation: no      Objective:     Patient found with: wound vac,telemetry,central line,joy catheter     General Precautions: Standard, fall   Orthopedic Precautions:N/A   Braces:    Respiratory Status: Room air    Exams:  · Cognitive Exam:  Patient is oriented to Person, Place, Time and Situation  · RLE ROM: Deficits: unable to assess hip 2/2 wound, knee and ankle WFL  · RLE Strength: Deficits: grossly weak  · LLE ROM: WFL  · LLE Strength: WFL    Functional Mobility:  · Bed Mobility:     · Rolling Left:  moderate assistance and of 2 persons  · Scooting: maximal assistance  · Bridging: maximal assistance and of 2 persons  · Supine to Sit: maximal assistance and of 2 persons  · Sit to Supine: maximal assistance and of 2 persons  · Balance: Poor in sitting    AM-PAC 6 CLICK MOBILITY  Total Score:9       Therapeutic Activities and Exercises:   Pt instructed on quad sets, glute sets, ankle pumps and LAQ's at EOB. Pt educated on importance of continued bed mobility to preserve skin integrity and improve response to activity    Patient left HOB elevated with all lines intact, call button in reach and nsg notified.    GOALS:   Multidisciplinary Problems     Physical Therapy Goals        Problem: Physical Therapy Goal    Goal Priority Disciplines Outcome Goal Variances Interventions   Physical Therapy Goal      PT, PT/OT Ongoing, Progressing     Description: Goals to be met by: 22     Patient will increase functional independence with mobility by performin. Supine to sit with Moderate Assistance  2. Sit to supine with Moderate Assistance  3. Rolling to Left and Right with Minimal Assistance.  4. Sit to stand transfer with Moderate Assistance  5. Bed to chair transfer with Moderate Assistance using Rolling Walker  5. Gait  x 15 feet with Minimal Assistance using Rolling Walker.                Multidisciplinary Problems (Resolved)        Problem: Physical Therapy Goal    Goal Priority Disciplines Outcome Goal Variances Interventions   Physical Therapy Goal   (Resolved)     PT, PT/OT Met     Description: Goals to be met by: 22    Patient will increase functional independence with mobility by performin. Pt will tolerate mobility assessment remaining free from falls and demonstrating good tolerance to activity                     History:     History reviewed. No pertinent past medical history.    Past Surgical History:   Procedure Laterality Date    CLOSURE OF WOUND Right 2021    Procedure: CLOSURE, WOUND;  Surgeon: Brant Jorgensen MD;  Location: [facility] OR;  Service: General;  Laterality: Right;  washout/closure. Needs Wound Vac.    CLOSURE OF WOUND Right 2021    Procedure: CLOSURE, WOUND;  Surgeon: Jorge Dupree MD;  Location: [facility] OR;  Service: General;  Laterality: Right;  wound vac change only    CLOSURE OF WOUND Right 2021    Procedure: CLOSURE, WOUND;  Surgeon: Brant Jorgensen MD;  Location: [facility] OR;  Service: General;  Laterality: Right;  Wound VAC change, possible partial closure    DEBRIDEMENT OF LOWER EXTREMITY Right 2021    Procedure: DEBRIDEMENT, LOWER EXTREMITY;  Surgeon: Jorge Dupree MD;  Location: [facility] OR;  Service: General;  Laterality: Right;    DEBRIDEMENT OF LOWER EXTREMITY Right 2021    Procedure: DEBRIDEMENT, LOWER EXTREMITY;  Surgeon: Brant  DIMA Jorgensen MD;  Location: [facility] OR;  Service: General;  Laterality: Right;  lithotomy    DEBRIDEMENT OF LOWER EXTREMITY Right 12/5/2021    Procedure: DEBRIDEMENT, LOWER EXTREMITY;  Surgeon: Brant Jorgensen MD;  Location: [facility] OR;  Service: General;  Laterality: Right;    DEBRIDEMENT OF LOWER EXTREMITY Right 12/6/2021    Procedure: DEBRIDEMENT, LOWER EXTREMITY;  Surgeon: Haroldo Song MD;  Location: [facility] OR;  Service: General;  Laterality: Right;    REPLACEMENT OF WOUND VACUUM-ASSISTED CLOSURE DEVICE Right 12/12/2021    Procedure: REPLACEMENT, WOUND VAC;  Surgeon: Jorge Dupree MD;  Location: [facility] OR;  Service: General;  Laterality: Right;       Time Tracking:     PT Received On: 12/18/21  PT Start Time: 1523     PT Stop Time: 1608  PT Total Time (min): 45 min     Billable Minutes: Re-eval 12 and Therapeutic Activity 12    Co-treat w/ OT    12/18/2021

## 2021-12-18 NOTE — PLAN OF CARE
Problem: Physical Therapy Goal  Goal: Physical Therapy Goal  Description: Goals to be met by: 22    Patient will increase functional independence with mobility by performin. Pt will tolerate mobility assessment remaining free from falls and demonstrating good tolerance to activity    Outcome: Met     PT evaluation completed today. Pt agreeable to attempt sitting at EOB today. Pt requires max Ax2 to transfer from supine to sit. Pt sat at EOB for approx 15 min with assistance from RW. Pt able to reach minimally outside of base of support with one hand on walker. Pt demonstrates fairly good AROM of L LE but increased difficulty moving R LE against gravity 2/2 weakness vs pain today. Pt educated on importance of continued bed mobility throughout the day to maintain skin integrity and reduce atrophy. Pt with fair activity tolerance today. Pt would benefit from skilled therapy to maximize functional mobility, decrease the risk for skin break down and increase strength for transfers and ambulation.

## 2021-12-19 LAB
ANION GAP SERPL CALC-SCNC: 9 MMOL/L (ref 8–16)
BACTERIA #/AREA URNS HPF: ABNORMAL /HPF
BASOPHILS # BLD AUTO: 0.03 K/UL (ref 0–0.2)
BASOPHILS NFR BLD: 0.4 % (ref 0–1.9)
BILIRUB UR QL STRIP: NEGATIVE
BUN SERPL-MCNC: 9 MG/DL (ref 6–20)
CALCIUM SERPL-MCNC: 7.6 MG/DL (ref 8.7–10.5)
CHLORIDE SERPL-SCNC: 106 MMOL/L (ref 95–110)
CLARITY UR: ABNORMAL
CO2 SERPL-SCNC: 29 MMOL/L (ref 23–29)
COLOR UR: YELLOW
CREAT SERPL-MCNC: 0.7 MG/DL (ref 0.5–1.4)
DIFFERENTIAL METHOD: ABNORMAL
EOSINOPHIL # BLD AUTO: 0.2 K/UL (ref 0–0.5)
EOSINOPHIL NFR BLD: 1.9 % (ref 0–8)
ERYTHROCYTE [DISTWIDTH] IN BLOOD BY AUTOMATED COUNT: 16.1 % (ref 11.5–14.5)
EST. GFR  (AFRICAN AMERICAN): >60 ML/MIN/1.73 M^2
EST. GFR  (NON AFRICAN AMERICAN): >60 ML/MIN/1.73 M^2
GLUCOSE SERPL-MCNC: 50 MG/DL (ref 70–110)
GLUCOSE UR QL STRIP: NEGATIVE
HCT VFR BLD AUTO: 26.6 % (ref 37–48.5)
HGB BLD-MCNC: 7.9 G/DL (ref 12–16)
HGB UR QL STRIP: NEGATIVE
IMM GRANULOCYTES # BLD AUTO: 0.07 K/UL (ref 0–0.04)
IMM GRANULOCYTES NFR BLD AUTO: 0.8 % (ref 0–0.5)
KETONES UR QL STRIP: NEGATIVE
LEUKOCYTE ESTERASE UR QL STRIP: ABNORMAL
LYMPHOCYTES # BLD AUTO: 1.7 K/UL (ref 1–4.8)
LYMPHOCYTES NFR BLD: 20 % (ref 18–48)
MCH RBC QN AUTO: 27.7 PG (ref 27–31)
MCHC RBC AUTO-ENTMCNC: 29.7 G/DL (ref 32–36)
MCV RBC AUTO: 93 FL (ref 82–98)
MICROSCOPIC COMMENT: ABNORMAL
MONOCYTES # BLD AUTO: 1.1 K/UL (ref 0.3–1)
MONOCYTES NFR BLD: 12.6 % (ref 4–15)
NEUTROPHILS # BLD AUTO: 5.4 K/UL (ref 1.8–7.7)
NEUTROPHILS NFR BLD: 64.3 % (ref 38–73)
NITRITE UR QL STRIP: NEGATIVE
NRBC BLD-RTO: 0 /100 WBC
PH UR STRIP: 6 [PH] (ref 5–8)
PLATELET # BLD AUTO: 374 K/UL (ref 150–450)
PMV BLD AUTO: 8.7 FL (ref 9.2–12.9)
POCT GLUCOSE: 127 MG/DL (ref 70–110)
POCT GLUCOSE: 129 MG/DL (ref 70–110)
POCT GLUCOSE: 140 MG/DL (ref 70–110)
POCT GLUCOSE: 202 MG/DL (ref 70–110)
POCT GLUCOSE: 49 MG/DL (ref 70–110)
POTASSIUM SERPL-SCNC: 3.3 MMOL/L (ref 3.5–5.1)
PROT UR QL STRIP: ABNORMAL
RBC # BLD AUTO: 2.85 M/UL (ref 4–5.4)
RBC #/AREA URNS HPF: 4 /HPF (ref 0–4)
SODIUM SERPL-SCNC: 144 MMOL/L (ref 136–145)
SP GR UR STRIP: 1.01 (ref 1–1.03)
URN SPEC COLLECT METH UR: ABNORMAL
UROBILINOGEN UR STRIP-ACNC: NEGATIVE EU/DL
WBC # BLD AUTO: 8.42 K/UL (ref 3.9–12.7)
WBC #/AREA URNS HPF: 13 /HPF (ref 0–5)

## 2021-12-19 PROCEDURE — 25000003 PHARM REV CODE 250: Performed by: HOSPITALIST

## 2021-12-19 PROCEDURE — 81000 URINALYSIS NONAUTO W/SCOPE: CPT | Performed by: STUDENT IN AN ORGANIZED HEALTH CARE EDUCATION/TRAINING PROGRAM

## 2021-12-19 PROCEDURE — 36415 COLL VENOUS BLD VENIPUNCTURE: CPT | Performed by: STUDENT IN AN ORGANIZED HEALTH CARE EDUCATION/TRAINING PROGRAM

## 2021-12-19 PROCEDURE — 87086 URINE CULTURE/COLONY COUNT: CPT | Performed by: STUDENT IN AN ORGANIZED HEALTH CARE EDUCATION/TRAINING PROGRAM

## 2021-12-19 PROCEDURE — 63600175 PHARM REV CODE 636 W HCPCS: Performed by: HOSPITALIST

## 2021-12-19 PROCEDURE — 63600175 PHARM REV CODE 636 W HCPCS: Performed by: STUDENT IN AN ORGANIZED HEALTH CARE EDUCATION/TRAINING PROGRAM

## 2021-12-19 PROCEDURE — 11000001 HC ACUTE MED/SURG PRIVATE ROOM

## 2021-12-19 PROCEDURE — 36415 COLL VENOUS BLD VENIPUNCTURE: CPT | Performed by: HOSPITALIST

## 2021-12-19 PROCEDURE — 63600175 PHARM REV CODE 636 W HCPCS: Performed by: INTERNAL MEDICINE

## 2021-12-19 PROCEDURE — 80048 BASIC METABOLIC PNL TOTAL CA: CPT | Performed by: HOSPITALIST

## 2021-12-19 PROCEDURE — 87040 BLOOD CULTURE FOR BACTERIA: CPT | Mod: 59 | Performed by: STUDENT IN AN ORGANIZED HEALTH CARE EDUCATION/TRAINING PROGRAM

## 2021-12-19 PROCEDURE — 94761 N-INVAS EAR/PLS OXIMETRY MLT: CPT

## 2021-12-19 PROCEDURE — 25000003 PHARM REV CODE 250: Performed by: STUDENT IN AN ORGANIZED HEALTH CARE EDUCATION/TRAINING PROGRAM

## 2021-12-19 PROCEDURE — 85025 COMPLETE CBC W/AUTO DIFF WBC: CPT | Performed by: HOSPITALIST

## 2021-12-19 RX ORDER — LOPERAMIDE HYDROCHLORIDE 2 MG/1
4 CAPSULE ORAL 4 TIMES DAILY
Status: DISCONTINUED | OUTPATIENT
Start: 2021-12-19 | End: 2021-12-27

## 2021-12-19 RX ORDER — LOPERAMIDE HYDROCHLORIDE 2 MG/1
CAPSULE ORAL
Status: DISPENSED
Start: 2021-12-19 | End: 2021-12-20

## 2021-12-19 RX ADMIN — PIPERACILLIN AND TAZOBACTAM 4.5 G: 4; .5 INJECTION, POWDER, LYOPHILIZED, FOR SOLUTION INTRAVENOUS; PARENTERAL at 02:12

## 2021-12-19 RX ADMIN — INSULIN DETEMIR 20 UNITS: 100 INJECTION, SOLUTION SUBCUTANEOUS at 08:12

## 2021-12-19 RX ADMIN — LOPERAMIDE HYDROCHLORIDE 4 MG: 2 CAPSULE ORAL at 02:12

## 2021-12-19 RX ADMIN — Medication 1 PACKET: at 05:12

## 2021-12-19 RX ADMIN — Medication 1 PACKET: at 08:12

## 2021-12-19 RX ADMIN — Medication 1 PACKET: at 11:12

## 2021-12-19 RX ADMIN — DEXTROSE MONOHYDRATE 12.5 G: 25 INJECTION, SOLUTION INTRAVENOUS at 08:12

## 2021-12-19 RX ADMIN — INSULIN ASPART 2 UNITS: 100 INJECTION, SOLUTION INTRAVENOUS; SUBCUTANEOUS at 08:12

## 2021-12-19 RX ADMIN — PIPERACILLIN AND TAZOBACTAM 4.5 G: 4; .5 INJECTION, POWDER, LYOPHILIZED, FOR SOLUTION INTRAVENOUS; PARENTERAL at 09:12

## 2021-12-19 RX ADMIN — INSULIN ASPART 8 UNITS: 100 INJECTION, SOLUTION INTRAVENOUS; SUBCUTANEOUS at 12:12

## 2021-12-19 RX ADMIN — INSULIN ASPART 8 UNITS: 100 INJECTION, SOLUTION INTRAVENOUS; SUBCUTANEOUS at 05:12

## 2021-12-19 RX ADMIN — ENOXAPARIN SODIUM 40 MG: 40 INJECTION SUBCUTANEOUS at 05:12

## 2021-12-19 RX ADMIN — LOPERAMIDE HYDROCHLORIDE 4 MG: 2 CAPSULE ORAL at 08:12

## 2021-12-19 RX ADMIN — CALCIUM CARBONATE (ANTACID) CHEW TAB 500 MG 1000 MG: 500 CHEW TAB at 08:12

## 2021-12-19 RX ADMIN — PIPERACILLIN AND TAZOBACTAM 4.5 G: 4; .5 INJECTION, POWDER, LYOPHILIZED, FOR SOLUTION INTRAVENOUS; PARENTERAL at 03:12

## 2021-12-19 RX ADMIN — MORPHINE SULFATE 2 MG: 4 INJECTION, SOLUTION INTRAMUSCULAR; INTRAVENOUS at 08:12

## 2021-12-19 NOTE — PROGRESS NOTES
[facility]  General Surgery  Progress Note      Interval History:     Patient is s/p serial debridements of right lower extremity (groin to knee) for necrotizing fasciitis, most recently 12/6, at which time the wound appeared healthy with no remaining infection. On 12/8 patient underwent partial closure and wound VAC placement. Wound VAC exchanged in OR on 12/12. On 12/14 she was taken for further debridement (necrotic skin), partial closure, and WV change. On 12/17/21 she underwent wound vac change.    Yesterday had soiled her wound vac dressing while having bowel movement  Dressing was reinforced and cleaned by nursing staff  WV with seal this morning; 550cc of serosanguinous output in cannister  Febrile to 101.5 overnight; afebrile and normocardic this morning    Objective:     Vital Signs (Most Recent):  Temp: 97.9 °F (36.6 °C) (12/19/21 0814)  Pulse: 95 (12/19/21 0814)  Resp: 20 (12/19/21 0814)  BP: 132/79 (12/19/21 0814)  SpO2: 97 % (12/19/21 0814) Vital Signs (24h Range):  Temp:  [97.4 °F (36.3 °C)-101.5 °F (38.6 °C)] 97.9 °F (36.6 °C)  Pulse:  [] 95  Resp:  [16-20] 20  SpO2:  [97 %-100 %] 97 %  BP: (104-132)/(57-79) 132/79     Weight: (!) 224 kg (493 lb 13.3 oz)  Body mass index is 84.72 kg/m².      Intake/Output Summary (Last 24 hours) at 12/19/2021 0842  Last data filed at 12/19/2021 0600  Gross per 24 hour   Intake 1704.18 ml   Output 1700 ml   Net 4.18 ml       Physical Exam  GEN: alert and oriented, NAD  HEENT: MMM, no scleral icterus  CV: low grade tachycardia, distant heart sounds due to body habitus, regular rhythm  PULM: Mildly increased WOB on RA  ABD: soft, non-tender, non-distended, obese  EXT: moves all. Right leg with wound VAC to suction, serosanguinous drainage in cannister. Closed portion of the incision c/d/i    Significant Labs:  CBC:   Recent Labs   Lab 12/19/21  0423   WBC 8.42   RBC 2.85*   HGB 7.9*   HCT 26.6*      MCV 93   MCH 27.7   MCHC 29.7*     CMP:    Recent Labs   Lab 12/19/21  0423   GLU 50*   CALCIUM 7.6*      K 3.3*   CO2 29      BUN 9   CREATININE 0.7       Significant Diagnostics:  None    Assessment/Plan:     Active Diagnoses:    Diagnosis Date Noted POA    PRINCIPAL PROBLEM:  Necrotizing fasciitis [M72.6] 11/30/2021 Yes    Debility [R53.81] 12/01/2021 Yes    Anemia of chronic disease [D63.8] 12/01/2021 Yes    Hypophosphatemia [E83.39] 12/01/2021 Yes    Obesity [E66.9] 12/01/2021 Yes    DM (diabetes mellitus) [E11.9] 11/30/2021 Yes    DANG (obstructive sleep apnea) [G47.33] 11/30/2021 Yes      Problems Resolved During this Admission:     This is a 36 year old F with class 3 obesity c/b several medical problems who is transferred to [facility] for treatment of right groin/thigh necrotizing fasciitis.    S/p serial debridements (groin to knee), most recently 12/6. At this time the wound was found to be healthy with minimal necrotic tissues. On 12/8 she underwent partial closure and wound VAC placement, s/p wound VAC change on 12/12 and further debridement (necrotic skin), partial closure, and WV change on 12/14 and 12/17.    Maintain WV to 125mmHg suction  Will plan on change again in OR sometime this upcoming week (possibly Tuesday or Weds)    Patient discussed with attending surgeon Dr. Nohemi Cisneros MD PGY4  General Surgery  [facility]

## 2021-12-19 NOTE — NURSING
Pt reporting 6/10 pain. Wound vac to continuous vacuum therapy 125mmHg to right leg. Indwelling joy in place, drainage bag to gravity. Turned, repositioned and placed pillow under right buttocks. Family at bedside.

## 2021-12-20 ENCOUNTER — ANESTHESIA (OUTPATIENT)
Dept: SURGERY | Facility: HOSPITAL | Age: 36
DRG: 463 | End: 2021-12-20
Payer: COMMERCIAL

## 2021-12-20 ENCOUNTER — ANESTHESIA EVENT (OUTPATIENT)
Dept: SURGERY | Facility: HOSPITAL | Age: 36
DRG: 463 | End: 2021-12-20
Payer: COMMERCIAL

## 2021-12-20 LAB
ABO GROUP BLD: NORMAL
BLD GP AB SCN CELLS X3 SERPL QL: NORMAL
POCT GLUCOSE: 119 MG/DL (ref 70–110)
POCT GLUCOSE: 122 MG/DL (ref 70–110)
POCT GLUCOSE: 130 MG/DL (ref 70–110)
POCT GLUCOSE: 160 MG/DL (ref 70–110)
RH BLD: NORMAL
SARS-COV-2 RDRP RESP QL NAA+PROBE: NEGATIVE

## 2021-12-20 PROCEDURE — 99233 SBSQ HOSP IP/OBS HIGH 50: CPT | Mod: ,,, | Performed by: INTERNAL MEDICINE

## 2021-12-20 PROCEDURE — D9220A PRA ANESTHESIA: ICD-10-PCS | Mod: CRNA,,, | Performed by: STUDENT IN AN ORGANIZED HEALTH CARE EDUCATION/TRAINING PROGRAM

## 2021-12-20 PROCEDURE — 63600175 PHARM REV CODE 636 W HCPCS: Performed by: STUDENT IN AN ORGANIZED HEALTH CARE EDUCATION/TRAINING PROGRAM

## 2021-12-20 PROCEDURE — 25000003 PHARM REV CODE 250: Performed by: STUDENT IN AN ORGANIZED HEALTH CARE EDUCATION/TRAINING PROGRAM

## 2021-12-20 PROCEDURE — D9220A PRA ANESTHESIA: Mod: CRNA,,, | Performed by: STUDENT IN AN ORGANIZED HEALTH CARE EDUCATION/TRAINING PROGRAM

## 2021-12-20 PROCEDURE — 36415 COLL VENOUS BLD VENIPUNCTURE: CPT | Performed by: ANESTHESIOLOGY

## 2021-12-20 PROCEDURE — U0002 COVID-19 LAB TEST NON-CDC: HCPCS | Performed by: SURGERY

## 2021-12-20 PROCEDURE — 11000001 HC ACUTE MED/SURG PRIVATE ROOM

## 2021-12-20 PROCEDURE — 25000003 PHARM REV CODE 250: Performed by: SURGERY

## 2021-12-20 PROCEDURE — 99233 PR SUBSEQUENT HOSPITAL CARE,LEVL III: ICD-10-PCS | Mod: ,,, | Performed by: INTERNAL MEDICINE

## 2021-12-20 PROCEDURE — 63600175 PHARM REV CODE 636 W HCPCS: Performed by: SURGERY

## 2021-12-20 PROCEDURE — 71000033 HC RECOVERY, INTIAL HOUR: Performed by: SURGERY

## 2021-12-20 PROCEDURE — 99900035 HC TECH TIME PER 15 MIN (STAT)

## 2021-12-20 PROCEDURE — 25000003 PHARM REV CODE 250: Performed by: HOSPITALIST

## 2021-12-20 PROCEDURE — 37000009 HC ANESTHESIA EA ADD 15 MINS: Performed by: SURGERY

## 2021-12-20 PROCEDURE — 36000707: Performed by: SURGERY

## 2021-12-20 PROCEDURE — 86900 BLOOD TYPING SEROLOGIC ABO: CPT | Performed by: ANESTHESIOLOGY

## 2021-12-20 PROCEDURE — 94660 CPAP INITIATION&MGMT: CPT

## 2021-12-20 PROCEDURE — 94761 N-INVAS EAR/PLS OXIMETRY MLT: CPT

## 2021-12-20 PROCEDURE — 86901 BLOOD TYPING SEROLOGIC RH(D): CPT | Performed by: ANESTHESIOLOGY

## 2021-12-20 PROCEDURE — 86850 RBC ANTIBODY SCREEN: CPT | Performed by: ANESTHESIOLOGY

## 2021-12-20 PROCEDURE — C9399 UNCLASSIFIED DRUGS OR BIOLOG: HCPCS | Performed by: SURGERY

## 2021-12-20 PROCEDURE — 87449 NOS EACH ORGANISM AG IA: CPT | Performed by: INTERNAL MEDICINE

## 2021-12-20 PROCEDURE — 36000706: Performed by: SURGERY

## 2021-12-20 PROCEDURE — 71000039 HC RECOVERY, EACH ADD'L HOUR: Performed by: SURGERY

## 2021-12-20 PROCEDURE — D9220A PRA ANESTHESIA: ICD-10-PCS | Mod: ANES,,, | Performed by: ANESTHESIOLOGY

## 2021-12-20 PROCEDURE — 97607 PR NEG PRESS WOUND THERAPY (NPWT) W/DISPOSABLE WOUND VAC DEVICE, <=50 CM: ICD-10-PCS | Mod: ,,, | Performed by: SURGERY

## 2021-12-20 PROCEDURE — 63600175 PHARM REV CODE 636 W HCPCS: Performed by: ANESTHESIOLOGY

## 2021-12-20 PROCEDURE — 97607 NEG PRS WND THR NDME<=50SQCM: CPT | Mod: ,,, | Performed by: SURGERY

## 2021-12-20 PROCEDURE — D9220A PRA ANESTHESIA: Mod: ANES,,, | Performed by: ANESTHESIOLOGY

## 2021-12-20 PROCEDURE — 37000008 HC ANESTHESIA 1ST 15 MINUTES: Performed by: SURGERY

## 2021-12-20 RX ORDER — KETAMINE HYDROCHLORIDE 100 MG/ML
INJECTION, SOLUTION INTRAMUSCULAR; INTRAVENOUS
Status: DISCONTINUED | OUTPATIENT
Start: 2021-12-20 | End: 2021-12-20

## 2021-12-20 RX ORDER — HYDROCODONE BITARTRATE AND ACETAMINOPHEN 500; 5 MG/1; MG/1
TABLET ORAL
Status: DISCONTINUED | OUTPATIENT
Start: 2021-12-20 | End: 2021-12-31 | Stop reason: HOSPADM

## 2021-12-20 RX ORDER — PROPOFOL 10 MG/ML
VIAL (ML) INTRAVENOUS
Status: DISCONTINUED | OUTPATIENT
Start: 2021-12-20 | End: 2021-12-20

## 2021-12-20 RX ORDER — MIDAZOLAM HYDROCHLORIDE 1 MG/ML
INJECTION, SOLUTION INTRAMUSCULAR; INTRAVENOUS
Status: DISCONTINUED | OUTPATIENT
Start: 2021-12-20 | End: 2021-12-20

## 2021-12-20 RX ORDER — PHENYLEPHRINE HYDROCHLORIDE 10 MG/ML
INJECTION INTRAVENOUS
Status: DISCONTINUED | OUTPATIENT
Start: 2021-12-20 | End: 2021-12-20

## 2021-12-20 RX ORDER — FENTANYL CITRATE 50 UG/ML
INJECTION, SOLUTION INTRAMUSCULAR; INTRAVENOUS
Status: DISCONTINUED | OUTPATIENT
Start: 2021-12-20 | End: 2021-12-20

## 2021-12-20 RX ORDER — HYDROMORPHONE HYDROCHLORIDE 2 MG/ML
0.2 INJECTION, SOLUTION INTRAMUSCULAR; INTRAVENOUS; SUBCUTANEOUS EVERY 5 MIN PRN
Status: DISCONTINUED | OUTPATIENT
Start: 2021-12-20 | End: 2021-12-20

## 2021-12-20 RX ORDER — ONDANSETRON 2 MG/ML
4 INJECTION INTRAMUSCULAR; INTRAVENOUS DAILY PRN
Status: DISCONTINUED | OUTPATIENT
Start: 2021-12-20 | End: 2021-12-23 | Stop reason: HOSPADM

## 2021-12-20 RX ORDER — PROCHLORPERAZINE EDISYLATE 5 MG/ML
5 INJECTION INTRAMUSCULAR; INTRAVENOUS EVERY 30 MIN PRN
Status: DISCONTINUED | OUTPATIENT
Start: 2021-12-20 | End: 2021-12-23 | Stop reason: HOSPADM

## 2021-12-20 RX ORDER — ONDANSETRON 2 MG/ML
INJECTION INTRAMUSCULAR; INTRAVENOUS
Status: DISCONTINUED | OUTPATIENT
Start: 2021-12-20 | End: 2021-12-20

## 2021-12-20 RX ORDER — SODIUM CHLORIDE 0.9 % (FLUSH) 0.9 %
10 SYRINGE (ML) INJECTION
Status: DISCONTINUED | OUTPATIENT
Start: 2021-12-20 | End: 2021-12-23 | Stop reason: HOSPADM

## 2021-12-20 RX ORDER — DEXMEDETOMIDINE HYDROCHLORIDE 100 UG/ML
INJECTION, SOLUTION INTRAVENOUS
Status: DISCONTINUED | OUTPATIENT
Start: 2021-12-20 | End: 2021-12-20

## 2021-12-20 RX ADMIN — HYDROMORPHONE HYDROCHLORIDE 0.2 MG: 2 INJECTION, SOLUTION INTRAMUSCULAR; INTRAVENOUS; SUBCUTANEOUS at 02:12

## 2021-12-20 RX ADMIN — DIPHENOXYLATE HYDROCHLORIDE AND ATROPINE SULFATE 1 TABLET: 2.5; .025 TABLET ORAL at 10:12

## 2021-12-20 RX ADMIN — FENTANYL CITRATE 50 MCG: 50 INJECTION, SOLUTION INTRAMUSCULAR; INTRAVENOUS at 12:12

## 2021-12-20 RX ADMIN — DEXMEDETOMIDINE HYDROCHLORIDE 10 MCG: 100 INJECTION, SOLUTION INTRAVENOUS at 12:12

## 2021-12-20 RX ADMIN — Medication 1 PACKET: at 06:12

## 2021-12-20 RX ADMIN — PHENYLEPHRINE HYDROCHLORIDE 100 MCG: 10 INJECTION INTRAVENOUS at 01:12

## 2021-12-20 RX ADMIN — LOPERAMIDE HYDROCHLORIDE 4 MG: 2 CAPSULE ORAL at 10:12

## 2021-12-20 RX ADMIN — DEXMEDETOMIDINE HYDROCHLORIDE 4 MCG: 100 INJECTION, SOLUTION INTRAVENOUS at 12:12

## 2021-12-20 RX ADMIN — DEXMEDETOMIDINE HYDROCHLORIDE 8 MCG: 100 INJECTION, SOLUTION INTRAVENOUS at 12:12

## 2021-12-20 RX ADMIN — Medication 1 PACKET: at 08:12

## 2021-12-20 RX ADMIN — FENTANYL CITRATE 25 MCG: 50 INJECTION, SOLUTION INTRAMUSCULAR; INTRAVENOUS at 12:12

## 2021-12-20 RX ADMIN — LOPERAMIDE HYDROCHLORIDE 4 MG: 2 CAPSULE ORAL at 06:12

## 2021-12-20 RX ADMIN — INSULIN ASPART 8 UNITS: 100 INJECTION, SOLUTION INTRAVENOUS; SUBCUTANEOUS at 06:12

## 2021-12-20 RX ADMIN — DEXMEDETOMIDINE HYDROCHLORIDE 8 MCG: 100 INJECTION, SOLUTION INTRAVENOUS at 01:12

## 2021-12-20 RX ADMIN — LOPERAMIDE HYDROCHLORIDE 4 MG: 2 CAPSULE ORAL at 08:12

## 2021-12-20 RX ADMIN — PIPERACILLIN AND TAZOBACTAM 4.5 G: 4; .5 INJECTION, POWDER, LYOPHILIZED, FOR SOLUTION INTRAVENOUS; PARENTERAL at 05:12

## 2021-12-20 RX ADMIN — KETAMINE HYDROCHLORIDE 20 MG: 100 INJECTION, SOLUTION, CONCENTRATE INTRAMUSCULAR; INTRAVENOUS at 12:12

## 2021-12-20 RX ADMIN — Medication 6 MG: at 12:12

## 2021-12-20 RX ADMIN — PIPERACILLIN AND TAZOBACTAM 4.5 G: 4; .5 INJECTION, POWDER, LYOPHILIZED, FOR SOLUTION INTRAVENOUS; PARENTERAL at 08:12

## 2021-12-20 RX ADMIN — Medication 1 PACKET: at 05:12

## 2021-12-20 RX ADMIN — MORPHINE SULFATE 2 MG: 4 INJECTION, SOLUTION INTRAMUSCULAR; INTRAVENOUS at 06:12

## 2021-12-20 RX ADMIN — INSULIN DETEMIR 20 UNITS: 100 INJECTION, SOLUTION SUBCUTANEOUS at 08:12

## 2021-12-20 RX ADMIN — ONDANSETRON 4 MG: 2 INJECTION, SOLUTION INTRAMUSCULAR; INTRAVENOUS at 12:12

## 2021-12-20 RX ADMIN — CALCIUM CARBONATE (ANTACID) CHEW TAB 500 MG 1000 MG: 500 CHEW TAB at 10:12

## 2021-12-20 RX ADMIN — HYDROCODONE BITARTRATE AND ACETAMINOPHEN 1 TABLET: 5; 325 TABLET ORAL at 12:12

## 2021-12-20 RX ADMIN — HYDROCODONE BITARTRATE AND ACETAMINOPHEN 1 TABLET: 5; 325 TABLET ORAL at 08:12

## 2021-12-20 RX ADMIN — ENOXAPARIN SODIUM 40 MG: 40 INJECTION SUBCUTANEOUS at 06:12

## 2021-12-20 RX ADMIN — KETAMINE HYDROCHLORIDE 10 MG: 100 INJECTION, SOLUTION, CONCENTRATE INTRAMUSCULAR; INTRAVENOUS at 12:12

## 2021-12-20 RX ADMIN — MIDAZOLAM HYDROCHLORIDE 2 MG: 1 INJECTION, SOLUTION INTRAMUSCULAR; INTRAVENOUS at 12:12

## 2021-12-20 RX ADMIN — PROPOFOL 10 MG: 10 INJECTION, EMULSION INTRAVENOUS at 12:12

## 2021-12-20 NOTE — ASSESSMENT & PLAN NOTE
36F with h/o morbid obesity (BMI 85), DM, HTN, DANG, HLD admitted 11/24 with right thigh pain, erythema, tenderness and found to be in DKA. Imaging reportedly couldn't be obtained 2/2 size. Noted to have necrotizing soft tissue infection of R upper leg, now s/p  serail debridements with surgery on 11/30, 12/2, 12/4, and 12/5.(partial closure with vac placment on 12/8). Surgical Cultures: 11/30 with - amp sensitive E.faecalis. Cultures 12/4 with Enteroocccus and Enterobacter. BCX- NGTD. Path was sent 12/4 and pending. Has been on vancomycin and Zosyn. Ampho added 12/4 given surgery's reported concern for mucor but that was stopped. ID consulted for abx recs: Zosyn x 2 weeks which ended 12/18. The patient had fever and tachycardia over the weekend and Zosyn was restarted. ID is reconsulted. The patient states that her pain is mproved but still present. She has diarrhea but no dysuria, cough, or other complaints.    Impression: Fever of unclear etiology: wound v other (UTI, CDI, etc)    Recs: 1) follow fever and culture results, 2) add CDI testing given diarrhea (last tested 12/16), 3) continue Zosyn for now

## 2021-12-20 NOTE — PT/OT/SLP PROGRESS
Physical Therapy      Patient Name:  Shauna Queen   MRN:  [id number]    Patient not seen today secondary to in OR for wound vac change. Will follow up..

## 2021-12-20 NOTE — NURSING
Wound vac suction would not go about 50mmHg. Placed to wall continuous suction at 125mmHg. Output in suction canister 50ml.

## 2021-12-20 NOTE — OP NOTE
.  [facility]  General Surgery  Operative Note    SUMMARY     Date of Procedure: 12/20/2021     Procedure: Procedure(s) (LRB):  DEBRIDEMENT, WOUND (Right)       Surgeon(s) and Role:     * Jorge Dupree MD - Primary    Assisting Surgeon: None    Pre-Operative Diagnosis: Necrotizing soft tissue infection [M79.89]  Necrotizing fasciitis [M72.6]    Post-Operative Diagnosis: Post-Op Diagnosis Codes:     * Necrotizing soft tissue infection [M79.89]     * Necrotizing fasciitis [M72.6]    Anesthesia: Choice    Indications for Procedure: chronic wound    Procedure in Detail: After informed consent was confirmed, the patient was taken to the OR and placed supine on the OR table.  After sedation was administered, the patient's old wound vac was removed. The patient was then prepped and draped in standard surgical fashion.      The wound was inspected.  Good granulation tissue was present over 80% of wound. White sponge sewn together end to end was placed in a tunnel underlying the suture line down her leg.  Black sponges were then placed circumferentially into the wound.  The vac was applied, concluding the procedure.    Significant Surgical Tasks Conducted by the Assistant(s), if Applicable: n/a    Estimated Blood Loss (EBL): min           Implants: * No implants in log *    Specimens:   Specimen (24h ago, onward)            None                  Condition: Good    Disposition: PACU - hemodynamically stable.    Attestation: I was present and scrubbed for the entire procedure.

## 2021-12-20 NOTE — NURSING TRANSFER
Nursing Transfer Note      12/20/2021     Reason patient is being transferred: Inpatient Admit    Transfer From: OR; To: Med-Surg room 433    Transfer via bariatric bed    Transfer with wound vac    Transported by Surgery transport x3 (Taylor Santacruz & Zohreh)    Medicines sent: LR infusing    Any special needs or follow-up needed: None    Chart send with patient: Yes    Notified: patient    Patient reassessed at: 1408 on 12/20/2021      Pt is AAO x4 and denies having any pain at current time. Pt appears to be in no apparent distress at current time. Pt has IV fluid infusing and is on room air. Incision to inner right thing which is dressed has grey foam, transparent film and a wound vac which is c/d/I. Vitals are stable and Nuvia score documented per flow sheet. Pt has no personal belongings in PACU at the bedside.

## 2021-12-20 NOTE — PROGRESS NOTES
"       [facility] [place] [facility] Medicine  Telemedicine Progress Note    Patient Name: Shauna Queen  MRN: [id number]  Patient Class: IP- Inpatient   Admission Date: 11/30/2021  Length of Stay: 19 days  Attending Physician: Milvia García MD  Primary Care Provider: Jorge Dennis MD          Subjective:     Principal Problem:Necrotizing fasciitis        HPI:  Ms. Queen is a 36-year-old presents to the ED from outside hospital for abscess.  She has initially presented to outside hospital for DKA which resolved.  Patient was unaware of why she was here in July prompted her.  Per the checkout are received: "36-year-old female history of diabetes mellitus, sleep apnea on CPAP, schizophrenia, hypertension, and hyperlipidemia admitted to [facility] on November 24 from [facility] for treatment of diabetic ketoacidosis. She was initiated on an insulin infusion. DKA improved and she was transitioned off the insulin infusion. She again developed DKA and was placed back on the insulin infusion for a period of time. Currently she is off the insulin infusion and on subcutaneous insulin. She was noted to have right upper/inner thigh pain with erythema, induration, and tenderness to palpation. She was treated with IV clindamycin and vancomycin. Due to her size ((BMI 74.32) they are unable to perform CT or MRI. General Surgery there performed an incision and drainage with purulent material found. She was taken to the OR on November 29 for a second-look at the thigh wound. They were only able to do superficial debridement. Postprocedure she extubated to 2 L nasal cannula and was awake with oxygen saturation 100 percent. Surgery at their facility recommended transfer to a facility better able to perform procedures on a patient with her BMI. Referring provider spoke with General Surgery at [facility]. [facility] Medicine was asked to admit for further treatment. I spoke with her current " "attending. He noted she has no alteration in mentation. Lower extremity is intact from a neurovascular standpoint. She remains in the ICU."      Overview/Hospital Course:  37 y/o female admitted to the hospital as a transfer secondary to R thigh abscess and a BMI of Body mass index is 84.77 kg/m².   Patient transferred here for surgical evaluation.  Patient went for surgical debridement on 11/30/21. Patient continued on Abx per ID recommendations. Blood cultures were NG. Underwent another debridement on 12/2 and again on 12/4 - noting significant necrotizing soft tissue infections concerning for tracking down into her knee. On broad spectrum antibiotics. To OR with Orthopedic surgery to explore right knee on 12/5. Patient again went for surgery on 12/6.  Wound closure with wound vac placement on 12/8.      Subjective/Interval History     Patient still with diarrhea, scheduled immodium with PRN lomotil. Febrile and tachycardic overnight, blood cultures, U/A, CXR ordered and zosyn resumed. ID consulted. Plan for patient go to back to the OR with surgery this week.      Review of Systems    Constitutional: Negative for chills, fatigue, fever.   HENT: Negative for sore throat, trouble swallowing.    Eyes: Negative for photophobia, visual disturbance.   Respiratory: Negative for cough, shortness of breath.    Cardiovascular: Negative for chest pain, palpitations, leg swelling.   Gastrointestinal: Negative for abdominal pain, constipation, nausea, vomiting. +diarrhea.   Endocrine: Negative for cold intolerance, heat intolerance.   Genitourinary: Negative for dysuria, frequency.   Musculoskeletal: Negative for arthralgias, myalgias.   Skin: Negative for rash, wound, erythema   Neurological: Negative for dizziness, syncope, weakness, light-headedness.   Psychiatric/Behavioral: Negative for confusion, hallucinations, anxiety    Medications  Scheduled Meds:   calcium carbonate  1,000 mg Oral Daily    enoxaparin  40 mg " Subcutaneous Daily    insulin aspart U-100  8 Units Subcutaneous TIDWM    insulin detemir U-100  20 Units Subcutaneous BID    loperamide  4 mg Oral QID    piperacillin-tazobactam (ZOSYN) IVPB  4.5 g Intravenous Q8H    piperacillin-tazobactam 4.5 g in dextrose 5 % 100 mL IVPB (ready to mix system)        potassium, sodium phosphates  1 packet Oral QID (AC & HS)     Continuous Infusions:  PRN Meds:.acetaminophen, ALPRAZolam, dextrose 50%, diphenoxylate-atropine 2.5-0.025 mg, glucagon (human recombinant), HYDROcodone-acetaminophen, influenza, insulin aspart U-100, melatonin, morphine, ondansetron, prochlorperazine, sars-cov-2 (covid-19), sodium chloride 0.9%    Objective    Physical Examination    Temp:  [97.4 °F (36.3 °C)-101.5 °F (38.6 °C)]   Pulse:  []   Resp:  [18-20]   BP: (104-132)/(64-81)   SpO2:  [97 %-100 %]     Constitutional: NAD, conversant  Head: NC, AT  Eyes: No scleral icterus.  No eye discharge  Pulmonary/Chest: Effort normal. No respiratory distress on room air  GI: No distension  Musculoskeletal: Moving all extremities  Neurological: Alert.  Oriented to person, place, and time.   Psychiatric: Normal behavior, mood, and affect  Skin: No visible erythema or cyanosis    CBC  Recent Labs   Lab 12/16/21  0938 12/17/21  0327 12/19/21  0423   WBC 10.06 8.03 8.42   HGB 8.6* 8.0* 7.9*   HCT 28.3* 25.9* 26.6*    328 374     CMP  Recent Labs   Lab 12/16/21  0938 12/17/21  0327 12/19/21  0423    142 144   K 3.0* 3.0* 3.3*    106 106   CO2 29 27 29   BUN 10 10 9   CREATININE 0.8 0.7 0.7   * 85 50*   CALCIUM 8.2* 7.8* 7.6*           Assessment/Plan:      * Necrotizing fasciitis  - Status post I&D with General surgery at outside hospital, did not feel like they completely removed abscess.  - Underwent debridement in OR 11/30, 12/2, and 12/4, 12/5 - no extension into knee.    - OR again on 12/6, 12/8 and 12/11.   - OR again on 12/14 for debridement of necrotic skin, washout,  partial closure, wound VAC change and then again on 12/17/21  - Continued on Zosyn till 12/18 for 2 weeks of treatment per ID. (estimated end date: 12/18/21)  - Pain control.   - to LTAC once wound vac can be changed at bedside.     Antibiotics (From admission, onward)            Start     Stop Route Frequency Ordered    12/04/21 1530  piperacillin-tazobactam 4.5 g in dextrose 5 % 100 mL IVPB (ready to mix system)         12/19 0959 IV Every 8 hours (non-standard times) 12/04/21 1429        Cultures were taken-   Microbiology Results (last 7 days)     Procedure Component Value Units Date/Time    Fungus culture [[id number]] Collected: 12/04/21 1312    Order Status: Completed Specimen: Wound from Leg, Right Updated: 12/13/21 1409     Fungus (Mycology) Culture No fungal growth to date    Narrative:      thigh    Fungus culture [[id number]] Collected: 11/30/21 1635    Order Status: Completed Specimen: Abscess from Leg, Right Updated: 12/13/21 1409     Fungus (Mycology) Culture No fungal growth to date    Narrative:      Right Inner thigh abcess    Culture, Anaerobe [[id number]]  (Abnormal) Collected: 12/04/21 1312    Order Status: Completed Specimen: Wound from Leg, Right Updated: 12/11/21 1350     Anaerobic Culture BACTEROIDES THETAIOTAOMICRON  Few      Narrative:      thigh          Obesity  Morbid obesity  Body mass index is 84.72 kg/m².          Hypophosphatemia  Replace as needed.      Anemia of chronic disease  With anemia of acute blood loss as expected from multiple surgeries.  Transfuse for Hgb<7      Diarrhea     Cdiff Negative, PRN lomotil       Debility  PT/OT once more stable      DANG (obstructive sleep apnea)  Patient likely has component of OHS as well  - outpatient sleep study      DM (diabetes mellitus)  A1c:   Lab Results   Component Value Date    HGBA1C >14.0 (H) 12/01/2021     Meds: basal bolus insulin + SSI PRN to maintain goal 140-180  ADA diet, accuchecks ACHS, hypoglycemic protocol  Continue current  management.      VTE Risk Mitigation (From admission, onward)         Ordered     enoxaparin injection 40 mg  Daily         12/04/21 2306     IP VTE LOW RISK PATIENT  Once         11/30/21 0403     Place sequential compression device  Until discontinued         11/30/21 0403                      I have assessed these finding virtually using telemed platform and with assistance of bedside nurse       The attending portion of this evaluation, treatment, and documentation was performed per Milvia García MD via Telemedicine AudioVisual using the secure [employer] software platform with 2 way audio/video. The provider was located off-site and the patient is located in the hospital. The aforementioned video software was utilized to document the relevant history and physical exam    Milvia García MD  Department of Hospital Medicine   [facility]

## 2021-12-20 NOTE — SUBJECTIVE & OBJECTIVE
Interval History:     36F with h/o morbid obesity (BMI 85), DM, HTN, DANG, HLD admitted 11/24 with right thigh pain, erythema, tenderness and found to be in DKA. Imaging reportedly couldn't be obtained 2/2 size. Noted to have necrotizing soft tissue infection of R upper leg, now s/p  serail debridements with surgery on 11/30, 12/2, 12/4, and 12/5.(partial closure with vac placment on 12/8). Surgical Cultures: 11/30 with - amp sensitive E.faecalis. Cultures 12/4 with Enteroocccus and Enterobacter. BCX- NGTD. Path was sent 12/4 and pending. Has been on vancomycin and Zosyn. Ampho added 12/4 given surgery's reported concern for mucor but that was stopped. ID consulted for abx recs: Zosyn x 2 weeks which ended 12/18. The patient had fever and tachycardia over the weekend and Zosyn was restarted. ID is reconsulted. The patient states that her pain is mproved but still present. She has diarrhea but no dysuria, cough, or other complaints.    Review of Systems   Constitutional: Positive for fever. Negative for chills.   Skin: Positive for wound.   All other systems reviewed and are negative.    Objective:     Vital Signs (Most Recent):  Temp: 98.8 °F (37.1 °C) (12/20/21 0905)  Pulse: 103 (12/20/21 0905)  Resp: 18 (12/20/21 0905)  BP: 115/70 (12/20/21 0905)  SpO2: 100 % (12/20/21 0905) Vital Signs (24h Range):  Temp:  [97.6 °F (36.4 °C)-99.8 °F (37.7 °C)] 98.8 °F (37.1 °C)  Pulse:  [101-119] 103  Resp:  [18-20] 18  SpO2:  [97 %-100 %] 100 %  BP: (115-143)/(70-86) 115/70     Weight: (!) 224 kg (493 lb 13.3 oz)  Body mass index is 84.72 kg/m².    Estimated Creatinine Clearance: 216.8 mL/min (based on SCr of 0.7 mg/dL).    Physical Exam  Vitals and nursing note reviewed.   Constitutional:       General: She is not in acute distress.     Appearance: Normal appearance. She is not ill-appearing, toxic-appearing or diaphoretic.   HENT:      Head: Normocephalic and atraumatic.      Nose: Nose normal.   Eyes:      Extraocular  Movements: Extraocular movements intact.      Pupils: Pupils are equal, round, and reactive to light.   Cardiovascular:      Rate and Rhythm: Normal rate and regular rhythm.   Musculoskeletal:         General: Swelling, tenderness and deformity present.      Right lower leg: Edema present.   Skin:     Findings: No erythema or rash.   Neurological:      General: No focal deficit present.      Mental Status: She is alert and oriented to person, place, and time. Mental status is at baseline.   Psychiatric:         Mood and Affect: Mood normal.         Behavior: Behavior normal.         Thought Content: Thought content normal.         Judgment: Judgment normal.         Significant Labs:   Blood Culture:   Recent Labs   Lab 11/30/21  0436 12/19/21  1230 12/19/21  1302   LABBLOO No Growth after 4 days.   No Growth after 4 days.  No Growth to date No Growth to date     CBC:   Recent Labs   Lab 12/19/21  0423   WBC 8.42   HGB 7.9*   HCT 26.6*        CMP:   Recent Labs   Lab 12/19/21  0423      K 3.3*      CO2 29   GLU 50*   BUN 9   CREATININE 0.7   CALCIUM 7.6*   ANIONGAP 9   EGFRNONAA >60     Urine Culture:   Recent Labs   Lab 12/19/21  1400   LABURIN No growth to date     Wound Culture:   Recent Labs   Lab 11/30/21  1635 12/04/21  1312   LABAERO ENTEROCOCCUS FAECALIS  Many  * ENTEROBACTER CLOACAE  Few  *  ENTEROCOCCUS FAECALIS  Moderate  *       Significant Imaging: None

## 2021-12-20 NOTE — PLAN OF CARE
Pt free from falls, injury or any further trauma throughout shift. Pt AAO x 4. Wound vac failed last night, switched to wall suction 125mmHg. Continued medications as ordered. Complaints of pain, controlled with medications. Pt in no distress. Bed at lowest position, bed alarm on, call light in reach, instruct pt to call for assistance. Will cont to monitor.      Problem: Adult Inpatient Plan of Care  Goal: Plan of Care Review  Outcome: Ongoing, Progressing     Problem: Bariatric Environmental Safety  Goal: Safety Maintained with Care  Outcome: Ongoing, Progressing     Problem: Skin Injury Risk Increased  Goal: Skin Health and Integrity  Outcome: Ongoing, Progressing     Problem: Wound  Goal: Optimal Wound Healing  Outcome: Ongoing, Progressing     Problem: Fall Injury Risk  Goal: Absence of Fall and Fall-Related Injury  Outcome: Ongoing, Progressing

## 2021-12-20 NOTE — PLAN OF CARE
TN spoke with Khloe with [facility], pending review.     Per care port, unable to accept patient in [facility],  Referral sent  to [facility].    [facility], not in network with [facility]; will need 3 in network denials.     1:15pm Per Khloe with [facility], no bed available. TN to follow up.       12/20/21 1224   Discharge Reassessment   Assessment Type Discharge Planning Reassessment   Did the patient's condition or plan change since previous assessment? No   Name(s) and Number(s) Deisy Elliott (mother) [phone]   Communicated STEVEN with patient/caregiver Date not available/Unable to determine   Discharge Plan A Long-term acute care facility (LTAC)   DME Needed Upon Discharge    (TBD)   Discharge Barriers Identified Bariatric   Why the patient remains in the hospital Requires continued medical care   Post-Acute Status   Post-Acute Authorization Placement   Post-Acute Placement Status Pending payor medical review/second level review   Coverage DANIELLE   Patient choice form signed by patient/caregiver List from System Post-Acute Care   Discharge Delays (!) Post-Acute Set-up

## 2021-12-20 NOTE — PROGRESS NOTES
"[facility]  Infectious Disease  Progress Note    Patient Name: Shauna Queen  MRN: [id number]  Admission Date: 11/30/2021  Length of Stay: 20 days  Attending Physician: Milvia García MD  Primary Care Provider: Jorge Dennis MD    Isolation Status: No active isolations  Assessment/Plan:      * Necrotizing fasciitis  36F with h/o morbid obesity (BMI 85), DM, HTN, DANG, HLD admitted 11/24 with right thigh pain, erythema, tenderness and found to be in DKA. Imaging reportedly couldn't be obtained 2/2 size. Noted to have necrotizing soft tissue infection of R upper leg, now s/p  serail debridements with surgery on 11/30, 12/2, 12/4, and 12/5.(partial closure with vac placment on 12/8). Surgical Cultures: 11/30 with - amp sensitive E.faecalis. Cultures 12/4 with Enteroocccus and Enterobacter. BCX- NGTD. Path was sent 12/4 and pending. Has been on vancomycin and Zosyn. Ampho added 12/4 given surgery's reported concern for mucor but that was stopped. ID consulted for abx recs: Zosyn x 2 weeks which ended 12/18. The patient had fever and tachycardia over the weekend and Zosyn was restarted. ID is reconsulted. The patient states that her pain is mproved but still present. She has diarrhea but no dysuria, cough, or other complaints.    Impression: Fever of unclear etiology: wound v other (UTI, CDI, etc)    Recs: 1) follow fever and culture results, 2) add CDI testing given diarrhea (last tested 12/16), 3) continue Zosyn for now    Darion Pettit MD  Infectious Disease  [facility]    Subjective:     Principal Problem:Necrotizing fasciitis    HPI:   36F with h/o morbid obesity (BMI 85), DM, HTN, DANG, HLD admitted to OSH on 11/24 with R thigh pain, erythema, tenderness and found to be in DKA. Imaging reportedly couldn't be obtained 2/2 size. Surgery performed I&D and purulent material found and was taken to OR 11/29 for superficial debridement. Then was transferred to [facility] for a "facility " "better able to perform procedures on a patient with her BMI".      Patient is awake and alert in ICU. Reports her leg wound "popped up" a day before she arrived at OSH. Says that it started as a boil on her thigh and then it "busted" and she had been applying warm compresses and peroxide. Reports she has been ambulatory prior to arrival, but  says she feels like she is going to fall when she tries to walk here. Denies any water or environmental exposures to wound. Denies pet or animal contacts    Has undergone several I&D with surgery on 11/30, 12/2, 12/4, 12/5 for Necrotizing soft tissue infection. Per op notes they saw a "significant amount of foul smelling, necrotic skin and fat"  and "purulent drainage was found tracking along fascial planes"      Surgical Cultures: 11/30 with - amp sensitive e.faecalis. cultures 12/4 with enteroocccus and enterobacter  BCX- NGTD    Path was sent 12/4 and pending    Has been on vancomycin and zosyn. Ampho added 12/4 for concern for mucor     ID consulted for abx recs    Component 2 d ago   Aerobic Bacterial Culture  Abnormal   ENTEROBACTER CLOACAE   Few   P      Aerobic Bacterial Culture  Abnormal   ENTEROCOCCUS SPECIES   Moderate   Identification and susceptibility pending   P      Resulting Agency [facility]          Susceptibility     Enterobacter cloacae     CULTURE, AEROBIC  (SPECIFY SOURCE) (Preliminary)     Cefepime <=2 mcg/mL Sensitive     Ceftriaxone <=1 mcg/mL Sensitive     Ciprofloxacin <=1 mcg/mL Sensitive     Ertapenem <=0.5 mcg/mL Sensitive     Gentamicin <=4 mcg/mL Sensitive     Levofloxacin <=2 mcg/mL Sensitive     Meropenem <=1 mcg/mL Sensitive     Minocycline <=4 mcg/mL Sensitive     Piperacillin/Tazo <=16 mcg/mL Sensitive     Tetracycline <=4 mcg/mL Sensitive     Tobramycin <=4 mcg/mL Sensitive     Trimeth/Sulfa >2/38 mcg/mL Resistant               Specimen Information: Leg, Right; Abscess         0 Result Notes         (important suggestion)  Newer results are " available. Click to view them now.     Component 6 d ago   Aerobic Bacterial Culture  Abnormal   ENTEROCOCCUS FAECALIS   Many     Resulting Agency [facility]          Susceptibility     Enterococcus faecalis     CULTURE, AEROBIC  (SPECIFY SOURCE)     Ampicillin <=2 mcg/mL Sensitive     Gentamicin Synergy Screen <=500 mcg/mL Sensitive     Tetracycline >8 mcg/mL Resistant     Vancomycin 2 mcg/mL Sensitive               Interval History:     36F with h/o morbid obesity (BMI 85), DM, HTN, DANG, HLD admitted 11/24 with right thigh pain, erythema, tenderness and found to be in DKA. Imaging reportedly couldn't be obtained 2/2 size. Noted to have necrotizing soft tissue infection of R upper leg, now s/p  serail debridements with surgery on 11/30, 12/2, 12/4, and 12/5.(partial closure with vac placment on 12/8). Surgical Cultures: 11/30 with - amp sensitive E.faecalis. Cultures 12/4 with Enteroocccus and Enterobacter. BCX- NGTD. Path was sent 12/4 and pending. Has been on vancomycin and Zosyn. Ampho added 12/4 given surgery's reported concern for mucor but that was stopped. ID consulted for abx recs: Zosyn x 2 weeks which ended 12/18. The patient had fever and tachycardia over the weekend and Zosyn was restarted. ID is reconsulted. The patient states that her pain is mproved but still present. She has diarrhea but no dysuria, cough, or other complaints.    Review of Systems   Constitutional: Positive for fever. Negative for chills.   Skin: Positive for wound.   All other systems reviewed and are negative.    Objective:     Vital Signs (Most Recent):  Temp: 98.8 °F (37.1 °C) (12/20/21 0905)  Pulse: 103 (12/20/21 0905)  Resp: 18 (12/20/21 0905)  BP: 115/70 (12/20/21 0905)  SpO2: 100 % (12/20/21 0905) Vital Signs (24h Range):  Temp:  [97.6 °F (36.4 °C)-99.8 °F (37.7 °C)] 98.8 °F (37.1 °C)  Pulse:  [101-119] 103  Resp:  [18-20] 18  SpO2:  [97 %-100 %] 100 %  BP: (115-143)/(70-86) 115/70     Weight: (!) 224 kg (493 lb 13.3 oz)  Body  mass index is 84.72 kg/m².    Estimated Creatinine Clearance: 216.8 mL/min (based on SCr of 0.7 mg/dL).    Physical Exam  Vitals and nursing note reviewed.   Constitutional:       General: She is not in acute distress.     Appearance: Normal appearance. She is not ill-appearing, toxic-appearing or diaphoretic.   HENT:      Head: Normocephalic and atraumatic.      Nose: Nose normal.   Eyes:      Extraocular Movements: Extraocular movements intact.      Pupils: Pupils are equal, round, and reactive to light.   Cardiovascular:      Rate and Rhythm: Normal rate and regular rhythm.   Musculoskeletal:         General: Swelling, tenderness and deformity present.      Right lower leg: Edema present.   Skin:     Findings: No erythema or rash.   Neurological:      General: No focal deficit present.      Mental Status: She is alert and oriented to person, place, and time. Mental status is at baseline.   Psychiatric:         Mood and Affect: Mood normal.         Behavior: Behavior normal.         Thought Content: Thought content normal.         Judgment: Judgment normal.         Significant Labs:   Blood Culture:   Recent Labs   Lab 11/30/21  0436 12/19/21  1230 12/19/21  1302   LABBLOO No Growth after 4 days.   No Growth after 4 days.  No Growth to date No Growth to date     CBC:   Recent Labs   Lab 12/19/21  0423   WBC 8.42   HGB 7.9*   HCT 26.6*        CMP:   Recent Labs   Lab 12/19/21  0423      K 3.3*      CO2 29   GLU 50*   BUN 9   CREATININE 0.7   CALCIUM 7.6*   ANIONGAP 9   EGFRNONAA >60     Urine Culture:   Recent Labs   Lab 12/19/21  1400   LABURIN No growth to date     Wound Culture:   Recent Labs   Lab 11/30/21  1635 12/04/21  1312   LABAERO ENTEROCOCCUS FAECALIS  Many  * ENTEROBACTER CLOACAE  Few  *  ENTEROCOCCUS FAECALIS  Moderate  *       Significant Imaging: None

## 2021-12-20 NOTE — PT/OT/SLP PROGRESS
Occupational Therapy      Patient Name:  Shauna Queen   MRN:  [id number]    Patient not seen today secondary to Unavailable (Comment) (off the unit for procedure). Will follow-up able.    12/20/2021

## 2021-12-20 NOTE — NURSING
Wound vac suction could not go above 75mmHg. Reinforced dressing again. Wound vac suctioning is at 125mmHg.

## 2021-12-20 NOTE — NURSING
Removed clear dressing and reinforced with new clear dressings. Wound vac now suctioning at 125mmHg.

## 2021-12-21 LAB
ANION GAP SERPL CALC-SCNC: 8 MMOL/L (ref 8–16)
BACTERIA UR CULT: NO GROWTH
BASOPHILS # BLD AUTO: 0.04 K/UL (ref 0–0.2)
BASOPHILS NFR BLD: 0.4 % (ref 0–1.9)
BUN SERPL-MCNC: 8 MG/DL (ref 6–20)
C DIFF GDH STL QL: NEGATIVE
C DIFF TOX A+B STL QL IA: NEGATIVE
CALCIUM SERPL-MCNC: 7.9 MG/DL (ref 8.7–10.5)
CHLORIDE SERPL-SCNC: 105 MMOL/L (ref 95–110)
CO2 SERPL-SCNC: 29 MMOL/L (ref 23–29)
CREAT SERPL-MCNC: 0.8 MG/DL (ref 0.5–1.4)
DIFFERENTIAL METHOD: ABNORMAL
EOSINOPHIL # BLD AUTO: 0.2 K/UL (ref 0–0.5)
EOSINOPHIL NFR BLD: 2.4 % (ref 0–8)
ERYTHROCYTE [DISTWIDTH] IN BLOOD BY AUTOMATED COUNT: 16.3 % (ref 11.5–14.5)
EST. GFR  (AFRICAN AMERICAN): >60 ML/MIN/1.73 M^2
EST. GFR  (NON AFRICAN AMERICAN): >60 ML/MIN/1.73 M^2
GLUCOSE SERPL-MCNC: 139 MG/DL (ref 70–110)
HCT VFR BLD AUTO: 26.3 % (ref 37–48.5)
HGB BLD-MCNC: 8.1 G/DL (ref 12–16)
IMM GRANULOCYTES # BLD AUTO: 0.06 K/UL (ref 0–0.04)
IMM GRANULOCYTES NFR BLD AUTO: 0.7 % (ref 0–0.5)
LYMPHOCYTES # BLD AUTO: 1.9 K/UL (ref 1–4.8)
LYMPHOCYTES NFR BLD: 20.2 % (ref 18–48)
MCH RBC QN AUTO: 28.7 PG (ref 27–31)
MCHC RBC AUTO-ENTMCNC: 30.8 G/DL (ref 32–36)
MCV RBC AUTO: 93 FL (ref 82–98)
MONOCYTES # BLD AUTO: 1.2 K/UL (ref 0.3–1)
MONOCYTES NFR BLD: 13.3 % (ref 4–15)
NEUTROPHILS # BLD AUTO: 5.8 K/UL (ref 1.8–7.7)
NEUTROPHILS NFR BLD: 63 % (ref 38–73)
NRBC BLD-RTO: 0 /100 WBC
PLATELET # BLD AUTO: 367 K/UL (ref 150–450)
PMV BLD AUTO: 8.9 FL (ref 9.2–12.9)
POCT GLUCOSE: 120 MG/DL (ref 70–110)
POCT GLUCOSE: 132 MG/DL (ref 70–110)
POCT GLUCOSE: 157 MG/DL (ref 70–110)
POCT GLUCOSE: 162 MG/DL (ref 70–110)
POTASSIUM SERPL-SCNC: 3.4 MMOL/L (ref 3.5–5.1)
RBC # BLD AUTO: 2.82 M/UL (ref 4–5.4)
SODIUM SERPL-SCNC: 142 MMOL/L (ref 136–145)
WBC # BLD AUTO: 9.17 K/UL (ref 3.9–12.7)

## 2021-12-21 PROCEDURE — 25000003 PHARM REV CODE 250: Performed by: SURGERY

## 2021-12-21 PROCEDURE — 63600175 PHARM REV CODE 636 W HCPCS: Performed by: SURGERY

## 2021-12-21 PROCEDURE — 94660 CPAP INITIATION&MGMT: CPT

## 2021-12-21 PROCEDURE — 36415 COLL VENOUS BLD VENIPUNCTURE: CPT | Performed by: SURGERY

## 2021-12-21 PROCEDURE — 94761 N-INVAS EAR/PLS OXIMETRY MLT: CPT

## 2021-12-21 PROCEDURE — 85025 COMPLETE CBC W/AUTO DIFF WBC: CPT | Performed by: SURGERY

## 2021-12-21 PROCEDURE — 99900035 HC TECH TIME PER 15 MIN (STAT)

## 2021-12-21 PROCEDURE — 25000003 PHARM REV CODE 250: Performed by: STUDENT IN AN ORGANIZED HEALTH CARE EDUCATION/TRAINING PROGRAM

## 2021-12-21 PROCEDURE — 11000001 HC ACUTE MED/SURG PRIVATE ROOM

## 2021-12-21 PROCEDURE — 80048 BASIC METABOLIC PNL TOTAL CA: CPT | Performed by: SURGERY

## 2021-12-21 RX ORDER — POTASSIUM CHLORIDE 20 MEQ/1
40 TABLET, EXTENDED RELEASE ORAL ONCE
Status: COMPLETED | OUTPATIENT
Start: 2021-12-21 | End: 2021-12-21

## 2021-12-21 RX ADMIN — HYDROCODONE BITARTRATE AND ACETAMINOPHEN 1 TABLET: 5; 325 TABLET ORAL at 10:12

## 2021-12-21 RX ADMIN — PIPERACILLIN AND TAZOBACTAM 4.5 G: 4; .5 INJECTION, POWDER, LYOPHILIZED, FOR SOLUTION INTRAVENOUS; PARENTERAL at 06:12

## 2021-12-21 RX ADMIN — ENOXAPARIN SODIUM 40 MG: 40 INJECTION SUBCUTANEOUS at 05:12

## 2021-12-21 RX ADMIN — INSULIN ASPART 2 UNITS: 100 INJECTION, SOLUTION INTRAVENOUS; SUBCUTANEOUS at 05:12

## 2021-12-21 RX ADMIN — LOPERAMIDE HYDROCHLORIDE 4 MG: 2 CAPSULE ORAL at 08:12

## 2021-12-21 RX ADMIN — Medication 1 PACKET: at 06:12

## 2021-12-21 RX ADMIN — INSULIN ASPART 2 UNITS: 100 INJECTION, SOLUTION INTRAVENOUS; SUBCUTANEOUS at 08:12

## 2021-12-21 RX ADMIN — CALCIUM CARBONATE (ANTACID) CHEW TAB 500 MG 1000 MG: 500 CHEW TAB at 08:12

## 2021-12-21 RX ADMIN — INSULIN ASPART 8 UNITS: 100 INJECTION, SOLUTION INTRAVENOUS; SUBCUTANEOUS at 08:12

## 2021-12-21 RX ADMIN — POTASSIUM CHLORIDE 40 MEQ: 1500 TABLET, EXTENDED RELEASE ORAL at 10:12

## 2021-12-21 RX ADMIN — INSULIN DETEMIR 20 UNITS: 100 INJECTION, SOLUTION SUBCUTANEOUS at 08:12

## 2021-12-21 RX ADMIN — PIPERACILLIN AND TAZOBACTAM 4.5 G: 4; .5 INJECTION, POWDER, LYOPHILIZED, FOR SOLUTION INTRAVENOUS; PARENTERAL at 02:12

## 2021-12-21 RX ADMIN — LOPERAMIDE HYDROCHLORIDE 4 MG: 2 CAPSULE ORAL at 05:12

## 2021-12-21 RX ADMIN — LOPERAMIDE HYDROCHLORIDE 4 MG: 2 CAPSULE ORAL at 09:12

## 2021-12-21 RX ADMIN — PIPERACILLIN AND TAZOBACTAM 4.5 G: 4; .5 INJECTION, POWDER, LYOPHILIZED, FOR SOLUTION INTRAVENOUS; PARENTERAL at 10:12

## 2021-12-21 RX ADMIN — Medication 1 PACKET: at 05:12

## 2021-12-21 RX ADMIN — Medication 1 PACKET: at 10:12

## 2021-12-21 RX ADMIN — LOPERAMIDE HYDROCHLORIDE 4 MG: 2 CAPSULE ORAL at 12:12

## 2021-12-21 RX ADMIN — INSULIN ASPART 8 UNITS: 100 INJECTION, SOLUTION INTRAVENOUS; SUBCUTANEOUS at 12:12

## 2021-12-21 RX ADMIN — INSULIN ASPART 8 UNITS: 100 INJECTION, SOLUTION INTRAVENOUS; SUBCUTANEOUS at 05:12

## 2021-12-21 RX ADMIN — HYDROCODONE BITARTRATE AND ACETAMINOPHEN 1 TABLET: 5; 325 TABLET ORAL at 06:12

## 2021-12-21 RX ADMIN — INSULIN DETEMIR 20 UNITS: 100 INJECTION, SOLUTION SUBCUTANEOUS at 09:12

## 2021-12-21 RX ADMIN — MORPHINE SULFATE 2 MG: 4 INJECTION, SOLUTION INTRAMUSCULAR; INTRAVENOUS at 12:12

## 2021-12-21 RX ADMIN — Medication 1 PACKET: at 09:12

## 2021-12-21 NOTE — PLAN OF CARE
Recommendations    1) Add Moody BID to assist in wound healing  2) Continue diabetic diet  3) Hyperglycemia. Hypokalemia - replete as needed    Goals:   1) Patient to continue meeting > 75% EEN/EPN via PO/ONS intake  2) Monitored labs to trend toward target ranges    Nutrition Goal Status: new  Communication of RD Recs: other (comment) (POC)

## 2021-12-21 NOTE — NURSING
Wound vac with continuous vacuum therapy at 125mmHg. Dressing clean, dry and intact. Indwelling joy in place, with drainage bag to gravity. Triple lumen PICC line in place, flushes and saline locked; no blood returen present. Pt reported pain 4/10 and sleepy. Will continue to monitor pt.

## 2021-12-21 NOTE — PT/OT/SLP PROGRESS
Physical Therapy      Patient Name:  Shauna Queen   MRN:  [id number]    Patient not seen today secondary to soiled in diarrhea and wound vac leak - nurse entering room to address issues. Will f/u tomorrow.

## 2021-12-21 NOTE — PROGRESS NOTES
"       [facility] [place] [facility] Medicine  Telemedicine Progress Note    Patient Name: Shauna Queen  MRN: [id number]  Patient Class: IP- Inpatient   Admission Date: 11/30/2021  Length of Stay: 20 days  Attending Physician: Milvia García MD  Primary Care Provider: Jorge Dennis MD          Subjective:     Principal Problem:Necrotizing fasciitis        HPI:  Ms. Queen is a 36-year-old presents to the ED from outside hospital for abscess.  She has initially presented to outside hospital for DKA which resolved.  Patient was unaware of why she was here in July prompted her.  Per the checkout are received: "36-year-old female history of diabetes mellitus, sleep apnea on CPAP, schizophrenia, hypertension, and hyperlipidemia [facility] from [facility] for treatment of diabetic ketoacidosis. She was initiated on an insulin infusion. DKA improved and she was transitioned off the insulin infusion. She again developed DKA and was placed back on the insulin infusion for a period of time. Currently she is off the insulin infusion and on subcutaneous insulin. She was noted to have right upper/inner thigh pain with erythema, induration, and tenderness to palpation. She was treated with IV clindamycin and vancomycin. Due to her size ((BMI 74.32) they are unable to perform CT or MRI. General Surgery there performed an incision and drainage with purulent material found. She was taken to the OR on November 29 for a second-look at the thigh wound. They were only able to do superficial debridement. Postprocedure she extubated to 2 L nasal cannula and was awake with oxygen saturation 100 percent. Surgery at their facility recommended transfer to a facility better able to perform procedures on a patient with her BMI. Referring provider spoke with General Surgery at [facility]. [facility] Medicine was asked to admit for further treatment. I spoke with her current " "attending. He noted she has no alteration in mentation. Lower extremity is intact from a neurovascular standpoint. She remains in the ICU."      Overview/Hospital Course:  37 y/o female admitted to the hospital as a transfer secondary to R thigh abscess and a BMI of Body mass index is 84.77 kg/m².   Patient transferred here for surgical evaluation.  Patient went for surgical debridement on 11/30/21. Patient continued on Abx per ID recommendations. Blood cultures were NG. Underwent another debridement on 12/2 and again on 12/4 - noting significant necrotizing soft tissue infections concerning for tracking down into her knee. On broad spectrum antibiotics. To OR with Orthopedic surgery to explore right knee on 12/5. Patient again went for surgery on 12/6.  Wound closure with wound vac placement on 12/8.      Subjective/Interval History     Patient still with diarrhea, ID re-consulted due to fever, continues on IV zosyn. To the OR for wound debridement today.    Review of Systems    Constitutional: Negative for chills, fatigue, fever.   HENT: Negative for sore throat, trouble swallowing.    Eyes: Negative for photophobia, visual disturbance.   Respiratory: Negative for cough, shortness of breath.    Cardiovascular: Negative for chest pain, palpitations, leg swelling.   Gastrointestinal: Negative for abdominal pain, constipation, nausea, vomiting. +diarrhea.   Endocrine: Negative for cold intolerance, heat intolerance.   Genitourinary: Negative for dysuria, frequency.   Musculoskeletal: Negative for arthralgias, myalgias.   Skin: Negative for rash, wound, erythema   Neurological: Negative for dizziness, syncope, weakness, light-headedness.   Psychiatric/Behavioral: Negative for confusion, hallucinations, anxiety    Medications  Scheduled Meds:   calcium carbonate  1,000 mg Oral Daily    enoxaparin  40 mg Subcutaneous Daily    insulin aspart U-100  8 Units Subcutaneous TIDWM    insulin detemir U-100  20 Units " Subcutaneous BID    loperamide  4 mg Oral QID    piperacillin-tazobactam (ZOSYN) IVPB  4.5 g Intravenous Q8H    potassium, sodium phosphates  1 packet Oral QID (AC & HS)     Continuous Infusions:  PRN Meds:.sodium chloride, acetaminophen, ALPRAZolam, dextrose 50%, diphenoxylate-atropine 2.5-0.025 mg, glucagon (human recombinant), HYDROcodone-acetaminophen, influenza, insulin aspart U-100, melatonin, morphine, ondansetron, prochlorperazine, sars-cov-2 (covid-19), sodium chloride 0.9%    Objective    Physical Examination    Temp:  [96.8 °F (36 °C)-99.1 °F (37.3 °C)]   Pulse:  []   Resp:  [16-20]   BP: (115-146)/()   SpO2:  [98 %-100 %]     Constitutional: NAD, conversant  Head: NC, AT  Eyes: No scleral icterus.  No eye discharge  Pulmonary/Chest: Effort normal. No respiratory distress on room air  GI: No distension  Musculoskeletal: Moving all extremities  Neurological: Alert.  Oriented to person, place, and time.   Psychiatric: Normal behavior, mood, and affect  Skin: No visible erythema or cyanosis    CBC  Recent Labs   Lab 12/16/21  0938 12/17/21  0327 12/19/21  0423   WBC 10.06 8.03 8.42   HGB 8.6* 8.0* 7.9*   HCT 28.3* 25.9* 26.6*    328 374     CMP  Recent Labs   Lab 12/16/21  0938 12/17/21  0327 12/19/21  0423    142 144   K 3.0* 3.0* 3.3*    106 106   CO2 29 27 29   BUN 10 10 9   CREATININE 0.8 0.7 0.7   * 85 50*   CALCIUM 8.2* 7.8* 7.6*           Assessment/Plan:      * Necrotizing fasciitis  - Status post I&D with General surgery at outside hospital, did not feel like they completely removed abscess.  - Underwent debridement in OR 11/30, 12/2, and 12/4, 12/5 - no extension into knee.    - OR again on 12/6, 12/8 and 12/11.   - OR again on 12/14 for debridement of necrotic skin, washout, partial closure, wound VAC change and then again on 12/17/21  - Continued on Zosyn till 12/18 for 2 weeks of treatment per ID. (estimated end date: 12/18/21)  - Pain control.   - to  LTAC once wound vac can be changed at bedside.     Antibiotics (From admission, onward)            Start     Stop Route Frequency Ordered    12/04/21 1530  piperacillin-tazobactam 4.5 g in dextrose 5 % 100 mL IVPB (ready to mix system)         12/19 0959 IV Every 8 hours (non-standard times) 12/04/21 1429        Cultures were taken-   Microbiology Results (last 7 days)     Procedure Component Value Units Date/Time    Fungus culture [[id number]] Collected: 12/04/21 1312    Order Status: Completed Specimen: Wound from Leg, Right Updated: 12/13/21 1409     Fungus (Mycology) Culture No fungal growth to date    Narrative:      thigh    Fungus culture [[id number]] Collected: 11/30/21 1635    Order Status: Completed Specimen: Abscess from Leg, Right Updated: 12/13/21 1409     Fungus (Mycology) Culture No fungal growth to date    Narrative:      Right Inner thigh abcess    Culture, Anaerobe [[id number]]  (Abnormal) Collected: 12/04/21 1312    Order Status: Completed Specimen: Wound from Leg, Right Updated: 12/11/21 1350     Anaerobic Culture BACTEROIDES THETAIOTAOMICRON  Few      Narrative:      thigh          Obesity  Morbid obesity  Body mass index is 84.72 kg/m².          Hypophosphatemia  Replace as needed.      Anemia of chronic disease  With anemia of acute blood loss as expected from multiple surgeries.  Transfuse for Hgb<7      Diarrhea     Cdiff Negative, PRN lomotil       Debility  PT/OT once more stable      DANG (obstructive sleep apnea)  Patient likely has component of OHS as well  - outpatient sleep study      DM (diabetes mellitus)  A1c:   Lab Results   Component Value Date    HGBA1C >14.0 (H) 12/01/2021     Meds: basal bolus insulin + SSI PRN to maintain goal 140-180  ADA diet, accuchecks ACHS, hypoglycemic protocol  Continue current management.      VTE Risk Mitigation (From admission, onward)         Ordered     enoxaparin injection 40 mg  Daily         12/04/21 2306     IP VTE LOW RISK PATIENT  Once          11/30/21 0403     Place sequential compression device  Until discontinued         11/30/21 0403                      I have assessed these finding virtually using telemed platform and with assistance of bedside nurse       The attending portion of this evaluation, treatment, and documentation was performed per Milvia García MD via Telemedicine AudioVisual using the secure [email or web address] software platform with 2 way audio/video. The provider was located off-site and the patient is located in the hospital. The aforementioned video software was utilized to document the relevant history and physical exam    Milvia García MD  Department of Hospital Medicine   [facility] - Med Surg [place]

## 2021-12-21 NOTE — PROGRESS NOTES
[facility]  Adult Nutrition  Progress Note    SUMMARY       Recommendations    No new recommendations    1) Add Moody BID to assist in wound healing  2) Continue diabetic diet  3) Hyperglycemia. Hypokalemia - replete as needed    Goals:   1) Patient to continue meeting > 75% EEN/EPN via PO/ONS intake  2) Monitored labs to trend toward target ranges    Nutrition Goal Status: ongoing  Communication of RD Recs: other (comment) (POC)    Dietitian Rounds Brief    12/28 - Spoke with patient, states she is feeling okay, appetite okay, PO intake ~ 75%, said that easiest foods to eat right now appetite wise are sandwiches and cereal. Pt pending transfer to LTAC as soon as wound vac can be fully changed at bedside.     12/20 - Pt PO intake 75 - 100%, was out of room at time of follow up for wound debridement, per physician note pt experiencing diarrhea. Currently providing lomotil to assist with minimizing occurences. Will continue to monitor - stool culture c.diff neg.     12/13 - Patient seen 2' f/u patient was having would vac care/dressing changes during initial visit. Per chart patient PO intake has increasingly improved currently eating ~ 75%. Will continue to follow.       12/8 - LOS, Patient not seen today d/t being out of room per washout procedure for wound. Per chart PO intake has been 0 - 25%, will follow up.     Assessment and Plan  Nutrition Problem  Increased nutrient needs     Related to (etiology):   Catabolic condition     Signs and Symptoms (as evidenced by):   Necrotizing faciitis  Large wound     Interventions/Recommendations (treatment strategy):  Carbohydrate Controlled Diet  Commercial Beverage  Collaboration of care with other providers     Nutrition Diagnosis Status:   Ongoing    Reason for Assessment    Reason For Assessment: RD follow-up  Diagnosis: infection/sepsis (Necrotizing fasciitis)  Relevant Medical History: DM, DANG, HLD, Anemia, HypoPO4, Obesity, Necrotizing  "Fasciitis  Nutrition Discharge Planning: Diabetic Diet    Nutrition Risk Screen    Nutrition Risk Screen: large or nonhealing wound, burn or pressure injury    Nutrition/Diet History    Spiritual, Cultural Beliefs, Denominational Practices, Values that Affect Care: no  Food Allergies: NKFA  Factors Affecting Nutritional Intake: None identified at this time    Anthropometrics    Temp: 99.2 °F (37.3 °C)  Height Method: Stated  Height: 5' 4.02" (162.6 cm)  Height (inches): 64.02 in  Weight Method: Bed Scale  Weight: (!) 224 kg (493 lb 13.3 oz)  Weight (lb): (!) 493.84 lb  Ideal Body Weight (IBW), Female: 120.1 lb  % Ideal Body Weight, Female (lb): 411.19 %  BMI (Calculated): 84.7  BMI Grade: greater than 40 - morbid obesity       Lab/Procedures/Meds    Pertinent Labs Reviewed: reviewed  Pertinent Medications Reviewed: reviewed    Estimated/Assessed Needs    Weight Used For Calorie Calculations: 54.5 kg (120 lb 1.6 oz)  Energy Calorie Requirements (kcal): 1635  Energy Need Method: Kcal/kg (30 kcal/kg IBW)  Protein Requirements: 81 - 109g (1.5 - 2.0 g/kg IBW)  Weight Used For Protein Calculations: 54.5 kg (120 lb 1.6 oz)     Estimated Fluid Requirement Method: RDA Method (or per MD)  RDA Method (mL): 1635  CHO Requirement: 204g      Nutrition Prescription Ordered    Current Diet Order: Diabetic  Nutrition Order Comments: 2000kcal    Evaluation of Received Nutrient/Fluid Intake    Energy Calories Required: meeting needs  Protein Required: meeting needs  Fluid Required: meeting needs  Tolerance: tolerating  % Intake of Estimated Energy Needs: 75 - 100 %  % Meal Intake: 75 - 100 %    Nutrition Risk    Level of Risk/Frequency of Follow-up:  (1x/week)     Monitor and Evaluation    Food and Nutrient Intake: energy intake,food and beverage intake  Food and Nutrient Adminstration: diet order  Knowledge/Beliefs/Attitudes: food and nutrition knowledge/skill,beliefs and attitudes  Physical Activity and Function: nutrition-related ADLs " and IADLs  Anthropometric Measurements: weight,weight change,body mass index  Biochemical Data, Medical Tests and Procedures: electrolyte and renal panel,gastrointestinal profile,glucose/endocrine profile,inflammatory profile,lipid profile  Nutrition-Focused Physical Findings: overall appearance,head and eyes,extremities, muscles and bones,skin     Nutrition Follow-Up    RD Follow-up?: Yes

## 2021-12-21 NOTE — NURSING
[facility] MEWS TRIGGER FOLLOW UP       MEWS Monitoring, Score is: 3  Indication for review: HR    Bedside Nurse, Khloe contacted, no concerns verbalized at this time, instructed to call [phone] for further concerns or assistance..

## 2021-12-21 NOTE — PT/OT/SLP PROGRESS
Occupational Therapy      Patient Name:  Shauna Queen   MRN:  [id number]    Patient not seen today secondary to Unavailable (Comment),Nursing care (waiting for nursing care and wound vac management). Will follow-up tomorrow.    12/21/2021

## 2021-12-21 NOTE — PLAN OF CARE
Pt free from falls, injury or any further trauma throughout shift. Pt AAO x 4. Wound vac continuous vacuum thearpy at 125mmHg, seal maintained no leaks. Total output 350mL during shift. Triple lumen PICC line, flushed and saline locked; no blood return present. Continued medications as ordered. Complaints of pain, controlled with medications. Pt in no distress. Bed at lowest position, bed alarm on, call light in reach, instruct pt to call for assistance. Will cont to monitor.       Problem: Adult Inpatient Plan of Care  Goal: Plan of Care Review  Outcome: Ongoing, Progressing     Problem: Bariatric Environmental Safety  Goal: Safety Maintained with Care  Outcome: Ongoing, Progressing     Problem: Skin Injury Risk Increased  Goal: Skin Health and Integrity  Outcome: Ongoing, Progressing     Problem: Wound  Goal: Optimal Wound Healing  Outcome: Ongoing, Progressing     Problem: Fall Injury Risk  Goal: Absence of Fall and Fall-Related Injury  Outcome: Ongoing, Progressing

## 2021-12-22 LAB
POCT GLUCOSE: 110 MG/DL (ref 70–110)
POCT GLUCOSE: 110 MG/DL (ref 70–110)
POCT GLUCOSE: 126 MG/DL (ref 70–110)
POCT GLUCOSE: 134 MG/DL (ref 70–110)

## 2021-12-22 PROCEDURE — 63600175 PHARM REV CODE 636 W HCPCS: Performed by: SURGERY

## 2021-12-22 PROCEDURE — 93010 EKG 12-LEAD: ICD-10-PCS | Mod: ,,, | Performed by: INTERNAL MEDICINE

## 2021-12-22 PROCEDURE — 93010 ELECTROCARDIOGRAM REPORT: CPT | Mod: ,,, | Performed by: INTERNAL MEDICINE

## 2021-12-22 PROCEDURE — 99233 PR SUBSEQUENT HOSPITAL CARE,LEVL III: ICD-10-PCS | Mod: ,,, | Performed by: INTERNAL MEDICINE

## 2021-12-22 PROCEDURE — 25000003 PHARM REV CODE 250: Performed by: STUDENT IN AN ORGANIZED HEALTH CARE EDUCATION/TRAINING PROGRAM

## 2021-12-22 PROCEDURE — 99233 SBSQ HOSP IP/OBS HIGH 50: CPT | Mod: ,,, | Performed by: INTERNAL MEDICINE

## 2021-12-22 PROCEDURE — 25000003 PHARM REV CODE 250: Performed by: SURGERY

## 2021-12-22 PROCEDURE — 97530 THERAPEUTIC ACTIVITIES: CPT

## 2021-12-22 PROCEDURE — 97110 THERAPEUTIC EXERCISES: CPT

## 2021-12-22 PROCEDURE — 97535 SELF CARE MNGMENT TRAINING: CPT

## 2021-12-22 PROCEDURE — 11000001 HC ACUTE MED/SURG PRIVATE ROOM

## 2021-12-22 PROCEDURE — 99900035 HC TECH TIME PER 15 MIN (STAT)

## 2021-12-22 PROCEDURE — 93005 ELECTROCARDIOGRAM TRACING: CPT

## 2021-12-22 RX ADMIN — INSULIN DETEMIR 20 UNITS: 100 INJECTION, SOLUTION SUBCUTANEOUS at 09:12

## 2021-12-22 RX ADMIN — MORPHINE SULFATE 2 MG: 4 INJECTION, SOLUTION INTRAMUSCULAR; INTRAVENOUS at 09:12

## 2021-12-22 RX ADMIN — METOPROLOL SUCCINATE 12.5 MG: 25 TABLET, EXTENDED RELEASE ORAL at 02:12

## 2021-12-22 RX ADMIN — LOPERAMIDE HYDROCHLORIDE 4 MG: 2 CAPSULE ORAL at 02:12

## 2021-12-22 RX ADMIN — PIPERACILLIN AND TAZOBACTAM 4.5 G: 4; .5 INJECTION, POWDER, LYOPHILIZED, FOR SOLUTION INTRAVENOUS; PARENTERAL at 02:12

## 2021-12-22 RX ADMIN — LOPERAMIDE HYDROCHLORIDE 4 MG: 2 CAPSULE ORAL at 08:12

## 2021-12-22 RX ADMIN — Medication 1 PACKET: at 06:12

## 2021-12-22 RX ADMIN — HYDROCODONE BITARTRATE AND ACETAMINOPHEN 1 TABLET: 5; 325 TABLET ORAL at 02:12

## 2021-12-22 RX ADMIN — LOPERAMIDE HYDROCHLORIDE 4 MG: 2 CAPSULE ORAL at 06:12

## 2021-12-22 RX ADMIN — INSULIN ASPART 8 UNITS: 100 INJECTION, SOLUTION INTRAVENOUS; SUBCUTANEOUS at 06:12

## 2021-12-22 RX ADMIN — HYDROCODONE BITARTRATE AND ACETAMINOPHEN 1 TABLET: 5; 325 TABLET ORAL at 10:12

## 2021-12-22 RX ADMIN — ENOXAPARIN SODIUM 40 MG: 40 INJECTION SUBCUTANEOUS at 06:12

## 2021-12-22 RX ADMIN — INSULIN ASPART 8 UNITS: 100 INJECTION, SOLUTION INTRAVENOUS; SUBCUTANEOUS at 08:12

## 2021-12-22 RX ADMIN — PIPERACILLIN AND TAZOBACTAM 4.5 G: 4; .5 INJECTION, POWDER, LYOPHILIZED, FOR SOLUTION INTRAVENOUS; PARENTERAL at 06:12

## 2021-12-22 RX ADMIN — PIPERACILLIN AND TAZOBACTAM 4.5 G: 4; .5 INJECTION, POWDER, LYOPHILIZED, FOR SOLUTION INTRAVENOUS; PARENTERAL at 10:12

## 2021-12-22 RX ADMIN — CALCIUM CARBONATE (ANTACID) CHEW TAB 500 MG 1000 MG: 500 CHEW TAB at 08:12

## 2021-12-22 RX ADMIN — INSULIN ASPART 8 UNITS: 100 INJECTION, SOLUTION INTRAVENOUS; SUBCUTANEOUS at 12:12

## 2021-12-22 RX ADMIN — Medication 1 PACKET: at 10:12

## 2021-12-22 RX ADMIN — MORPHINE SULFATE 2 MG: 4 INJECTION, SOLUTION INTRAMUSCULAR; INTRAVENOUS at 12:12

## 2021-12-22 RX ADMIN — INSULIN DETEMIR 20 UNITS: 100 INJECTION, SOLUTION SUBCUTANEOUS at 08:12

## 2021-12-22 NOTE — NURSING
Wrapped leg as ordered with kerlix and ace bandage. Wound vac continuous vacuum at 25-50mmHg. Will placed to wall suction.

## 2021-12-22 NOTE — PROGRESS NOTES
SSC called [facility] ([facility]) to inform them of the  three denials received. Was giving Arina's number to call. SSC spoke with Arina and she stated that she will review patient again and call back with decision.

## 2021-12-22 NOTE — PT/OT/SLP PROGRESS
Physical Therapy Treatment    Patient Name:  Shauna Queen   MRN:  [id number]    Recommendations:     Discharge Recommendations:  other (see comments) ([facility] per MD)   Discharge Equipment Recommendations: other (see comments) (TBD at next level of care)   Barriers to discharge: none    Assessment:     Shauna Queen is a 36 y.o. female admitted with a medical diagnosis of Necrotizing fasciitis.  She presents with the following impairments/functional limitations:  weakness,impaired endurance,decreased ROM,impaired self care skills,impaired skin,decreased lower extremity function,impaired functional mobilty,edema,impaired balance,pain .    Sat on side of bed 15 minutes with good balance. Pain well-controlled. Good participation. ACE wrap and wound vac remained intact.     Rehab Prognosis: Good; patient would benefit from acute skilled PT services to address these deficits and reach maximum level of function.    Recent Surgery: Procedure(s) (LRB):  DEBRIDEMENT, WOUND (Right) 2 Days Post-Op    Plan:     During this hospitalization, patient to be seen 5 x/week to address the identified rehab impairments via gait training,therapeutic exercises,therapeutic activities,neuromuscular re-education and progress toward the following goals:    · Plan of Care Expires:  01/05/22    Subjective     Chief Complaint: none  Patient/Family Comments/goals: pt agreed to sit on EOB  Pain/Comfort:  · Location - Side 1: Right  · Location - Orientation 1: upper  · Location 1: thigh  · Pain Addressed 1: Distraction,Pre-medicate for activity      Objective:     Communicated with nurse Chanel prior to session.  Patient found HOB elevated with central line,wound vac,joy catheter upon PT entry to room.     General Precautions: Standard, fall   Orthopedic Precautions:N/A   Braces: N/A  Respiratory Status: Room air     Functional Mobility:  · Bed Mobility:     · Supine to Sit: total assistance and of 2 persons  · Sit to Supine: maximal assistance  "and of 2 persons  · Balance: good (-) sitting dynamic      AM-PAC 6 CLICK MOBILITY  Turning over in bed (including adjusting bedclothes, sheets and blankets)?: 3  Sitting down on and standing up from a chair with arms (e.g., wheelchair, bedside commode, etc.): 1  Moving from lying on back to sitting on the side of the bed?: 2  Moving to and from a bed to a chair (including a wheelchair)?: 1  Need to walk in hospital room?: 1  Climbing 3-5 steps with a railing?: 1  Basic Mobility Total Score: 9       Therapeutic Activities and Exercises:   pt instructed in Ankle pumps, hip internal/external rotation, and glut sets to perform as "homework" in bed frequently in bed. Pt performed x 15 reps each. Pt performed x 15 reps B long arc quads seated EOB as well.   Performed B forward reaches with B hands and min trunk excursion x 5 reps each.    Patient left HOB elevated with call button in reach..    GOALS:   Multidisciplinary Problems     Physical Therapy Goals        Problem: Physical Therapy Goal    Goal Priority Disciplines Outcome Goal Variances Interventions   Physical Therapy Goal     PT, PT/OT Ongoing, Progressing     Description: Goals to be met by: 22     Patient will increase functional independence with mobility by performin. Supine to sit with Moderate Assistance  2. Sit to supine with Moderate Assistance  3. Rolling to Left and Right with Minimal Assistance.  4. Sit to stand transfer with Moderate Assistance  5. Bed to chair transfer with Moderate Assistance using Rolling Walker  5. Gait  x 15 feet with Minimal Assistance using Rolling Walker.                Multidisciplinary Problems (Resolved)        Problem: Physical Therapy Goal    Goal Priority Disciplines Outcome Goal Variances Interventions   Physical Therapy Goal   (Resolved)     PT, PT/OT Met     Description: Goals to be met by: 22    Patient will increase functional independence with mobility by performin. Pt will tolerate mobility " assessment remaining free from falls and demonstrating good tolerance to activity                     Time Tracking:     PT Received On: 12/22/21  PT Start Time: 1120     PT Stop Time: 1203  PT Total Time (min): 43 min     Billable Minutes: Therapeutic Activity 15 and Therapeutic Exercise 8    Treatment Type: Treatment  PT/PTA: PT     PTA Visit Number: 0     12/22/2021

## 2021-12-22 NOTE — PROGRESS NOTES
Pharmacokinetic Assessment Follow Up: IV Vancomycin    Vancomycin serum concentration assessment(s):    The random level was drawn correctly and can be used to guide therapy at this time. The measurement is within the desired definitive target range of 10 to 20 mcg/mL.    Vancomycin Regimen Plan:    Continue regimen to Vancomycin 2000 mg IV every 12 hours with next serum trough concentration measured at 2330 prior to 4th  dose on 12-01-21    Drug levels (last 3 results):  Recent Labs   Lab Result Units 11/30/21  0436   Vancomycin, Random ug/mL 13.2       Pharmacy will continue to follow and monitor vancomycin.    Please contact pharmacy at extension [phone] for questions regarding this assessment.    Thank you for the consult,   Manjula Latif       Patient brief summary:  Shauna Queen is a 36 y.o. female initiated on antimicrobial therapy with IV Vancomycin for treatment of skin & soft tissue infection    The patient's current regimen is Vancomycin 2000mg IVPB q12h.    Drug Allergies:   Review of patient's allergies indicates:  Not on File    Actual Body Weight:   224 kg    Renal Function:   Estimated Creatinine Clearance: 252.9 mL/min (based on SCr of 0.6 mg/dL).,     Dialysis Method (if applicable):  N/A    CBC (last 72 hours):  Recent Labs   Lab Result Units 11/30/21  0436   WBC K/uL 19.20*   Hemoglobin g/dL 10.8*   Hematocrit % 33.0*   Platelets K/uL 251   Gran % % 73.0   Lymph % % 15.0*   Mono % % 7.0   Eosinophil % % 1.0   Basophil % % 0.0   Differential Method  Manual       Metabolic Panel (last 72 hours):  Recent Labs   Lab Result Units 11/30/21  0914   Sodium mmol/L 139   Potassium mmol/L 3.6   Chloride mmol/L 109   CO2 mmol/L 22*   Glucose mg/dL 266*   BUN mg/dL 9   Creatinine mg/dL 0.6       Vancomycin Administrations:  vancomycin given in the last 96 hours        No antibiotic orders with administrations found.                    Microbiologic Results:  Microbiology Results (last 7 days)        Procedure Component Value Units Date/Time    Blood culture [[id number]] Collected: 11/30/21 0436    Order Status: Completed Specimen: Blood Updated: 11/30/21 1112     Blood Culture, Routine No Growth to date    Blood culture [[id number]] Collected: 11/30/21 0436    Order Status: Completed Specimen: Blood Updated: 11/30/21 1112     Blood Culture, Routine No Growth to date           Referred To Asc For Closure Text (Leave Blank If You Do Not Want): After obtaining clear surgical margins the patient was sent to an ASC for surgical repair.  The patient understands they will receive post-surgical care and follow-up from the ASC physician.

## 2021-12-22 NOTE — PLAN OF CARE
Problem: Physical Therapy Goal  Goal: Physical Therapy Goal  Description: Goals to be met by: 22     Patient will increase functional independence with mobility by performin. Supine to sit with Moderate Assistance  2. Sit to supine with Moderate Assistance  3. Rolling to Left and Right with Minimal Assistance.  4. Sit to stand transfer with Moderate Assistance  5. Bed to chair transfer with Moderate Assistance using Rolling Walker  5. Gait  x 15 feet with Minimal Assistance using Rolling Walker.     Outcome: Ongoing, Progressing

## 2021-12-22 NOTE — PLAN OF CARE
Pt free from falls, injury or any further trauma throughout shift. Pt AAO x 4. Multiple unsuccessful attempts to reinforced dressing during shift, per MD order to put to wall suction at 100mmHg. Triple lumen PICC line, flushed and saline locked; no blood return present. Continued medications as ordered. Complaints of pain, controlled with medications. Pt in no distress. Bed at lowest position, bed alarm on, call light in reach, instruct pt to call for assistance. Will cont to monitor.       Problem: Adult Inpatient Plan of Care  Goal: Plan of Care Review  Outcome: Ongoing, Progressing     Problem: Diabetes Comorbidity  Goal: Blood Glucose Level Within Desired Range  Outcome: Ongoing, Progressing     Problem: Bariatric Environmental Safety  Goal: Safety Maintained with Care  Outcome: Ongoing, Progressing     Problem: Skin Injury Risk Increased  Goal: Skin Health and Integrity  Outcome: Ongoing, Progressing     Problem: Wound  Goal: Optimal Wound Healing  Outcome: Ongoing, Progressing     Problem: Fall Injury Risk  Goal: Absence of Fall and Fall-Related Injury  Outcome: Ongoing, Progressing

## 2021-12-22 NOTE — PLAN OF CARE
[place] sent additional LTAC referral packets via care port to include, [facility], [facility] [place], [facility], and [place]. [place] to follow up.    12/22/21 1491   Post-Acute Status   Post-Acute Authorization Placement  (LT[facility])   Post-Acute Placement Status Referrals Sent   Coverage DANIELLE   Patient choice form signed by patient/caregiver List from [facility]   Discharge Delays (!) Post-Acute Set-up   Discharge Plan   Discharge Plan A Long-term acute care facility (LT[facility])   Discharge Plan B Home with family

## 2021-12-22 NOTE — NURSING
"Notified MD Hernandezo, "get kerlix and ace bandage, wrapped leg to make sure all edges are covered. If suction continued to maintained around 50mmHg, then put to wall suction at 100mmHg"  "

## 2021-12-22 NOTE — NURSING
Two RNs assessed wound vac continuous therapy is at 100mmHg. Readjusted leg and vacuum went up to 125mmHg. Waited 3 minutes and the vacuum went down and to 75-100mmHg. Notified charge nurse. Will continue to assist.

## 2021-12-22 NOTE — ASSESSMENT & PLAN NOTE
36F with h/o morbid obesity (BMI 85), DM, HTN, DANG, HLD admitted 11/24 with right thigh pain, erythema, tenderness and found to be in DKA. Imaging reportedly couldn't be obtained 2/2 size. Noted to have necrotizing soft tissue infection of R upper leg, now s/p  serail debridements with surgery on 11/30, 12/2, 12/4, and 12/5.(partial closure with vac placment on 12/8). Surgical Cultures: 11/30 with - amp sensitive E.faecalis. Cultures 12/4 with Enteroocccus and Enterobacter. BCX- NGTD. Path was sent 12/4 and pending. Has been on vancomycin and Zosyn. Ampho added 12/4 given surgery's reported concern for mucor but that was stopped. ID consulted for abx recs: Zosyn x 2 weeks which ended 12/18. The patient had fever and tachycardia over the weekend and Zosyn was restarted. ID is reconsulted. The patient states that her pain is mproved but still present. She has diarrhea but no dysuria, cough, or other complaints.    Impression: Fever of unclear etiology: resolved with abx (likely wound)    Recs: would complete 2 weeks of Zosyn, if she is transferred to LTAC

## 2021-12-22 NOTE — PROGRESS NOTES
"[facility]  Infectious Disease  Progress Note    Patient Name: Shauna Queen  MRN: [id number]  Admission Date: 11/30/2021  Length of Stay: 22 days  Attending Physician: Milvia García MD  Primary Care Provider: Jorge Dennis MD    Isolation Status: No active isolations  Assessment/Plan:      * Necrotizing fasciitis  36F with h/o morbid obesity (BMI 85), DM, HTN, DANG, HLD admitted 11/24 with right thigh pain, erythema, tenderness and found to be in DKA. Imaging reportedly couldn't be obtained 2/2 size. Noted to have necrotizing soft tissue infection of R upper leg, now s/p  serail debridements with surgery on 11/30, 12/2, 12/4, and 12/5.(partial closure with vac placment on 12/8). Surgical Cultures: 11/30 with - amp sensitive E.faecalis. Cultures 12/4 with Enteroocccus and Enterobacter. BCX- NGTD. Path was sent 12/4 and pending. Has been on vancomycin and Zosyn. Ampho added 12/4 given surgery's reported concern for mucor but that was stopped. ID consulted for abx recs: Zosyn x 2 weeks which ended 12/18. The patient had fever and tachycardia over the weekend and Zosyn was restarted. ID is reconsulted. The patient states that her pain is mproved but still present. She has diarrhea but no dysuria, cough, or other complaints.    Impression: Fever of unclear etiology: resolved with abx (likely wound)    Recs: would complete 2 weeks of Zosyn, if she is transferred to LTAC          Darion Pettit MD  Infectious Disease  [facility]    Subjective:     Principal Problem:Necrotizing fasciitis    HPI:   36F with h/o morbid obesity (BMI 85), DM, HTN, DANG, HLD admitted to OSH on 11/24 with R thigh pain, erythema, tenderness and found to be in DKA. Imaging reportedly couldn't be obtained 2/2 size. Surgery performed I&D and purulent material found and was taken to OR 11/29 for superficial debridement. Then was transferred to [facility] for a "facility better able to perform procedures on a " "patient with her BMI".      Patient is awake and alert in ICU. Reports her leg wound "popped up" a day before she arrived at OSH. Says that it started as a boil on her thigh and then it "busted" and she had been applying warm compresses and peroxide. Reports she has been ambulatory prior to arrival, but  says she feels like she is going to fall when she tries to walk here. Denies any water or environmental exposures to wound. Denies pet or animal contacts    Has undergone several I&D with surgery on 11/30, 12/2, 12/4, 12/5 for Necrotizing soft tissue infection. Per op notes they saw a "significant amount of foul smelling, necrotic skin and fat"  and "purulent drainage was found tracking along fascial planes"      Surgical Cultures: 11/30 with - amp sensitive e.faecalis. cultures 12/4 with enteroocccus and enterobacter  BCX- NGTD    Path was sent 12/4 and pending    Has been on vancomycin and zosyn. Ampho added 12/4 for concern for mucor     ID consulted for abx recs    Component 2 d ago   Aerobic Bacterial Culture  Abnormal   ENTEROBACTER CLOACAE   Few   P      Aerobic Bacterial Culture  Abnormal   ENTEROCOCCUS SPECIES   Moderate   Identification and susceptibility pending   P      Resulting Agency [facility]          Susceptibility     Enterobacter cloacae     CULTURE, AEROBIC  (SPECIFY SOURCE) (Preliminary)     Cefepime <=2 mcg/mL Sensitive     Ceftriaxone <=1 mcg/mL Sensitive     Ciprofloxacin <=1 mcg/mL Sensitive     Ertapenem <=0.5 mcg/mL Sensitive     Gentamicin <=4 mcg/mL Sensitive     Levofloxacin <=2 mcg/mL Sensitive     Meropenem <=1 mcg/mL Sensitive     Minocycline <=4 mcg/mL Sensitive     Piperacillin/Tazo <=16 mcg/mL Sensitive     Tetracycline <=4 mcg/mL Sensitive     Tobramycin <=4 mcg/mL Sensitive     Trimeth/Sulfa >2/38 mcg/mL Resistant               Specimen Information: Leg, Right; Abscess         0 Result Notes         (important suggestion)  Newer results are available. Click to view them now. "     Component 6 d ago   Aerobic Bacterial Culture  Abnormal   ENTEROCOCCUS FAECALIS   Many     Resulting Agency [facility]          Susceptibility     Enterococcus faecalis     CULTURE, AEROBIC  (SPECIFY SOURCE)     Ampicillin <=2 mcg/mL Sensitive     Gentamicin Synergy Screen <=500 mcg/mL Sensitive     Tetracycline >8 mcg/mL Resistant     Vancomycin 2 mcg/mL Sensitive               Interval History: Feels okay. Tired. Pain controlled.     Review of Systems  Objective:     Vital Signs (Most Recent):  Temp: 98.8 °F (37.1 °C) (12/22/21 0806)  Pulse: (!) 111 (12/22/21 0806)  Resp: 20 (12/22/21 0806)  BP: 138/78 (12/22/21 0806)  SpO2: 99 % (12/22/21 0806) Vital Signs (24h Range):  Temp:  [98.5 °F (36.9 °C)-99.2 °F (37.3 °C)] 98.8 °F (37.1 °C)  Pulse:  [102-115] 111  Resp:  [16-20] 20  SpO2:  [96 %-100 %] 99 %  BP: (133-138)/(73-87) 138/78     Weight: (!) 224 kg (493 lb 13.3 oz)  Body mass index is 84.72 kg/m².    Estimated Creatinine Clearance: 189.7 mL/min (based on SCr of 0.8 mg/dL).    Physical Exam  Vitals and nursing note reviewed.   Constitutional:       General: She is not in acute distress.     Appearance: Normal appearance. She is not toxic-appearing.   Neurological:      General: No focal deficit present.      Mental Status: She is alert and oriented to person, place, and time. Mental status is at baseline.   Psychiatric:         Mood and Affect: Mood normal.         Behavior: Behavior normal.         Thought Content: Thought content normal.         Judgment: Judgment normal.         Significant Labs:   CBC:   Recent Labs   Lab 12/21/21  0350   WBC 9.17   HGB 8.1*   HCT 26.3*        Wound Culture:   Recent Labs   Lab 11/30/21  1635 12/04/21  1312   LABAERO ENTEROCOCCUS FAECALIS  Many  * ENTEROBACTER CLOACAE  Few  *  ENTEROCOCCUS FAECALIS  Moderate  *       Significant Imaging: None

## 2021-12-22 NOTE — PT/OT/SLP PROGRESS
Occupational Therapy   Treatment    Name: Shauna Queen  MRN: [id number]  Admitting Diagnosis:  Necrotizing fasciitis  2 Days Post-Op    Recommendations:     Discharge Recommendations: [facility] (long-term acute care hospital)  Discharge Equipment Recommendations:   (TBD)  Barriers to discharge:   (with need continued wound care)    Assessment:     Shauna Queen is a 36 y.o. female with a medical diagnosis of Necrotizing fasciitis.   Performance deficits affecting function are weakness,impaired endurance,impaired self care skills,impaired functional mobilty,gait instability,decreased upper extremity function,decreased lower extremity function,pain,impaired skin,edema.   The patient demo improved activity tolerance and able to sit on the EOB with SBA/(S) while performing grooming tasks.          Rehab Prognosis:  Good and Fair; patient would benefit from acute skilled OT services to address these deficits and reach maximum level of function.       Plan:     Patient to be seen 3 x/week to address the above listed problems via self-care/home management,therapeutic activities,therapeutic exercises  · Plan of Care Expires: 12/31/21  · Plan of Care Reviewed with: patient    Subjective     Pain/Comfort:  · Pain Rating 1:  (yes-did not rate)  · Location - Side 1: Right  · Location - Orientation 1: medial  · Location 1: thigh  · Pain Addressed 1: Pre-medicate for activity,Reposition,Distraction,Cessation of Activity    Objective:     Communicated with: Yanique barbosa prior to session.  Patient found HOB elevated with wound vac,telemetry,central line,joy catheter (barriatric bed) upon OT entry to room.    General Precautions: Standard, fall   Orthopedic Precautions:N/A   Braces: N/A  Respiratory Status: Room air     Occupational Performance:     Bed Mobility:    · Patient completed Scooting/Bridging with maximal assistance  · Patient completed Supine to Sit with maximal assistance and 2 persons  · Patient completed Sit to  Supine with maximal assistance and 2 persons     Functional Mobility/Transfers:  · Functional Mobility: The patient tolerated sitting on the EOB with SBA/(S) ~15 min with fair+ sitting balance    Activities of Daily Living:  · Grooming: modified independence and supervision to brush her teeth and wash her hands and face while seated on the EOB  · Upper Body Dressing: minimum assistance to don gown in bed  · Lower Body Dressing: dependence        AMPAC 6 Click ADL: 16    Treatment & Education:  · Participated in self care and functional mobility as noted above    Patient left HOB elevated with all lines intact, call button in reach and nurse notifiedEducation:      GOALS:   Multidisciplinary Problems     Occupational Therapy Goals        Problem: Occupational Therapy Goal    Goal Priority Disciplines Outcome Interventions   Occupational Therapy Goal     OT, PT/OT Ongoing, Progressing    Description: Goals to be met by: 12/31/21    Patient will increase functional independence with ADLs by performing:    Feeding with [place].  UE Dressing with Modified [place].  LE Dressing with Minimal Assistance.  Grooming while seated with [place].  Toileting from bedside commode with Minimal Assistance for hygiene and clothing management.   Toilet transfer to bedside commode with Minimal Assistance.    New Goals to be met by: 12/31/21    Patient will increase functional independence with ADLs by performing:    Supine to sit with Stand-by Assistance.  The patient will tolerate sitting on the EOB for 30 min for self care task                   Time Tracking:     OT Date of Treatment: 12/22/21  OT Start Time: 1120  OT Stop Time: 1203  OT Total Time (min): 43 min    Billable Minutes:Self Care/Home Management 23  Total Time 43 (with PT)               12/22/2021

## 2021-12-22 NOTE — NURSING
RN, Charge nurse and 2 PCTs turned and cleaned and repositioned pt. Upon moving pt, wound vac at 125mmHg. After repositioning, wound vac was still at 125mmHg; within minutes wound vac dropped down to 50-75mmHg. Unsuccessful attempt to resealed dressing, also changed out cansiter. Notified charge nurse of vacuum setting, called house supervisor to get a new wound vac machine. Will continue to monitor pt.

## 2021-12-22 NOTE — SUBJECTIVE & OBJECTIVE
Interval History: Feels okay. Tired. Pain controlled.     Review of Systems  Objective:     Vital Signs (Most Recent):  Temp: 98.8 °F (37.1 °C) (12/22/21 0806)  Pulse: (!) 111 (12/22/21 0806)  Resp: 20 (12/22/21 0806)  BP: 138/78 (12/22/21 0806)  SpO2: 99 % (12/22/21 0806) Vital Signs (24h Range):  Temp:  [98.5 °F (36.9 °C)-99.2 °F (37.3 °C)] 98.8 °F (37.1 °C)  Pulse:  [102-115] 111  Resp:  [16-20] 20  SpO2:  [96 %-100 %] 99 %  BP: (133-138)/(73-87) 138/78     Weight: (!) 224 kg (493 lb 13.3 oz)  Body mass index is 84.72 kg/m².    Estimated Creatinine Clearance: 189.7 mL/min (based on SCr of 0.8 mg/dL).    Physical Exam  Vitals and nursing note reviewed.   Constitutional:       General: She is not in acute distress.     Appearance: Normal appearance. She is not toxic-appearing.   Neurological:      General: No focal deficit present.      Mental Status: She is alert and oriented to person, place, and time. Mental status is at baseline.   Psychiatric:         Mood and Affect: Mood normal.         Behavior: Behavior normal.         Thought Content: Thought content normal.         Judgment: Judgment normal.         Significant Labs:   CBC:   Recent Labs   Lab 12/21/21  0350   WBC 9.17   HGB 8.1*   HCT 26.3*        Wound Culture:   Recent Labs   Lab 11/30/21  1635 12/04/21  1312   LABAERO ENTEROCOCCUS FAECALIS  Many  * ENTEROBACTER CLOACAE  Few  *  ENTEROCOCCUS FAECALIS  Moderate  *       Significant Imaging: None

## 2021-12-22 NOTE — PROGRESS NOTES
"       [facility] - Med Surg [facility] Medicine  Telemedicine Progress Note    Patient Name: Shauna Queen  MRN: [id number]  Patient Class: IP- Inpatient   Admission Date: 11/30/2021  Length of Stay: 21 days  Attending Physician: Milvia García MD  Primary Care Provider: Jorge Dennis MD          Subjective:     Principal Problem:Necrotizing fasciitis        HPI:  Ms. Queen is a 36-year-old presents to the ED from outside hospital for abscess.  She has initially presented to outside hospital for DKA which resolved.  Patient was unaware of why she was here in July prompted her.  Per the checkout are received: "36-year-old female history of diabetes mellitus, sleep apnea on CPAP, schizophrenia, hypertension, and hyperlipidemia admitted to [facility] on November 24 from [facility] for treatment of diabetic ketoacidosis. She was initiated on an insulin infusion. DKA improved and she was transitioned off the insulin infusion. She again developed DKA and was placed back on the insulin infusion for a period of time. Currently she is off the insulin infusion and on subcutaneous insulin. She was noted to have right upper/inner thigh pain with erythema, induration, and tenderness to palpation. She was treated with IV clindamycin and vancomycin. Due to her size ((BMI 74.32) they are unable to perform CT or MRI. General Surgery there performed an incision and drainage with purulent material found. She was taken to the OR on November 29 for a second-look at the thigh wound. They were only able to do superficial debridement. Postprocedure she extubated to 2 L nasal cannula and was awake with oxygen saturation 100 percent. Surgery at their facility recommended transfer to a facility better able to perform procedures on a patient with her BMI. Referring provider spoke with General Surgery at [facility]. [facility] Medicine was asked to admit for further treatment. I spoke with her current " "attending. He noted she has no alteration in mentation. Lower extremity is intact from a neurovascular standpoint. She remains in the ICU."      Overview/Hospital Course:  37 y/o female admitted to the hospital as a transfer secondary to R thigh abscess and a BMI of Body mass index is 84.77 kg/m².   Patient transferred here for surgical evaluation.  Patient went for surgical debridement on 11/30/21. Patient continued on Abx per ID recommendations. Blood cultures were NG. Underwent another debridement on 12/2 and again on 12/4 - noting significant necrotizing soft tissue infections concerning for tracking down into her knee. On broad spectrum antibiotics. To OR with Orthopedic surgery to explore right knee on 12/5. Patient again went for surgery on 12/6.  Wound closure with wound vac placement on 12/8.      Subjective/Interval History     Patient doing better, CDiff negative again. Per surgery, okay to transfer to LTAC for continued wound vac change at bedside there. Awaiting final recs from ID regarding antibiotics.     Review of Systems    Constitutional: Negative for chills, fatigue, fever.   HENT: Negative for sore throat, trouble swallowing.    Eyes: Negative for photophobia, visual disturbance.   Respiratory: Negative for cough, shortness of breath.    Cardiovascular: Negative for chest pain, palpitations, leg swelling.   Gastrointestinal: Negative for abdominal pain, constipation, nausea, vomiting. +diarrhea.   Endocrine: Negative for cold intolerance, heat intolerance.   Genitourinary: Negative for dysuria, frequency.   Musculoskeletal: Negative for arthralgias, myalgias.   Skin: Negative for rash, wound, erythema   Neurological: Negative for dizziness, syncope, weakness, light-headedness.   Psychiatric/Behavioral: Negative for confusion, hallucinations, anxiety    Medications  Scheduled Meds:   calcium carbonate  1,000 mg Oral Daily    enoxaparin  40 mg Subcutaneous Daily    insulin aspart U-100  8 Units " Subcutaneous TIDWM    insulin detemir U-100  20 Units Subcutaneous BID    loperamide  4 mg Oral QID    piperacillin-tazobactam (ZOSYN) IVPB  4.5 g Intravenous Q8H    potassium, sodium phosphates  1 packet Oral QID (AC & HS)     Continuous Infusions:  PRN Meds:.sodium chloride, acetaminophen, ALPRAZolam, dextrose 50%, diphenoxylate-atropine 2.5-0.025 mg, glucagon (human recombinant), HYDROcodone-acetaminophen, influenza, insulin aspart U-100, melatonin, morphine, ondansetron, prochlorperazine, sars-cov-2 (covid-19), sodium chloride 0.9%    Objective    Physical Examination    Temp:  [98.5 °F (36.9 °C)-99.2 °F (37.3 °C)]   Pulse:  [104-120]   Resp:  [16-18]   BP: (116-138)/(59-84)   SpO2:  [94 %-100 %]     Constitutional: NAD, conversant  Head: NC, AT  Eyes: No scleral icterus.  No eye discharge  Pulmonary/Chest: Effort normal. No respiratory distress on room air  GI: No distension  Musculoskeletal: Moving all extremities  Neurological: Alert.  Oriented to person, place, and time.   Psychiatric: Normal behavior, mood, and affect  Skin: No visible erythema or cyanosis    CBC  Recent Labs   Lab 12/17/21  0327 12/19/21  0423 12/21/21  0350   WBC 8.03 8.42 9.17   HGB 8.0* 7.9* 8.1*   HCT 25.9* 26.6* 26.3*    374 367     CMP  Recent Labs   Lab 12/17/21  0327 12/19/21  0423 12/21/21  0350    144 142   K 3.0* 3.3* 3.4*    106 105   CO2 27 29 29   BUN 10 9 8   CREATININE 0.7 0.7 0.8   GLU 85 50* 139*   CALCIUM 7.8* 7.6* 7.9*           Assessment/Plan:      * Necrotizing fasciitis  - Status post I&D with General surgery at outside hospital, did not feel like they completely removed abscess.  - Underwent debridement in OR 11/30, 12/2, and 12/4, 12/5 - no extension into knee.    - OR again on 12/6, 12/8 and 12/11.   - OR again on 12/14 for debridement of necrotic skin, washout, partial closure, wound VAC change and then again on 12/17/21  - Continued on Zosyn till 12/18 for 2 weeks of treatment per ID.  (estimated end date: 12/18/21). Febrile again so ID reconsulted and IV zosyn resumed. Pending final recs from ID  - Pain control.   - to LTAC once wound vac can be changed at bedside. Per surgery, okay to d/c to LTAC      Obesity  Morbid obesity  Body mass index is 84.72 kg/m².          Hypophosphatemia  Replace as needed.      Anemia of chronic disease  With anemia of acute blood loss as expected from multiple surgeries.  Transfuse for Hgb<7      Diarrhea     Cdiff Negative, PRN lomotil       Debility  PT/OT following      DANG (obstructive sleep apnea)  Patient likely has component of OHS as well  - outpatient sleep study      DM (diabetes mellitus)  A1c:   Lab Results   Component Value Date    HGBA1C >14.0 (H) 12/01/2021     Meds: basal bolus insulin + SSI PRN to maintain goal 140-180  ADA diet, accuchecks ACHS, hypoglycemic protocol  Continue current management.      VTE Risk Mitigation (From admission, onward)         Ordered     enoxaparin injection 40 mg  Daily         12/04/21 2306     IP VTE LOW RISK PATIENT  Once         11/30/21 0403     Place sequential compression device  Until discontinued         11/30/21 0403                      I have assessed these finding virtually using telemed platform and with assistance of bedside nurse       The attending portion of this evaluation, treatment, and documentation was performed per Milvia García MD via Telemedicine AudioVisual using the secure [employer] software platform with 2 way audio/video. The provider was located off-site and the patient is located in the hospital. The aforementioned video software was utilized to document the relevant history and physical exam    Milvia García MD  Department of Hospital Medicine   [facility]

## 2021-12-23 ENCOUNTER — ANESTHESIA EVENT (OUTPATIENT)
Dept: SURGERY | Facility: HOSPITAL | Age: 36
DRG: 463 | End: 2021-12-23
Payer: COMMERCIAL

## 2021-12-23 LAB
ANION GAP SERPL CALC-SCNC: 8 MMOL/L (ref 8–16)
BACTERIA BLD CULT: NORMAL
BACTERIA BLD CULT: NORMAL
BASOPHILS # BLD AUTO: 0.03 K/UL (ref 0–0.2)
BASOPHILS NFR BLD: 0.3 % (ref 0–1.9)
BUN SERPL-MCNC: 7 MG/DL (ref 6–20)
CALCIUM SERPL-MCNC: 8 MG/DL (ref 8.7–10.5)
CHLORIDE SERPL-SCNC: 104 MMOL/L (ref 95–110)
CO2 SERPL-SCNC: 30 MMOL/L (ref 23–29)
CREAT SERPL-MCNC: 0.8 MG/DL (ref 0.5–1.4)
DIFFERENTIAL METHOD: ABNORMAL
EOSINOPHIL # BLD AUTO: 0.2 K/UL (ref 0–0.5)
EOSINOPHIL NFR BLD: 1.8 % (ref 0–8)
ERYTHROCYTE [DISTWIDTH] IN BLOOD BY AUTOMATED COUNT: 16.1 % (ref 11.5–14.5)
EST. GFR  (AFRICAN AMERICAN): >60 ML/MIN/1.73 M^2
EST. GFR  (NON AFRICAN AMERICAN): >60 ML/MIN/1.73 M^2
GLUCOSE SERPL-MCNC: 132 MG/DL (ref 70–110)
HCT VFR BLD AUTO: 26.9 % (ref 37–48.5)
HGB BLD-MCNC: 8 G/DL (ref 12–16)
IMM GRANULOCYTES # BLD AUTO: 0.06 K/UL (ref 0–0.04)
IMM GRANULOCYTES NFR BLD AUTO: 0.6 % (ref 0–0.5)
LYMPHOCYTES # BLD AUTO: 1.5 K/UL (ref 1–4.8)
LYMPHOCYTES NFR BLD: 16.3 % (ref 18–48)
MCH RBC QN AUTO: 28.1 PG (ref 27–31)
MCHC RBC AUTO-ENTMCNC: 29.7 G/DL (ref 32–36)
MCV RBC AUTO: 94 FL (ref 82–98)
MONOCYTES # BLD AUTO: 1.2 K/UL (ref 0.3–1)
MONOCYTES NFR BLD: 12.3 % (ref 4–15)
NEUTROPHILS # BLD AUTO: 6.5 K/UL (ref 1.8–7.7)
NEUTROPHILS NFR BLD: 68.7 % (ref 38–73)
NRBC BLD-RTO: 0 /100 WBC
PLATELET # BLD AUTO: 375 K/UL (ref 150–450)
PMV BLD AUTO: 8.9 FL (ref 9.2–12.9)
POCT GLUCOSE: 178 MG/DL (ref 70–110)
POCT GLUCOSE: 188 MG/DL (ref 70–110)
POCT GLUCOSE: 89 MG/DL (ref 70–110)
POTASSIUM SERPL-SCNC: 3.9 MMOL/L (ref 3.5–5.1)
RBC # BLD AUTO: 2.85 M/UL (ref 4–5.4)
SODIUM SERPL-SCNC: 142 MMOL/L (ref 136–145)
WBC # BLD AUTO: 9.45 K/UL (ref 3.9–12.7)

## 2021-12-23 PROCEDURE — 25000003 PHARM REV CODE 250: Performed by: SURGERY

## 2021-12-23 PROCEDURE — 85025 COMPLETE CBC W/AUTO DIFF WBC: CPT | Performed by: SURGERY

## 2021-12-23 PROCEDURE — 97110 THERAPEUTIC EXERCISES: CPT | Mod: CQ

## 2021-12-23 PROCEDURE — 63600175 PHARM REV CODE 636 W HCPCS: Performed by: SURGERY

## 2021-12-23 PROCEDURE — 11000001 HC ACUTE MED/SURG PRIVATE ROOM

## 2021-12-23 PROCEDURE — 25000003 PHARM REV CODE 250: Performed by: STUDENT IN AN ORGANIZED HEALTH CARE EDUCATION/TRAINING PROGRAM

## 2021-12-23 PROCEDURE — 80048 BASIC METABOLIC PNL TOTAL CA: CPT | Performed by: SURGERY

## 2021-12-23 PROCEDURE — 36415 COLL VENOUS BLD VENIPUNCTURE: CPT | Performed by: SURGERY

## 2021-12-23 RX ORDER — METOPROLOL SUCCINATE 25 MG/1
25 TABLET, EXTENDED RELEASE ORAL DAILY
Status: DISCONTINUED | OUTPATIENT
Start: 2021-12-23 | End: 2021-12-24

## 2021-12-23 RX ADMIN — MORPHINE SULFATE 2 MG: 4 INJECTION, SOLUTION INTRAMUSCULAR; INTRAVENOUS at 09:12

## 2021-12-23 RX ADMIN — HYDROCODONE BITARTRATE AND ACETAMINOPHEN 1 TABLET: 5; 325 TABLET ORAL at 11:12

## 2021-12-23 RX ADMIN — LOPERAMIDE HYDROCHLORIDE 4 MG: 2 CAPSULE ORAL at 12:12

## 2021-12-23 RX ADMIN — METOPROLOL SUCCINATE 25 MG: 25 TABLET, EXTENDED RELEASE ORAL at 08:12

## 2021-12-23 RX ADMIN — LOPERAMIDE HYDROCHLORIDE 4 MG: 2 CAPSULE ORAL at 04:12

## 2021-12-23 RX ADMIN — LOPERAMIDE HYDROCHLORIDE 4 MG: 2 CAPSULE ORAL at 10:12

## 2021-12-23 RX ADMIN — INSULIN DETEMIR 20 UNITS: 100 INJECTION, SOLUTION SUBCUTANEOUS at 08:12

## 2021-12-23 RX ADMIN — Medication 1 PACKET: at 04:12

## 2021-12-23 RX ADMIN — INSULIN ASPART 2 UNITS: 100 INJECTION, SOLUTION INTRAVENOUS; SUBCUTANEOUS at 12:12

## 2021-12-23 RX ADMIN — PIPERACILLIN AND TAZOBACTAM 4.5 G: 4; .5 INJECTION, POWDER, LYOPHILIZED, FOR SOLUTION INTRAVENOUS; PARENTERAL at 02:12

## 2021-12-23 RX ADMIN — MORPHINE SULFATE 2 MG: 4 INJECTION, SOLUTION INTRAMUSCULAR; INTRAVENOUS at 06:12

## 2021-12-23 RX ADMIN — PIPERACILLIN AND TAZOBACTAM 4.5 G: 4; .5 INJECTION, POWDER, LYOPHILIZED, FOR SOLUTION INTRAVENOUS; PARENTERAL at 10:12

## 2021-12-23 RX ADMIN — LOPERAMIDE HYDROCHLORIDE 4 MG: 2 CAPSULE ORAL at 08:12

## 2021-12-23 RX ADMIN — Medication 1 PACKET: at 10:12

## 2021-12-23 RX ADMIN — Medication 1 PACKET: at 11:12

## 2021-12-23 RX ADMIN — PIPERACILLIN AND TAZOBACTAM 4.5 G: 4; .5 INJECTION, POWDER, LYOPHILIZED, FOR SOLUTION INTRAVENOUS; PARENTERAL at 06:12

## 2021-12-23 RX ADMIN — INSULIN ASPART 8 UNITS: 100 INJECTION, SOLUTION INTRAVENOUS; SUBCUTANEOUS at 08:12

## 2021-12-23 RX ADMIN — CALCIUM CARBONATE (ANTACID) CHEW TAB 500 MG 1000 MG: 500 CHEW TAB at 08:12

## 2021-12-23 RX ADMIN — ENOXAPARIN SODIUM 40 MG: 40 INJECTION SUBCUTANEOUS at 04:12

## 2021-12-23 RX ADMIN — INSULIN ASPART 8 UNITS: 100 INJECTION, SOLUTION INTRAVENOUS; SUBCUTANEOUS at 12:12

## 2021-12-23 RX ADMIN — INSULIN DETEMIR 10 UNITS: 100 INJECTION, SOLUTION SUBCUTANEOUS at 10:12

## 2021-12-23 RX ADMIN — Medication 1 PACKET: at 06:12

## 2021-12-23 NOTE — PLAN OF CARE
TN sent updated LTAC referral packets via care port. Pending review for [facility]. TN to follow up.     Per Zena with [facility], no bed availability.      TN placed call to [facility], stated no bariatric bed at this time, will review again on Monday.     TN placed call to [facility], stated will need three in network denials prior to acceptance. TN to contact Kary [phone] if denials are received     [facility]    12/23/21 0822   Post-Acute Status   Post-Acute Authorization Placement  (LTAC)   Post-Acute Placement Status Pending payor medical review/second level review   Coverage DANIELLE   Discharge Delays (!) Post-Acute Set-up   Discharge Plan   Discharge Plan A Long-term acute care facility (LTAC)

## 2021-12-23 NOTE — NURSING
Dr. Brant Jorgensen, notified of wound vac having an air licking. Orders received to put it back on wall suction on high continuous. Will continue to monitor.

## 2021-12-23 NOTE — PROGRESS NOTES
[facility] Called [facility] and [facility], both with no response. Will follow up again.

## 2021-12-23 NOTE — NURSING
Right leg dressing saturated with stool. Leg rewrapped with kerlix and ace wrap. Patient tolerated well.

## 2021-12-23 NOTE — PROGRESS NOTES
"       [facility] - Med Surg [facility] Medicine  Telemedicine Progress Note    Patient Name: Shauna Queen  MRN: [id number]  Patient Class: IP- Inpatient   Admission Date: 11/30/2021  Length of Stay: 23 days  Attending Physician: Milvia García MD  Primary Care Provider: Jorge Dennis MD          Subjective:     Principal Problem:Necrotizing fasciitis        HPI:  Ms. Queen is a 36-year-old presents to the ED from outside hospital for abscess.  She has initially presented to outside hospital for DKA which resolved.  Patient was unaware of why she was here in July prompted her.  Per the checkout are received: "36-year-old female history of diabetes mellitus, sleep apnea on CPAP, schizophrenia, hypertension, and hyperlipidemia admitted to [facility] on November 24 from [facility] for treatment of diabetic ketoacidosis. She was initiated on an insulin infusion. DKA improved and she was transitioned off the insulin infusion. She again developed DKA and was placed back on the insulin infusion for a period of time. Currently she is off the insulin infusion and on subcutaneous insulin. She was noted to have right upper/inner thigh pain with erythema, induration, and tenderness to palpation. She was treated with IV clindamycin and vancomycin. Due to her size ((BMI 74.32) they are unable to perform CT or MRI. General Surgery there performed an incision and drainage with purulent material found. She was taken to the OR on November 29 for a second-look at the thigh wound. They were only able to do superficial debridement. Postprocedure she extubated to 2 L nasal cannula and was awake with oxygen saturation 100 percent. Surgery at their facility recommended transfer to a facility better able to perform procedures on a patient with her BMI. Referring provider spoke with General Surgery at [facility]. [facility] Medicine was asked to admit for further treatment. I spoke with her current " "attending. He noted she has no alteration in mentation. Lower extremity is intact from a neurovascular standpoint. She remains in the ICU."      Overview/Hospital Course:  37 y/o female admitted to the hospital as a transfer secondary to R thigh abscess and a BMI of Body mass index is 84.77 kg/m².   Patient transferred here for surgical evaluation.  Patient went for surgical debridement on 11/30/21. Patient continued on Abx per ID recommendations. Blood cultures were NG. Underwent another debridement on 12/2 and again on 12/4 - noting significant necrotizing soft tissue infections concerning for tracking down into her knee. On broad spectrum antibiotics. To OR with Orthopedic surgery to explore right knee on 12/5. Patient again went for surgery on 12/6.  Wound closure with wound vac placement on 12/8.      Subjective/Interval History     Patient doing better, CDiff negative again. Per surgery, multiple attempt to re-apply dressing eventually lead to another leak so patient will need wound vac change in the OR tomorrow. NPO at midnight.      Review of Systems    Constitutional: Negative for chills, fatigue, fever.   HENT: Negative for sore throat, trouble swallowing.    Eyes: Negative for photophobia, visual disturbance.   Respiratory: Negative for cough, shortness of breath.    Cardiovascular: Negative for chest pain, palpitations, leg swelling.   Gastrointestinal: Negative for abdominal pain, constipation, nausea, vomiting. +diarrhea.   Endocrine: Negative for cold intolerance, heat intolerance.   Genitourinary: Negative for dysuria, frequency.   Musculoskeletal: Negative for arthralgias, myalgias.   Skin: Negative for rash, wound, erythema   Neurological: Negative for dizziness, syncope, weakness, light-headedness.   Psychiatric/Behavioral: Negative for confusion, hallucinations, anxiety    Medications  Scheduled Meds:   calcium carbonate  1,000 mg Oral Daily    enoxaparin  40 mg Subcutaneous Daily    insulin " aspart U-100  8 Units Subcutaneous TIDWM    insulin detemir U-100  10 Units Subcutaneous BID    loperamide  4 mg Oral QID    metoprolol succinate  25 mg Oral Daily    piperacillin-tazobactam (ZOSYN) IVPB  4.5 g Intravenous Q8H    potassium, sodium phosphates  1 packet Oral QID (AC & HS)     Continuous Infusions:  PRN Meds:.sodium chloride, acetaminophen, ALPRAZolam, dextrose 50%, diphenoxylate-atropine 2.5-0.025 mg, glucagon (human recombinant), HYDROcodone-acetaminophen, influenza, insulin aspart U-100, melatonin, morphine, sars-cov-2 (covid-19)    Objective    Physical Examination    Temp:  [98.2 °F (36.8 °C)-99.6 °F (37.6 °C)]   Pulse:  []   Resp:  [18-20]   BP: (124-156)/(61-85)   SpO2:  [96 %-100 %]     Constitutional: NAD, conversant  Head: NC, AT  Eyes: No scleral icterus.  No eye discharge  Pulmonary/Chest: Effort normal. No respiratory distress on room air  GI: No distension  Musculoskeletal: Moving all extremities  Neurological: Alert.  Oriented to person, place, and time.   Psychiatric: Normal behavior, mood, and affect  Skin: No visible erythema or cyanosis    CBC  Recent Labs   Lab 12/19/21  0423 12/21/21  0350 12/23/21  0338   WBC 8.42 9.17 9.45   HGB 7.9* 8.1* 8.0*   HCT 26.6* 26.3* 26.9*    367 375     CMP  Recent Labs   Lab 12/19/21  0423 12/21/21  0350 12/23/21  0338    142 142   K 3.3* 3.4* 3.9    105 104   CO2 29 29 30*   BUN 9 8 7   CREATININE 0.7 0.8 0.8   GLU 50* 139* 132*   CALCIUM 7.6* 7.9* 8.0*           Assessment/Plan:      * Necrotizing fasciitis  - Status post I&D with General surgery at outside hospital, did not feel like they completely removed abscess.  - Underwent debridement in OR 11/30, 12/2, and 12/4, 12/5 - no extension into knee.    - OR again on 12/6, 12/8 and 12/11.   - OR again on 12/14 for debridement of necrotic skin, washout, partial closure, wound VAC change and then again on 12/17/21  - Continued on Zosyn till 12/18 for 2 weeks of treatment  per ID. (estimated end date: 12/18/21). Febrile again so ID reconsulted and IV zosyn resumed. Per ID, continue IV zosyn for two weeks.   - Pain control.       - to LTAC once wound vac can be changed at bedside. Per surgery, okay to d/c to LTAC however on 12/23, multiple attempt to re-apply dressing eventually lead to another leak so patient will need wound vac change in the OR tomorrow. NPO at midnight. .         Obesity  Morbid obesity  Body mass index is 84.72 kg/m².          Hypophosphatemia  Replace as needed.      Anemia of chronic disease  With anemia of acute blood loss as expected from multiple surgeries.  Transfuse for Hgb<7      Diarrhea     Cdiff Negative, PRN lomotil       Debility  PT/OT following, plan for LTAC      DANG (obstructive sleep apnea)  Patient likely has component of OHS as well  - outpatient sleep study      DM (diabetes mellitus)  A1c:   Lab Results   Component Value Date    HGBA1C >14.0 (H) 12/01/2021     Meds: basal bolus insulin + SSI PRN to maintain goal 140-180  ADA diet, accuchecks ACHS, hypoglycemic protocol  Continue current management.      VTE Risk Mitigation (From admission, onward)         Ordered     enoxaparin injection 40 mg  Daily         12/04/21 2306     IP VTE LOW RISK PATIENT  Once         11/30/21 0403     Place sequential compression device  Until discontinued         11/30/21 0403                      I have assessed these finding virtually using telemed platform and with assistance of bedside nurse       The attending portion of this evaluation, treatment, and documentation was performed per Milvia García MD via Telemedicine AudioVisual using the secure [employer] software platform with 2 way audio/video. The provider was located off-site and the patient is located in the hospital. The aforementioned video software was utilized to document the relevant history and physical exam    Milvia García MD  Department of Hospital Medicine   [facility] - [place], [place] - [facility]

## 2021-12-23 NOTE — PT/OT/SLP PROGRESS
"Physical Therapy Treatment    Patient Name:  Shauna Queen   MRN:  [id number]    Recommendations:     Discharge Recommendations:  other (see comments) (LTAC per MD)   Discharge Equipment Recommendations: other (see comments) (TBD at next level of care)   Barriers to discharge: None    Assessment:     Shauna Queen is a 36 y.o. female admitted with a medical diagnosis of Necrotizing fasciitis.  She presents with the following impairments/functional limitations:  weakness,impaired self care skills,gait instability,impaired functional mobilty,impaired balance,decreased upper extremity function,decreased lower extremity function,decreased ROM,edema,impaired skin,pain .    Rehab Prognosis: Good; patient would benefit from acute skilled PT services to address these deficits and reach maximum level of function.    Recent Surgery: Procedure(s) (LRB):  DEBRIDEMENT, WOUND (Right) 3 Days Post-Op    Plan:     During this hospitalization, patient to be seen 5 x/week to address the identified rehab impairments via gait training,therapeutic exercises,therapeutic activities,neuromuscular re-education and progress toward the following goals:    · Plan of Care Expires:  01/05/22    Subjective     Chief Complaint: weakness   Patient/Family Comments/goals: pt is agreeable to therapy   Pain/Comfort: " a little bit" pain .   · Location - Side 1: Right  · Location 1: thigh  · Pain Addressed 1: Pre-medicate for activity,Reposition,Nurse notified      Objective:     Communicated with nurse prior to session.  Patient found HOB elevated with bed alarm,telemetry,joy catheter,central line upon PT entry to room.     General Precautions: Standard, fall   Orthopedic Precautions:N/A   Braces: N/A  Respiratory Status: Room air        AM-PAC 6 CLICK MOBILITY  Turning over in bed (including adjusting bedclothes, sheets and blankets)?: 3  Sitting down on and standing up from a chair with arms (e.g., wheelchair, bedside commode, etc.): 1  Moving " from lying on back to sitting on the side of the bed?: 2  Moving to and from a bed to a chair (including a wheelchair)?: 1  Need to walk in hospital room?: 1  Climbing 3-5 steps with a railing?: 1  Basic Mobility Total Score: 9       Therapeutic Activities and Exercises:   Pt performed in bed BLE 10 reps (AAROM-AROM BLE ):  AP, ankle circumduction, QS ,GS ,  HS,  Hip IR/ER .  V/T's cues for technique and sequence.   Encouraged pt to perform BLE ex's as above throughout the day, pt verbalized understanding .    Patient left with bed in chair position with all lines intact, call button in reach, nurse notified and family and  present..    GOALS:   Multidisciplinary Problems     Physical Therapy Goals        Problem: Physical Therapy Goal    Goal Priority Disciplines Outcome Goal Variances Interventions   Physical Therapy Goal     PT, PT/OT Ongoing, Progressing     Description: Goals to be met by: 22     Patient will increase functional independence with mobility by performin. Supine to sit with Moderate Assistance  2. Sit to supine with Moderate Assistance  3. Rolling to Left and Right with Minimal Assistance.  4. Sit to stand transfer with Moderate Assistance  5. Bed to chair transfer with Moderate Assistance using Rolling Walker  5. Gait  x 15 feet with Minimal Assistance using Rolling Walker.                Multidisciplinary Problems (Resolved)        Problem: Physical Therapy Goal    Goal Priority Disciplines Outcome Goal Variances Interventions   Physical Therapy Goal   (Resolved)     PT, PT/OT Met     Description: Goals to be met by: 22    Patient will increase functional independence with mobility by performin. Pt will tolerate mobility assessment remaining free from falls and demonstrating good tolerance to activity                     Time Tracking:     PT Received On: 21  PT Start Time: 1401     PT Stop Time: 1414  PT Total Time (min): 13 min     Billable Minutes:  Therapeutic Exercise 13    Treatment Type: Treatment  PT/PTA: PTA     PTA Visit Number: 1     12/23/2021

## 2021-12-23 NOTE — PROGRESS NOTES
Surgery note    Multiple attempt to re-apply dressing eventually lead to another leak.  Will plan to wall suction.  NPO at midnight.  To OR tomorrow AM for wound vac change under sedation.

## 2021-12-23 NOTE — PLAN OF CARE
Problem: Adult Inpatient Plan of Care  Goal: Plan of Care Review  Outcome: Ongoing, Progressing  Flowsheets (Taken 12/23/2021 0450)  Plan of Care Reviewed With: patient    Pt free from falls and injury throughout shift. Pt AAO x 4. Right leg incision hooked up to wall suction per order. No output this shift. Triple lumen PICC line dressing changed.  Medications continued as ordered. Pain controlled with current analgesics. Patient resting comfortably, able to make needs known. Plan of care continued.

## 2021-12-23 NOTE — PLAN OF CARE
[facility] [place] [facility]  Discharge Reassessment    TN spoke with patient and her mother at the bedside. TN informed both of pending accepting LTAC facility with bed availability. Further, TN explained that referrals have been sent within a larger radius including [place] and surrounding areas, pending bed availability and medical acceptance. TN to follow up.     Primary Care Provider: Jorge Dennis MD    Expected Discharge Date:     Reassessment (most recent)     Discharge Reassessment - 12/23/21 6022        Discharge Reassessment    Assessment Type Discharge Planning Reassessment     Did the patient's condition or plan change since previous assessment? No     Discharge Plan discussed with: Patient;Parent(s)     Communicated STEVEN with patient/caregiver Date not available/Unable to determine     Discharge Plan A Long-term acute care facility (LTAC)     Discharge Plan B Home with family     DME Needed Upon Discharge  --   TBD    Discharge Barriers Identified Bariatric     Why the patient remains in the hospital Requires continued medical care        Post-Acute Status    Post-Acute Authorization Placement     Post-Acute Placement Status Pending Bed Availability     Coverage DANIELLE     Discharge Delays Procedure Scheduling (IR, OR, Labs, Echo, Cath, Echo, EEG)

## 2021-12-23 NOTE — PROGRESS NOTES
"       [facility] - Med Surg [facility] Medicine  Telemedicine Progress Note    Patient Name: Shauna Queen  MRN: [id number]  Patient Class: IP- Inpatient   Admission Date: 11/30/2021  Length of Stay: 22 days  Attending Physician: Milvia García MD  Primary Care Provider: Jorge Dennis MD          Subjective:     Principal Problem:Necrotizing fasciitis        HPI:  Ms. Queen is a 36-year-old presents to the ED from outside hospital for abscess.  She has initially presented to outside hospital for DKA which resolved.  Patient was unaware of why she was here in July prompted her.  Per the checkout are received: "36-year-old female history of diabetes mellitus, sleep apnea on CPAP, schizophrenia, hypertension, and hyperlipidemia admitted to [facility] on November 24 from [facility] for treatment of diabetic ketoacidosis. She was initiated on an insulin infusion. DKA improved and she was transitioned off the insulin infusion. She again developed DKA and was placed back on the insulin infusion for a period of time. Currently she is off the insulin infusion and on subcutaneous insulin. She was noted to have right upper/inner thigh pain with erythema, induration, and tenderness to palpation. She was treated with IV clindamycin and vancomycin. Due to her size ((BMI 74.32) they are unable to perform CT or MRI. General Surgery there performed an incision and drainage with purulent material found. She was taken to the OR on November 29 for a second-look at the thigh wound. They were only able to do superficial debridement. Postprocedure she extubated to 2 L nasal cannula and was awake with oxygen saturation 100 percent. Surgery at their facility recommended transfer to a facility better able to perform procedures on a patient with her BMI. Referring provider spoke with General Surgery at [facility]. [facility] was asked to admit for further treatment. I spoke with her current " "attending. He noted she has no alteration in mentation. Lower extremity is intact from a neurovascular standpoint. She remains in the ICU."      Overview/Hospital Course:  35 y/o female admitted to the hospital as a transfer secondary to R thigh abscess and a BMI of Body mass index is 84.77 kg/m².   Patient transferred here for surgical evaluation.  Patient went for surgical debridement on 11/30/21. Patient continued on Abx per ID recommendations. Blood cultures were NG. Underwent another debridement on 12/2 and again on 12/4 - noting significant necrotizing soft tissue infections concerning for tracking down into her knee. On broad spectrum antibiotics. To OR with Orthopedic surgery to explore right knee on 12/5. Patient again went for surgery on 12/6.  Wound closure with wound vac placement on 12/8.      Subjective/Interval History     Patient doing better, CDiff negative again. Per surgery, okay to transfer to LTAC for continued wound vac change at bedside there. Per ID, continue IV zosyn for 2 weeks. Patient medically ready for discharge to LTAC.       Review of Systems    Constitutional: Negative for chills, fatigue, fever.   HENT: Negative for sore throat, trouble swallowing.    Eyes: Negative for photophobia, visual disturbance.   Respiratory: Negative for cough, shortness of breath.    Cardiovascular: Negative for chest pain, palpitations, leg swelling.   Gastrointestinal: Negative for abdominal pain, constipation, nausea, vomiting. +diarrhea.   Endocrine: Negative for cold intolerance, heat intolerance.   Genitourinary: Negative for dysuria, frequency.   Musculoskeletal: Negative for arthralgias, myalgias.   Skin: Negative for rash, wound, erythema   Neurological: Negative for dizziness, syncope, weakness, light-headedness.   Psychiatric/Behavioral: Negative for confusion, hallucinations, anxiety    Medications  Scheduled Meds:   calcium carbonate  1,000 mg Oral Daily    enoxaparin  40 mg Subcutaneous " Daily    insulin aspart U-100  8 Units Subcutaneous TIDWM    insulin detemir U-100  20 Units Subcutaneous BID    loperamide  4 mg Oral QID    metoprolol succinate  12.5 mg Oral Daily    piperacillin-tazobactam (ZOSYN) IVPB  4.5 g Intravenous Q8H    potassium, sodium phosphates  1 packet Oral QID (AC & HS)     Continuous Infusions:  PRN Meds:.sodium chloride, acetaminophen, ALPRAZolam, dextrose 50%, diphenoxylate-atropine 2.5-0.025 mg, glucagon (human recombinant), HYDROcodone-acetaminophen, influenza, insulin aspart U-100, melatonin, morphine, ondansetron, prochlorperazine, sars-cov-2 (covid-19), sodium chloride 0.9%    Objective    Physical Examination    Temp:  [98.7 °F (37.1 °C)-99.6 °F (37.6 °C)]   Pulse:  [102-118]   Resp:  [16-20]   BP: (128-138)/(61-87)   SpO2:  [96 %-100 %]     Constitutional: NAD, conversant  Head: NC, AT  Eyes: No scleral icterus.  No eye discharge  Pulmonary/Chest: Effort normal. No respiratory distress on room air  GI: No distension  Musculoskeletal: Moving all extremities  Neurological: Alert.  Oriented to person, place, and time.   Psychiatric: Normal behavior, mood, and affect  Skin: No visible erythema or cyanosis    CBC  Recent Labs   Lab 12/17/21  0327 12/19/21  0423 12/21/21  0350   WBC 8.03 8.42 9.17   HGB 8.0* 7.9* 8.1*   HCT 25.9* 26.6* 26.3*    374 367     CMP  Recent Labs   Lab 12/17/21  0327 12/19/21  0423 12/21/21  0350    144 142   K 3.0* 3.3* 3.4*    106 105   CO2 27 29 29   BUN 10 9 8   CREATININE 0.7 0.7 0.8   GLU 85 50* 139*   CALCIUM 7.8* 7.6* 7.9*           Assessment/Plan:      * Necrotizing fasciitis  - Status post I&D with General surgery at outside hospital, did not feel like they completely removed abscess.  - Underwent debridement in OR 11/30, 12/2, and 12/4, 12/5 - no extension into knee.    - OR again on 12/6, 12/8 and 12/11.   - OR again on 12/14 for debridement of necrotic skin, washout, partial closure, wound VAC change and then  again on 12/17/21  - Continued on Zosyn till 12/18 for 2 weeks of treatment per ID. (estimated end date: 12/18/21). Febrile again so ID reconsulted and IV zosyn resumed. Per ID, continue IV zosyn for two weeks.   - Pain control.   - to LTAC once wound vac can be changed at bedside. Per surgery, okay to d/c to LTAC      Obesity  Morbid obesity  Body mass index is 84.72 kg/m².          Hypophosphatemia  Replace as needed.      Anemia of chronic disease  With anemia of acute blood loss as expected from multiple surgeries.  Transfuse for Hgb<7      Diarrhea     Cdiff Negative, PRN lomotil       Debility  PT/OT following      DANG (obstructive sleep apnea)  Patient likely has component of OHS as well  - outpatient sleep study      DM (diabetes mellitus)  A1c:   Lab Results   Component Value Date    HGBA1C >14.0 (H) 12/01/2021     Meds: basal bolus insulin + SSI PRN to maintain goal 140-180  ADA diet, accuchecks ACHS, hypoglycemic protocol  Continue current management.      VTE Risk Mitigation (From admission, onward)         Ordered     enoxaparin injection 40 mg  Daily         12/04/21 2306     IP VTE LOW RISK PATIENT  Once         11/30/21 0403     Place sequential compression device  Until discontinued         11/30/21 0403                      I have assessed these finding virtually using telemed platform and with assistance of bedside nurse       The attending portion of this evaluation, treatment, and documentation was performed per Milvia García MD via Telemedicine AudioVisual using the secure [employer] software platform with 2 way audio/video. The provider was located off-site and the patient is located in the hospital. The aforementioned video software was utilized to document the relevant history and physical exam    Milvia García MD  Department of Hospital Medicine   [facility]

## 2021-12-24 ENCOUNTER — ANESTHESIA (OUTPATIENT)
Dept: SURGERY | Facility: HOSPITAL | Age: 36
DRG: 463 | End: 2021-12-24
Payer: MEDICAID

## 2021-12-24 LAB
POCT GLUCOSE: 107 MG/DL (ref 70–110)
POCT GLUCOSE: 108 MG/DL (ref 70–110)
POCT GLUCOSE: 113 MG/DL (ref 70–110)
POCT GLUCOSE: 115 MG/DL (ref 70–110)
POCT GLUCOSE: 151 MG/DL (ref 70–110)

## 2021-12-24 PROCEDURE — 36000706: Performed by: SURGERY

## 2021-12-24 PROCEDURE — 71000033 HC RECOVERY, INTIAL HOUR: Performed by: SURGERY

## 2021-12-24 PROCEDURE — D9220A PRA ANESTHESIA: ICD-10-PCS | Mod: ANES,,, | Performed by: ANESTHESIOLOGY

## 2021-12-24 PROCEDURE — 25000003 PHARM REV CODE 250: Performed by: SURGERY

## 2021-12-24 PROCEDURE — 63600175 PHARM REV CODE 636 W HCPCS: Performed by: SURGERY

## 2021-12-24 PROCEDURE — 25000003 PHARM REV CODE 250: Performed by: STUDENT IN AN ORGANIZED HEALTH CARE EDUCATION/TRAINING PROGRAM

## 2021-12-24 PROCEDURE — 97605 PR NEG PRESS WOUND THERAPY (NPWT) W/NON-DISPOSABLE WOUND VAC DEVICE (DME), <=50 CM: ICD-10-PCS | Mod: ,,, | Performed by: SURGERY

## 2021-12-24 PROCEDURE — 94761 N-INVAS EAR/PLS OXIMETRY MLT: CPT

## 2021-12-24 PROCEDURE — D9220A PRA ANESTHESIA: Mod: CRNA,,, | Performed by: STUDENT IN AN ORGANIZED HEALTH CARE EDUCATION/TRAINING PROGRAM

## 2021-12-24 PROCEDURE — 36000707: Performed by: SURGERY

## 2021-12-24 PROCEDURE — D9220A PRA ANESTHESIA: ICD-10-PCS | Mod: CRNA,,, | Performed by: STUDENT IN AN ORGANIZED HEALTH CARE EDUCATION/TRAINING PROGRAM

## 2021-12-24 PROCEDURE — 63600175 PHARM REV CODE 636 W HCPCS: Performed by: STUDENT IN AN ORGANIZED HEALTH CARE EDUCATION/TRAINING PROGRAM

## 2021-12-24 PROCEDURE — C9399 UNCLASSIFIED DRUGS OR BIOLOG: HCPCS | Performed by: STUDENT IN AN ORGANIZED HEALTH CARE EDUCATION/TRAINING PROGRAM

## 2021-12-24 PROCEDURE — 37000008 HC ANESTHESIA 1ST 15 MINUTES: Performed by: SURGERY

## 2021-12-24 PROCEDURE — 37000009 HC ANESTHESIA EA ADD 15 MINS: Performed by: SURGERY

## 2021-12-24 PROCEDURE — 97605 NEG PRS WND THER DME<=50SQCM: CPT | Mod: ,,, | Performed by: SURGERY

## 2021-12-24 PROCEDURE — 11000001 HC ACUTE MED/SURG PRIVATE ROOM

## 2021-12-24 PROCEDURE — 99900035 HC TECH TIME PER 15 MIN (STAT)

## 2021-12-24 PROCEDURE — D9220A PRA ANESTHESIA: Mod: ANES,,, | Performed by: ANESTHESIOLOGY

## 2021-12-24 RX ORDER — LIDOCAINE HYDROCHLORIDE 20 MG/ML
INJECTION INTRAVENOUS
Status: DISCONTINUED | OUTPATIENT
Start: 2021-12-24 | End: 2021-12-24

## 2021-12-24 RX ORDER — PROPOFOL 10 MG/ML
VIAL (ML) INTRAVENOUS
Status: DISCONTINUED | OUTPATIENT
Start: 2021-12-24 | End: 2021-12-24

## 2021-12-24 RX ORDER — METOPROLOL SUCCINATE 50 MG/1
50 TABLET, EXTENDED RELEASE ORAL DAILY
Status: DISCONTINUED | OUTPATIENT
Start: 2021-12-25 | End: 2021-12-29

## 2021-12-24 RX ORDER — FENTANYL CITRATE 50 UG/ML
INJECTION, SOLUTION INTRAMUSCULAR; INTRAVENOUS
Status: DISCONTINUED | OUTPATIENT
Start: 2021-12-24 | End: 2021-12-24

## 2021-12-24 RX ORDER — MIDAZOLAM HYDROCHLORIDE 1 MG/ML
INJECTION, SOLUTION INTRAMUSCULAR; INTRAVENOUS
Status: DISCONTINUED | OUTPATIENT
Start: 2021-12-24 | End: 2021-12-24

## 2021-12-24 RX ORDER — KETAMINE HYDROCHLORIDE 100 MG/ML
INJECTION, SOLUTION INTRAMUSCULAR; INTRAVENOUS
Status: DISCONTINUED | OUTPATIENT
Start: 2021-12-24 | End: 2021-12-24

## 2021-12-24 RX ORDER — DEXMEDETOMIDINE HYDROCHLORIDE 100 UG/ML
INJECTION, SOLUTION INTRAVENOUS
Status: DISCONTINUED | OUTPATIENT
Start: 2021-12-24 | End: 2021-12-24

## 2021-12-24 RX ORDER — ONDANSETRON 2 MG/ML
INJECTION INTRAMUSCULAR; INTRAVENOUS
Status: DISCONTINUED | OUTPATIENT
Start: 2021-12-24 | End: 2021-12-24

## 2021-12-24 RX ADMIN — ONDANSETRON 4 MG: 2 INJECTION, SOLUTION INTRAMUSCULAR; INTRAVENOUS at 11:12

## 2021-12-24 RX ADMIN — INSULIN DETEMIR 10 UNITS: 100 INJECTION, SOLUTION SUBCUTANEOUS at 09:12

## 2021-12-24 RX ADMIN — DEXMEDETOMIDINE HYDROCHLORIDE 8 MCG: 100 INJECTION, SOLUTION INTRAVENOUS at 12:12

## 2021-12-24 RX ADMIN — PIPERACILLIN AND TAZOBACTAM 4.5 G: 4; .5 INJECTION, POWDER, LYOPHILIZED, FOR SOLUTION INTRAVENOUS; PARENTERAL at 03:12

## 2021-12-24 RX ADMIN — DEXMEDETOMIDINE HYDROCHLORIDE 8 MCG: 100 INJECTION, SOLUTION INTRAVENOUS at 11:12

## 2021-12-24 RX ADMIN — Medication 1 PACKET: at 09:12

## 2021-12-24 RX ADMIN — LOPERAMIDE HYDROCHLORIDE 4 MG: 2 CAPSULE ORAL at 05:12

## 2021-12-24 RX ADMIN — LIDOCAINE HYDROCHLORIDE 50 MG: 20 INJECTION, SOLUTION INTRAVENOUS at 11:12

## 2021-12-24 RX ADMIN — PROPOFOL 20 MG: 10 INJECTION, EMULSION INTRAVENOUS at 12:12

## 2021-12-24 RX ADMIN — Medication 1 PACKET: at 05:12

## 2021-12-24 RX ADMIN — KETAMINE HYDROCHLORIDE 30 MG: 100 INJECTION, SOLUTION, CONCENTRATE INTRAMUSCULAR; INTRAVENOUS at 11:12

## 2021-12-24 RX ADMIN — INSULIN ASPART 8 UNITS: 100 INJECTION, SOLUTION INTRAVENOUS; SUBCUTANEOUS at 05:12

## 2021-12-24 RX ADMIN — METOPROLOL SUCCINATE 25 MG: 25 TABLET, EXTENDED RELEASE ORAL at 08:12

## 2021-12-24 RX ADMIN — PROPOFOL 30 MG: 10 INJECTION, EMULSION INTRAVENOUS at 11:12

## 2021-12-24 RX ADMIN — FENTANYL CITRATE 50 MCG: 50 INJECTION, SOLUTION INTRAMUSCULAR; INTRAVENOUS at 11:12

## 2021-12-24 RX ADMIN — PIPERACILLIN AND TAZOBACTAM 4.5 G: 4; .5 INJECTION, POWDER, LYOPHILIZED, FOR SOLUTION INTRAVENOUS; PARENTERAL at 11:12

## 2021-12-24 RX ADMIN — KETAMINE HYDROCHLORIDE 10 MG: 100 INJECTION, SOLUTION, CONCENTRATE INTRAMUSCULAR; INTRAVENOUS at 12:12

## 2021-12-24 RX ADMIN — FENTANYL CITRATE 25 MCG: 50 INJECTION, SOLUTION INTRAMUSCULAR; INTRAVENOUS at 11:12

## 2021-12-24 RX ADMIN — PIPERACILLIN AND TAZOBACTAM 4.5 G: 4; .5 INJECTION, POWDER, LYOPHILIZED, FOR SOLUTION INTRAVENOUS; PARENTERAL at 05:12

## 2021-12-24 RX ADMIN — MIDAZOLAM HYDROCHLORIDE 2 MG: 1 INJECTION, SOLUTION INTRAMUSCULAR; INTRAVENOUS at 11:12

## 2021-12-24 RX ADMIN — KETAMINE HYDROCHLORIDE 20 MG: 100 INJECTION, SOLUTION, CONCENTRATE INTRAMUSCULAR; INTRAVENOUS at 11:12

## 2021-12-24 RX ADMIN — PROPOFOL 20 MG: 10 INJECTION, EMULSION INTRAVENOUS at 11:12

## 2021-12-24 RX ADMIN — DEXMEDETOMIDINE HYDROCHLORIDE 4 MCG: 100 INJECTION, SOLUTION INTRAVENOUS at 12:12

## 2021-12-24 RX ADMIN — ENOXAPARIN SODIUM 40 MG: 40 INJECTION SUBCUTANEOUS at 05:12

## 2021-12-24 RX ADMIN — LOPERAMIDE HYDROCHLORIDE 4 MG: 2 CAPSULE ORAL at 08:12

## 2021-12-24 RX ADMIN — CALCIUM CARBONATE (ANTACID) CHEW TAB 500 MG 1000 MG: 500 CHEW TAB at 08:12

## 2021-12-24 RX ADMIN — INSULIN DETEMIR 10 UNITS: 100 INJECTION, SOLUTION SUBCUTANEOUS at 08:12

## 2021-12-24 RX ADMIN — LOPERAMIDE HYDROCHLORIDE 4 MG: 2 CAPSULE ORAL at 09:12

## 2021-12-24 RX ADMIN — DEXMEDETOMIDINE HYDROCHLORIDE 4 MCG: 100 INJECTION, SOLUTION INTRAVENOUS at 11:12

## 2021-12-24 RX ADMIN — HYDROCODONE BITARTRATE AND ACETAMINOPHEN 1 TABLET: 5; 325 TABLET ORAL at 11:12

## 2021-12-24 NOTE — PROGRESS NOTES
[facility]  General Surgery  Progress Note      Interval History:     Patient is s/p serial debridements of right lower extremity (groin to knee) for necrotizing fasciitis, most recently 12/6, at which time the wound appeared healthy with no remaining infection. On 12/8 patient underwent partial closure and wound VAC placement. Wound VAC exchanged in OR on 12/12. On 12/14 she was taken for further debridement (necrotic skin), partial closure, and WV change. On 12/17/21 and 12/20/21 she underwent wound vac change.    Wound vac continues to not hold seal when patient moves or when she soils it after having a bowel movement  Currently on wall suction with minimal serosanguinous drainage  NPO since midnight for wound vac change today  Afebrile; tachycardia improving now 101  WBC normal  LTAC placement pending    Objective:     Vital Signs (Most Recent):  Temp: 98.2 °F (36.8 °C) (12/24/21 0332)  Pulse: 101 (12/24/21 0332)  Resp: 18 (12/24/21 0332)  BP: (!) 136/92 (12/24/21 0332)  SpO2: 96 % (12/24/21 0332) Vital Signs (24h Range):  Temp:  [98.2 °F (36.8 °C)-99.2 °F (37.3 °C)] 98.2 °F (36.8 °C)  Pulse:  [] 101  Resp:  [18-20] 18  SpO2:  [95 %-100 %] 96 %  BP: (124-156)/(71-92) 136/92     Weight: (!) 224 kg (493 lb 13.3 oz)  Body mass index is 84.72 kg/m².      Intake/Output Summary (Last 24 hours) at 12/24/2021 0644  Last data filed at 12/24/2021 0332  Gross per 24 hour   Intake 600 ml   Output 1200 ml   Net -600 ml       Physical Exam  GEN: alert and oriented, NAD  HEENT: MMM, no scleral icterus  CV: low grade tachycardia, distant heart sounds due to body habitus, regular rhythm  PULM: Mildly increased WOB on RA  ABD: soft, non-tender, non-distended, obese  EXT: moves all. Right leg with wound VAC to wall suction with minimal serosanguinous drainage    Significant Labs:  CBC:   Recent Labs   Lab 12/23/21  0338   WBC 9.45   RBC 2.85*   HGB 8.0*   HCT 26.9*      MCV 94   MCH 28.1   MCHC  29.7*     CMP:   Recent Labs   Lab 12/23/21  0338   *   CALCIUM 8.0*      K 3.9   CO2 30*      BUN 7   CREATININE 0.8       Significant Diagnostics:  None    Assessment/Plan:     Active Diagnoses:    Diagnosis Date Noted POA    PRINCIPAL PROBLEM:  Necrotizing fasciitis [M72.6] 11/30/2021 Yes    Debility [R53.81] 12/01/2021 Yes    Anemia of chronic disease [D63.8] 12/01/2021 Yes    Hypophosphatemia [E83.39] 12/01/2021 Yes    Obesity [E66.9] 12/01/2021 Yes    DM (diabetes mellitus) [E11.9] 11/30/2021 Yes    DANG (obstructive sleep apnea) [G47.33] 11/30/2021 Yes      Problems Resolved During this Admission:     This is a 36 year old F with class 3 obesity c/b several medical problems who is transferred to [facility] for treatment of right groin/thigh necrotizing fasciitis.    S/p serial debridements (groin to knee), most recently 12/6. At this time the wound was found to be healthy with minimal necrotic tissues. On 12/8 she underwent partial closure and wound VAC placement, s/p wound VAC change on 12/12 and further debridement (necrotic skin), partial closure, and WV change on 12/14, 12/17, and 12/20.    OR today for wound vac replacement  LTAC placement pending    Erin Cisneros MD PGY4  General Surgery  [facility]

## 2021-12-24 NOTE — PLAN OF CARE
Chart check complete. Pt AAOx4, able to verbalize needs. Rajput in place to gravity, draining well.  PICC to CASH infusing antibiotics as ordered.  Abscess to right leg with wound vac to wall suction, small amount of drainage noted.  BG monitored, scheduled insulin given as ordered.  CPAP QHS.  Pt is NPO at midnight for debridment in AM.  PRN pain medication given for pain to right leg.  Diet tolerated well, no distress noted, VSS.  Bed in low position, wheels locked, call light in reach for assistance, will continue to monitor.        Problem: Adult Inpatient Plan of Care  Goal: Plan of Care Review  Outcome: Ongoing, Progressing     Problem: Adult Inpatient Plan of Care  Goal: Patient-Specific Goal (Individualization)  Outcome: Ongoing, Progressing     Problem: Adult Inpatient Plan of Care  Goal: Absence of Hospital-Acquired Illness or Injury  Outcome: Ongoing, Progressing     Problem: Adult Inpatient Plan of Care  Goal: Optimal Comfort and Wellbeing  Outcome: Ongoing, Progressing     Problem: Diabetes Comorbidity  Goal: Blood Glucose Level Within Desired Range  Outcome: Ongoing, Progressing     Problem: Bariatric Environmental Safety  Goal: Safety Maintained with Care  Outcome: Ongoing, Progressing     Problem: Fall Injury Risk  Goal: Absence of Fall and Fall-Related Injury  Outcome: Ongoing, Progressing

## 2021-12-24 NOTE — OP NOTE
.  [facility] - [place] - Med Surg [place]  General Surgery  Operative Note    SUMMARY     Date of Procedure: 12/24/21    Procedure: Procedure(s) (LRB):  DEBRIDEMENT, WOUND (Right)       Surgeon(s) and Role:     * Jorge Dupree MD - Primary    Assisting Surgeon: Erin Cisneros MD - Resident    Pre-Operative Diagnosis: Necrotizing soft tissue infection [M79.89]  Necrotizing fasciitis [M72.6]    Post-Operative Diagnosis: Post-Op Diagnosis Codes:     * Necrotizing soft tissue infection [M79.89]     * Necrotizing fasciitis [M72.6]    Anesthesia: MAC    Indications for Procedure: chronic wound    Procedure in Detail: After informed consent was confirmed, the patient was taken to the OR and placed in lithotomy position. After sedation was administered, the patient's old wound vac was removed. The right lower extremity was then prepped and draped in sterile fashion.    The wound was inspected.  Good granulation tissue was present over 80% of wound. There was some pooling of murky fluid in the dependent area medially likely due to the vac recently losing suction seal but no gross purulence noted. One white sponge was placed in the medial tunnel tracking inferiorly. Black sponges were then placed circumferentially into the wound.  A pressure dressing was placed over the inferior portion of the vac that was closest to the nylon sutures to reduce risk of losing seal. The vac was connected to 125mmHg with negative leak test. Patient tolerated procedure well.    Estimated Blood Loss (EBL): none           Implants: * No implants in log *    Specimens:   Specimen (24h ago, onward)            None                  Condition: Good    Disposition: PACU - hemodynamically stable.    Attestation: Dr. Dupree was present for the entirety of the procedure.    Erin Cisneros MD PGY-IV  General Surgery  [facility]

## 2021-12-24 NOTE — PROGRESS NOTES
"        [facility] - Med Surg [facility] Medicine  Telemedicine Progress Note    Patient Name: Shauna Queen  MRN: [id number]  Patient Class: IP- Inpatient   Admission Date: 11/30/2021  Length of Stay: 24 days  Attending Physician: Milvia García MD  Primary Care Provider: Jorge Dennis MD          Subjective:     Principal Problem:Necrotizing fasciitis        HPI:  Ms. Queen is a 36-year-old presents to the ED from outside hospital for abscess.  She has initially presented to outside hospital for DKA which resolved.  Patient was unaware of why she was here in July prompted her.  Per the checkout are received: "36-year-old female history of diabetes mellitus, sleep apnea on CPAP, schizophrenia, hypertension, and hyperlipidemia admitted to [facility] on November 24 from [facility] for treatment of diabetic ketoacidosis. She was initiated on an insulin infusion. DKA improved and she was transitioned off the insulin infusion. She again developed DKA and was placed back on the insulin infusion for a period of time. Currently she is off the insulin infusion and on subcutaneous insulin. She was noted to have right upper/inner thigh pain with erythema, induration, and tenderness to palpation. She was treated with IV clindamycin and vancomycin. Due to her size ((BMI 74.32) they are unable to perform CT or MRI. General Surgery there performed an incision and drainage with purulent material found. She was taken to the OR on November 29 for a second-look at the thigh wound. They were only able to do superficial debridement. Postprocedure she extubated to 2 L nasal cannula and was awake with oxygen saturation 100 percent. Surgery at their facility recommended transfer to a facility better able to perform procedures on a patient with her BMI. Referring provider spoke with General Surgery at [facility]. [facility] was asked to admit for further treatment. I spoke with her current " "attending. He noted she has no alteration in mentation. Lower extremity is intact from a neurovascular standpoint. She remains in the ICU."      Overview/Hospital Course:  35 y/o female admitted to the hospital as a transfer secondary to R thigh abscess and a BMI of Body mass index is 84.77 kg/m².   Patient transferred here for surgical evaluation.  Patient went for surgical debridement on 11/30/21. Patient continued on Abx per ID recommendations. Blood cultures were NG. Underwent another debridement on 12/2 and again on 12/4 - noting significant necrotizing soft tissue infections concerning for tracking down into her knee. On broad spectrum antibiotics. To OR with Orthopedic surgery to explore right knee on 12/5. Patient again went for surgery on 12/6.  Wound closure with wound vac placement on 12/8.      Subjective/Interval History     Patient doing better, per surgery, multiple attempt to re-apply dressing eventually lead to another leak so patient will need wound vac change in the OR today.       Review of Systems    Constitutional: Negative for chills, fatigue, fever.   HENT: Negative for sore throat, trouble swallowing.    Eyes: Negative for photophobia, visual disturbance.   Respiratory: Negative for cough, shortness of breath.    Cardiovascular: Negative for chest pain, palpitations, leg swelling.   Gastrointestinal: Negative for abdominal pain, constipation, nausea, vomiting. +diarrhea.   Endocrine: Negative for cold intolerance, heat intolerance.   Genitourinary: Negative for dysuria, frequency.   Musculoskeletal: Negative for arthralgias, myalgias.   Skin: Negative for rash, wound, erythema   Neurological: Negative for dizziness, syncope, weakness, light-headedness.   Psychiatric/Behavioral: Negative for confusion, hallucinations, anxiety    Medications  Scheduled Meds:   calcium carbonate  1,000 mg Oral Daily    enoxaparin  40 mg Subcutaneous Daily    insulin aspart U-100  8 Units Subcutaneous TIDWM "    insulin detemir U-100  10 Units Subcutaneous BID    loperamide  4 mg Oral QID    [START ON 12/25/2021] metoprolol succinate  50 mg Oral Daily    piperacillin-tazobactam (ZOSYN) IVPB  4.5 g Intravenous Q8H    potassium, sodium phosphates  1 packet Oral QID (AC & HS)     Continuous Infusions:  PRN Meds:.sodium chloride, acetaminophen, ALPRAZolam, dextrose 50%, diphenoxylate-atropine 2.5-0.025 mg, glucagon (human recombinant), HYDROcodone-acetaminophen, influenza, insulin aspart U-100, melatonin, morphine, sars-cov-2 (covid-19)    Objective    Physical Examination    Temp:  [97.9 °F (36.6 °C)-98.7 °F (37.1 °C)]   Pulse:  []   Resp:  [16-20]   BP: (114-143)/(60-92)   SpO2:  [95 %-100 %]     Constitutional: NAD, conversant  Head: NC, AT  Eyes: No scleral icterus.  No eye discharge  Pulmonary/Chest: Effort normal. No respiratory distress on room air  GI: No distension  Musculoskeletal: Moving all extremities  Neurological: Alert.  Oriented to person, place, and time.   Psychiatric: Normal behavior, mood, and affect  Skin: No visible erythema or cyanosis    CBC  Recent Labs   Lab 12/19/21  0423 12/21/21  0350 12/23/21  0338   WBC 8.42 9.17 9.45   HGB 7.9* 8.1* 8.0*   HCT 26.6* 26.3* 26.9*    367 375     CMP  Recent Labs   Lab 12/19/21  0423 12/21/21  0350 12/23/21  0338    142 142   K 3.3* 3.4* 3.9    105 104   CO2 29 29 30*   BUN 9 8 7   CREATININE 0.7 0.8 0.8   GLU 50* 139* 132*   CALCIUM 7.6* 7.9* 8.0*           Assessment/Plan:      * Necrotizing fasciitis  - Status post I&D with General surgery at outside hospital, did not feel like they completely removed abscess.  - Underwent debridement in OR 11/30, 12/2, and 12/4, 12/5 - no extension into knee.    - OR again on 12/6, 12/8 and 12/11.   - OR again on 12/14 for debridement of necrotic skin, washout, partial closure, wound VAC change and then again on 12/17/21  - Continued on Zosyn till 12/18 for 2 weeks of treatment per ID. (estimated  end date: 12/18/21). Febrile again so ID reconsulted and IV zosyn resumed. Per ID, continue IV zosyn for two weeks.   - Pain control.       - to LTAC once wound vac can be changed at bedside. Per surgery, okay to d/c to LTAC however on 12/23, multiple attempt to re-apply dressing eventually lead to another leak so patient will need wound vac change in the OR today.         Obesity  Morbid obesity  Body mass index is 84.72 kg/m².          Hypophosphatemia  Replace as needed.      Anemia of chronic disease  With anemia of acute blood loss as expected from multiple surgeries.  Transfuse for Hgb<7      Diarrhea     Cdiff Negative, PRN lomotil       Debility  PT/OT following, plan for LTAC      DANG (obstructive sleep apnea)  Patient likely has component of OHS as well  - outpatient sleep study      DM (diabetes mellitus)  A1c:   Lab Results   Component Value Date    HGBA1C >14.0 (H) 12/01/2021     Meds: basal bolus insulin + SSI PRN to maintain goal 140-180  ADA diet, accuchecks ACHS, hypoglycemic protocol  Continue current management.      VTE Risk Mitigation (From admission, onward)         Ordered     enoxaparin injection 40 mg  Daily         12/04/21 2306     IP VTE LOW RISK PATIENT  Once         11/30/21 0403     Place sequential compression device  Until discontinued         11/30/21 0403                      I have assessed these finding virtually using telemed platform and with assistance of bedside nurse       The attending portion of this evaluation, treatment, and documentation was performed per Milvia García MD via Telemedicine AudioVisual using the secure [employer] software platform with 2 way audio/video. The provider was located off-site and the patient is located in the hospital. The aforementioned video software was utilized to document the relevant history and physical exam    Milvia García MD  Department of Hospital Medicine   [facility]

## 2021-12-25 LAB
ANION GAP SERPL CALC-SCNC: 11 MMOL/L (ref 8–16)
BASOPHILS # BLD AUTO: 0.03 K/UL (ref 0–0.2)
BASOPHILS NFR BLD: 0.3 % (ref 0–1.9)
BUN SERPL-MCNC: 8 MG/DL (ref 6–20)
CALCIUM SERPL-MCNC: 8.2 MG/DL (ref 8.7–10.5)
CHLORIDE SERPL-SCNC: 103 MMOL/L (ref 95–110)
CO2 SERPL-SCNC: 27 MMOL/L (ref 23–29)
CREAT SERPL-MCNC: 0.7 MG/DL (ref 0.5–1.4)
DIFFERENTIAL METHOD: ABNORMAL
EOSINOPHIL # BLD AUTO: 0.1 K/UL (ref 0–0.5)
EOSINOPHIL NFR BLD: 1.6 % (ref 0–8)
ERYTHROCYTE [DISTWIDTH] IN BLOOD BY AUTOMATED COUNT: 15.9 % (ref 11.5–14.5)
EST. GFR  (AFRICAN AMERICAN): >60 ML/MIN/1.73 M^2
EST. GFR  (NON AFRICAN AMERICAN): >60 ML/MIN/1.73 M^2
GLUCOSE SERPL-MCNC: 114 MG/DL (ref 70–110)
HCT VFR BLD AUTO: 27.7 % (ref 37–48.5)
HGB BLD-MCNC: 8.5 G/DL (ref 12–16)
IMM GRANULOCYTES # BLD AUTO: 0.06 K/UL (ref 0–0.04)
IMM GRANULOCYTES NFR BLD AUTO: 0.7 % (ref 0–0.5)
LYMPHOCYTES # BLD AUTO: 2.6 K/UL (ref 1–4.8)
LYMPHOCYTES NFR BLD: 28.9 % (ref 18–48)
MCH RBC QN AUTO: 27.8 PG (ref 27–31)
MCHC RBC AUTO-ENTMCNC: 30.7 G/DL (ref 32–36)
MCV RBC AUTO: 91 FL (ref 82–98)
MONOCYTES # BLD AUTO: 1.4 K/UL (ref 0.3–1)
MONOCYTES NFR BLD: 16.2 % (ref 4–15)
NEUTROPHILS # BLD AUTO: 4.7 K/UL (ref 1.8–7.7)
NEUTROPHILS NFR BLD: 52.3 % (ref 38–73)
NRBC BLD-RTO: 0 /100 WBC
PLATELET # BLD AUTO: 464 K/UL (ref 150–450)
PMV BLD AUTO: 9.4 FL (ref 9.2–12.9)
POCT GLUCOSE: 144 MG/DL (ref 70–110)
POCT GLUCOSE: 145 MG/DL (ref 70–110)
POCT GLUCOSE: 173 MG/DL (ref 70–110)
POTASSIUM SERPL-SCNC: 4.6 MMOL/L (ref 3.5–5.1)
RBC # BLD AUTO: 3.06 M/UL (ref 4–5.4)
SODIUM SERPL-SCNC: 141 MMOL/L (ref 136–145)
WBC # BLD AUTO: 8.9 K/UL (ref 3.9–12.7)

## 2021-12-25 PROCEDURE — 63600175 PHARM REV CODE 636 W HCPCS: Performed by: STUDENT IN AN ORGANIZED HEALTH CARE EDUCATION/TRAINING PROGRAM

## 2021-12-25 PROCEDURE — 11000001 HC ACUTE MED/SURG PRIVATE ROOM

## 2021-12-25 PROCEDURE — 80048 BASIC METABOLIC PNL TOTAL CA: CPT | Performed by: STUDENT IN AN ORGANIZED HEALTH CARE EDUCATION/TRAINING PROGRAM

## 2021-12-25 PROCEDURE — 25000003 PHARM REV CODE 250: Performed by: STUDENT IN AN ORGANIZED HEALTH CARE EDUCATION/TRAINING PROGRAM

## 2021-12-25 PROCEDURE — 85025 COMPLETE CBC W/AUTO DIFF WBC: CPT | Performed by: STUDENT IN AN ORGANIZED HEALTH CARE EDUCATION/TRAINING PROGRAM

## 2021-12-25 PROCEDURE — 99900035 HC TECH TIME PER 15 MIN (STAT)

## 2021-12-25 PROCEDURE — 36415 COLL VENOUS BLD VENIPUNCTURE: CPT | Performed by: STUDENT IN AN ORGANIZED HEALTH CARE EDUCATION/TRAINING PROGRAM

## 2021-12-25 RX ADMIN — Medication 6 MG: at 10:12

## 2021-12-25 RX ADMIN — LOPERAMIDE HYDROCHLORIDE 4 MG: 2 CAPSULE ORAL at 09:12

## 2021-12-25 RX ADMIN — INSULIN ASPART 8 UNITS: 100 INJECTION, SOLUTION INTRAVENOUS; SUBCUTANEOUS at 05:12

## 2021-12-25 RX ADMIN — ENOXAPARIN SODIUM 40 MG: 40 INJECTION SUBCUTANEOUS at 05:12

## 2021-12-25 RX ADMIN — Medication 1 PACKET: at 09:12

## 2021-12-25 RX ADMIN — METOPROLOL SUCCINATE 50 MG: 50 TABLET, EXTENDED RELEASE ORAL at 09:12

## 2021-12-25 RX ADMIN — HYDROCODONE BITARTRATE AND ACETAMINOPHEN 1 TABLET: 5; 325 TABLET ORAL at 10:12

## 2021-12-25 RX ADMIN — PIPERACILLIN AND TAZOBACTAM 4.5 G: 4; .5 INJECTION, POWDER, LYOPHILIZED, FOR SOLUTION INTRAVENOUS; PARENTERAL at 06:12

## 2021-12-25 RX ADMIN — LOPERAMIDE HYDROCHLORIDE 4 MG: 2 CAPSULE ORAL at 12:12

## 2021-12-25 RX ADMIN — Medication 1 PACKET: at 05:12

## 2021-12-25 RX ADMIN — INSULIN ASPART 2 UNITS: 100 INJECTION, SOLUTION INTRAVENOUS; SUBCUTANEOUS at 05:12

## 2021-12-25 RX ADMIN — INSULIN ASPART 8 UNITS: 100 INJECTION, SOLUTION INTRAVENOUS; SUBCUTANEOUS at 12:12

## 2021-12-25 RX ADMIN — Medication 1 PACKET: at 12:12

## 2021-12-25 RX ADMIN — PIPERACILLIN AND TAZOBACTAM 4.5 G: 4; .5 INJECTION, POWDER, LYOPHILIZED, FOR SOLUTION INTRAVENOUS; PARENTERAL at 10:12

## 2021-12-25 RX ADMIN — INSULIN DETEMIR 10 UNITS: 100 INJECTION, SOLUTION SUBCUTANEOUS at 09:12

## 2021-12-25 RX ADMIN — PIPERACILLIN AND TAZOBACTAM 4.5 G: 4; .5 INJECTION, POWDER, LYOPHILIZED, FOR SOLUTION INTRAVENOUS; PARENTERAL at 03:12

## 2021-12-25 RX ADMIN — LOPERAMIDE HYDROCHLORIDE 4 MG: 2 CAPSULE ORAL at 05:12

## 2021-12-25 RX ADMIN — Medication 1 PACKET: at 07:12

## 2021-12-25 RX ADMIN — CALCIUM CARBONATE (ANTACID) CHEW TAB 500 MG 1000 MG: 500 CHEW TAB at 09:12

## 2021-12-25 NOTE — PROGRESS NOTES
[facility]  General Surgery  Progress Note      Interval History:     Patient is s/p serial debridements of right lower extremity (groin to knee) for necrotizing fasciitis, most recently 12/6, at which time the wound appeared healthy with no remaining infection. On 12/8 patient underwent partial closure and wound VAC placement. Wound VAC exchanged in OR on 12/12. On 12/14 she was taken for further debridement (necrotic skin), partial closure, and WV change. She has undergone wound vac changes on 12/17, 12/20, and 12/24.    NAEON  WV maintaining seal  Having some pain/discomfort at wound vac site  Afebrile and normocardic this morning  WBC normal    Objective:     Vital Signs (Most Recent):  Temp: 98 °F (36.7 °C) (12/25/21 0555)  Pulse: 99 (12/25/21 0555)  Resp: 20 (12/25/21 0555)  BP: (!) 143/86 (12/25/21 0555)  SpO2: 98 % (12/25/21 0555) Vital Signs (24h Range):  Temp:  [97.9 °F (36.6 °C)-98.4 °F (36.9 °C)] 98 °F (36.7 °C)  Pulse:  [] 99  Resp:  [16-20] 20  SpO2:  [93 %-100 %] 98 %  BP: (114-143)/(60-86) 143/86     Weight: (!) 224 kg (493 lb 13.3 oz)  Body mass index is 84.72 kg/m².      Intake/Output Summary (Last 24 hours) at 12/25/2021 0801  Last data filed at 12/25/2021 0645  Gross per 24 hour   Intake 400 ml   Output 2100 ml   Net -1700 ml       Physical Exam  GEN: alert and oriented, NAD  HEENT: MMM, no scleral icterus  CV: RRR, distant heart sounds due to body habitus  PULM: Mildly increased WOB on RA  ABD: soft, non-tender, non-distended, obese  EXT: moves all. Right leg with wound VAC maintain seal with serosanguinous drainage    Significant Labs:  CBC:   Recent Labs   Lab 12/25/21  0408   WBC 8.90   RBC 3.06*   HGB 8.5*   HCT 27.7*   *   MCV 91   MCH 27.8   MCHC 30.7*     CMP:   Recent Labs   Lab 12/25/21  0408   *   CALCIUM 8.2*      K 4.6   CO2 27      BUN 8   CREATININE 0.7       Significant Diagnostics:  None    Assessment/Plan:     Active Diagnoses:     Diagnosis Date Noted POA    PRINCIPAL PROBLEM:  Necrotizing fasciitis [M72.6] 11/30/2021 Yes    Debility [R53.81] 12/01/2021 Yes    Anemia of chronic disease [D63.8] 12/01/2021 Yes    Hypophosphatemia [E83.39] 12/01/2021 Yes    Obesity [E66.9] 12/01/2021 Yes    DM (diabetes mellitus) [E11.9] 11/30/2021 Yes    DANG (obstructive sleep apnea) [G47.33] 11/30/2021 Yes      Problems Resolved During this Admission:     This is a 36 year old F with class 3 obesity c/b several medical problems who is transferred to [facility] for treatment of right groin/thigh necrotizing fasciitis.    S/p serial debridements (groin to knee), most recently 12/6. At this time the wound was found to be healthy with minimal necrotic tissues. On 12/8 she underwent partial closure and wound VAC placement, s/p wound VAC change on 12/12 and further debridement (necrotic skin), partial closure, and WV change on 12/14, 12/17, 12/20, and 12/24.    If still inpatient will plan on wound vac replacement in 3-4 days    LTAC placement pending    Erin Cisneros MD PGY4  General Surgery  [facility]

## 2021-12-26 LAB
POCT GLUCOSE: 101 MG/DL (ref 70–110)
POCT GLUCOSE: 162 MG/DL (ref 70–110)
POCT GLUCOSE: 271 MG/DL (ref 70–110)
POCT GLUCOSE: 53 MG/DL (ref 70–110)
POCT GLUCOSE: 79 MG/DL (ref 70–110)

## 2021-12-26 PROCEDURE — 63600175 PHARM REV CODE 636 W HCPCS: Performed by: STUDENT IN AN ORGANIZED HEALTH CARE EDUCATION/TRAINING PROGRAM

## 2021-12-26 PROCEDURE — 25000003 PHARM REV CODE 250: Performed by: STUDENT IN AN ORGANIZED HEALTH CARE EDUCATION/TRAINING PROGRAM

## 2021-12-26 PROCEDURE — 11000001 HC ACUTE MED/SURG PRIVATE ROOM

## 2021-12-26 PROCEDURE — 94660 CPAP INITIATION&MGMT: CPT

## 2021-12-26 RX ADMIN — LOPERAMIDE HYDROCHLORIDE 4 MG: 2 CAPSULE ORAL at 08:12

## 2021-12-26 RX ADMIN — PIPERACILLIN AND TAZOBACTAM 4.5 G: 4; .5 INJECTION, POWDER, LYOPHILIZED, FOR SOLUTION INTRAVENOUS; PARENTERAL at 11:12

## 2021-12-26 RX ADMIN — INSULIN ASPART 8 UNITS: 100 INJECTION, SOLUTION INTRAVENOUS; SUBCUTANEOUS at 08:12

## 2021-12-26 RX ADMIN — LOPERAMIDE HYDROCHLORIDE 4 MG: 2 CAPSULE ORAL at 10:12

## 2021-12-26 RX ADMIN — INSULIN ASPART 8 UNITS: 100 INJECTION, SOLUTION INTRAVENOUS; SUBCUTANEOUS at 05:12

## 2021-12-26 RX ADMIN — INSULIN ASPART 6 UNITS: 100 INJECTION, SOLUTION INTRAVENOUS; SUBCUTANEOUS at 08:12

## 2021-12-26 RX ADMIN — HYDROCODONE BITARTRATE AND ACETAMINOPHEN 1 TABLET: 5; 325 TABLET ORAL at 05:12

## 2021-12-26 RX ADMIN — PIPERACILLIN AND TAZOBACTAM 4.5 G: 4; .5 INJECTION, POWDER, LYOPHILIZED, FOR SOLUTION INTRAVENOUS; PARENTERAL at 06:12

## 2021-12-26 RX ADMIN — Medication 1 PACKET: at 05:12

## 2021-12-26 RX ADMIN — LOPERAMIDE HYDROCHLORIDE 4 MG: 2 CAPSULE ORAL at 12:12

## 2021-12-26 RX ADMIN — Medication 1 PACKET: at 10:12

## 2021-12-26 RX ADMIN — PIPERACILLIN AND TAZOBACTAM 4.5 G: 4; .5 INJECTION, POWDER, LYOPHILIZED, FOR SOLUTION INTRAVENOUS; PARENTERAL at 03:12

## 2021-12-26 RX ADMIN — Medication 1 PACKET: at 06:12

## 2021-12-26 RX ADMIN — LOPERAMIDE HYDROCHLORIDE 4 MG: 2 CAPSULE ORAL at 05:12

## 2021-12-26 RX ADMIN — INSULIN DETEMIR 10 UNITS: 100 INJECTION, SOLUTION SUBCUTANEOUS at 08:12

## 2021-12-26 RX ADMIN — METOPROLOL SUCCINATE 50 MG: 50 TABLET, EXTENDED RELEASE ORAL at 08:12

## 2021-12-26 RX ADMIN — INSULIN ASPART 2 UNITS: 100 INJECTION, SOLUTION INTRAVENOUS; SUBCUTANEOUS at 05:12

## 2021-12-26 RX ADMIN — Medication 1 PACKET: at 12:12

## 2021-12-26 RX ADMIN — ENOXAPARIN SODIUM 40 MG: 40 INJECTION SUBCUTANEOUS at 05:12

## 2021-12-26 RX ADMIN — CALCIUM CARBONATE (ANTACID) CHEW TAB 500 MG 1000 MG: 500 CHEW TAB at 08:12

## 2021-12-26 NOTE — PROGRESS NOTES
"        [facility] Medicine  Telemedicine Progress Note    Patient Name: Shauna Queen  MRN: [id number]  Patient Class: IP- Inpatient   Admission Date: 11/30/2021  Length of Stay: 25 days  Attending Physician: Milvia García MD  Primary Care Provider: Jorge Dennis MD          Subjective:     Principal Problem:Necrotizing fasciitis        HPI:  Ms. Queen is a 36-year-old presents to the ED from outside hospital for abscess.  She has initially presented to outside hospital for DKA which resolved.  Patient was unaware of why she was here in July prompted her.  Per the checkout are received: "36-year-old female history of diabetes mellitus, sleep apnea on CPAP, schizophrenia, hypertension, and hyperlipidemia admitted to [facility] on November 24 from [facility] for treatment of diabetic ketoacidosis. She was initiated on an insulin infusion. DKA improved and she was transitioned off the insulin infusion. She again developed DKA and was placed back on the insulin infusion for a period of time. Currently she is off the insulin infusion and on subcutaneous insulin. She was noted to have right upper/inner thigh pain with erythema, induration, and tenderness to palpation. She was treated with IV clindamycin and vancomycin. Due to her size ((BMI 74.32) they are unable to perform CT or MRI. General Surgery there performed an incision and drainage with purulent material found. She was taken to the OR on November 29 for a second-look at the thigh wound. They were only able to do superficial debridement. Postprocedure she extubated to 2 L nasal cannula and was awake with oxygen saturation 100 percent. Surgery at their facility recommended transfer to a facility better able to perform procedures on a patient with her BMI. Referring provider spoke with General Surgery at [facility]. [facility] Medicine was asked to admit for further treatment. I spoke with her current " "attending. He noted she has no alteration in mentation. Lower extremity is intact from a neurovascular standpoint. She remains in the ICU."      Overview/Hospital Course:  35 y/o female admitted to the hospital as a transfer secondary to R thigh abscess and a BMI of Body mass index is 84.77 kg/m².   Patient transferred here for surgical evaluation.  Patient went for surgical debridement on 11/30/21. Patient continued on Abx per ID recommendations. Blood cultures were NG. Underwent another debridement on 12/2 and again on 12/4 - noting significant necrotizing soft tissue infections concerning for tracking down into her knee. On broad spectrum antibiotics. To OR with Orthopedic surgery to explore right knee on 12/5. Patient again went for surgery on 12/6.  Wound closure with wound vac placement on 12/8.      Subjective/Interval History     Patient doing well after wound vac exchange, tachycardia much improved with addition of metoprolol. Patient without complaints, awaiting LTAC placement.        Review of Systems    Constitutional: Negative for chills, fatigue, fever.   HENT: Negative for sore throat, trouble swallowing.    Eyes: Negative for photophobia, visual disturbance.   Respiratory: Negative for cough, shortness of breath.    Cardiovascular: Negative for chest pain, palpitations, leg swelling.   Gastrointestinal: Negative for abdominal pain, constipation, nausea, vomiting. +diarrhea.   Endocrine: Negative for cold intolerance, heat intolerance.   Genitourinary: Negative for dysuria, frequency.   Musculoskeletal: Negative for arthralgias, myalgias.   Skin: Negative for rash, wound, erythema   Neurological: Negative for dizziness, syncope, weakness, light-headedness.   Psychiatric/Behavioral: Negative for confusion, hallucinations, anxiety    Medications  Scheduled Meds:   calcium carbonate  1,000 mg Oral Daily    enoxaparin  40 mg Subcutaneous Daily    insulin aspart U-100  8 Units Subcutaneous TIDWM    " insulin detemir U-100  10 Units Subcutaneous BID    loperamide  4 mg Oral QID    metoprolol succinate  50 mg Oral Daily    piperacillin-tazobactam (ZOSYN) IVPB  4.5 g Intravenous Q8H    potassium, sodium phosphates  1 packet Oral QID (AC & HS)     Continuous Infusions:  PRN Meds:.sodium chloride, acetaminophen, ALPRAZolam, dextrose 50%, diphenoxylate-atropine 2.5-0.025 mg, glucagon (human recombinant), HYDROcodone-acetaminophen, influenza, insulin aspart U-100, melatonin, morphine, sars-cov-2 (covid-19)    Objective    Physical Examination    Temp:  [97.7 °F (36.5 °C)-98.5 °F (36.9 °C)]   Pulse:  []   Resp:  [18-20]   BP: (128-159)/(69-89)   SpO2:  [93 %-99 %]     Constitutional: NAD, conversant  Head: NC, AT  Eyes: No scleral icterus.  No eye discharge  Pulmonary/Chest: Effort normal. No respiratory distress on room air  GI: No distension  Musculoskeletal: Moving all extremities  Neurological: Alert.  Oriented to person, place, and time.   Psychiatric: Normal behavior, mood, and affect  Skin: No visible erythema or cyanosis    CBC  Recent Labs   Lab 12/21/21  0350 12/23/21  0338 12/25/21  0408   WBC 9.17 9.45 8.90   HGB 8.1* 8.0* 8.5*   HCT 26.3* 26.9* 27.7*    375 464*     CMP  Recent Labs   Lab 12/21/21  0350 12/23/21  0338 12/25/21  0408    142 141   K 3.4* 3.9 4.6    104 103   CO2 29 30* 27   BUN 8 7 8   CREATININE 0.8 0.8 0.7   * 132* 114*   CALCIUM 7.9* 8.0* 8.2*           Assessment/Plan:      * Necrotizing fasciitis  - Status post I&D with General surgery at outside hospital, did not feel like they completely removed abscess.  - Underwent debridement in OR 11/30, 12/2, and 12/4, 12/5 - no extension into knee.    - OR again on 12/6, 12/8 and 12/11.   - OR again on 12/14 for debridement of necrotic skin, washout, partial closure, wound VAC change and then again on 12/17/21  - Continued on Zosyn till 12/18 for 2 weeks of treatment per ID. (estimated end date: 12/18/21).  Febrile again so ID reconsulted and IV zosyn resumed. Per ID, continue IV zosyn for two weeks.   - Pain control.       - to LTAC once wound vac can be changed at bedside. Per surgery, okay to d/c to LTAC however on 12/23, multiple attempt to re-apply dressing eventually lead to another leak so patient will need wound vac change in the OR today.         Obesity  Morbid obesity  Body mass index is 84.72 kg/m².      Tachycardia      Noted since admission, EKG with sinus tachycardia, thought likely related to underlying infection however persisted despite abx      Improved with metoprolol     Hypophosphatemia  Replace as needed.      Anemia of chronic disease  With anemia of acute blood loss as expected from multiple surgeries.  Transfuse for Hgb<7      Diarrhea     Cdiff Negative, PRN lomotil       Debility  PT/OT following, plan for LTAC      DANG (obstructive sleep apnea)  Patient likely has component of OHS as well  - outpatient sleep study      DM (diabetes mellitus)  A1c:   Lab Results   Component Value Date    HGBA1C >14.0 (H) 12/01/2021     Meds: basal bolus insulin + SSI PRN to maintain goal 140-180  ADA diet, accuchecks ACHS, hypoglycemic protocol  Continue current management.      VTE Risk Mitigation (From admission, onward)         Ordered     enoxaparin injection 40 mg  Daily         12/04/21 2306     IP VTE LOW RISK PATIENT  Once         11/30/21 0403     Place sequential compression device  Until discontinued         11/30/21 0403                      I have assessed these finding virtually using telemed platform and with assistance of bedside nurse       The attending portion of this evaluation, treatment, and documentation was performed per Milvia García MD via Telemedicine AudioVisual using the secure [employer] software platform with 2 way audio/video. The provider was located off-site and the patient is located in the hospital. The aforementioned video software was utilized to document the relevant  history and physical exam    Milvia García MD  Department of Hospital Medicine   [facility] [place] - Med Surg [place]

## 2021-12-26 NOTE — PROGRESS NOTES
[facility]  General Surgery  Progress Note      Interval History:     Patient is s/p serial debridements of right lower extremity (groin to knee) for necrotizing fasciitis, most recently 12/6, at which time the wound appeared healthy with no remaining infection. On 12/8 patient underwent partial closure and wound VAC placement. Wound VAC exchanged in OR on 12/12. On 12/14 she was taken for further debridement (necrotic skin), partial closure, and WV change. She has undergone wound vac changes on 12/17, 12/20, and 12/24.    NAEON  WV maintaining seal  Pain stable  Afebrile and normocardic this morning    Objective:     Vital Signs (Most Recent):  Temp: 98 °F (36.7 °C) (12/26/21 0757)  Pulse: 101 (12/26/21 0757)  Resp: 18 (12/26/21 0757)  BP: 126/74 (12/26/21 0757)  SpO2: 96 % (12/26/21 0757) Vital Signs (24h Range):  Temp:  [97.7 °F (36.5 °C)-98.7 °F (37.1 °C)] 98 °F (36.7 °C)  Pulse:  [] 101  Resp:  [18-20] 18  SpO2:  [96 %-99 %] 96 %  BP: (126-159)/(72-89) 126/74     Weight: (!) 224 kg (493 lb 13.3 oz)  Body mass index is 84.72 kg/m².      Intake/Output Summary (Last 24 hours) at 12/26/2021 0806  Last data filed at 12/26/2021 0619  Gross per 24 hour   Intake --   Output 1350 ml   Net -1350 ml       Physical Exam  GEN: alert and oriented, NAD  HEENT: MMM, no scleral icterus  CV: RRR, distant heart sounds due to body habitus  PULM: Mildly increased WOB on RA  ABD: soft, non-tender, non-distended, obese  EXT: moves all. Right leg with wound VAC maintain seal with serosanguinous drainage    Significant Labs:  CBC:   Recent Labs   Lab 12/25/21  0408   WBC 8.90   RBC 3.06*   HGB 8.5*   HCT 27.7*   *   MCV 91   MCH 27.8   MCHC 30.7*     CMP:   Recent Labs   Lab 12/25/21  0408   *   CALCIUM 8.2*      K 4.6   CO2 27      BUN 8   CREATININE 0.7       Significant Diagnostics:  None    Assessment/Plan:     Active Diagnoses:    Diagnosis Date Noted POA    PRINCIPAL PROBLEM:   Necrotizing fasciitis [M72.6] 11/30/2021 Yes    Debility [R53.81] 12/01/2021 Yes    Anemia of chronic disease [D63.8] 12/01/2021 Yes    Hypophosphatemia [E83.39] 12/01/2021 Yes    Obesity [E66.9] 12/01/2021 Yes    DM (diabetes mellitus) [E11.9] 11/30/2021 Yes    DANG (obstructive sleep apnea) [G47.33] 11/30/2021 Yes      Problems Resolved During this Admission:     This is a 36 year old F with class 3 obesity c/b several medical problems who is transferred to [facility] for treatment of right groin/thigh necrotizing fasciitis.    S/p serial debridements (groin to knee), most recently 12/6. At this time the wound was found to be healthy with minimal necrotic tissues. On 12/8 she underwent partial closure and wound VAC placement, s/p wound VAC change on 12/12 and further debridement (necrotic skin), partial closure, and WV change on 12/14, 12/17, 12/20, and 12/24.    If still inpatient will plan on wound vac replacement in 3-4 days (Tuesday or Weds)    LTAC placement pending    Erin Cisneros MD PGY4  General Surgery  [facility]

## 2021-12-27 LAB
ANION GAP SERPL CALC-SCNC: 10 MMOL/L (ref 8–16)
BASOPHILS # BLD AUTO: 0.03 K/UL (ref 0–0.2)
BASOPHILS NFR BLD: 0.4 % (ref 0–1.9)
BUN SERPL-MCNC: 8 MG/DL (ref 6–20)
CALCIUM SERPL-MCNC: 8.3 MG/DL (ref 8.7–10.5)
CHLORIDE SERPL-SCNC: 104 MMOL/L (ref 95–110)
CO2 SERPL-SCNC: 28 MMOL/L (ref 23–29)
CREAT SERPL-MCNC: 0.7 MG/DL (ref 0.5–1.4)
DIFFERENTIAL METHOD: ABNORMAL
EOSINOPHIL # BLD AUTO: 0.1 K/UL (ref 0–0.5)
EOSINOPHIL NFR BLD: 1.4 % (ref 0–8)
ERYTHROCYTE [DISTWIDTH] IN BLOOD BY AUTOMATED COUNT: 15.9 % (ref 11.5–14.5)
EST. GFR  (AFRICAN AMERICAN): >60 ML/MIN/1.73 M^2
EST. GFR  (NON AFRICAN AMERICAN): >60 ML/MIN/1.73 M^2
GLUCOSE SERPL-MCNC: 86 MG/DL (ref 70–110)
HCT VFR BLD AUTO: 27.8 % (ref 37–48.5)
HGB BLD-MCNC: 8.6 G/DL (ref 12–16)
IMM GRANULOCYTES # BLD AUTO: 0.11 K/UL (ref 0–0.04)
IMM GRANULOCYTES NFR BLD AUTO: 1.5 % (ref 0–0.5)
LYMPHOCYTES # BLD AUTO: 1.9 K/UL (ref 1–4.8)
LYMPHOCYTES NFR BLD: 25.7 % (ref 18–48)
MCH RBC QN AUTO: 27.7 PG (ref 27–31)
MCHC RBC AUTO-ENTMCNC: 30.9 G/DL (ref 32–36)
MCV RBC AUTO: 90 FL (ref 82–98)
MONOCYTES # BLD AUTO: 0.8 K/UL (ref 0.3–1)
MONOCYTES NFR BLD: 10.8 % (ref 4–15)
NEUTROPHILS # BLD AUTO: 4.3 K/UL (ref 1.8–7.7)
NEUTROPHILS NFR BLD: 60.2 % (ref 38–73)
NRBC BLD-RTO: 1 /100 WBC
PLATELET # BLD AUTO: ABNORMAL K/UL (ref 150–450)
PLATELET BLD QL SMEAR: ABNORMAL
PMV BLD AUTO: ABNORMAL FL (ref 9.2–12.9)
POCT GLUCOSE: 102 MG/DL (ref 70–110)
POCT GLUCOSE: 111 MG/DL (ref 70–110)
POCT GLUCOSE: 113 MG/DL (ref 70–110)
POCT GLUCOSE: 172 MG/DL (ref 70–110)
POTASSIUM SERPL-SCNC: 4.9 MMOL/L (ref 3.5–5.1)
RBC # BLD AUTO: 3.1 M/UL (ref 4–5.4)
SODIUM SERPL-SCNC: 142 MMOL/L (ref 136–145)
WBC # BLD AUTO: 7.2 K/UL (ref 3.9–12.7)

## 2021-12-27 PROCEDURE — 25000003 PHARM REV CODE 250: Performed by: STUDENT IN AN ORGANIZED HEALTH CARE EDUCATION/TRAINING PROGRAM

## 2021-12-27 PROCEDURE — 97530 THERAPEUTIC ACTIVITIES: CPT | Mod: CQ

## 2021-12-27 PROCEDURE — 80048 BASIC METABOLIC PNL TOTAL CA: CPT | Performed by: STUDENT IN AN ORGANIZED HEALTH CARE EDUCATION/TRAINING PROGRAM

## 2021-12-27 PROCEDURE — 63600175 PHARM REV CODE 636 W HCPCS: Performed by: STUDENT IN AN ORGANIZED HEALTH CARE EDUCATION/TRAINING PROGRAM

## 2021-12-27 PROCEDURE — 11000001 HC ACUTE MED/SURG PRIVATE ROOM

## 2021-12-27 PROCEDURE — 99900035 HC TECH TIME PER 15 MIN (STAT)

## 2021-12-27 PROCEDURE — 94761 N-INVAS EAR/PLS OXIMETRY MLT: CPT

## 2021-12-27 PROCEDURE — 94660 CPAP INITIATION&MGMT: CPT

## 2021-12-27 PROCEDURE — 97535 SELF CARE MNGMENT TRAINING: CPT

## 2021-12-27 PROCEDURE — 85025 COMPLETE CBC W/AUTO DIFF WBC: CPT | Performed by: STUDENT IN AN ORGANIZED HEALTH CARE EDUCATION/TRAINING PROGRAM

## 2021-12-27 PROCEDURE — 97110 THERAPEUTIC EXERCISES: CPT

## 2021-12-27 RX ADMIN — ENOXAPARIN SODIUM 40 MG: 40 INJECTION SUBCUTANEOUS at 05:12

## 2021-12-27 RX ADMIN — METOPROLOL SUCCINATE 50 MG: 50 TABLET, EXTENDED RELEASE ORAL at 09:12

## 2021-12-27 RX ADMIN — Medication 1 PACKET: at 09:12

## 2021-12-27 RX ADMIN — PIPERACILLIN AND TAZOBACTAM 4.5 G: 4; .5 INJECTION, POWDER, LYOPHILIZED, FOR SOLUTION INTRAVENOUS; PARENTERAL at 02:12

## 2021-12-27 RX ADMIN — PIPERACILLIN AND TAZOBACTAM 4.5 G: 4; .5 INJECTION, POWDER, LYOPHILIZED, FOR SOLUTION INTRAVENOUS; PARENTERAL at 06:12

## 2021-12-27 RX ADMIN — Medication 1 PACKET: at 06:12

## 2021-12-27 RX ADMIN — PIPERACILLIN AND TAZOBACTAM 4.5 G: 4; .5 INJECTION, POWDER, LYOPHILIZED, FOR SOLUTION INTRAVENOUS; PARENTERAL at 10:12

## 2021-12-27 RX ADMIN — Medication 1 PACKET: at 11:12

## 2021-12-27 RX ADMIN — Medication 1 PACKET: at 05:12

## 2021-12-27 RX ADMIN — LOPERAMIDE HYDROCHLORIDE 4 MG: 2 CAPSULE ORAL at 09:12

## 2021-12-27 RX ADMIN — CALCIUM CARBONATE (ANTACID) CHEW TAB 500 MG 1000 MG: 500 CHEW TAB at 09:12

## 2021-12-27 NOTE — PLAN OF CARE
Problem: Physical Therapy Goal  Goal: Physical Therapy Goal  Description: Goals to be met by: 22     Patient will increase functional independence with mobility by performin. Supine to sit with Moderate Assistance  2. Sit to supine with Moderate Assistance  3. Rolling to Left and Right with Minimal Assistance.  4. Sit to stand transfer with Moderate Assistance  5. Bed to chair transfer with Moderate Assistance using Rolling Walker  5. Gait  x 15 feet with Minimal Assistance using Rolling Walker.     Outcome: Ongoing, Progressing   Pt will benefit of further therapy in order to return to PLOF.

## 2021-12-27 NOTE — PT/OT/SLP PROGRESS
Physical Therapy Treatment    Patient Name:  Shauna Queen   MRN:  [id number]    Recommendations:     Discharge Recommendations:  other (see comments) (LTAC per MD)   Discharge Equipment Recommendations: other (see comments) (TBD at next level of care)   Barriers to discharge: None    Assessment:     Shauna Queen is a 36 y.o. female admitted with a medical diagnosis of Necrotizing fasciitis.  She presents with the following impairments/functional limitations:  weakness,impaired endurance,impaired self care skills,gait instability,impaired functional mobilty,impaired balance,decreased upper extremity function,decreased lower extremity function,decreased ROM,decreased coordination,pain,decreased safety awareness,impaired cardiopulmonary response to activity,edema,impaired skin .    Rehab Prognosis: Fair; patient would benefit from acute skilled PT services to address these deficits and reach maximum level of function.    Recent Surgery: Procedure(s) (LRB):  DEBRIDEMENT, WOUND (Right) 3 Days Post-Op    Plan:     During this hospitalization, patient to be seen 5 x/week to address the identified rehab impairments via gait training,therapeutic exercises,therapeutic activities,neuromuscular re-education and progress toward the following goals:    · Plan of Care Expires:  01/05/22    Subjective     Chief Complaint: pain and fear of falling   Patient/Family Comments/goals: pt is agreeable to therapy   Pain/Comfort:  · Location - Side 1: Right  · Location 1: leg  · Pain Addressed 1: Pre-medicate for activity,Reposition,Nurse notified      Objective:     Communicated with nurse prior to session.  Patient found HOB elevated with bed alarm,wound vac,telemetry,PICC line,joy catheter upon PT entry to room.     General Precautions: Standard, fall   Orthopedic Precautions:N/A   Braces: N/A  Respiratory Status: Room air     Functional Mobility:  · Bed Mobility:     · Rolling Right: moderate assistance  · Scooting: maximal  assistance and of 2 persons  · Supine to Sit: maximal assistance and of 2 persons, HOB elevated, bedside rail   · Sit to Supine: maximal assistance and of 2 persons  · Balance:  Fair in sitting.       AM-PAC 6 CLICK MOBILITY  Turning over in bed (including adjusting bedclothes, sheets and blankets)?: 2  Sitting down on and standing up from a chair with arms (e.g., wheelchair, bedside commode, etc.): 1  Moving from lying on back to sitting on the side of the bed?: 2  Moving to and from a bed to a chair (including a wheelchair)?: 1  Need to walk in hospital room?: 1  Climbing 3-5 steps with a railing?: 1  Basic Mobility Total Score: 8       Therapeutic Activities and Exercises:   instructed pt to perform BLE X 10 reps : AP, ankle circumduction.   Performed modified abdominal curl with BUE push up from bed rail , max A  x 3 trials.     Patient left HOB elevated with all lines intact, call button in reach, nurse notified and OT present..    GOALS:   Multidisciplinary Problems     Physical Therapy Goals        Problem: Physical Therapy Goal    Goal Priority Disciplines Outcome Goal Variances Interventions   Physical Therapy Goal     PT, PT/OT Ongoing, Progressing     Description: Goals to be met by: 22     Patient will increase functional independence with mobility by performin. Supine to sit with Moderate Assistance  2. Sit to supine with Moderate Assistance  3. Rolling to Left and Right with Minimal Assistance.  4. Sit to stand transfer with Moderate Assistance  5. Bed to chair transfer with Moderate Assistance using Rolling Walker  5. Gait  x 15 feet with Minimal Assistance using Rolling Walker.                Multidisciplinary Problems (Resolved)        Problem: Physical Therapy Goal    Goal Priority Disciplines Outcome Goal Variances Interventions   Physical Therapy Goal   (Resolved)     PT, PT/OT Met     Description: Goals to be met by: 22    Patient will increase functional independence with  mobility by performin. Pt will tolerate mobility assessment remaining free from falls and demonstrating good tolerance to activity                     Time Tracking:     PT Received On: 21  PT Start Time: 1133     PT Stop Time: 1207  PT Total Time (min): 34 min     Billable Minutes: Therapeutic Activity 15 and Total Time 34 MIN partial co-treat with OT    Treatment Type: Treatment  PT/PTA: PTA     PTA Visit Number: 2     2021

## 2021-12-27 NOTE — PLAN OF CARE
Problem: Adult Inpatient Plan of Care  Goal: Plan of Care Review  Outcome: Ongoing, Progressing  Goal: Patient-Specific Goal (Individualization)  Outcome: Ongoing, Progressing  Goal: Absence of Hospital-Acquired Illness or Injury  Outcome: Ongoing, Progressing  Intervention: Identify and Manage Fall Risk  Flowsheets (Taken 12/27/2021 0020)  Safety Promotion/Fall Prevention:   assistive device/personal item within reach   commode/urinal/bedpan at bedside   diversional activities provided   Fall Risk reviewed with patient/family   Fall Risk signage in place   high risk medications identified   medications reviewed   room near unit station   side rails raised x 2   instructed to call staff for mobility  Intervention: Prevent VTE (venous thromboembolism)  Flowsheets (Taken 12/27/2021 0020)  VTE Prevention/Management:   bleeding risk assessed   dorsiflexion/plantar flexion performed   fluids promoted   ROM (active) performed   ROM (passive) performed  Goal: Optimal Comfort and Wellbeing  Outcome: Ongoing, Progressing  Intervention: Provide Person-Centered Care  Flowsheets (Taken 12/27/2021 0020)  Trust Relationship/Rapport:   care explained   choices provided   emotional support provided   empathic listening provided   questions answered   questions encouraged   reassurance provided   thoughts/feelings acknowledged  Goal: Readiness for Transition of Care  Outcome: Ongoing, Progressing  Goal: Rounds/Family Conference  Outcome: Ongoing, Progressing     Problem: Diabetes Comorbidity  Goal: Blood Glucose Level Within Desired Range  Outcome: Ongoing, Progressing  Intervention: Maintain Glycemic Control  Flowsheets (Taken 12/27/2021 0020)  Glycemic Management: blood glucose monitored     Problem: Bariatric Environmental Safety  Goal: Safety Maintained with Care  Outcome: Ongoing, Progressing  Intervention: Promote Safety and Comfort  Flowsheets (Taken 12/27/2021 0020)  Bariatric Safety: specialty bed utilized     Problem:  Skin Injury Risk Increased  Goal: Skin Health and Integrity  Outcome: Ongoing, Progressing  Intervention: Optimize Skin Protection  Flowsheets (Taken 12/27/2021 0020)  Pressure Reduction Techniques:   frequent weight shift encouraged   weight shift assistance provided  Pressure Reduction Devices:   positioning supports utilized   pressure-redistributing mattress utilized   specialty bed utilized  Skin Protection:   adhesive use limited   incontinence pads utilized   transparent dressing maintained  Head of Bed (HOB) Positioning:   HOB elevated   HOB at 30-45 degrees  Intervention: Promote and Optimize Oral Intake  Flowsheets (Taken 12/27/2021 0020)  Oral Nutrition Promotion: rest periods promoted     Problem: Infection  Goal: Infection Symptom Resolution  Outcome: Ongoing, Progressing  Intervention: Prevent or Manage Infection  Flowsheets (Taken 12/27/2021 0020)  Fever Reduction/Comfort Measures:   lightweight bedding   lightweight clothing  Infection Management: aseptic technique maintained  Isolation Precautions: protective     Problem: Wound  Goal: Optimal Wound Healing  Outcome: Ongoing, Progressing  Intervention: Promote Effective Wound Healing  Flowsheets (Taken 12/27/2021 0020)  Oral Nutrition Promotion: rest periods promoted  Sleep/Rest Enhancement:   awakenings minimized   consistent schedule promoted   noise level reduced   regular sleep/rest pattern promoted   relaxation techniques promoted   room darkened   therapeutic touch utilized  Activity Management:   Arm raise - L1   Heel slide - L1   Rolling - L1  Pain Management Interventions:   care clustered   diversional activity provided   pillow support provided   position adjusted   quiet environment facilitated   relaxation techniques promoted     Problem: Fall Injury Risk  Goal: Absence of Fall and Fall-Related Injury  Outcome: Ongoing, Progressing  Intervention: Identify and Manage Contributors  Flowsheets (Taken 12/27/2021 0020)  Self-Care Promotion:    independence encouraged   BADL personal objects within reach   BADL personal routines maintained   meal set-up provided   safe use of adaptive equipment encouraged  Medication Review/Management:   medications reviewed   high-risk medications identified  Intervention: Promote Injury-Free Environment  Flowsheets (Taken 12/27/2021 0020)  Safety Promotion/Fall Prevention:   assistive device/personal item within reach   commode/urinal/bedpan at bedside   diversional activities provided   Fall Risk reviewed with patient/family   Fall Risk signage in place   high risk medications identified   medications reviewed   room near unit station   side rails raised x 2   instructed to call staff for mobility

## 2021-12-27 NOTE — PLAN OF CARE
Problem: Occupational Therapy Goal  Goal: Occupational Therapy Goal  Description: Goals to be met by: 12/31/21    Patient will increase functional independence with ADLs by performing:    Feeding with [place].  UE Dressing with Modified [place].  LE Dressing with Minimal Assistance.  Grooming while seated with [place].  Toileting from bedside commode with Minimal Assistance for hygiene and clothing management.   Toilet transfer to bedside commode with Minimal Assistance.    New Goals to be met by: 12/31/21    Patient will increase functional independence with ADLs by performing:    Supine to sit with Stand-by Assistance.  The patient will tolerate sitting on the EOB for 30 min for self care task  Outcome: Ongoing, Progressing

## 2021-12-27 NOTE — PROGRESS NOTES
"        [facility] - Med Surg [facility] Medicine  Telemedicine Progress Note    Patient Name: Shauna Queen  MRN: [id number]  Patient Class: IP- Inpatient   Admission Date: 11/30/2021  Length of Stay: 26 days  Attending Physician: Milvia García MD  Primary Care Provider: Jorge Dennis MD          Subjective:     Principal Problem:Necrotizing fasciitis        HPI:  Ms. Queen is a 36-year-old presents to the ED from outside hospital for abscess.  She has initially presented to outside hospital for DKA which resolved.  Patient was unaware of why she was here in July prompted her.  Per the checkout are received: "36-year-old female history of diabetes mellitus, sleep apnea on CPAP, schizophrenia, hypertension, and hyperlipidemia admitted to  on November 24 from [facility] for treatment of diabetic ketoacidosis. She was initiated on an insulin infusion. DKA improved and she was transitioned off the insulin infusion. She again developed DKA and was placed back on the insulin infusion for a period of time. Currently she is off the insulin infusion and on subcutaneous insulin. She was noted to have right upper/inner thigh pain with erythema, induration, and tenderness to palpation. She was treated with IV clindamycin and vancomycin. Due to her size ((BMI 74.32) they are unable to perform CT or MRI. General Surgery there performed an incision and drainage with purulent material found. She was taken to the OR on November 29 for a second-look at the thigh wound. They were only able to do superficial debridement. Postprocedure she extubated to 2 L nasal cannula and was awake with oxygen saturation 100 percent. Surgery at their facility recommended transfer to a facility better able to perform procedures on a patient with her BMI. Referring provider spoke with General Surgery at [facility]. [facility] was asked to admit for further treatment. I spoke with her current " "attending. He noted she has no alteration in mentation. Lower extremity is intact from a neurovascular standpoint. She remains in the ICU."      Overview/Hospital Course:  35 y/o female admitted to the hospital as a transfer secondary to R thigh abscess and a BMI of Body mass index is 84.77 kg/m².   Patient transferred here for surgical evaluation.  Patient went for surgical debridement on 11/30/21. Patient continued on Abx per ID recommendations. Blood cultures were NG. Underwent another debridement on 12/2 and again on 12/4 - noting significant necrotizing soft tissue infections concerning for tracking down into her knee. On broad spectrum antibiotics. To OR with Orthopedic surgery to explore right knee on 12/5. Patient again went for surgery on 12/6.  Wound closure with wound vac placement on 12/8.      Subjective/Interval History     Patient doing well after wound vac exchange, tachycardia much improved with addition of metoprolol. Patient without complaints, awaiting LTAC placement.        Review of Systems    Constitutional: Negative for chills, fatigue, fever.   HENT: Negative for sore throat, trouble swallowing.    Eyes: Negative for photophobia, visual disturbance.   Respiratory: Negative for cough, shortness of breath.    Cardiovascular: Negative for chest pain, palpitations, leg swelling.   Gastrointestinal: Negative for abdominal pain, constipation, nausea, vomiting. +diarrhea.   Endocrine: Negative for cold intolerance, heat intolerance.   Genitourinary: Negative for dysuria, frequency.   Musculoskeletal: Negative for arthralgias, myalgias.   Skin: Negative for rash, wound, erythema   Neurological: Negative for dizziness, syncope, weakness, light-headedness.   Psychiatric/Behavioral: Negative for confusion, hallucinations, anxiety    Medications  Scheduled Meds:   calcium carbonate  1,000 mg Oral Daily    enoxaparin  40 mg Subcutaneous Daily    insulin aspart U-100  8 Units Subcutaneous TIDWM    " insulin detemir U-100  10 Units Subcutaneous BID    loperamide  4 mg Oral QID    metoprolol succinate  50 mg Oral Daily    piperacillin-tazobactam (ZOSYN) IVPB  4.5 g Intravenous Q8H    potassium, sodium phosphates  1 packet Oral QID (AC & HS)     Continuous Infusions:  PRN Meds:.sodium chloride, acetaminophen, ALPRAZolam, dextrose 50%, diphenoxylate-atropine 2.5-0.025 mg, glucagon (human recombinant), HYDROcodone-acetaminophen, influenza, insulin aspart U-100, melatonin, morphine, sars-cov-2 (covid-19)    Objective    Physical Examination    Temp:  [98 °F (36.7 °C)-98.2 °F (36.8 °C)]   Pulse:  []   Resp:  [17-18]   BP: (119-135)/(74-90)   SpO2:  [96 %-100 %]     Constitutional: NAD, conversant  Head: NC, AT  Eyes: No scleral icterus.  No eye discharge  Pulmonary/Chest: Effort normal. No respiratory distress on room air  GI: No distension  Musculoskeletal: Moving all extremities  Neurological: Alert.  Oriented to person, place, and time.   Psychiatric: Normal behavior, mood, and affect  Skin: No visible erythema or cyanosis    CBC  Recent Labs   Lab 12/21/21  0350 12/23/21  0338 12/25/21  0408   WBC 9.17 9.45 8.90   HGB 8.1* 8.0* 8.5*   HCT 26.3* 26.9* 27.7*    375 464*     CMP  Recent Labs   Lab 12/21/21  0350 12/23/21  0338 12/25/21  0408    142 141   K 3.4* 3.9 4.6    104 103   CO2 29 30* 27   BUN 8 7 8   CREATININE 0.8 0.8 0.7   * 132* 114*   CALCIUM 7.9* 8.0* 8.2*           Assessment/Plan:      * Necrotizing fasciitis  - Status post I&D with General surgery at outside hospital, did not feel like they completely removed abscess.  - Underwent debridement in OR 11/30, 12/2, and 12/4, 12/5 - no extension into knee.    - OR again on 12/6, 12/8 and 12/11.   - OR again on 12/14 for debridement of necrotic skin, washout, partial closure, wound VAC change and then again on 12/17/21  - Continued on Zosyn till 12/18 for 2 weeks of treatment per ID. (estimated end date: 12/18/21).  Febrile again so ID reconsulted and IV zosyn resumed. Per ID, continue IV zosyn for two weeks.   - Pain control.       - to LTAC once wound vac can be changed at bedside. Per surgery, okay to d/c to LTAC however on 12/23, multiple attempt to re-apply dressing eventually lead to another leak so patient will need wound vac change in the OR today.         Obesity  Morbid obesity  Body mass index is 84.72 kg/m².      Tachycardia      Noted since admission, EKG with sinus tachycardia, thought likely related to underlying infection however persisted despite abx      Improved with metoprolol     Hypophosphatemia  Replace as needed.      Anemia of chronic disease  With anemia of acute blood loss as expected from multiple surgeries.  Transfuse for Hgb<7      Diarrhea     Cdiff Negative, PRN lomotil       Debility  PT/OT following, plan for LTAC      DANG (obstructive sleep apnea)  Patient likely has component of OHS as well  - outpatient sleep study      DM (diabetes mellitus)  A1c:   Lab Results   Component Value Date    HGBA1C >14.0 (H) 12/01/2021     Meds: basal bolus insulin + SSI PRN to maintain goal 140-180  ADA diet, accuchecks ACHS, hypoglycemic protocol  Continue current management.      VTE Risk Mitigation (From admission, onward)         Ordered     enoxaparin injection 40 mg  Daily         12/04/21 2306     IP VTE LOW RISK PATIENT  Once         11/30/21 0403     Place sequential compression device  Until discontinued         11/30/21 0403                      I have assessed these finding virtually using telemed platform and with assistance of bedside nurse       The attending portion of this evaluation, treatment, and documentation was performed per Mlivia García MD via Telemedicine AudioVisual using the secure [employer] software platform with 2 way audio/video. The provider was located off-site and the patient is located in the hospital. The aforementioned video software was utilized to document the relevant  history and physical exam    Milvia García MD  Department of Hospital Medicine   [facility] [place] [facility] [place]

## 2021-12-27 NOTE — PT/OT/SLP PROGRESS
Occupational Therapy   Treatment    Name: Shauna Queen  MRN: [id number]  Admitting Diagnosis:  Necrotizing fasciitis  3 Days Post-Op    Recommendations:     Discharge Recommendations: LTACH (long-term acute care hospital)  Discharge Equipment Recommendations:  bedside commode,bath bench (bariatric BSC, bath bench)  Barriers to discharge:   (High risk of untrained caregiver strain. severly compromised sitting tolerance and ADL task initition/task tolerance levels at present.)    Assessment:     Shauna Queen is a 36 y.o. female with a medical diagnosis of Necrotizing fasciitis.  The Patient reports high insecurity to EOB trials. Positive Patient response to supportive feedback and education this date. Performance deficits affecting function are weakness,impaired functional mobilty,impaired endurance,decreased coordination,pain,impaired coordination,impaired cardiopulmonary response to activity,impaired self care skills,decreased lower extremity function,decreased upper extremity function,impaired balance,decreased ROM,impaired skin,edema.     Rehab Prognosis:  Good; patient would benefit from acute skilled OT services to address these deficits and reach maximum level of function.       Plan:     Patient to be seen 3 x/week,5 x/week to address the above listed problems via self-care/home management,therapeutic activities,therapeutic exercises  · Plan of Care Expires: 12/31/21  · Plan of Care Reviewed with: patient    Subjective     Pain/Comfort:  · Location - Side 1: Right  · Location - Orientation 1: medial  · Location 1: thigh  · Pain Addressed 1: Pre-medicate for activity,Reposition (education.)  · Pain Rating Post-Intervention 1:  (Patient reporting absence of pain following above intervention with exception of fleeting whincing x1 while facilitating /upright sitting, seeking recovery of EOB tolerance levels.)    Objective:     Communicated with: RN prior to session.  Patient found supine with bed alarm,wound  vac,telemetry,joy catheter,PICC line upon OT entry to room.    General Precautions: Standard, aspiration,fall (high skin/joint integrioty risks, aspiration risks 2* tendecy toward supine dining.)   Orthopedic Precautions:N/A   Braces: N/A  Respiratory Status: Room air.      Occupational Performance:     Bed / Functional Mobility:    · Patient completed Rolling/Turning to Right with moderate assistance  · Patient completed Scooting/Bridging with maximal assistance  · Patient completed Supine to Sit with maximal assistance and 2 persons  · Patient completed Sit to Supine with maximal assistance and 2 staff.  · Patient engaged well in all bed mobility, functional reach and pressure relief training this date. Patient demonstrating mod < max a for supine to sit, abdominal activation and effective use of UB as a stabilizer in supported and unsupported sitting/COG/ proper breathing training as awell x at least 2 trials.       Activities of Daily Living:  · Feeding:  Bmechaincal bed positioining ranges insufficient for optimal upright supported sitting during intake as needed for suport standard aspiration precaution adherence. Scapular and BUE pillow support provided to decrease dep positioining of UEs and support gravity assisted digestion as well as to bolster visual scan and semi recumbent reach during mid day meal.   Patient response (+), Increased care staff attention to pre meal positioining advised.   · Grooming: minimum assistance    · Upper Body Dressing: maximal assistance and fullest size gown procured.     · Lower Body Dressing: total assistance        [facility] 6 Click ADL: 12    Treatment & Education:  *Patient re-education provided re: role of OT, and OT Treatment rationales / protocols prior to arrival of PTA. Patient verbalized understanding.  *Patient agreeable to therapy session. Lights on / shade open for session.    SC:   *Basic self-care tasks and rudimentary functional mobility undertaken -- assist  levels noted above.  *Safe swallow  / oral fluids management supported, with bed positioned on midFowlers recommended/instructed to Patient.   TE:   *Education provided regarding the importance of early/consistent mobility to maximize recovery in conjunction w/ UB stretch and proper breathing edu.    *Education then underway for intro deep relaxed/restorative breathing tech as needed for non Rx pain management/MM relaxation, and to support internal self reguation. Patient responses (+) as needed for prep for co treatment to facilitate EOB engagement progression.   Good (-) to Good return demonstration, however f/u recommended.   *Patient further engaged in edu for UB ex program for:AROM there ex to BUE 2x 10 reps, x3/day recommended, all joints/all planes in    sit as indicated for recovery of effective respiration in functional tasks, and to bolster proper circulation while in acute setting.  SESSION'S END:  Following departure of PTA. OT reviewed UB AROM and proper breathing ex. Patient attentive, info integration good at that time.   *Questions/concerns within OT scope addressed.      Patient left HOB elevated 55 degrees with all lines intact, call button in hand and RN notifiedEducation:      GOALS:   Multidisciplinary Problems     Occupational Therapy Goals        Problem: Occupational Therapy Goal    Goal Priority Disciplines Outcome Interventions   Occupational Therapy Goal     OT, PT/OT Ongoing, Progressing    Description: Goals to be met by: 12/31/21    Patient will increase functional independence with ADLs by performing:    Feeding with [place].  UE Dressing with Modified [place].  LE Dressing with Minimal Assistance.  Grooming while seated with [place].  Toileting from bedside commode with Minimal Assistance for hygiene and clothing management.   Toilet transfer to bedside commode with Minimal Assistance.    New Goals to be met by: 12/31/21    Patient will increase functional independence  with ADLs by performing:    Supine to sit with Stand-by Assistance.  The patient will tolerate sitting on the EOB for 30 min for self care task                   Time Tracking:     OT Date of Treatment: 12/27/21  OT Start Time: 1125  OT Stop Time: 1218  OT Total Time (min): 53 min    Billable Minutes:Self Care/Home Management 18  Therapeutic Exercise 15    12/27/2021

## 2021-12-27 NOTE — PROGRESS NOTES
[facility]  General Surgery  Progress Note      Interval History:     Patient is s/p serial debridements of right lower extremity (groin to knee) for necrotizing fasciitis, most recently 12/6, at which time the wound appeared healthy with no remaining infection. On 12/8 patient underwent partial closure and wound VAC placement. Wound VAC exchanged in OR on 12/12. On 12/14 she was taken for further debridement (necrotic skin), partial closure, and WV change. She has undergone wound vac changes on 12/17, 12/20, and 12/24.    NAEON  WV maintaining seal  Pain controlled, feeling well  Afebrile and normocardic this morning    Objective:     Vital Signs (Most Recent):  Temp: 97.6 °F (36.4 °C) (12/27/21 1108)  Pulse: 97 (12/27/21 1108)  Resp: 18 (12/27/21 1108)  BP: 127/73 (12/27/21 1108)  SpO2: 99 % (12/27/21 1108) Vital Signs (24h Range):  Temp:  [97.6 °F (36.4 °C)-98.6 °F (37 °C)] 97.6 °F (36.4 °C)  Pulse:  [89-97] 97  Resp:  [17-18] 18  SpO2:  [96 %-100 %] 99 %  BP: (119-143)/(73-79) 127/73     Weight: (!) 224 kg (493 lb 13.3 oz)  Body mass index is 84.72 kg/m².      Intake/Output Summary (Last 24 hours) at 12/27/2021 1310  Last data filed at 12/27/2021 0639  Gross per 24 hour   Intake 480.29 ml   Output 1575 ml   Net -1094.71 ml       Physical Exam  GEN: alert and oriented, NAD  HEENT: MMM, no scleral icterus  CV: RRR, distant heart sounds due to body habitus  PULM: Mildly increased WOB on RA  ABD: soft, non-tender, non-distended, obese  EXT: moves all. Right leg with wound VAC maintaining seal with serosanguinous drainage    Significant Labs:  CBC:   Recent Labs   Lab 12/27/21  0420   WBC 7.20   RBC 3.10*   HGB 8.6*   HCT 27.8*   PLT SEE COMMENT   MCV 90   MCH 27.7   MCHC 30.9*     CMP:   Recent Labs   Lab 12/27/21  0420   GLU 86   CALCIUM 8.3*      K 4.9   CO2 28      BUN 8   CREATININE 0.7       Significant Diagnostics:  None    Assessment/Plan:     Active Diagnoses:    Diagnosis Date  Noted POA    PRINCIPAL PROBLEM:  Necrotizing fasciitis [M72.6] 11/30/2021 Yes    Debility [R53.81] 12/01/2021 Yes    Anemia of chronic disease [D63.8] 12/01/2021 Yes    Hypophosphatemia [E83.39] 12/01/2021 Yes    Obesity [E66.9] 12/01/2021 Yes    DM (diabetes mellitus) [E11.9] 11/30/2021 Yes    DANG (obstructive sleep apnea) [G47.33] 11/30/2021 Yes      Problems Resolved During this Admission:     This is a 36 year old F with class 3 obesity c/b several medical problems who is transferred to [facility] for treatment of right groin/thigh necrotizing fasciitis.    S/p serial debridements (groin to knee), most recently 12/6. At this time the wound was found to be healthy with minimal necrotic tissues. On 12/8 she underwent partial closure and wound VAC placement, s/p wound VAC change on 12/12 and further debridement (necrotic skin), partial closure, and WV change on 12/14, 12/17, 12/20, and 12/24.    If still inpatient will plan on wound vac replacement on Tuesday or Weds    LTAC placement pending    Serena Black MD PGY5  General Surgery  [facility]

## 2021-12-27 NOTE — PLAN OF CARE
TN spoke with Cassie with [facility], sent referral packet updates for review.     TN sent additional LTAC referral packets via care port, with increased search radius of 200 miles. TN to follow up.     Per care port, patient medically accepted to [facility] in [place]. TN placed call to [facility] to confirm bed availability, left voice message. TN left message for patient to inform of acceptance. TN to follow up.     4:00pm TN spoke with patient at bedside regarding discharge planning. TN informed patient of acceptance to [facility], verbalized understanding.    12/27/21 1036   Discharge Reassessment   Assessment Type Discharge Planning Reassessment   Did the patient's condition or plan change since previous assessment? No   Name(s) and Number(s) Deisy Elliott (mother) [phone]   Communicated STEVEN with patient/caregiver Yes   Discharge Plan A Long-term acute care facility (LTAC)   Discharge Plan B Home with family;Home Health   DME Needed Upon Discharge  wound care supplies   Discharge Barriers Identified Bariatric   Why the patient remains in the hospital Requires continued medical care;Placement issues   Post-Acute Status   Post-Acute Authorization Placement   Post-Acute Placement Status Pending payor medical review/second level review   Coverage DANIELLE   Discharge Delays (!) Post-Acute Set-up

## 2021-12-27 NOTE — PLAN OF CARE
"Pt free from falls/injury. AAOx4, NAD, report moderate pain with good relief from PRN analgesic. Wound vac frequently alarming with blockage and air leaks this AM. Changed tubing and patched. No further alarms this evening. Pt refusing to turn to assess skin, denies BM today, states she is worried about "messing up the vac." Tolerating diet. Call light in reach. Will continue to monitor.      Problem: Adult Inpatient Plan of Care  Goal: Plan of Care Review  Outcome: Ongoing, Progressing  Goal: Patient-Specific Goal (Individualization)  Outcome: Ongoing, Progressing  Goal: Absence of Hospital-Acquired Illness or Injury  Outcome: Ongoing, Progressing  Goal: Optimal Comfort and Wellbeing  Outcome: Ongoing, Progressing  Goal: Readiness for Transition of Care  Outcome: Ongoing, Progressing  Goal: Rounds/Family Conference  Outcome: Ongoing, Progressing     Problem: Diabetes Comorbidity  Goal: Blood Glucose Level Within Desired Range  Outcome: Ongoing, Progressing     Problem: Bariatric Environmental Safety  Goal: Safety Maintained with Care  Outcome: Ongoing, Progressing     Problem: Skin Injury Risk Increased  Goal: Skin Health and Integrity  Outcome: Ongoing, Progressing     Problem: Infection  Goal: Infection Symptom Resolution  Outcome: Ongoing, Progressing     Problem: Wound  Goal: Optimal Wound Healing  Outcome: Ongoing, Progressing     Problem: Fall Injury Risk  Goal: Absence of Fall and Fall-Related Injury  Outcome: Ongoing, Progressing     "

## 2021-12-27 NOTE — ASSESSMENT & PLAN NOTE
- Status post I&D with General surgery at outside hospital, did not feel like they completely removed abscess  - Started on vanc/Zosyn  - Blood cultures pending, negative to date so far  - Lactate normal  - Consult general surgery - planning for debridement in OR 11/30 and 12/2. Possibly another debridement planned.  - Pain control  - Cultures growing Enterococcus, continue on Vanc for now       Patient denies any current chest pain.

## 2021-12-28 LAB
FUNGUS SPEC CULT: NORMAL
POCT GLUCOSE: 133 MG/DL (ref 70–110)
POCT GLUCOSE: 153 MG/DL (ref 70–110)
POCT GLUCOSE: 158 MG/DL (ref 70–110)
POCT GLUCOSE: 162 MG/DL (ref 70–110)

## 2021-12-28 PROCEDURE — 25000003 PHARM REV CODE 250: Performed by: STUDENT IN AN ORGANIZED HEALTH CARE EDUCATION/TRAINING PROGRAM

## 2021-12-28 PROCEDURE — 97535 SELF CARE MNGMENT TRAINING: CPT

## 2021-12-28 PROCEDURE — 11000001 HC ACUTE MED/SURG PRIVATE ROOM

## 2021-12-28 PROCEDURE — 63600175 PHARM REV CODE 636 W HCPCS

## 2021-12-28 PROCEDURE — 97110 THERAPEUTIC EXERCISES: CPT | Mod: CQ

## 2021-12-28 PROCEDURE — 97530 THERAPEUTIC ACTIVITIES: CPT

## 2021-12-28 PROCEDURE — 97110 THERAPEUTIC EXERCISES: CPT

## 2021-12-28 PROCEDURE — 97606 NEG PRS WND THER DME>50 SQCM: CPT | Performed by: GENERAL ACUTE CARE HOSPITAL

## 2021-12-28 PROCEDURE — 99900035 HC TECH TIME PER 15 MIN (STAT)

## 2021-12-28 PROCEDURE — 63600175 PHARM REV CODE 636 W HCPCS: Performed by: STUDENT IN AN ORGANIZED HEALTH CARE EDUCATION/TRAINING PROGRAM

## 2021-12-28 RX ORDER — HYDROMORPHONE HYDROCHLORIDE 2 MG/ML
1 INJECTION, SOLUTION INTRAMUSCULAR; INTRAVENOUS; SUBCUTANEOUS ONCE
Status: COMPLETED | OUTPATIENT
Start: 2021-12-28 | End: 2021-12-28

## 2021-12-28 RX ORDER — LOPERAMIDE HYDROCHLORIDE 2 MG/1
2 CAPSULE ORAL 4 TIMES DAILY
Status: DISCONTINUED | OUTPATIENT
Start: 2021-12-28 | End: 2021-12-31 | Stop reason: HOSPADM

## 2021-12-28 RX ADMIN — PIPERACILLIN AND TAZOBACTAM 4.5 G: 4; .5 INJECTION, POWDER, LYOPHILIZED, FOR SOLUTION INTRAVENOUS; PARENTERAL at 06:12

## 2021-12-28 RX ADMIN — Medication 1 PACKET: at 05:12

## 2021-12-28 RX ADMIN — ENOXAPARIN SODIUM 40 MG: 40 INJECTION SUBCUTANEOUS at 05:12

## 2021-12-28 RX ADMIN — HYDROMORPHONE HYDROCHLORIDE 1 MG: 2 INJECTION INTRAMUSCULAR; INTRAVENOUS; SUBCUTANEOUS at 11:12

## 2021-12-28 RX ADMIN — Medication 1 PACKET: at 11:12

## 2021-12-28 RX ADMIN — CALCIUM CARBONATE (ANTACID) CHEW TAB 500 MG 1000 MG: 500 CHEW TAB at 09:12

## 2021-12-28 RX ADMIN — LOPERAMIDE HYDROCHLORIDE 2 MG: 2 CAPSULE ORAL at 09:12

## 2021-12-28 RX ADMIN — LOPERAMIDE HYDROCHLORIDE 2 MG: 2 CAPSULE ORAL at 05:12

## 2021-12-28 RX ADMIN — METOPROLOL SUCCINATE 50 MG: 50 TABLET, EXTENDED RELEASE ORAL at 09:12

## 2021-12-28 RX ADMIN — Medication 1 PACKET: at 09:12

## 2021-12-28 RX ADMIN — PIPERACILLIN AND TAZOBACTAM 4.5 G: 4; .5 INJECTION, POWDER, LYOPHILIZED, FOR SOLUTION INTRAVENOUS; PARENTERAL at 09:12

## 2021-12-28 RX ADMIN — HYDROMORPHONE HYDROCHLORIDE 1 MG: 2 INJECTION INTRAMUSCULAR; INTRAVENOUS; SUBCUTANEOUS at 01:12

## 2021-12-28 RX ADMIN — LOPERAMIDE HYDROCHLORIDE 2 MG: 2 CAPSULE ORAL at 01:12

## 2021-12-28 RX ADMIN — INSULIN ASPART 2 UNITS: 100 INJECTION, SOLUTION INTRAVENOUS; SUBCUTANEOUS at 05:12

## 2021-12-28 RX ADMIN — PIPERACILLIN AND TAZOBACTAM 4.5 G: 4; .5 INJECTION, POWDER, LYOPHILIZED, FOR SOLUTION INTRAVENOUS; PARENTERAL at 01:12

## 2021-12-28 RX ADMIN — Medication 1 PACKET: at 06:12

## 2021-12-28 NOTE — CONSULTS
VAC dressing right anterior/medial thigh leaking and for scheduled dressing change. Assisted surgery and nursing with VAC dressing change. Inserted 3 medium black [place] dressings. Applied Mariya ring to medial aspect of wound to facilitate seal between legs.  mmHg continuous suction.  Wound beefy red without slough. Multiple buds of granulation. Wound 17 cm(L) 46 cm(W) 6 cm(D).   Sutured closure of anterior distal thigh.   Photodocumentation-  and flowsheet.

## 2021-12-28 NOTE — PT/OT/SLP PROGRESS
"Physical Therapy Treatment    Patient Name:  Shauna Queen   MRN:  [id number]    Recommendations:     Discharge Recommendations:  other (see comments) (LTAC per MD)   Discharge Equipment Recommendations: other (see comments) (TBD at next level of care)   Barriers to discharge: None    Assessment:     Shauna Queen is a 36 y.o. female admitted with a medical diagnosis of Necrotizing fasciitis.  She presents with the following impairments/functional limitations:  weakness,impaired endurance,impaired self care skills,impaired cognition,impaired functional mobilty,gait instability,impaired balance,decreased upper extremity function,decreased lower extremity function,pain,decreased safety awareness,decreased ROM,edema,impaired skin,decreased coordination .    Rehab Prognosis: Fair; patient would benefit from acute skilled PT services to address these deficits and reach maximum level of function.    Recent Surgery: Procedure(s) (LRB):  DEBRIDEMENT, WOUND (Right) 4 Days Post-Op    Plan:     During this hospitalization, patient to be seen 5 x/week to address the identified rehab impairments via gait training,therapeutic exercises,therapeutic activities,neuromuscular re-education and progress toward the following goals:    · Plan of Care Expires:  01/05/22    Subjective     Chief Complaint: fatigue, had a rough day   Patient/Family Comments/goals: pt only agreeable to supine TE , stated she will sit EOB tomorrow .  Pain/Comfort:  · Pain Rating 1: 0/10  · Pain Rating Post-Intervention 1: 0/10      Objective:     Communicated with nurse prior to session.  Patient found HOB elevated with telemetry,wound vac,joy catheter,PICC line upon PT entry to room.     General Precautions: Standard, fall,aspiration   Orthopedic Precautions:N/A   Braces: N/A  Respiratory Status: Room air     Functional Mobility:  · Bed Mobility:  Declined to re-postioning , stated " I am comfortable right now."       AM-PAC 6 CLICK MOBILITY  Turning " over in bed (including adjusting bedclothes, sheets and blankets)?: 2  Sitting down on and standing up from a chair with arms (e.g., wheelchair, bedside commode, etc.): 1  Moving from lying on back to sitting on the side of the bed?: 2  Moving to and from a bed to a chair (including a wheelchair)?: 1  Need to walk in hospital room?: 1  Climbing 3-5 steps with a railing?: 1  Basic Mobility Total Score: 8       Therapeutic Activities and Exercises:   Pt performed in bed BLE 10 reps (AAROM-AROM BLE ):  AP, ankle circumduction, QS ,GS ,  HS,  Hip IR/ER .  V/T's cues for technique and sequence.   Encouraged pt to perform BLE ex's as above throughout the day, pt verbalized understanding .     Patient left HOB elevated with all lines intact, call button in reach and nurse notified..    GOALS:   Multidisciplinary Problems     Physical Therapy Goals        Problem: Physical Therapy Goal    Goal Priority Disciplines Outcome Goal Variances Interventions   Physical Therapy Goal     PT, PT/OT Ongoing, Progressing     Description: Goals to be met by: 22     Patient will increase functional independence with mobility by performin. Supine to sit with Moderate Assistance  2. Sit to supine with Moderate Assistance  3. Rolling to Left and Right with Minimal Assistance.  4. Sit to stand transfer with Moderate Assistance  5. Bed to chair transfer with Moderate Assistance using Rolling Walker  5. Gait  x 15 feet with Minimal Assistance using Rolling Walker.                Multidisciplinary Problems (Resolved)        Problem: Physical Therapy Goal    Goal Priority Disciplines Outcome Goal Variances Interventions   Physical Therapy Goal   (Resolved)     PT, PT/OT Met     Description: Goals to be met by: 22    Patient will increase functional independence with mobility by performin. Pt will tolerate mobility assessment remaining free from falls and demonstrating good tolerance to activity                     Time  Tracking:     PT Received On: 12/28/21  PT Start Time: 1445     PT Stop Time: 1456  PT Total Time (min): 11 min     Billable Minutes: Therapeutic Exercise 11    Treatment Type: Treatment  PT/PTA: PTA     PTA Visit Number: 3     12/28/2021

## 2021-12-28 NOTE — PLAN OF CARE
TN spoke with Reanna, with [facility], pending review and bed availability.     TN placed call for [facility], awaiting call back for confirmation.    12/28/21 0813   Post-Acute Status   Post-Acute Authorization Placement   Post-Acute Placement Status Pending Bed Availability   Coverage DANIELLE   Discharge Delays (!) Post-Acute Set-up   Discharge Plan   Discharge Plan A Long-term acute care facility (LTAC)

## 2021-12-28 NOTE — PLAN OF CARE
Problem: Adult Inpatient Plan of Care  Goal: Plan of Care Review  Outcome: Ongoing, Not Progressing  Goal: Patient-Specific Goal (Individualization)  Outcome: Ongoing, Not Progressing  Goal: Absence of Hospital-Acquired Illness or Injury  Outcome: Ongoing, Not Progressing  Goal: Optimal Comfort and Wellbeing  Outcome: Ongoing, Not Progressing  Goal: Readiness for Transition of Care  Outcome: Ongoing, Not Progressing  Goal: Rounds/Family Conference  Outcome: Ongoing, Not Progressing     Problem: Diabetes Comorbidity  Goal: Blood Glucose Level Within Desired Range  Outcome: Ongoing, Not Progressing     Problem: Bariatric Environmental Safety  Goal: Safety Maintained with Care  Outcome: Ongoing, Not Progressing     Problem: Skin Injury Risk Increased  Goal: Skin Health and Integrity  Outcome: Ongoing, Not Progressing     Problem: Infection  Goal: Infection Symptom Resolution  Outcome: Ongoing, Not Progressing     Problem: Wound  Goal: Optimal Wound Healing  Outcome: Ongoing, Not Progressing     Problem: Fall Injury Risk  Goal: Absence of Fall and Fall-Related Injury  Outcome: Ongoing, Not Progressing

## 2021-12-28 NOTE — PROGRESS NOTES
"        [facility] - Med Surg [facility] Medicine  Telemedicine Progress Note    Patient Name: Shauna Queen  MRN: [id number]  Patient Class: IP- Inpatient   Admission Date: 11/30/2021  Length of Stay: 27 days  Attending Physician: Milvia García MD  Primary Care Provider: Jorge Dennis MD          Subjective:     Principal Problem:Necrotizing fasciitis        HPI:  Ms. Queen is a 36-year-old presents to the ED from outside hospital for abscess.  She has initially presented to outside hospital for DKA which resolved.  Patient was unaware of why she was here in July prompted her.  Per the checkout are received: "36-year-old female history of diabetes mellitus, sleep apnea on CPAP, schizophrenia, hypertension, and hyperlipidemia admitted to [facility] on November 24 from [facility] for treatment of diabetic ketoacidosis. She was initiated on an insulin infusion. DKA improved and she was transitioned off the insulin infusion. She again developed DKA and was placed back on the insulin infusion for a period of time. Currently she is off the insulin infusion and on subcutaneous insulin. She was noted to have right upper/inner thigh pain with erythema, induration, and tenderness to palpation. She was treated with IV clindamycin and vancomycin. Due to her size ((BMI 74.32) they are unable to perform CT or MRI. General Surgery there performed an incision and drainage with purulent material found. She was taken to the OR on November 29 for a second-look at the thigh wound. They were only able to do superficial debridement. Postprocedure she extubated to 2 L nasal cannula and was awake with oxygen saturation 100 percent. Surgery at their facility recommended transfer to a facility better able to perform procedures on a patient with her BMI. Referring provider spoke with General Surgery at [facility]. [facility] Medicine was asked to admit for further treatment. I spoke with her current " "attending. He noted she has no alteration in mentation. Lower extremity is intact from a neurovascular standpoint. She remains in the ICU."      Overview/Hospital Course:  37 y/o female admitted to the hospital as a transfer secondary to R thigh abscess and a BMI of Body mass index is 84.77 kg/m².   Patient transferred here for surgical evaluation.  Patient went for surgical debridement on 11/30/21. Patient continued on Abx per ID recommendations. Blood cultures were NG. Underwent another debridement on 12/2 and again on 12/4 - noting significant necrotizing soft tissue infections concerning for tracking down into her knee. On broad spectrum antibiotics. To OR with Orthopedic surgery to explore right knee on 12/5. Patient again went for surgery on 12/6.  Wound closure with wound vac placement on 12/8.      Subjective/Interval History     Patient doing well after wound vac exchange, tachycardia much improved with addition of metoprolol. Patient without complaints, awaiting LTAC placement. Insulin decreased as patient with poor PO intake. Gen surgery following for possible wound vac replacement.      Review of Systems    Constitutional: Negative for chills, fatigue, fever.   HENT: Negative for sore throat, trouble swallowing.    Eyes: Negative for photophobia, visual disturbance.   Respiratory: Negative for cough, shortness of breath.    Cardiovascular: Negative for chest pain, palpitations, leg swelling.   Gastrointestinal: Negative for abdominal pain, constipation, nausea, vomiting. +diarrhea.   Endocrine: Negative for cold intolerance, heat intolerance.   Genitourinary: Negative for dysuria, frequency.   Musculoskeletal: Negative for arthralgias, myalgias.   Skin: Negative for rash, wound, erythema   Neurological: Negative for dizziness, syncope, weakness, light-headedness.   Psychiatric/Behavioral: Negative for confusion, hallucinations, anxiety    Medications  Scheduled Meds:   calcium carbonate  1,000 mg Oral " Daily    enoxaparin  40 mg Subcutaneous Daily    insulin detemir U-100  5 Units Subcutaneous BID    metoprolol succinate  50 mg Oral Daily    piperacillin-tazobactam (ZOSYN) IVPB  4.5 g Intravenous Q8H    potassium, sodium phosphates  1 packet Oral QID (AC & HS)     Continuous Infusions:  PRN Meds:.sodium chloride, acetaminophen, ALPRAZolam, dextrose 50%, diphenoxylate-atropine 2.5-0.025 mg, glucagon (human recombinant), HYDROcodone-acetaminophen, influenza, insulin aspart U-100, melatonin, morphine, sars-cov-2 (covid-19)    Objective    Physical Examination    Temp:  [97.6 °F (36.4 °C)-98.6 °F (37 °C)]   Pulse:  []   Resp:  [17-18]   BP: (121-159)/(73-82)   SpO2:  [96 %-100 %]     Constitutional: NAD, conversant  Head: NC, AT  Eyes: No scleral icterus.  No eye discharge  Pulmonary/Chest: Effort normal. No respiratory distress on room air  GI: No distension  Musculoskeletal: Moving all extremities  Neurological: Alert.  Oriented to person, place, and time.   Psychiatric: Normal behavior, mood, and affect  Skin: No visible erythema or cyanosis    CBC  Recent Labs   Lab 12/23/21  0338 12/25/21  0408 12/27/21  0420   WBC 9.45 8.90 7.20   HGB 8.0* 8.5* 8.6*   HCT 26.9* 27.7* 27.8*    464* SEE COMMENT     CMP  Recent Labs   Lab 12/23/21  0338 12/25/21  0408 12/27/21  0420    141 142   K 3.9 4.6 4.9    103 104   CO2 30* 27 28   BUN 7 8 8   CREATININE 0.8 0.7 0.7   * 114* 86   CALCIUM 8.0* 8.2* 8.3*           Assessment/Plan:      * Necrotizing fasciitis  - Status post I&D with General surgery at outside hospital, did not feel like they completely removed abscess.  - Underwent debridement in OR 11/30, 12/2, and 12/4, 12/5 - no extension into knee.    - OR again on 12/6, 12/8 and 12/11.   - OR again on 12/14 for debridement of necrotic skin, washout, partial closure, wound VAC change and then again on 12/17/21  - Continued on Zosyn till 12/18 for 2 weeks of treatment per ID. (estimated  end date: 12/18/21). Febrile again so ID reconsulted and IV zosyn resumed. Per ID, continue IV zosyn for two weeks.   - Pain control.       - to LTAC once wound vac can be changed at bedside. Per surgery, okay to d/c to LTAC however on 12/23, multiple attempt to re-apply dressing eventually lead to another leak so patient will need wound vac change in the OR today.         Obesity  Morbid obesity  Body mass index is 84.72 kg/m².      Tachycardia      Noted since admission, EKG with sinus tachycardia, thought likely related to underlying infection however persisted despite abx      Improved with metoprolol     Hypophosphatemia  Replace as needed.      Anemia of chronic disease  With anemia of acute blood loss as expected from multiple surgeries.  Transfuse for Hgb<7      Diarrhea     Cdiff Negative, PRN lomotil       Debility  PT/OT following, plan for LTAC      DANG (obstructive sleep apnea)  Patient likely has component of OHS as well  - outpatient sleep study      DM (diabetes mellitus)  A1c:   Lab Results   Component Value Date    HGBA1C >14.0 (H) 12/01/2021     Meds: basal bolus insulin + SSI PRN to maintain goal 140-180  ADA diet, accuchecks ACHS, hypoglycemic protocol  Continue current management.      VTE Risk Mitigation (From admission, onward)         Ordered     enoxaparin injection 40 mg  Daily         12/04/21 2306     IP VTE LOW RISK PATIENT  Once         11/30/21 0403     Place sequential compression device  Until discontinued         11/30/21 0403                      I have assessed these finding virtually using telemed platform and with assistance of bedside nurse       The attending portion of this evaluation, treatment, and documentation was performed per Milvia García MD via Telemedicine AudioVisual using the secure [employer] software platform with 2 way audio/video. The provider was located off-site and the patient is located in the hospital. The aforementioned video software was utilized to  document the relevant history and physical exam    Milvia García MD  Department of Hospital Medicine   [facility] - Med Surg [place]

## 2021-12-28 NOTE — PROGRESS NOTES
[facility]  General Surgery  Progress Note      Interval History:     Patient is s/p serial debridements of right lower extremity (groin to knee) for necrotizing fasciitis, most recently 12/6, at which time the wound appeared healthy with no remaining infection. On 12/8 patient underwent partial closure and wound VAC placement. Wound VAC exchanged in OR on 12/12. On 12/14 she was taken for further debridement (necrotic skin), partial closure, and WV change. She has undergone wound vac changes in OR on 12/17, 12/20, and 12/24.    Now s/p bedside wound VAC change on 12/28    Patient tolerated wound VAC change well after getting pain medication.     Objective:     Vital Signs (Most Recent):  Temp: 98.6 °F (37 °C) (12/28/21 0827)  Pulse: 100 (12/28/21 0827)  Resp: 19 (12/28/21 1332)  BP: 136/85 (12/28/21 0827)  SpO2: 98 % (12/28/21 0827) Vital Signs (24h Range):  Temp:  [98.1 °F (36.7 °C)-98.6 °F (37 °C)] 98.6 °F (37 °C)  Pulse:  [] 100  Resp:  [18-19] 19  SpO2:  [95 %-99 %] 98 %  BP: (121-159)/(76-85) 136/85     Weight: (!) 224 kg (493 lb 13.3 oz)  Body mass index is 84.72 kg/m².      Intake/Output Summary (Last 24 hours) at 12/28/2021 1525  Last data filed at 12/28/2021 1230  Gross per 24 hour   Intake 600 ml   Output 2375 ml   Net -1775 ml       Physical Exam  GEN: alert and oriented, NAD  HEENT: MMM, no scleral icterus  CV: RRR, distant heart sounds due to body habitus  PULM: Mildly increased WOB on RA  ABD: soft, non-tender, non-distended, obese  EXT: moves all. Right thigh/groin: wound bed with good granulation tissue, no purulence or fibrinous exudate. There is a short tract (approx 6-7 cm) tunneling under inferior-most border of wound at the level of the knee, which has been closed with suture. This tract does have significant fibrinous exudate, and the overlying skin is edematous and fragile. Other portions of wound which have previously been closed are healing.    Significant  Labs:  CBC:   Recent Labs   Lab 12/27/21  0420   WBC 7.20   RBC 3.10*   HGB 8.6*   HCT 27.8*   PLT SEE COMMENT   MCV 90   MCH 27.7   MCHC 30.9*     CMP:   Recent Labs   Lab 12/27/21  0420   GLU 86   CALCIUM 8.3*      K 4.9   CO2 28      BUN 8   CREATININE 0.7       Significant Diagnostics:  None    Assessment/Plan:     Active Diagnoses:    Diagnosis Date Noted POA    PRINCIPAL PROBLEM:  Necrotizing fasciitis [M72.6] 11/30/2021 Yes    Debility [R53.81] 12/01/2021 Yes    Anemia of chronic disease [D63.8] 12/01/2021 Yes    Hypophosphatemia [E83.39] 12/01/2021 Yes    Obesity [E66.9] 12/01/2021 Yes    DM (diabetes mellitus) [E11.9] 11/30/2021 Yes    DANG (obstructive sleep apnea) [G47.33] 11/30/2021 Yes      Problems Resolved During this Admission:     This is a 36 year old F with class 3 obesity c/b several medical problems who is transferred to [facility] for treatment of right groin/thigh necrotizing fasciitis.    S/p serial debridements (groin to knee), most recently 12/6. At this time the wound was found to be healthy with minimal necrotic tissues. On 12/8 she underwent partial closure and wound VAC placement, s/p wound VAC change on 12/12 and further debridement (necrotic skin), partial closure, and WV change on 12/14, 12/17, 12/20, and 12/24.    Now s/p bedside wound VAC change on 12/28. Patient tolerated wound VAC change well after getting pain medication. Overall wound is healing very well.    Okay to discharge to [facility] with wound VAC changes every 3 days.    Patient discussed with attending surgeon Dr. Joon Black MD PGY5  General Surgery  [facility]

## 2021-12-28 NOTE — PLAN OF CARE
Problem: Occupational Therapy Goal  Goal: Occupational Therapy Goal  Description: Goals to be met by: 12/31/21    Patient will increase functional independence with ADLs by performing:    Feeding with [place].  UE Dressing with Modified [place].  LE Dressing with Minimal Assistance.  Grooming while seated with [place].  Toileting from bedside commode with Minimal Assistance for hygiene and clothing management.   Toilet transfer to bedside commode with Minimal Assistance.    New Goals to be met by: 12/31/21    Patient will increase functional independence with ADLs by performing:    Supine to sit with Stand-by Assistance.  The patient will tolerate sitting on the EOB for 30 min for self care task  Outcome: Ongoing, Progressing

## 2021-12-28 NOTE — PT/OT/SLP PROGRESS
Occupational Therapy   Treatment    Name: Shauna Queen  MRN: [id number]  Admitting Diagnosis:  Necrotizing fasciitis  4 Days Post-Op    Recommendations:     Discharge Recommendations: LTACH (long-term acute care hospital)  Discharge Equipment Recommendations:  bedside commode,bath bench (Bariatric: BSC and bath bench)  Barriers to discharge:   (High risk of untrained caregiver strain. Severly compromised sitting tolerance and ADL engagement levels.)    Assessment:     Shauna Queen is a 36 y.o. female with a medical diagnosis of Necrotizing fasciitis. Patient demonstrating progressive gains in self driven bed mobility and pressure relief actions. Prompt, encouragement remains required/beneficial 2* fear related to mishap in prior movement task/event, however very good response noted to supportive feedback and encouraging VC.  Performance deficits affecting function are weakness,impaired functional mobilty,decreased safety awareness,impaired coordination,impaired cardiopulmonary response to activity,impaired endurance,decreased coordination,pain,impaired balance,impaired sensation,decreased lower extremity function,impaired self care skills,edema (significant wound carer needs.).     Rehab Prognosis:  Good; patient would benefit from acute skilled OT services to address these deficits and reach maximum level of function.       Plan:     Patient to be seen 3 x/week,5 x/week to address the above listed problems via self-care/home management,therapeutic activities,therapeutic exercises  · Plan of Care Expires: 12/31/21  · Plan of Care Reviewed with: patient    Subjective     Pain/Comfort:  · Pain Rating 1: 2/10 (fleeting at wound site. Patient stating to pulling sesnation of wound and wound vac. (+) responses to repositioining.)  · Location - Orientation 1: medial  · Location 1: thigh  · Pain Addressed 1: Reposition (non Rx pain managment edu ongoing.)  · Pain Rating Post-Intervention 1:  (no pain at rest and as  "session progressed with low amp RLE movement at large joints as needed during bed mob/pressure reielf training.)    Objective:     Communicated with:GIOVANY Chanel prior to session.  Patient found supine with telemetry,wound vac,PICC line,joy catheter upon OT entry to room. OT requesting and RN states she will support procurement of alternate bed 2* current bed unable to adjusted to HOB > 35 degrees elevation-> feeding difficult. Therapist unable to advance non demanding normative sitting positioning between sessions.   General Precautions: Standard, aspiration,fall (high skin/joint integirty risks 2* BMI and recent immobility.)   Orthopedic Precautions:N/A   Braces: N/A  Respiratory Status: Room air     Occupational Performance:     Bed Mobility:    · Patient completed Rolling/Turning to Left with  maximal assistance  · Patient completed Rolling/Turning to Right with maximal assistance  · Patient completed Supine to Sit with maximal assistance     Functional Mobility/Transfers: NT this date. Surgeon and Wound nurse arrival for wound care and wound vac reinforcement overriding.   · Patient reports "dizzines" with right sitting > 25 degrees, and > 1 min.   · Ability to provide "bed in chair" position (not currently mechanically  available) would benefit Patient as it relates to countering prolonged supine and reinforcement of global extension patterns.     Activities of Daily Living:  · Feeding:  set up with pillow support advised 2* bed dining at present.     · Upper Body Dressing: maximal assistance    · Lower Body Dressing: total assistance        [facility] 6 Click ADL: 12    Treatment & Education:  *Patient agreeable to therapy session. Lights on / shade open for session .  SC:   *Basic self-care tasks and rudimentary functional mobility undertaken -- assist levels noted above.  *Safe swallow reinforced/supported, with education re: bed positioning recommendations/pi;;ow support provision (Patient and RN).   TE: "   *Education provided regarding the importance of early/consistent mobility to maximize recovery in conjunction w/ edu related to importance of performing daily     UB/LB AROM exercises.  *Education underway for intro deep relaxed/restorative breathing tech as needed for non Rx pain management/MM relaxation, and to support internal self reguation.   Fair (+) return demonstration, f/u recommended.   *Patient further engaged in edu for UB stretch and AROM ex program:  BUE / (2)x 10 reps, recommending x3/day, all joints/all planes in     sit as indicated for recovery of effective respiration in functional tasks, to support joint capsule nourishment, and to bolster proper circulation while in acute setting. AA/PROM BLE provided with B foot retro massage and low amp MTP joint mobs.   TA:   Bed mobility training with hand rail use, roll L<> R (w/ max a) for intro pressure relief.   SESSION'S END:  Patient left HOB elevated 35 degrees scapular pillow support required, and BUEs in resting neutral w/ hands 20 degrees above wrists for gravity assisted circulation needs. Patient left with all lines intact, call button in hand and RN notifiedEducation:  , wound care Nurse present, Surgeon en route for procedure.     GOALS:   Multidisciplinary Problems     Occupational Therapy Goals        Problem: Occupational Therapy Goal    Goal Priority Disciplines Outcome Interventions   Occupational Therapy Goal     OT, PT/OT Ongoing, Progressing    Description: Goals to be met by: 12/31/21    Patient will increase functional independence with ADLs by performing:    Feeding with [place].  UE Dressing with Modified [place].  LE Dressing with Minimal Assistance.  Grooming while seated with [place].  Toileting from bedside commode with Minimal Assistance for hygiene and clothing management.   Toilet transfer to bedside commode with Minimal Assistance.    New Goals to be met by: 12/31/21    Patient will increase functional  independence with ADLs by performing:    Supine to sit with Stand-by Assistance.  The patient will tolerate sitting on the EOB for 30 min for self care task                   Time Tracking:     OT Date of Treatment: 12/28/21  OT Start Time: 1101  OT Stop Time: 1147  OT Total Time (min): 46 min    Billable Minutes:Self Care/Home Management 10   Therapeutic Activity 16  Therapeutic Exercise 20    12/28/2021

## 2021-12-29 LAB
ANION GAP SERPL CALC-SCNC: 9 MMOL/L (ref 8–16)
BASOPHILS # BLD AUTO: 0.02 K/UL (ref 0–0.2)
BASOPHILS NFR BLD: 0.2 % (ref 0–1.9)
BUN SERPL-MCNC: 7 MG/DL (ref 6–20)
CALCIUM SERPL-MCNC: 8.2 MG/DL (ref 8.7–10.5)
CHLORIDE SERPL-SCNC: 102 MMOL/L (ref 95–110)
CO2 SERPL-SCNC: 28 MMOL/L (ref 23–29)
CREAT SERPL-MCNC: 0.7 MG/DL (ref 0.5–1.4)
DIFFERENTIAL METHOD: ABNORMAL
EOSINOPHIL # BLD AUTO: 0.1 K/UL (ref 0–0.5)
EOSINOPHIL NFR BLD: 0.8 % (ref 0–8)
ERYTHROCYTE [DISTWIDTH] IN BLOOD BY AUTOMATED COUNT: 16 % (ref 11.5–14.5)
EST. GFR  (AFRICAN AMERICAN): >60 ML/MIN/1.73 M^2
EST. GFR  (NON AFRICAN AMERICAN): >60 ML/MIN/1.73 M^2
GLUCOSE SERPL-MCNC: 144 MG/DL (ref 70–110)
HCT VFR BLD AUTO: 28.2 % (ref 37–48.5)
HGB BLD-MCNC: 8.4 G/DL (ref 12–16)
IMM GRANULOCYTES # BLD AUTO: 0.03 K/UL (ref 0–0.04)
IMM GRANULOCYTES NFR BLD AUTO: 0.4 % (ref 0–0.5)
LYMPHOCYTES # BLD AUTO: 1.6 K/UL (ref 1–4.8)
LYMPHOCYTES NFR BLD: 19.4 % (ref 18–48)
MCH RBC QN AUTO: 27.6 PG (ref 27–31)
MCHC RBC AUTO-ENTMCNC: 29.8 G/DL (ref 32–36)
MCV RBC AUTO: 93 FL (ref 82–98)
MONOCYTES # BLD AUTO: 1.1 K/UL (ref 0.3–1)
MONOCYTES NFR BLD: 13.1 % (ref 4–15)
NEUTROPHILS # BLD AUTO: 5.5 K/UL (ref 1.8–7.7)
NEUTROPHILS NFR BLD: 66.1 % (ref 38–73)
NRBC BLD-RTO: 0 /100 WBC
PLATELET # BLD AUTO: 455 K/UL (ref 150–450)
PMV BLD AUTO: 9 FL (ref 9.2–12.9)
POCT GLUCOSE: 142 MG/DL (ref 70–110)
POCT GLUCOSE: 143 MG/DL (ref 70–110)
POCT GLUCOSE: 158 MG/DL (ref 70–110)
POCT GLUCOSE: 162 MG/DL (ref 70–110)
POTASSIUM SERPL-SCNC: 3.7 MMOL/L (ref 3.5–5.1)
RBC # BLD AUTO: 3.04 M/UL (ref 4–5.4)
SODIUM SERPL-SCNC: 139 MMOL/L (ref 136–145)
WBC # BLD AUTO: 8.31 K/UL (ref 3.9–12.7)

## 2021-12-29 PROCEDURE — 97110 THERAPEUTIC EXERCISES: CPT | Mod: CQ

## 2021-12-29 PROCEDURE — 99232 PR SUBSEQUENT HOSPITAL CARE,LEVL II: ICD-10-PCS | Mod: ,,, | Performed by: SURGERY

## 2021-12-29 PROCEDURE — 25000003 PHARM REV CODE 250: Performed by: STUDENT IN AN ORGANIZED HEALTH CARE EDUCATION/TRAINING PROGRAM

## 2021-12-29 PROCEDURE — 99232 SBSQ HOSP IP/OBS MODERATE 35: CPT | Mod: ,,, | Performed by: SURGERY

## 2021-12-29 PROCEDURE — 85025 COMPLETE CBC W/AUTO DIFF WBC: CPT | Performed by: STUDENT IN AN ORGANIZED HEALTH CARE EDUCATION/TRAINING PROGRAM

## 2021-12-29 PROCEDURE — 63600175 PHARM REV CODE 636 W HCPCS: Performed by: STUDENT IN AN ORGANIZED HEALTH CARE EDUCATION/TRAINING PROGRAM

## 2021-12-29 PROCEDURE — 99900035 HC TECH TIME PER 15 MIN (STAT)

## 2021-12-29 PROCEDURE — 97535 SELF CARE MNGMENT TRAINING: CPT

## 2021-12-29 PROCEDURE — 36415 COLL VENOUS BLD VENIPUNCTURE: CPT | Performed by: STUDENT IN AN ORGANIZED HEALTH CARE EDUCATION/TRAINING PROGRAM

## 2021-12-29 PROCEDURE — 11000001 HC ACUTE MED/SURG PRIVATE ROOM

## 2021-12-29 PROCEDURE — 94660 CPAP INITIATION&MGMT: CPT

## 2021-12-29 PROCEDURE — 80048 BASIC METABOLIC PNL TOTAL CA: CPT | Performed by: STUDENT IN AN ORGANIZED HEALTH CARE EDUCATION/TRAINING PROGRAM

## 2021-12-29 RX ORDER — METOPROLOL SUCCINATE 50 MG/1
100 TABLET, EXTENDED RELEASE ORAL DAILY
Status: DISCONTINUED | OUTPATIENT
Start: 2021-12-29 | End: 2021-12-31 | Stop reason: HOSPADM

## 2021-12-29 RX ADMIN — HYDROCODONE BITARTRATE AND ACETAMINOPHEN 1 TABLET: 5; 325 TABLET ORAL at 12:12

## 2021-12-29 RX ADMIN — METOPROLOL SUCCINATE 100 MG: 50 TABLET, EXTENDED RELEASE ORAL at 08:12

## 2021-12-29 RX ADMIN — HYDROCODONE BITARTRATE AND ACETAMINOPHEN 1 TABLET: 5; 325 TABLET ORAL at 09:12

## 2021-12-29 RX ADMIN — CALCIUM CARBONATE (ANTACID) CHEW TAB 500 MG 1000 MG: 500 CHEW TAB at 08:12

## 2021-12-29 RX ADMIN — Medication 1 PACKET: at 05:12

## 2021-12-29 RX ADMIN — PIPERACILLIN AND TAZOBACTAM 4.5 G: 4; .5 INJECTION, POWDER, LYOPHILIZED, FOR SOLUTION INTRAVENOUS; PARENTERAL at 06:12

## 2021-12-29 RX ADMIN — LOPERAMIDE HYDROCHLORIDE 2 MG: 2 CAPSULE ORAL at 08:12

## 2021-12-29 RX ADMIN — Medication 1 PACKET: at 09:12

## 2021-12-29 RX ADMIN — Medication 1 PACKET: at 12:12

## 2021-12-29 RX ADMIN — MORPHINE SULFATE 2 MG: 4 INJECTION, SOLUTION INTRAMUSCULAR; INTRAVENOUS at 02:12

## 2021-12-29 RX ADMIN — LOPERAMIDE HYDROCHLORIDE 2 MG: 2 CAPSULE ORAL at 05:12

## 2021-12-29 RX ADMIN — LOPERAMIDE HYDROCHLORIDE 2 MG: 2 CAPSULE ORAL at 12:12

## 2021-12-29 RX ADMIN — INSULIN ASPART 1 UNITS: 100 INJECTION, SOLUTION INTRAVENOUS; SUBCUTANEOUS at 09:12

## 2021-12-29 RX ADMIN — PIPERACILLIN AND TAZOBACTAM 4.5 G: 4; .5 INJECTION, POWDER, LYOPHILIZED, FOR SOLUTION INTRAVENOUS; PARENTERAL at 10:12

## 2021-12-29 RX ADMIN — LOPERAMIDE HYDROCHLORIDE 2 MG: 2 CAPSULE ORAL at 09:12

## 2021-12-29 RX ADMIN — ENOXAPARIN SODIUM 40 MG: 40 INJECTION SUBCUTANEOUS at 05:12

## 2021-12-29 RX ADMIN — Medication 1 PACKET: at 06:12

## 2021-12-29 RX ADMIN — PIPERACILLIN AND TAZOBACTAM 4.5 G: 4; .5 INJECTION, POWDER, LYOPHILIZED, FOR SOLUTION INTRAVENOUS; PARENTERAL at 03:12

## 2021-12-29 NOTE — PLAN OF CARE
[facility], stated pending review of progress note updates and photos.     TN placed called for [facility], left detailed voice message with call back number.     TN placed call to [facility] with [employer], left voice message.     TN spoke with Michelle with [facility], sent updated progress notes via care port, pending review.     10:16am TN received call from [facility] with [employer], state patient can be accepted to day, physician notified.     1:08pm TN send plan of care orders and dc summary to [facility], pending review for call report.    12/29/21 0952   Post-Acute Status   Post-Acute Authorization Placement   Post-Acute Placement Status Pending payor medical review/second level review   Coverage DANIELLE   Discharge Delays (!) Post-Acute Set-up   Discharge Plan   Discharge Plan A Long-term acute care facility (LTAC)

## 2021-12-29 NOTE — PLAN OF CARE
Problem: Adult Inpatient Plan of Care  Goal: Plan of Care Review  Outcome: Ongoing, Progressing  Goal: Patient-Specific Goal (Individualization)  Outcome: Ongoing, Progressing  Goal: Absence of Hospital-Acquired Illness or Injury  Outcome: Ongoing, Progressing  Intervention: Identify and Manage Fall Risk  Flowsheets (Taken 12/29/2021 0126)  Safety Promotion/Fall Prevention:   assistive device/personal item within reach   diversional activities provided   Fall Risk reviewed with patient/family   Fall Risk signage in place   high risk medications identified   medications reviewed   room near unit station   side rails raised x 2   instructed to call staff for mobility  Intervention: Prevent VTE (venous thromboembolism)  Flowsheets (Taken 12/29/2021 0126)  VTE Prevention/Management:   bleeding risk assessed   dorsiflexion/plantar flexion performed   fluids promoted   ROM (active) performed   ROM (passive) performed  Goal: Optimal Comfort and Wellbeing  Outcome: Ongoing, Progressing  Intervention: Provide Person-Centered Care  Flowsheets (Taken 12/29/2021 0126)  Trust Relationship/Rapport:   care explained   questions encouraged   choices provided   reassurance provided   emotional support provided   thoughts/feelings acknowledged   empathic listening provided   questions answered  Goal: Readiness for Transition of Care  Outcome: Ongoing, Progressing  Goal: Rounds/Family Conference  Outcome: Ongoing, Progressing     Problem: Diabetes Comorbidity  Goal: Blood Glucose Level Within Desired Range  Outcome: Ongoing, Progressing  Intervention: Maintain Glycemic Control  Flowsheets (Taken 12/29/2021 0126)  Glycemic Management:   blood glucose monitored   supplemental insulin given     Problem: Bariatric Environmental Safety  Goal: Safety Maintained with Care  Outcome: Ongoing, Progressing  Intervention: Promote Safety and Comfort  Flowsheets (Taken 12/29/2021 0126)  Bariatric Safety: specialty bed utilized     Problem: Skin  Injury Risk Increased  Goal: Skin Health and Integrity  Outcome: Ongoing, Progressing  Intervention: Optimize Skin Protection  Flowsheets (Taken 12/29/2021 0126)  Pressure Reduction Techniques:   frequent weight shift encouraged   weight shift assistance provided  Pressure Reduction Devices:   positioning supports utilized   pressure-redistributing mattress utilized   specialty bed utilized  Skin Protection:   adhesive use limited   incontinence pads utilized   transparent dressing maintained  Head of Bed (HOB) Positioning:   HOB elevated   HOB at 30-45 degrees  Intervention: Promote and Optimize Oral Intake  Flowsheets (Taken 12/29/2021 0126)  Oral Nutrition Promotion: rest periods promoted     Problem: Infection  Goal: Infection Symptom Resolution  Outcome: Ongoing, Progressing  Intervention: Prevent or Manage Infection  Flowsheets (Taken 12/29/2021 0126)  Fever Reduction/Comfort Measures:   lightweight bedding   lightweight clothing  Infection Management: aseptic technique maintained  Isolation Precautions: protective     Problem: Wound  Goal: Optimal Wound Healing  Outcome: Ongoing, Progressing  Intervention: Promote Effective Wound Healing  Flowsheets (Taken 12/29/2021 0126)  Oral Nutrition Promotion: rest periods promoted  Sleep/Rest Enhancement:   awakenings minimized   consistent schedule promoted   family presence promoted   noise level reduced   regular sleep/rest pattern promoted   relaxation techniques promoted   room darkened   therapeutic touch utilized  Activity Management: Rolling - L1  Pain Management Interventions:   care clustered   pillow support provided   position adjusted   quiet environment facilitated   relaxation techniques promoted     Problem: Fall Injury Risk  Goal: Absence of Fall and Fall-Related Injury  Outcome: Ongoing, Progressing  Intervention: Identify and Manage Contributors  Flowsheets (Taken 12/29/2021 0126)  Self-Care Promotion:   independence encouraged   BADL personal  objects within reach   BADL personal routines maintained   meal set-up provided   safe use of adaptive equipment encouraged  Medication Review/Management:   medications reviewed   high-risk medications identified  Intervention: Promote Injury-Free Environment  Flowsheets (Taken 12/29/2021 0126)  Safety Promotion/Fall Prevention:   assistive device/personal item within reach   diversional activities provided   Fall Risk reviewed with patient/family   Fall Risk signage in place   high risk medications identified   medications reviewed   room near unit station   side rails raised x 2   instructed to call staff for mobility

## 2021-12-29 NOTE — PLAN OF CARE
Pt AAO resting in bed w/ the television and personal items within in reach. No complaints, request or concerns at this time. Wound dressing reinforced. Bed in lowest position. Call light in reach. Fluids provided. Will continue to monitor.    Problem: Adult Inpatient Plan of Care  Goal: Plan of Care Review  Outcome: Ongoing, Progressing  Goal: Patient-Specific Goal (Individualization)  Outcome: Ongoing, Progressing  Goal: Absence of Hospital-Acquired Illness or Injury  Outcome: Ongoing, Progressing  Goal: Optimal Comfort and Wellbeing  Outcome: Ongoing, Progressing  Goal: Readiness for Transition of Care  Outcome: Ongoing, Progressing  Goal: Rounds/Family Conference  Outcome: Ongoing, Progressing     Problem: Diabetes Comorbidity  Goal: Blood Glucose Level Within Desired Range  Outcome: Ongoing, Progressing     Problem: Bariatric Environmental Safety  Goal: Safety Maintained with Care  Outcome: Ongoing, Progressing     Problem: Skin Injury Risk Increased  Goal: Skin Health and Integrity  Outcome: Ongoing, Progressing     Problem: Infection  Goal: Infection Symptom Resolution  Outcome: Ongoing, Progressing     Problem: Wound  Goal: Optimal Wound Healing  Outcome: Ongoing, Progressing     Problem: Fall Injury Risk  Goal: Absence of Fall and Fall-Related Injury  Outcome: Ongoing, Progressing

## 2021-12-29 NOTE — PLAN OF CARE
Problem: Occupational Therapy Goal  Goal: Occupational Therapy Goal  Description: Goals to be met by: 12/31/21    Patient will increase functional independence with ADLs by performing:    Feeding with [place].  UE Dressing with Modified [place].  LE Dressing with Minimal Assistance.  Grooming while seated with [place].  Toileting from bedside commode with Minimal Assistance for hygiene and clothing management.   Toilet transfer to bedside commode with Minimal Assistance.    New Goals to be met by: 12/31/21    Patient will increase functional independence with ADLs by performing:    Supine to sit with Stand-by Assistance.  The patient will tolerate sitting on the EOB for 30 min for self care task  Outcome: Ongoing, Progressing

## 2021-12-29 NOTE — PT/OT/SLP PROGRESS
Occupational Therapy   Treatment    Name: Shauna Queen  MRN: [id number]  Admitting Diagnosis:  Necrotizing fasciitis  5 Days Post-Op    Recommendations:     Discharge Recommendations: LTACH (long-term acute care hospital)  Discharge Equipment Recommendations:   (Bariatric BSC and tub bench.)  Barriers to discharge:  Inaccessible home environment (High risk of untrained caregiver strain. Signifcant wound care needs.)    Assessment:     Shauna Queen is a 36 y.o. female with a medical diagnosis of Necrotizing fasciitis.  Patient calm, cooperative throughout session this date.  Performance deficits affecting function are weakness,impaired functional mobilty,impaired coordination,impaired endurance,decreased coordination,pain,impaired sensation,impaired balance,decreased lower extremity function,impaired self care skills,edema,decreased ROM,impaired skin,impaired cardiopulmonary response to activity.     Rehab Prognosis:  Good; patient would benefit from acute skilled OT services to address these deficits and reach maximum level of function.       Plan:     Patient to be seen 3 x/week,5 x/week to address the above listed problems via self-care/home management,therapeutic activities,therapeutic exercises  · Plan of Care Expires: 12/31/21  · Plan of Care Reviewed with: patient    Subjective     Pain/Comfort:  · Pain Rating 1:  (Facial whincing fleeting in initation of roll to R, and during total assist aleshia care w/ PCT. Patient unable to quantify. No disruption of willingness to engage.)  · Location - Orientation 1: medial  · Location 1: thigh    Objective:     Communicated with: RN prior to session.  Patient found supine with telemetry,wound vac,joy catheter,PICC line upon OT entry to room.    General Precautions: Standard, aspiration,fall,respiratory (high skin/joint integirty risks 2* recent immobility, vulnerability to (-) effects of extended isolation/hospitalization.)   Orthopedic Precautions:N/A   Braces:  N/A  Respiratory Status: Room air     Occupational Performance:     Bed/ Functional Mobility:    · Patient completed Rolling/Turning to Left with  maximal assistance of 2 <> 3 staff.   · Patient completed Rolling/Turning to Right with maximal assistance of 2  · Patient completed Scooting/Bridging with maximal assistance of 3  Patient completed Supine to Sit with maximal assistance of 2 and mechanical bed assist.  · Functional Mobility:  Max a x2 staff for LB pressure relief.     Activities of Daily Living:  · Upper Body Dressing: maximal assistance    · Lower Body Dressing: dependence and of 2-3 persons    · Toileting: dependence and of 3 persons        [facility] 6 Click ADL: 11    Treatment & Education:  *Patient agreeable to therapy session. Lights on / shade open for session .    SC:   *Basic self-care tasks and rudimentary functional mobility undertaken -- assist levels noted above. Bed change not procured as discussed in previous session. Max a for positioning and external pillow support required pre intake for gravity assisted digestion.   R hand retro grade massage provided, BUE pillow support provided LUE for comfort, RUE hand in elevation 25 degrees above elbow as needed for gravity assisted circulation.   SESSION'S END:  *General in room safety and (S)'d mobility advised, call button use reviewed.  *Questions/concerns within OT scope addressed.      Patient left HOB elevated with all lines intact, call button in hand and RN notifiedEducation:      GOALS:   Multidisciplinary Problems     Occupational Therapy Goals        Problem: Occupational Therapy Goal    Goal Priority Disciplines Outcome Interventions   Occupational Therapy Goal     OT, PT/OT Ongoing, Progressing    Description: Goals to be met by: 12/31/21    Patient will increase functional independence with ADLs by performing:    Feeding with [place].  UE Dressing with Modified [place].  LE Dressing with Minimal Assistance.  Grooming while  seated with [place].  Toileting from bedside commode with Minimal Assistance for hygiene and clothing management.   Toilet transfer to bedside commode with Minimal Assistance.    New Goals to be met by: 12/31/21    Patient will increase functional independence with ADLs by performing:    Supine to sit with Stand-by Assistance.  The patient will tolerate sitting on the EOB for 30 min for self care task                   Time Tracking:     OT Date of Treatment: 12/29/21  OT Start Time: 1035  OT Stop Time: 1113  OT Total Time (min): 38 min   Co treat with PTA    Billable Minutes:Self Care  23  12/29/2021

## 2021-12-29 NOTE — PLAN OF CARE
Problem: Physical Therapy Goal  Goal: Physical Therapy Goal  Description: Goals to be met by: 22     Patient will increase functional independence with mobility by performin. Supine to sit with Moderate Assistance  2. Sit to supine with Moderate Assistance  3. Rolling to Left and Right with Minimal Assistance.  4. Sit to stand transfer with Moderate Assistance  5. Bed to chair transfer with Moderate Assistance using Rolling Walker  5. Gait  x 15 feet with Minimal Assistance using Rolling Walker.     Outcome: Ongoing, Progressing   Pt tolerated treatment good today. Pt able to roll L and R with Max A of 3 persons and scoot to HOB with Max A of 3 persons. Pt educated on ankle pumps and quad sets throughout the day. Pt verbalized understanding. Limited with supine<>sit activities due to increased time for turning/pericare and exercises. Pt would continue to benefit from skilled PT services to address pt's deficits and improve current level of function.

## 2021-12-29 NOTE — PLAN OF CARE
Pt is resting in bed. POC reviewed with pt. Medication administered per order. Wound care completed with surgical MD/ Wound Care Nurse. Bed in lowest position. Bed alarm is on and audible. Call light and personal items are in reach. No complaints, concerns or request at the moment. Will continue to monitor pt.    Problem: Adult Inpatient Plan of Care  Goal: Plan of Care Review  Outcome: Ongoing, Progressing  Goal: Patient-Specific Goal (Individualization)  Outcome: Ongoing, Progressing  Goal: Absence of Hospital-Acquired Illness or Injury  Outcome: Ongoing, Progressing  Goal: Optimal Comfort and Wellbeing  Outcome: Ongoing, Progressing  Goal: Readiness for Transition of Care  Outcome: Ongoing, Progressing  Goal: Rounds/Family Conference  Outcome: Ongoing, Progressing     Problem: Diabetes Comorbidity  Goal: Blood Glucose Level Within Desired Range  Outcome: Ongoing, Progressing     Problem: Bariatric Environmental Safety  Goal: Safety Maintained with Care  Outcome: Ongoing, Progressing     Problem: Skin Injury Risk Increased  Goal: Skin Health and Integrity  Outcome: Ongoing, Progressing     Problem: Infection  Goal: Infection Symptom Resolution  Outcome: Ongoing, Progressing     Problem: Wound  Goal: Optimal Wound Healing  Outcome: Ongoing, Progressing     Problem: Fall Injury Risk  Goal: Absence of Fall and Fall-Related Injury  Outcome: Ongoing, Progressing

## 2021-12-29 NOTE — PROGRESS NOTES
[facility]  General Surgery  Progress Note      Interval History:     Patient is s/p serial debridements of right lower extremity (groin to knee) for necrotizing fasciitis, most recently 12/6, at which time the wound appeared healthy with no remaining infection. On 12/8 patient underwent partial closure and wound VAC placement. Wound VAC exchanged in OR on 12/12. On 12/14 she was taken for further debridement (necrotic skin), partial closure, and WV change. She has undergone wound vac changes in OR on 12/17, 12/20, and 12/24.    Now s/p bedside wound VAC change on 12/28, which was well-tolerated.    Patient is feeling pretty well. Unfortunately she is having frequent liquid stool, likely due to antibiotic therapy. This often soils her dressing (replaced by nursing this AM).     Objective:     Vital Signs (Most Recent):  Temp: 98.3 °F (36.8 °C) (12/29/21 1527)  Pulse: (!) 114 (12/29/21 1527)  Resp: 18 (12/29/21 1527)  BP: 127/73 (12/29/21 1527)  SpO2: 98 % (12/29/21 1527) Vital Signs (24h Range):  Temp:  [98.1 °F (36.7 °C)-99.4 °F (37.4 °C)] 98.3 °F (36.8 °C)  Pulse:  [] 114  Resp:  [18-22] 18  SpO2:  [97 %-98 %] 98 %  BP: (122-151)/(71-84) 127/73     Weight: (!) 224 kg (493 lb 13.3 oz)  Body mass index is 84.72 kg/m².      Intake/Output Summary (Last 24 hours) at 12/29/2021 1632  Last data filed at 12/29/2021 0700  Gross per 24 hour   Intake 115.14 ml   Output 1125 ml   Net -1009.86 ml       Physical Exam  GEN: alert and oriented, NAD  HEENT: MMM, no scleral icterus  CV: RRR, distant heart sounds due to body habitus  PULM: Mildly increased WOB on RA  ABD: soft, non-tender, non-distended, obese  EXT: moves all. Right thigh/groin: wound VAC in place with good seal    Significant Labs:  CBC:   Recent Labs   Lab 12/29/21  0353   WBC 8.31   RBC 3.04*   HGB 8.4*   HCT 28.2*   *   MCV 93   MCH 27.6   MCHC 29.8*     CMP:   Recent Labs   Lab 12/29/21  0353   *   CALCIUM 8.2*       K 3.7   CO2 28      BUN 7   CREATININE 0.7       Significant Diagnostics:  None    Assessment/Plan:     Active Diagnoses:    Diagnosis Date Noted POA    PRINCIPAL PROBLEM:  Necrotizing fasciitis [M72.6] 11/30/2021 Yes    Debility [R53.81] 12/01/2021 Yes    Anemia of chronic disease [D63.8] 12/01/2021 Yes    Hypophosphatemia [E83.39] 12/01/2021 Yes    Obesity [E66.9] 12/01/2021 Yes    DM (diabetes mellitus) [E11.9] 11/30/2021 Yes    DANG (obstructive sleep apnea) [G47.33] 11/30/2021 Yes      Problems Resolved During this Admission:     This is a 36 year old F with class 3 obesity c/b several medical problems who is transferred to [facility] for treatment of right groin/thigh necrotizing fasciitis.    S/p serial debridements (groin to knee), most recently 12/6. At this time the wound was found to be healthy with minimal necrotic tissues. On 12/8 she underwent partial closure and wound VAC placement, s/p wound VAC change on 12/12 and further debridement (necrotic skin), partial closure, and WV change on 12/14, 12/17, 12/20, and 12/24.    Now s/p bedside wound VAC change on 12/28. Patient tolerated wound VAC change well after getting pain medication. Overall wound is healing very well. Continue dressing changes as needed by nursing if dressing becomes soiled.     Okay to discharge to LT with wound VAC changes every 3 days.    Patient discussed with attending surgeon Dr. Joslyn Black MD PGY5  General Surgery  [facility]

## 2021-12-29 NOTE — PROGRESS NOTES
When patient discharged to LTAC, [facility] Wound VAC and dressing will need to be removed from right thigh wound.   [facility] advises not to leave dressing in place for > 2 hours without NPWT. [facility] recommends applying gauze dressing.   Recommend dressing wound with Vashe moistened gauze for trip.

## 2021-12-29 NOTE — PT/OT/SLP PROGRESS
"Physical Therapy Treatment    Patient Name:  Shauna Queen   MRN:  [id number]    Recommendations:     Discharge Recommendations:  LTACH ([facility]) (per MD)   Discharge Equipment Recommendations:  (TBD at next level of care)   Barriers to discharge: decreased mobility, not at PLOF, unable to transfer or ambulate    Assessment:     Shauna Queen is a 36 y.o. female admitted with a medical diagnosis of Necrotizing fasciitis.  She presents with the following impairments/functional limitations:  weakness,impaired functional mobilty,impaired skin,edema,decreased ROM,decreased lower extremity function,impaired self care skills,impaired balance,decreased upper extremity function,decreased coordination,pain,decreased safety awareness,impaired endurance,impaired sensation.    Pt tolerated treatment good today. Pt able to roll L and R with Max A of 3 persons and scoot to HOB with Max A of 3 persons. Pt educated on ankle pumps and quad sets throughout the day. Pt verbalized understanding. Limited with supine<>sit activities due to increased time for turning/pericare and exercises. Pt would continue to benefit from skilled PT services to address pt's deficits and improve current level of function.     Rehab Prognosis: Fair; patient would benefit from acute skilled PT services to address these deficits and reach maximum level of function.    Recent Surgery: Procedure(s) (LRB):  DEBRIDEMENT, WOUND (Right) 5 Days Post-Op    Plan:     During this hospitalization, patient to be seen 5 x/week to address the identified rehab impairments via gait training,therapeutic exercises,neuromuscular re-education and progress toward the following goals:    · Plan of Care Expires:  01/05/22    Subjective     Chief Complaint: needing to be cleaned up  Patient/Family Comments/goals: Pt reports " It takes a long time for them to come."  Pain/Comfort:  · Pain Rating 1:  (yes, did not rate)  · Location - Side 1: " Right  · Location 1: thigh  · Pain Addressed 1: Reposition,Distraction,Cessation of Activity      Objective:     Communicated with pt's nurseYanique, prior to session.  Patient found HOB elevated with telemetry,wound vac,joy catheter,PICC line upon PT entry to room.     General Precautions: Standard, aspiration,fall   Orthopedic Precautions:N/A   Braces: N/A  Respiratory Status: Room air     Functional Mobility:  · Bed Mobility:     · Rolling Left:  Maximal Assistance of 3 persons (requires cues for proper hand/foot placement) x2 trials to L side  · Rolling Right: maximal assistance and of 3 persons (requires cues for proper hand/foot placement)   · Scooting: maximal assistance and of 3 persons (Pt able to assist with use of LLE and BUEs)  · Balance: Lying on side bilaterally - poor   -  Requiring Max A of 3 persons with use of HR. Pt able to tolerate lying on L side x2 trials and R side x1 trial.           AM-PAC 6 CLICK MOBILITY  Turning over in bed (including adjusting bedclothes, sheets and blankets)?: 2  Sitting down on and standing up from a chair with arms (e.g., wheelchair, bedside commode, etc.): 1  Moving from lying on back to sitting on the side of the bed?: 2  Moving to and from a bed to a chair (including a wheelchair)?: 1  Need to walk in hospital room?: 1  Climbing 3-5 steps with a railing?: 1  Basic Mobility Total Score: 8       Therapeutic Activities and Exercises:   Supine therex to BLEs x10 reps AAROM: SAQs, ankle pumps, quad sets, glut sets, L hip abduction (Requires extra time to perform)  Pt required Dep A of 3 persons for pericare and doffing/donning of gown.    · Pt educated on PTA role/POC.   · Importance of improving functional mobility  · Importance of performing ankle pumps and quad sets to assist with decreased RLE swelling  · All questions/concerns answered within scope of practice      Patient left HOB elevated with R heel elevated, cell phone and tray table within reach with all  lines intact, call button in reach and PCT Andrade and pt's nurseYanique notified..    GOALS:   Multidisciplinary Problems     Physical Therapy Goals        Problem: Physical Therapy Goal    Goal Priority Disciplines Outcome Goal Variances Interventions   Physical Therapy Goal     PT, PT/OT Ongoing, Progressing     Description: Goals to be met by: 22     Patient will increase functional independence with mobility by performin. Supine to sit with Moderate Assistance  2. Sit to supine with Moderate Assistance  3. Rolling to Left and Right with Minimal Assistance.  4. Sit to stand transfer with Moderate Assistance  5. Bed to chair transfer with Moderate Assistance using Rolling Walker  5. Gait  x 15 feet with Minimal Assistance using Rolling Walker.                Multidisciplinary Problems (Resolved)        Problem: Physical Therapy Goal    Goal Priority Disciplines Outcome Goal Variances Interventions   Physical Therapy Goal   (Resolved)     PT, PT/OT Met     Description: Goals to be met by: 22    Patient will increase functional independence with mobility by performin. Pt will tolerate mobility assessment remaining free from falls and demonstrating good tolerance to activity                     Time Tracking:     PT Received On: 21  PT Start Time: 1035     PT Stop Time: 1113  PT Total Time (min): 38 min     Billable Minutes: Therapeutic Exercise 15 (Cotx with OT)    Co-treatment with MARKS/ OT secondary to need for two skilled therapists for safety of clinicians and patient.    Treatment Type: Treatment  PT/PTA: PTA     PTA Visit Number: 4     2021

## 2021-12-29 NOTE — NURSING
"Patient had a large episode of diarrhea with slippage to corner of large VAC dressing causing lifting of right hand corner of dressing. RN and 2 PCTs turned and cleaned the patient. Patient has no orders for dressing resealment or reinforcement instruction, charge nurse Brook notified and aware who stated "what are the patient's orders?". RN reinforced dressing with 3-4x4 tegaderm to lifting right hand corner, wound vac continuous therapy currently and successfully at 125 mmHg.   "

## 2021-12-30 LAB
POCT GLUCOSE: 103 MG/DL (ref 70–110)
POCT GLUCOSE: 110 MG/DL (ref 70–110)
POCT GLUCOSE: 113 MG/DL (ref 70–110)
POCT GLUCOSE: 120 MG/DL (ref 70–110)
SARS-COV-2 RDRP RESP QL NAA+PROBE: NEGATIVE

## 2021-12-30 PROCEDURE — 25000003 PHARM REV CODE 250: Performed by: STUDENT IN AN ORGANIZED HEALTH CARE EDUCATION/TRAINING PROGRAM

## 2021-12-30 PROCEDURE — 63600175 PHARM REV CODE 636 W HCPCS: Performed by: STUDENT IN AN ORGANIZED HEALTH CARE EDUCATION/TRAINING PROGRAM

## 2021-12-30 PROCEDURE — 11000001 HC ACUTE MED/SURG PRIVATE ROOM

## 2021-12-30 PROCEDURE — U0002 COVID-19 LAB TEST NON-CDC: HCPCS | Performed by: HOSPITALIST

## 2021-12-30 RX ORDER — ACETAMINOPHEN 325 MG/1
650 TABLET ORAL EVERY 4 HOURS PRN
Refills: 0
Start: 2021-12-30

## 2021-12-30 RX ORDER — HYDROCODONE BITARTRATE AND ACETAMINOPHEN 5; 325 MG/1; MG/1
1 TABLET ORAL EVERY 4 HOURS PRN
Qty: 10 TABLET | Refills: 0
Start: 2021-12-30

## 2021-12-30 RX ORDER — LOPERAMIDE HYDROCHLORIDE 2 MG/1
2 CAPSULE ORAL 4 TIMES DAILY
Refills: 0
Start: 2021-12-30 | End: 2022-01-09

## 2021-12-30 RX ORDER — METOPROLOL SUCCINATE 100 MG/1
100 TABLET, EXTENDED RELEASE ORAL DAILY
Qty: 30 TABLET | Refills: 11 | Status: SHIPPED | OUTPATIENT
Start: 2021-12-31 | End: 2022-12-31

## 2021-12-30 RX ORDER — DIPHENOXYLATE HYDROCHLORIDE AND ATROPINE SULFATE 2.5; .025 MG/1; MG/1
1 TABLET ORAL 4 TIMES DAILY PRN
Start: 2021-12-30 | End: 2022-01-09

## 2021-12-30 RX ORDER — ALPRAZOLAM 0.25 MG/1
0.25 TABLET ORAL 2 TIMES DAILY PRN
Start: 2021-12-30 | End: 2022-01-09

## 2021-12-30 RX ORDER — CALCIUM CARBONATE 200(500)MG
1000 TABLET,CHEWABLE ORAL DAILY
Qty: 60 TABLET | Refills: 11 | Status: SHIPPED | OUTPATIENT
Start: 2021-12-31 | End: 2022-12-31

## 2021-12-30 RX ADMIN — MORPHINE SULFATE 2 MG: 4 INJECTION, SOLUTION INTRAMUSCULAR; INTRAVENOUS at 10:12

## 2021-12-30 RX ADMIN — METOPROLOL SUCCINATE 100 MG: 50 TABLET, EXTENDED RELEASE ORAL at 09:12

## 2021-12-30 RX ADMIN — CALCIUM CARBONATE (ANTACID) CHEW TAB 500 MG 1000 MG: 500 CHEW TAB at 09:12

## 2021-12-30 RX ADMIN — PIPERACILLIN AND TAZOBACTAM 4.5 G: 4; .5 INJECTION, POWDER, LYOPHILIZED, FOR SOLUTION INTRAVENOUS; PARENTERAL at 04:12

## 2021-12-30 RX ADMIN — LOPERAMIDE HYDROCHLORIDE 2 MG: 2 CAPSULE ORAL at 08:12

## 2021-12-30 RX ADMIN — Medication 1 PACKET: at 04:12

## 2021-12-30 RX ADMIN — HYDROCODONE BITARTRATE AND ACETAMINOPHEN 1 TABLET: 5; 325 TABLET ORAL at 04:12

## 2021-12-30 RX ADMIN — LOPERAMIDE HYDROCHLORIDE 2 MG: 2 CAPSULE ORAL at 04:12

## 2021-12-30 RX ADMIN — PIPERACILLIN AND TAZOBACTAM 4.5 G: 4; .5 INJECTION, POWDER, LYOPHILIZED, FOR SOLUTION INTRAVENOUS; PARENTERAL at 06:12

## 2021-12-30 RX ADMIN — ENOXAPARIN SODIUM 40 MG: 40 INJECTION SUBCUTANEOUS at 04:12

## 2021-12-30 RX ADMIN — LOPERAMIDE HYDROCHLORIDE 2 MG: 2 CAPSULE ORAL at 09:12

## 2021-12-30 RX ADMIN — PIPERACILLIN AND TAZOBACTAM 4.5 G: 4; .5 INJECTION, POWDER, LYOPHILIZED, FOR SOLUTION INTRAVENOUS; PARENTERAL at 10:12

## 2021-12-30 RX ADMIN — Medication 1 PACKET: at 06:12

## 2021-12-30 RX ADMIN — Medication 1 PACKET: at 08:12

## 2021-12-30 NOTE — PLAN OF CARE
TN received call from [facility], patient accepted with bed availability for today. TN notified physician.    12/30/21 1011   Post-Acute Status   Post-Acute Placement Status Pending payor medical review/second level review   Discharge Delays (!) Post-Acute Set-up   Discharge Plan   Discharge Plan A Long-term acute care facility (LTAC)

## 2021-12-30 NOTE — PLAN OF CARE
Pt resting in bed; pt stated that she did not get any sleep last night and requested that shades to be closed to promote rest. Pt's wound vac and dressing assessed and reinforced. Drain tube replaced and output recorded. Bed wheels locked. Call light and personal items are in reach. The 3 P's addressed; no complaints, concerns or request at the time. Will continue to monitor.     Problem: Adult Inpatient Plan of Care  Goal: Plan of Care Review  Outcome: Ongoing, Progressing  Goal: Patient-Specific Goal (Individualization)  Outcome: Ongoing, Progressing  Goal: Absence of Hospital-Acquired Illness or Injury  Outcome: Ongoing, Progressing  Goal: Optimal Comfort and Wellbeing  Outcome: Ongoing, Progressing  Goal: Readiness for Transition of Care  Outcome: Ongoing, Progressing  Goal: Rounds/Family Conference  Outcome: Ongoing, Progressing     Problem: Diabetes Comorbidity  Goal: Blood Glucose Level Within Desired Range  Outcome: Ongoing, Progressing     Problem: Bariatric Environmental Safety  Goal: Safety Maintained with Care  Outcome: Ongoing, Progressing     Problem: Skin Injury Risk Increased  Goal: Skin Health and Integrity  Outcome: Ongoing, Progressing     Problem: Infection  Goal: Infection Symptom Resolution  Outcome: Ongoing, Progressing     Problem: Wound  Goal: Optimal Wound Healing  Outcome: Ongoing, Progressing     Problem: Fall Injury Risk  Goal: Absence of Fall and Fall-Related Injury  Outcome: Ongoing, Progressing

## 2021-12-30 NOTE — PROGRESS NOTES
"        [facility] - Med Surg [facility] Medicine  Telemedicine Progress Note    Patient Name: Shauna Queen  MRN: [id number]  Patient Class: IP- Inpatient   Admission Date: 11/30/2021  Length of Stay: 29 days  Attending Physician: Milvia García MD  Primary Care Provider: Jorge Dennis MD          Subjective:     Principal Problem:Necrotizing fasciitis        HPI:  Ms. Queen is a 36-year-old presents to the ED from outside hospital for abscess.  She has initially presented to outside hospital for DKA which resolved.  Patient was unaware of why she was here in July prompted her.  Per the checkout are received: "36-year-old female history of diabetes mellitus, sleep apnea on CPAP, schizophrenia, hypertension, and hyperlipidemia admitted to [facility] on November 24 from [facility] for treatment of diabetic ketoacidosis. She was initiated on an insulin infusion. DKA improved and she was transitioned off the insulin infusion. She again developed DKA and was placed back on the insulin infusion for a period of time. Currently she is off the insulin infusion and on subcutaneous insulin. She was noted to have right upper/inner thigh pain with erythema, induration, and tenderness to palpation. She was treated with IV clindamycin and vancomycin. Due to her size ((BMI 74.32) they are unable to perform CT or MRI. General Surgery there performed an incision and drainage with purulent material found. She was taken to the OR on November 29 for a second-look at the thigh wound. They were only able to do superficial debridement. Postprocedure she extubated to 2 L nasal cannula and was awake with oxygen saturation 100 percent. Surgery at their facility recommended transfer to a facility better able to perform procedures on a patient with her BMI. Referring provider spoke with General Surgery at [facility]. [facility] Medicine was asked to admit for further treatment. I spoke with her current " "attending. He noted she has no alteration in mentation. Lower extremity is intact from a neurovascular standpoint. She remains in the ICU."      Overview/Hospital Course:  35 y/o female admitted to the hospital as a transfer secondary to R thigh abscess and a BMI of Body mass index is 84.77 kg/m².   Patient transferred here for surgical evaluation.  Patient went for surgical debridement on 11/30/21. Patient continued on Abx per ID recommendations. Blood cultures were NG. Underwent another debridement on 12/2 and again on 12/4 - noting significant necrotizing soft tissue infections concerning for tracking down into her knee. On broad spectrum antibiotics. To OR with Orthopedic surgery to explore right knee on 12/5. Patient again went for surgery on 12/6.  Wound closure with wound vac placement on 12/8.      Subjective/Interval History     Patient with diarrhea as soon as immodium stopped, likely due to IV abx, resumed scheduled immodium today. Patient otherwise stable and cleared for discharge to LTAC by general surgery. Awaiting placement.       Review of Systems    Constitutional: Negative for chills, fatigue, fever.   HENT: Negative for sore throat, trouble swallowing.    Eyes: Negative for photophobia, visual disturbance.   Respiratory: Negative for cough, shortness of breath.    Cardiovascular: Negative for chest pain, palpitations, leg swelling.   Gastrointestinal: Negative for abdominal pain, constipation, nausea, vomiting. +diarrhea.   Endocrine: Negative for cold intolerance, heat intolerance.   Genitourinary: Negative for dysuria, frequency.   Musculoskeletal: Negative for arthralgias, myalgias.   Skin: Negative for rash, wound, erythema   Neurological: Negative for dizziness, syncope, weakness, light-headedness.   Psychiatric/Behavioral: Negative for confusion, hallucinations, anxiety    Medications  Scheduled Meds:   calcium carbonate  1,000 mg Oral Daily    enoxaparin  40 mg Subcutaneous Daily    " insulin detemir U-100  5 Units Subcutaneous BID    loperamide  2 mg Oral QID    metoprolol succinate  100 mg Oral Daily    piperacillin-tazobactam (ZOSYN) IVPB  4.5 g Intravenous Q8H    potassium, sodium phosphates  1 packet Oral QID (AC & HS)     Continuous Infusions:  PRN Meds:.sodium chloride, acetaminophen, ALPRAZolam, dextrose 50%, diphenoxylate-atropine 2.5-0.025 mg, glucagon (human recombinant), HYDROcodone-acetaminophen, influenza, insulin aspart U-100, melatonin, morphine, sars-cov-2 (covid-19)    Objective    Physical Examination    Temp:  [98.1 °F (36.7 °C)-99.4 °F (37.4 °C)]   Pulse:  []   Resp:  [18-22]   BP: (122-151)/(71-81)   SpO2:  [97 %-98 %]     Constitutional: NAD, conversant  Head: NC, AT  Eyes: No scleral icterus.  No eye discharge  Pulmonary/Chest: Effort normal. No respiratory distress on room air  GI: No distension  Musculoskeletal: Moving all extremities  Neurological: Alert.  Oriented to person, place, and time.   Psychiatric: Normal behavior, mood, and affect  Skin: No visible erythema or cyanosis    CBC  Recent Labs   Lab 12/25/21  0408 12/27/21  0420 12/29/21  0353   WBC 8.90 7.20 8.31   HGB 8.5* 8.6* 8.4*   HCT 27.7* 27.8* 28.2*   * SEE COMMENT 455*     CMP  Recent Labs   Lab 12/25/21  0408 12/27/21  0420 12/29/21  0353    142 139   K 4.6 4.9 3.7    104 102   CO2 27 28 28   BUN 8 8 7   CREATININE 0.7 0.7 0.7   * 86 144*   CALCIUM 8.2* 8.3* 8.2*           Assessment/Plan:      * Necrotizing fasciitis  - Status post I&D with General surgery at outside hospital, did not feel like they completely removed abscess.  - Underwent debridement in OR 11/30, 12/2, and 12/4, 12/5 - no extension into knee.    - OR again on 12/6, 12/8 and 12/11.   - OR again on 12/14 for debridement of necrotic skin, washout, partial closure, wound VAC change and then again on 12/17/21  - Continued on Zosyn till 12/18 for 2 weeks of treatment per ID. (estimated end date: 12/18/21).  Febrile again so ID reconsulted and IV zosyn resumed. Per ID, continue IV zosyn for two weeks.   - Pain control.       - to LTAC once wound vac can be changed at bedside. Per surgery, okay to d/c to LTAC however on 12/23, multiple attempt to re-apply dressing eventually lead to another leak so patient underwent repeat wound vac change.       -wound vac changed at bedside 12/28 by general surgery, okay to discharge to LTAC        Obesity  Morbid obesity  Body mass index is 84.72 kg/m².      Tachycardia      Noted since admission, EKG with sinus tachycardia, thought likely related to underlying infection however persisted despite abx      Improved with metoprolol     Hypophosphatemia  Replace as needed.      Anemia of chronic disease  With anemia of acute blood loss as expected from multiple surgeries.  Transfuse for Hgb<7      Diarrhea     Cdiff Negative, PRN lomotil      Scheduled immodium       Debility  PT/OT following, plan for LTAC      DANG (obstructive sleep apnea)  Patient likely has component of OHS as well  - outpatient sleep study      DM (diabetes mellitus)  A1c:   Lab Results   Component Value Date    HGBA1C >14.0 (H) 12/01/2021     Meds: basal bolus insulin + SSI PRN to maintain goal 140-180  ADA diet, accuchecks ACHS, hypoglycemic protocol  Continue current management.      VTE Risk Mitigation (From admission, onward)         Ordered     enoxaparin injection 40 mg  Daily         12/04/21 2306     IP VTE LOW RISK PATIENT  Once         11/30/21 0403     Place sequential compression device  Until discontinued         11/30/21 0403                      I have assessed these finding virtually using telemed platform and with assistance of bedside nurse       The attending portion of this evaluation, treatment, and documentation was performed per Milvia García MD via Telemedicine AudioVisual using the secure Ansible software platform with 2 way audio/video. The provider was located off-site and the patient  is located in the hospital. The aforementioned video software was utilized to document the relevant history and physical exam    Milvia García MD  Department of Hospital Medicine   [facility] [place] [facility] [place]

## 2021-12-30 NOTE — PLAN OF CARE
[facility]     Department of Hospital Medicine     [address] [place] [address]     [phone] after hours  [phone] fax       FACILITY TRANSFER ORDERS    12/30/2021    Admit to Nursing Home:  LTAC Bed         Diagnoses:  Active Hospital Problems    Diagnosis  POA    *Necrotizing fasciitis [M72.6]  Yes    Debility [R53.81]  Yes     PT/OT consulted       Anemia of chronic disease [D63.8]  Yes    Hypophosphatemia [E83.39]  Yes    Obesity [E66.9]  Yes    DM (diabetes mellitus) [E11.9]  Yes    DANG (obstructive sleep apnea) [G47.33]  Yes      Resolved Hospital Problems   No resolved problems to display.       Patient is homebound due to:  Necrotizing fasciitis    Allergies:Review of patient's allergies indicates:  No Known Allergies    Vitals:       Every shift (Skilled Nursing patients)    Diet: diabetic   Supplement:  1 can every three times a day with meals                         Type:      Glucerna       Acitivities:      - Up in a chair each morning as tolerated   - Ambulate with assistance to bathroom   - Scheduled walks once each shift (every 8 hours)    LABS:  Per facility protocol    Nursing Precautions:     - Aspiration precautions:             - Total assistance with meals            -  Upright 90 degrees befor during and after meals             -  Suction at bedside          - Fall precautions per nursing home protocol   - Seizure precaution per jail protocol   - Decubitus precautions:        -  for positioning   - Pressure reducing foam mattress   - Turn patient every two hours. Use wedge pillows to anchor patient    CONSULTS:     Physical Therapy to evaluate and treat     Occupational Therapy to evaluate and treat     Speech Therapy  to evaluate and treat     Nutrition to evaluate and recommend diet         MISCELLANEOUS CARE:                Routine Skin for Bedridden Patients:  Apply moisture barrier cream to all    skin  folds and wet areas in perineal area daily and after baths and                           all bowel movements.    Wound care: continue wound vac, continue moisture management     DIABETES CARE:      Check blood sugar:       Fingerstick blood sugar AC and HS   Fingerstick blood sugar every 6 hours if unable to eat      Report CBG < 60 or > 400 to physician.                                          Insulin Sliding Scale          Glucose  Novolog Insulin Subcutaneous        0 - 60   Orange juice or glucose tablet, hold insulin      No insulin   201-250  2 units   251-300  4 units   301-350  6 units   351-400  8 units   >400   10 units then call physician      Medications: Discontinue all previous medication orders, if any. See new list below.  Current Discharge Medication List      START taking these medications    Details   acetaminophen (TYLENOL) 325 MG tablet Take 2 tablets (650 mg total) by mouth every 4 (four) hours as needed.  Refills: 0      ALPRAZolam (XANAX) 0.25 MG tablet Take 1 tablet (0.25 mg total) by mouth 2 (two) times daily as needed for Anxiety.      calcium carbonate (TUMS) 200 mg calcium (500 mg) chewable tablet Take 2 tablets (1,000 mg total) by mouth once daily.  Qty: 60 tablet, Refills: 11      diphenoxylate-atropine 2.5-0.025 mg (LOMOTIL) 2.5-0.025 mg per tablet Take 1 tablet by mouth 4 (four) times daily as needed for Diarrhea.      HYDROcodone-acetaminophen (NORCO) 5-325 mg per tablet Take 1 tablet by mouth every 4 (four) hours as needed for Pain.  Qty: 10 tablet, Refills: 0    Comments: Quantity prescribed more than 7 day supply? No      insulin detemir U-100 (LEVEMIR FLEXTOUCH) 100 unit/mL (3 mL) SubQ InPn pen Inject 5 Units into the skin 2 (two) times daily.  Refills: 0      loperamide (IMODIUM) 2 mg capsule Take 1 capsule (2 mg total) by mouth 4 (four) times daily. for 10 days  Refills: 0      metoprolol succinate (TOPROL-XL) 100 MG 24 hr tablet Take 1 tablet (100 mg total) by mouth  once daily.  Qty: 30 tablet, Refills: 11    Comments: .      piperacillin sodium/tazobactam (PIPERACILLIN-TAZOBACTAM 4.5G/100ML D5W IVPB, READY TO MIX,) Inject 100 mLs (4.5 g total) into the vein every 8 (eight) hours. for 7 days  Qty: 2100 mL, Refills: 0                   _________________________________  Jessie Ram MD  12/30/2021

## 2021-12-30 NOTE — PROGRESS NOTES
[employer] VAC to right thigh wound at 125 mmHg continuous suction. Pink fluid in VAC canister.   Nursing reports patient to go to LTAC in [place]. Due to transportation delay, patient leaving tomorrow.   Discussed NPWT with nursing. As per [employer] guidelines, foam dressing to be removed from wound if off suction longer than 2 hours. Since transportation to [place] will be longer than 2 hours, suggest dressing wound with NS moistened gauze and LTAC to replace NPWT.

## 2021-12-30 NOTE — PROGRESS NOTES
9:24 am : Called [facility], still no answer. Will continue to call.        [facility] called [facility] at 8:35am, no answer. Will Continue to call.

## 2021-12-30 NOTE — PT/OT/SLP PROGRESS
Occupational Therapy      Patient Name:  Shauna Queen   MRN:  [id number]    Patient not treated today secondary to Patient unwilling to participate,Patient stating eagerness for pending discharge this date. OT will follow-up if/as indicated.     12/30/2021

## 2021-12-30 NOTE — PLAN OF CARE
Problem: Adult Inpatient Plan of Care  Goal: Plan of Care Review  Outcome: Ongoing, Progressing  Goal: Patient-Specific Goal (Individualization)  Outcome: Ongoing, Progressing  Goal: Optimal Comfort and Wellbeing  Outcome: Ongoing, Progressing  Goal: Readiness for Transition of Care  Outcome: Ongoing, Progressing   Pt alert and oriented.Pt free from falls, injury or any further trauma throughout shift. Continued medications as ordered. Complaints of pain, controlled with medications. Wound vac and joy in place. Pt in no distress. Will cont to monitor.

## 2021-12-30 NOTE — PLAN OF CARE
Per care port, [facility], will need 3 network denials to proceed. TN placed call to [facility], left detailed voice message with call  back number. TN also sent detailed response via care [employer].      12/30/21 0845   Post-Acute Status   Post-Acute Authorization Placement   Post-Acute Placement Status Pending payor medical review/second level review   Coverage DANIELLE   Discharge Delays (!) Post-Acute Set-up   Discharge Plan   Discharge Plan A Long-term acute care facility (LTAC)

## 2021-12-30 NOTE — PLAN OF CARE
ADT 30 order placed for [employer] [employer].  Requested  time: 2:45PM   If transportation does not arrive at ETA time nurse will be instructed to follow protocol for transportation below:   How can I get in touch directly with dispatch, if needed?                 Non-emergent dispatch: [phone]      +++NURSING:  If Van does not arrive at requested time please call the above Non Emergent Dispatcher.  If issue not resolved please escalate to your charge nurse for further instructions.    Transport to [facility] [id number] [facility] [address]

## 2021-12-31 VITALS
TEMPERATURE: 98 F | WEIGHT: 293 LBS | RESPIRATION RATE: 20 BRPM | BODY MASS INDEX: 50.02 KG/M2 | HEART RATE: 93 BPM | SYSTOLIC BLOOD PRESSURE: 126 MMHG | OXYGEN SATURATION: 100 % | DIASTOLIC BLOOD PRESSURE: 72 MMHG | HEIGHT: 64 IN

## 2021-12-31 LAB
ANION GAP SERPL CALC-SCNC: 9 MMOL/L (ref 8–16)
BASOPHILS # BLD AUTO: 0.03 K/UL (ref 0–0.2)
BASOPHILS NFR BLD: 0.4 % (ref 0–1.9)
BUN SERPL-MCNC: 5 MG/DL (ref 6–20)
CALCIUM SERPL-MCNC: 8.3 MG/DL (ref 8.7–10.5)
CHLORIDE SERPL-SCNC: 101 MMOL/L (ref 95–110)
CO2 SERPL-SCNC: 28 MMOL/L (ref 23–29)
CREAT SERPL-MCNC: 0.7 MG/DL (ref 0.5–1.4)
DIFFERENTIAL METHOD: ABNORMAL
EOSINOPHIL # BLD AUTO: 0.1 K/UL (ref 0–0.5)
EOSINOPHIL NFR BLD: 1.6 % (ref 0–8)
ERYTHROCYTE [DISTWIDTH] IN BLOOD BY AUTOMATED COUNT: 15.7 % (ref 11.5–14.5)
EST. GFR  (AFRICAN AMERICAN): >60 ML/MIN/1.73 M^2
EST. GFR  (NON AFRICAN AMERICAN): >60 ML/MIN/1.73 M^2
GLUCOSE SERPL-MCNC: 100 MG/DL (ref 70–110)
HCT VFR BLD AUTO: 31.1 % (ref 37–48.5)
HGB BLD-MCNC: 9.3 G/DL (ref 12–16)
IMM GRANULOCYTES # BLD AUTO: 0.04 K/UL (ref 0–0.04)
IMM GRANULOCYTES NFR BLD AUTO: 0.5 % (ref 0–0.5)
LYMPHOCYTES # BLD AUTO: 2 K/UL (ref 1–4.8)
LYMPHOCYTES NFR BLD: 26.9 % (ref 18–48)
MCH RBC QN AUTO: 27.5 PG (ref 27–31)
MCHC RBC AUTO-ENTMCNC: 29.9 G/DL (ref 32–36)
MCV RBC AUTO: 92 FL (ref 82–98)
MONOCYTES # BLD AUTO: 1 K/UL (ref 0.3–1)
MONOCYTES NFR BLD: 13.6 % (ref 4–15)
NEUTROPHILS # BLD AUTO: 4.2 K/UL (ref 1.8–7.7)
NEUTROPHILS NFR BLD: 57 % (ref 38–73)
NRBC BLD-RTO: 0 /100 WBC
PLATELET # BLD AUTO: 451 K/UL (ref 150–450)
PMV BLD AUTO: 8.9 FL (ref 9.2–12.9)
POCT GLUCOSE: 112 MG/DL (ref 70–110)
POTASSIUM SERPL-SCNC: 3.7 MMOL/L (ref 3.5–5.1)
RBC # BLD AUTO: 3.38 M/UL (ref 4–5.4)
SODIUM SERPL-SCNC: 138 MMOL/L (ref 136–145)
WBC # BLD AUTO: 7.43 K/UL (ref 3.9–12.7)

## 2021-12-31 PROCEDURE — 25000003 PHARM REV CODE 250: Performed by: STUDENT IN AN ORGANIZED HEALTH CARE EDUCATION/TRAINING PROGRAM

## 2021-12-31 PROCEDURE — 36415 COLL VENOUS BLD VENIPUNCTURE: CPT | Performed by: STUDENT IN AN ORGANIZED HEALTH CARE EDUCATION/TRAINING PROGRAM

## 2021-12-31 PROCEDURE — 85025 COMPLETE CBC W/AUTO DIFF WBC: CPT | Performed by: STUDENT IN AN ORGANIZED HEALTH CARE EDUCATION/TRAINING PROGRAM

## 2021-12-31 PROCEDURE — 63600175 PHARM REV CODE 636 W HCPCS: Performed by: STUDENT IN AN ORGANIZED HEALTH CARE EDUCATION/TRAINING PROGRAM

## 2021-12-31 PROCEDURE — 80048 BASIC METABOLIC PNL TOTAL CA: CPT | Performed by: STUDENT IN AN ORGANIZED HEALTH CARE EDUCATION/TRAINING PROGRAM

## 2021-12-31 RX ADMIN — MORPHINE SULFATE 2 MG: 4 INJECTION, SOLUTION INTRAMUSCULAR; INTRAVENOUS at 11:12

## 2021-12-31 RX ADMIN — Medication 1 PACKET: at 11:12

## 2021-12-31 RX ADMIN — LOPERAMIDE HYDROCHLORIDE 2 MG: 2 CAPSULE ORAL at 08:12

## 2021-12-31 RX ADMIN — METOPROLOL SUCCINATE 100 MG: 50 TABLET, EXTENDED RELEASE ORAL at 08:12

## 2021-12-31 RX ADMIN — Medication 1 PACKET: at 05:12

## 2021-12-31 RX ADMIN — PIPERACILLIN AND TAZOBACTAM 4.5 G: 4; .5 INJECTION, POWDER, LYOPHILIZED, FOR SOLUTION INTRAVENOUS; PARENTERAL at 05:12

## 2021-12-31 RX ADMIN — CALCIUM CARBONATE (ANTACID) CHEW TAB 500 MG 1000 MG: 500 CHEW TAB at 08:12

## 2021-12-31 RX ADMIN — MORPHINE SULFATE 2 MG: 4 INJECTION, SOLUTION INTRAMUSCULAR; INTRAVENOUS at 02:12

## 2021-12-31 NOTE — PLAN OF CARE
[facility]  Discharge Final Note    Patient ready for discharge from case management stand point.     Primary Care Provider: Jorge Dennis MD    Expected Discharge Date: 12/31/2021    Final Discharge Note (most recent)     Final Note - 12/31/21 1113        Final Note    Assessment Type Final Discharge Note     Anticipated Discharge Disposition Long Term Acute [facility]   [facility]    What phone number can be called within the next 1-3 days to see how you are doing after discharge? [phone]        Post-Acute Status    Post-Acute Placement Status Set-up Complete/Auth obtained     Coverage DANIELLE     Patient choice form signed by patient/caregiver List from System Post-Acute Care     Discharge Delays None known at this time                 Important Message from Medicare             Contact Info     [facility] - Emergency Dept   Specialty: Emergency Medicine    [address] [facility] [address] [facility] [address]   Phone: [phone]       Next Steps: Follow up    Instructions: As needed, If symptoms worsen    Jorge Dennis MD   Specialty: Family Medicine   Relationship: PCP - General    [address] [place] [address]   Phone: [phone]       Next Steps: Schedule an appointment as soon as possible for a visit in 1 week(s)    [facility] INFECTIOUS DISEASE   Specialty: Infectious Diseases    [address] [address]   Phone: [phone]       Next Steps: Schedule an appointment as soon as possible for a visit in 1 week(s)    [facility] GENERAL SURGERY   Specialty: General Surgery    [address] [address]   Phone: [phone]       Next Steps: Schedule an appointment as soon as possible for a visit in 1 week(s)

## 2021-12-31 NOTE — PT/OT/SLP PROGRESS
Physical Therapy      Patient Name:  Shauna Queen   MRN:  [id number]    Patient not seen today secondary to Nursing care. Will follow-up as able.    Dyana Castaneda,PTA

## 2021-12-31 NOTE — PROGRESS NOTES
TN spoke with Rosa, with [facility]. TN confirmed patient will be discharged and transportation has been requested with an ETA for  at 10amTN notified physician.

## 2021-12-31 NOTE — SUBJECTIVE & OBJECTIVE
Interval History:  Patient doing well, no acute events overnight, no new complaints this morning.  Continue current antibiotic regimen.  Pending LTAC placement.    Review of Systems   Constitutional: Negative for fever.   Respiratory: Negative for shortness of breath.    Cardiovascular: Negative for chest pain.   Gastrointestinal: Negative for abdominal pain.   Musculoskeletal: Positive for arthralgias.   Neurological: Negative for dizziness and headaches.   All other systems reviewed and are negative.    Objective:     Vital Signs (Most Recent):  Temp: 98.1 °F (36.7 °C) (12/31/21 0726)  Pulse: 93 (12/31/21 0726)  Resp: 20 (12/31/21 1103)  BP: 126/72 (12/31/21 0726)  SpO2: 100 % (12/31/21 0726) Vital Signs (24h Range):  Temp:  [98.1 °F (36.7 °C)-98.6 °F (37 °C)] 98.1 °F (36.7 °C)  Pulse:  [90-95] 93  Resp:  [18-20] 20  SpO2:  [95 %-100 %] 100 %  BP: (121-156)/(68-79) 126/72     Weight: (!) 224 kg (493 lb 13.3 oz)  Body mass index is 84.72 kg/m².    Intake/Output Summary (Last 24 hours) at 12/31/2021 1215  Last data filed at 12/31/2021 0628  Gross per 24 hour   Intake 1047.36 ml   Output 1500 ml   Net -452.64 ml      Physical Exam  Vitals and nursing note reviewed.   Constitutional:       Appearance: Normal appearance.   HENT:      Head: Normocephalic and atraumatic.   Eyes:      Extraocular Movements: Extraocular movements intact.      Pupils: Pupils are equal, round, and reactive to light.   Cardiovascular:      Rate and Rhythm: Normal rate and regular rhythm.   Pulmonary:      Effort: Pulmonary effort is normal. No respiratory distress.   Abdominal:      General: There is no distension.   Musculoskeletal:         General: Normal range of motion.      Cervical back: Normal range of motion.   Skin:     Comments: Lower extremity wound   Neurological:      General: No focal deficit present.      Mental Status: She is alert and oriented to person, place, and time.   Psychiatric:         Mood and Affect: Mood normal.          Behavior: Behavior normal.         Significant Labs:   All pertinent labs within the past 24 hours have been reviewed.  CBC:   Recent Labs   Lab 12/31/21  0341   WBC 7.43   HGB 9.3*   HCT 31.1*   *     CMP:   Recent Labs   Lab 12/31/21  0341      K 3.7      CO2 28      BUN 5*   CREATININE 0.7   CALCIUM 8.3*   ANIONGAP 9   EGFRNONAA >60       Significant Imaging: I have reviewed all pertinent imaging results/findings within the past 24 hours.

## 2021-12-31 NOTE — PROGRESS NOTES
"      [facility] - Med Surg [facility] Medicine  Telemedicine Progress Note    Patient Name: Shauna Queen  MRN: [id number]  Patient Class: IP- Inpatient   Admission Date: 11/30/2021  Length of Stay: 31 days  Attending Physician: Jessie Ram MD  Primary Care Provider: Jorge Dennis MD          Subjective:     Principal Problem:Necrotizing fasciitis        HPI:  Ms. Queen is a 36-year-old presents to the ED from outside hospital for abscess.  She has initially presented to outside hospital for DKA which resolved.  Patient was unaware of why she was here in July prompted her.  Per the checkout are received: "36-year-old female history of diabetes mellitus, sleep apnea on CPAP, schizophrenia, hypertension, and hyperlipidemia admitted to [facility] on November 24 from [facility] for treatment of diabetic ketoacidosis. She was initiated on an insulin infusion. DKA improved and she was transitioned off the insulin infusion. She again developed DKA and was placed back on the insulin infusion for a period of time. Currently she is off the insulin infusion and on subcutaneous insulin. She was noted to have right upper/inner thigh pain with erythema, induration, and tenderness to palpation. She was treated with IV clindamycin and vancomycin. Due to her size ((BMI 74.32) they are unable to perform CT or MRI. General Surgery there performed an incision and drainage with purulent material found. She was taken to the OR on November 29 for a second-look at the thigh wound. They were only able to do superficial debridement. Postprocedure she extubated to 2 L nasal cannula and was awake with oxygen saturation 100 percent. Surgery at their facility recommended transfer to a facility better able to perform procedures on a patient with her BMI. Referring provider spoke with General Surgery at [facility]. [facility] Medicine was asked to admit for further treatment. I spoke with her current " "attending. He noted she has no alteration in mentation. Lower extremity is intact from a neurovascular standpoint. She remains in the ICU."      Overview/Hospital Course:  35 y/o female admitted to the hospital as a transfer secondary to R thigh abscess and a BMI of Body mass index is 84.77 kg/m².   Patient transferred here for surgical evaluation.  Patient went for surgical debridement on 11/30/21. Patient continued on Abx per ID recommendations. Blood cultures were NG. Underwent another debridement on 12/2 and again on 12/4 - noting significant necrotizing soft tissue infections concerning for tracking down into her knee. On broad spectrum antibiotics. To OR with Orthopedic surgery to explore right knee on 12/5. Patient again went for surgery on 12/6.  Wound closure with wound vac placement on 12/8.      Interval History:  Patient doing well, no acute events overnight, no new complaints this morning.  Continue current antibiotic regimen.  Pending LTAC placement.    Review of Systems   Constitutional: Negative for fever.   Respiratory: Negative for shortness of breath.    Cardiovascular: Negative for chest pain.   Gastrointestinal: Negative for abdominal pain.   Musculoskeletal: Positive for arthralgias.   Neurological: Negative for dizziness and headaches.   All other systems reviewed and are negative.    Objective:     Vital Signs (Most Recent):  Temp: 98.1 °F (36.7 °C) (12/31/21 0726)  Pulse: 93 (12/31/21 0726)  Resp: 20 (12/31/21 1103)  BP: 126/72 (12/31/21 0726)  SpO2: 100 % (12/31/21 0726) Vital Signs (24h Range):  Temp:  [98.1 °F (36.7 °C)-98.6 °F (37 °C)] 98.1 °F (36.7 °C)  Pulse:  [90-95] 93  Resp:  [18-20] 20  SpO2:  [95 %-100 %] 100 %  BP: (121-156)/(68-79) 126/72     Weight: (!) 224 kg (493 lb 13.3 oz)  Body mass index is 84.72 kg/m².    Intake/Output Summary (Last 24 hours) at 12/31/2021 1215  Last data filed at 12/31/2021 0628  Gross per 24 hour   Intake 1047.36 ml   Output 1500 ml   Net -452.64 ml    "   Physical Exam  Vitals and nursing note reviewed.   Constitutional:       Appearance: Normal appearance.   HENT:      Head: Normocephalic and atraumatic.   Eyes:      Extraocular Movements: Extraocular movements intact.      Pupils: Pupils are equal, round, and reactive to light.   Cardiovascular:      Rate and Rhythm: Normal rate and regular rhythm.   Pulmonary:      Effort: Pulmonary effort is normal. No respiratory distress.   Abdominal:      General: There is no distension.   Musculoskeletal:         General: Normal range of motion.      Cervical back: Normal range of motion.   Skin:     Comments: Lower extremity wound   Neurological:      General: No focal deficit present.      Mental Status: She is alert and oriented to person, place, and time.   Psychiatric:         Mood and Affect: Mood normal.         Behavior: Behavior normal.         Significant Labs:   All pertinent labs within the past 24 hours have been reviewed.  CBC:   Recent Labs   Lab 12/31/21  0341   WBC 7.43   HGB 9.3*   HCT 31.1*   *     CMP:   Recent Labs   Lab 12/31/21  0341      K 3.7      CO2 28      BUN 5*   CREATININE 0.7   CALCIUM 8.3*   ANIONGAP 9   EGFRNONAA >60       Significant Imaging: I have reviewed all pertinent imaging results/findings within the past 24 hours.      Assessment/Plan:      * Necrotizing fasciitis  - Status post I&D with General surgery at outside hospital, did not feel like they completely removed abscess.  - Underwent debridement in OR 11/30, 12/2, and 12/4, 12/5 - no extension into knee.    - OR again on 12/6, 12/8 and 12/11.   - OR again on 12/14 for debridement of necrotic skin, washout, partial closure, wound VAC change  - Continued on Zosyn till 12/18 for 2 weeks of treatment per ID. (estimated end date: 12/18/21)  - Pain control.   - to LTAC once wound vac can be changed at bedside.     Antibiotics (From admission, onward)            Start     Stop Route Frequency Ordered     12/04/21 1530  piperacillin-tazobactam 4.5 g in dextrose 5 % 100 mL IVPB (ready to mix system)         12/19 0959 IV Every 8 hours (non-standard times) 12/04/21 1429        Cultures were taken-   Microbiology Results (last 7 days)     Procedure Component Value Units Date/Time    Fungus culture [[id number]] Collected: 12/04/21 1312    Order Status: Completed Specimen: Wound from Leg, Right Updated: 12/13/21 1409     Fungus (Mycology) Culture No fungal growth to date    Narrative:      thigh    Fungus culture [[id number]] Collected: 11/30/21 1635    Order Status: Completed Specimen: Abscess from Leg, Right Updated: 12/13/21 1409     Fungus (Mycology) Culture No fungal growth to date    Narrative:      Right Inner thigh abcess    Culture, Anaerobe [[id number]]  (Abnormal) Collected: 12/04/21 1312    Order Status: Completed Specimen: Wound from Leg, Right Updated: 12/11/21 1350     Anaerobic Culture BACTEROIDES THETAIOTAOMICRON  Few      Narrative:      thigh          Obesity  Morbid obesity  Body mass index is 84.72 kg/m².          Hypophosphatemia  Replace as needed.      Anemia of chronic disease  With anemia of acute blood loss as expected from multiple surgeries.  Transfuse for Hgb<7      Debility  PT/OT once more stable      DANG (obstructive sleep apnea)  Patient likely has component of OHS as well  - outpatient sleep study      DM (diabetes mellitus)  A1c:   Lab Results   Component Value Date    HGBA1C >14.0 (H) 12/01/2021     Meds: basal bolus insulin + SSI PRN to maintain goal 140-180  ADA diet, accuchecks ACHS, hypoglycemic protocol  Continue current management.        VTE Risk Mitigation (From admission, onward)         Ordered     enoxaparin injection 40 mg  Daily         12/04/21 2306     IP VTE LOW RISK PATIENT  Once         11/30/21 0403     Place sequential compression device  Until discontinued         11/30/21 0403                      I have assessed these finding virtually using telemed platform and  with assistance of bedside nurse                 The attending portion of this evaluation, treatment, and documentation was performed per Jessie Ram MD via Telemedicine AudioVisual using the secure flikdate software platform with 2 way audio/video. The provider was located off-site and the patient is located in the hospital. The aforementioned video software was utilized to document the relevant history and physical exam    Jessie Ram MD  Department of Hospital Medicine   [facility]

## 2021-12-31 NOTE — PLAN OF CARE
Problem: Adult Inpatient Plan of Care  Goal: Plan of Care Review  Outcome: Ongoing, Progressing  Goal: Patient-Specific Goal (Individualization)  Outcome: Ongoing, Progressing  Goal: Optimal Comfort and Wellbeing  Outcome: Ongoing, Progressing  Goal: Readiness for Transition of Care  Outcome: Ongoing, Progressing   Pt alert and oriented. Pt free from falls, injury or any further trauma throughout shift.  Continued medications as ordered. Complaints of pain, prn pain medication given. Pt on room air. Wound vac and Rajput in place. Pt in no distress. Will cont to monitor.

## 2022-01-04 LAB — FUNGUS SPEC CULT: NORMAL

## 2022-01-31 NOTE — PLAN OF CARE
Problem: Adult Inpatient Plan of Care  Goal: Plan of Care Review  Outcome: Ongoing, Progressing     Problem: Skin Injury Risk Increased  Goal: Skin Health and Integrity  Outcome: Ongoing, Progressing     Problem: Infection  Goal: Infection Symptom Resolution  Outcome: Ongoing, Progressing     Problem: Wound  Goal: Optimal Wound Healing  Outcome: Ongoing, Progressing      [Negative] : Genitourinary

## 2022-09-01 NOTE — CONSULTS
[facility] - Intensive Care  Infectious Disease  Consult Note    Patient Name: Shauna Queen  MRN: [id number]  Admission Date: 11/30/2021  Hospital Length of Stay: 6 days  Attending Physician: Delgado Ragnel MD  Primary Care Provider: Jorge Dennis MD     Isolation Status: No active isolations    Patient information was obtained from patient and ER records.      Inpatient consult to Infectious Diseases  Consult performed by: Nalini Colon MD  Consult ordered by: Jaya Ibarra MD        Assessment/Plan:     * Necrotizing fasciitis  36F with h/o morbid obesity (BMI 85), DM, HTN, DANG, HLD admitted 11/24 with R thigh pain, erythema, tenderness and found to be in DKA. Imaging reportedly couldn't be obtained 2/2 size. Noted to have necrotizing soft tissue infection of R upper leg, now s/p  I&D with surgery on 11/30, 12/2, 12/4, 12/5. Surgical Cultures: 11/30 with - amp sensitive e.faecalis. cultures 12/4 with enteroocccus and enterobacter. BCX- NGTD. Path was sent 12/4 and pending. Has been on vancomycin and zosyn. Ampho added 12/4 given surgery's reported concern for mucor. ID consulted for abx recs    Recommendations:   - appreciate surgery's help getting source control of infection. Agree with plan for continued debridement as required to healthy tissue, viviana if this is indeed a mold infection  - continue zosyn and amphoB. Stop vancomycin  - if patient undergoes additional surgery, frozen pathology can be helpful in establishing mold diagnosis.   - f/u path and fungal cultures  - need weight loss and better control of DM  - wound care as per primary team          Thank you for your consult. I will follow-up with patient. Please contact us if you have any additional questions.    Nalini Colon MD  Infectious Disease  [facility] - [place] - Intensive Care    Subjective:     Principal Problem: Necrotizing fasciitis    HPI:   36F with h/o morbid obesity (BMI 85), DM, HTN, DANG, HLD admitted to OSH on 11/24 with R thigh pain,  "erythema, tenderness and found to be in DKA. Imaging reportedly couldn't be obtained 2/2 size. Surgery performed I&D and purulent material found and was taken to OR 11/29 for superficial debridement. Then was transferred to [facility] for a "facility better able to perform procedures on a patient with her BMI".      Patient is awake and alert in ICU. Reports her leg wound "popped up" a day before she arrived at OSH. Says that it started as a boil on her thigh and then it "busted" and she had been applying warm compresses and peroxide. Reports she has been ambulatory prior to arrival, but  says she feels like she is going to fall when she tries to walk here. Denies any water or environmental exposures to wound. Denies pet or animal contacts    Has undergone several I&D with surgery on 11/30, 12/2, 12/4, 12/5 for Necrotizing soft tissue infection. Per op notes they saw a "significant amount of foul smelling, necrotic skin and fat"  and "purulent drainage was found tracking along fascial planes"      Surgical Cultures: 11/30 with - amp sensitive e.faecalis. cultures 12/4 with enteroocccus and enterobacter  BCX- NGTD    Path was sent 12/4 and pending    Has been on vancomycin and zosyn. Ampho added 12/4 for concern for mucor     ID consulted for abx recs    Component 2 d ago   Aerobic Bacterial Culture  Abnormal   ENTEROBACTER CLOACAE   Few   P      Aerobic Bacterial Culture  Abnormal   ENTEROCOCCUS SPECIES   Moderate   Identification and susceptibility pending   P      Resulting Agency [facility]          Susceptibility     Enterobacter cloacae     CULTURE, AEROBIC  (SPECIFY SOURCE) (Preliminary)     Cefepime <=2 mcg/mL Sensitive     Ceftriaxone <=1 mcg/mL Sensitive     Ciprofloxacin <=1 mcg/mL Sensitive     Ertapenem <=0.5 mcg/mL Sensitive     Gentamicin <=4 mcg/mL Sensitive     Levofloxacin <=2 mcg/mL Sensitive     Meropenem <=1 mcg/mL Sensitive     Minocycline <=4 mcg/mL Sensitive     Piperacillin/Tazo <=16 mcg/mL Sensitive  " [FreeTextEntry1] : Very pleasant 56-year-old gentleman presents for follow-up of low testosterone, premature ejaculation, erectile dysfunction.  He reports that his erections are stable.  He reports good energy levels.  He reports that he is sleeping well.  He previously had testosterone checked  in April 2022 which was found to be 181 total testosterone and 5.4 free testosterone.  He reports an improvement in his intravaginal latency time with paroxetine.    Tetracycline <=4 mcg/mL Sensitive     Tobramycin <=4 mcg/mL Sensitive     Trimeth/Sulfa >2/38 mcg/mL Resistant               Specimen Information: Leg, Right; Abscess         0 Result Notes         (important suggestion)  Newer results are available. Click to view them now.     Component 6 d ago   Aerobic Bacterial Culture  Abnormal   ENTEROCOCCUS FAECALIS   Many     Resulting Agency [facility]          Susceptibility     Enterococcus faecalis     CULTURE, AEROBIC  (SPECIFY SOURCE)     Ampicillin <=2 mcg/mL Sensitive     Gentamicin Synergy Screen <=500 mcg/mL Sensitive     Tetracycline >8 mcg/mL Resistant     Vancomycin 2 mcg/mL Sensitive                     Review of Systems   Constitutional: Negative for fever.   HENT: Negative.    Eyes: Negative.    Respiratory: Negative.    Cardiovascular: Negative.    Gastrointestinal: Negative for nausea and vomiting.   Endocrine: Negative.    Genitourinary: Negative.    Musculoskeletal: Negative.    Neurological: Negative.    Hematological: Negative.    Psychiatric/Behavioral: Negative.      Objective:     Vital Signs (Most Recent):  Temp: 98.8 °F (37.1 °C) (12/06/21 0701)  Pulse: (!) 116 (12/06/21 0900)  Resp: 15 (12/06/21 0900)  BP: 118/67 (12/06/21 0900)  SpO2: 100 % (12/06/21 0900) Vital Signs (24h Range):  Temp:  [97.6 °F (36.4 °C)-99.3 °F (37.4 °C)] 98.8 °F (37.1 °C)  Pulse:  [105-132] 116  Resp:  [9-37] 15  SpO2:  [98 %-100 %] 100 %  BP: ()/(50-87) 118/67     Weight: (!) 224 kg (493 lb 13.3 oz)  Body mass index is 84.77 kg/m².    Intake/Output Summary (Last 24 hours) at 12/6/2021 1304  Last data filed at 12/6/2021 0611  Gross per 24 hour   Intake 4920.44 ml   Output 1875 ml   Net 3045.44 ml      Physical Exam  Vitals and nursing note reviewed.   Constitutional:       General: She is not in acute distress.     Appearance: Normal appearance. She is obese.   HENT:      Head: Normocephalic and atraumatic.   Cardiovascular:      Rate and Rhythm: Normal rate and regular  rhythm.   Pulmonary:      Effort: Pulmonary effort is normal. No respiratory distress.      Breath sounds: No wheezing.   Musculoskeletal:         General: No swelling. Normal range of motion.      Cervical back: Normal range of motion and neck supple.   Skin:     General: Skin is warm and dry.      Comments: Right upper thigh incision with packing. Only adipose visible on bedside exam. Some scant drainage   Neurological:      General: No focal deficit present.      Mental Status: She is alert and oriented to person, place, and time.      Motor: No weakness.   Psychiatric:         Mood and Affect: Mood normal.         Behavior: Behavior normal.         Thought Content: Thought content normal.         Significant Labs:   All pertinent labs within the past 24 hours have been reviewed.  BMP:   Recent Labs   Lab 12/06/21  0424   *   *   K 3.9      CO2 24   BUN 7   CREATININE 0.8   CALCIUM 6.5*     CBC:   Recent Labs   Lab 12/05/21  0345 12/05/21 2039 12/06/21  0424   WBC 23.91* 52.51* 35.08*   HGB 6.3* 7.8* 7.1*   HCT 19.2* 23.4* 21.9*    177 195

## 2024-04-04 NOTE — PROGRESS NOTES
The patient is in bed in the ICU.  She reports she is doing okay.        Temp:  [97.6 °F (36.4 °C)-99.3 °F (37.4 °C)] 98.8 °F (37.1 °C)  Pulse:  [105-132] 116  Resp:  [9-37] 15  SpO2:  [98 %-100 %] 100 %  BP: ()/(50-87) 118/67    Physical examination:    Markedly obese female in bed.    Dressing right thigh has some weeping as expected.  Right calf is soft and nontender.  She is able to move her foot.  She reports she has sensibility to light touch to the foot.      Recent Labs   Lab 12/06/21  0424   WBC 35.08*   RBC 2.54*   HGB 7.1*   HCT 21.9*      MCV 86   MCH 28.0   MCHC 32.4         Current Facility-Administered Medications:     0.9%  NaCl infusion (for blood administration), , Intravenous, Q24H PRN, Serena Black MD    0.9%  NaCl infusion, , Intravenous, Continuous, Delgado Rangel MD, Last Rate: 150 mL/hr at 12/06/21 1053, New Bag at 12/06/21 1053    acetaminophen tablet 650 mg, 650 mg, Oral, Q4H PRN, Delgado Rangel MD    amphotericin B liposome (AMBISOME) 650 mg in dextrose 5 % 650 mL IVPB, 3 mg/kg, Intravenous, Q24H, Jaya Ibarra MD, Stopped at 12/05/21 1945    calcium carbonate 200 mg calcium (500 mg) chewable tablet 1,000 mg, 1,000 mg, Oral, Daily, Jaya Ibarra MD, 1,000 mg at 12/06/21 0932    dextrose 50% injection 12.5 g, 12.5 g, Intravenous, PRN, Delgado Rangel MD    enoxaparin injection 40 mg, 40 mg, Subcutaneous, Daily, Delgado Rangel MD, 40 mg at 12/05/21 1613    glucagon (human recombinant) injection 1 mg, 1 mg, Intramuscular, PRN, Delgado Rangel MD    HYDROcodone-acetaminophen 5-325 mg per tablet 1 tablet, 1 tablet, Oral, Q4H PRN, Delgado Rangel MD, 1 tablet at 12/05/21 2202    insulin aspart U-100 pen 1-10 Units, 1-10 Units, Subcutaneous, QID (AC + HS) PRN, Delgado Rangel MD, 8 Units at 12/05/21 2040    insulin aspart U-100 pen 8 Units, 8 Units, Subcutaneous, TIDWM, Jaya Ibarra MD, 8 Units at 12/05/21 1617     Pravin Mendoza 4/14/2022    Anemia 01/01/2020    Essential (pregnancy 9/16/2021    Pregnancy 01/01/2020    Seasonal Allergies 01/01/Robby Mitchell MS,EP   insulin detemir U-100 pen 35 Units, 35 Units, Subcutaneous, BID, Jaya Ibarra MD, 35 Units at 12/06/21 0938    loperamide capsule 2 mg, 2 mg, Oral, QID PRN, Anshul Perez MD, 2 mg at 12/04/21 0031    melatonin tablet 6 mg, 6 mg, Oral, Nightly PRN, Delgado Rangel MD    morphine injection 4 mg, 4 mg, Intravenous, Q6H PRN, Delgado Rangel MD, 4 mg at 11/30/21 0442    ondansetron injection 4 mg, 4 mg, Intravenous, Q8H PRN, Delgado Rangel MD    piperacillin-tazobactam 4.5 g in dextrose 5 % 100 mL IVPB (ready to mix system), 4.5 g, Intravenous, Q8H, Jaya Ibarra MD, Last Rate: 25 mL/hr at 12/06/21 0932, 4.5 g at 12/06/21 0932    potassium, sodium phosphates 280-160-250 mg packet 1 packet, 1 packet, Oral, QID (AC & HS), Delgado Rangel MD, 1 packet at 12/06/21 1314    sodium chloride 0.9% flush 10 mL, 10 mL, Intravenous, PRN, Ulysses Le MD    Assessment and Plan:    36-year-old female with necrotizing fasciitis with Enterococcus faecalis.   Serial debridements underway and planned with general surgery.  Distal extent of right thigh infection did not appear to go intra-articular or into the calf musculature on the right knee.  Continue plans per general surgery.  Please call for any further concerns.    Steve Hutchins MD  [facility]  This note has been transcribed with voice recognition software, but not reviewed and may contain unrecognized errors.

## (undated) DEVICE — SYR 50ML CATH TIP

## (undated) DEVICE — GLOVE SURGICAL LATEX SZ 7

## (undated) DEVICE — Device

## (undated) DEVICE — PACK DRAPE PERI/GYN TIBURON

## (undated) DEVICE — DRESSING TRANS 4X4 TEGADERM

## (undated) DEVICE — DRESSING VAC WHITE FOAM SMALL

## (undated) DEVICE — KIT DRSNG GRNUFM MED 18X12X5CM

## (undated) DEVICE — SEE MEDLINE ITEM 157110

## (undated) DEVICE — CONNECTOR Y T.R.A.C.

## (undated) DEVICE — GLOVE BIOGEL 7.5

## (undated) DEVICE — PAD ABD 8X10 STERILE

## (undated) DEVICE — SHEET DRAPE FAN-FOLDED 3/4

## (undated) DEVICE — PACK ARTHROSCOPY W/ISO BAC

## (undated) DEVICE — NDL HYPO REG 25G X 1 1/2

## (undated) DEVICE — SUT SILK 2-0 STRANDS 30IN

## (undated) DEVICE — TAPE MEDIPORE 4IN X 2YDS

## (undated) DEVICE — SOL IRR NACL .9% 3000ML

## (undated) DEVICE — CANISTER INFOV.A.C WOUND 500ML

## (undated) DEVICE — SEE MEDLINE ITEM 107746

## (undated) DEVICE — CLEANER TIP ELECSURG 2X2IN

## (undated) DEVICE — SEE MEDLINE ITEM 157181

## (undated) DEVICE — SPONGE LAP 18X18 PREWASHED

## (undated) DEVICE — TIP YANKAUERS BULB NO VENT

## (undated) DEVICE — GAUZE FLUFF XXLG 36X36 2 PLY

## (undated) DEVICE — SEE MEDLINE ITEM 146322

## (undated) DEVICE — COVER OVERHEAD SURG LT BLUE

## (undated) DEVICE — SUT ETHILON 2-0 FSLX 30 BLK

## (undated) DEVICE — BANDAGE GAUZE COT STRL 4.5X4.1

## (undated) DEVICE — ELECTRODE REM PLYHSV RETURN 9

## (undated) DEVICE — DRAPE UNDER BUTTOCKS SUC PORT

## (undated) DEVICE — SOL 9P NACL IRR PIC IL

## (undated) DEVICE — PACK ENDOSCOPY GENERAL

## (undated) DEVICE — SEE MEDLINE ITEM 146292

## (undated) DEVICE — STAPLER SKIN ROTATING HEAD

## (undated) DEVICE — TOWEL OR DISP STRL BLUE 4/PK

## (undated) DEVICE — PAD ABDOMINAL 5X9 STERILE

## (undated) DEVICE — SUT SILK 2-0 SH 18IN BLACK

## (undated) DEVICE — SPONGE DERMACEA GAUZE 4X4

## (undated) DEVICE — GAUZE SPONGE 4X4 12 PLY NS

## (undated) DEVICE — GAUZE SPONGE 4X4 12PLY

## (undated) DEVICE — APPLICATOR CHLORAPREP ORN 26ML

## (undated) DEVICE — SEE MEDLINE ITEM 154981

## (undated) DEVICE — SEE MEDLINE ITEM 146298

## (undated) DEVICE — CLIPPER BLADE MOD 4406 (CAREF)

## (undated) DEVICE — PULSAVAC ZIMMER

## (undated) DEVICE — DRESSING INFOVAC SMALL BLK

## (undated) DEVICE — SEE MEDLINE ITEM 157166

## (undated) DEVICE — FOAM APP FILM BARRIER NO STING

## (undated) DEVICE — TAPE ADH MEDIPORE 4 X 10YDS

## (undated) DEVICE — SET CYSTO IRRIGATION UNIV SPIK

## (undated) DEVICE — BANDAGE KERLIX AMD

## (undated) DEVICE — SOL NACL IRR 3000ML

## (undated) DEVICE — SYR 10CC LUER LOCK

## (undated) DEVICE — CANISTER SUCTION 2 LTR

## (undated) DEVICE — SUT ETHIBOND XTRA2 OS-4 30

## (undated) DEVICE — BLANKET UPPER BODY 78.7X29.9IN

## (undated) DEVICE — SUT 2/0 30IN SILK BLK BRAI

## (undated) DEVICE — SEE L#120831

## (undated) DEVICE — PACK DRAPE UNIVERSAL CONVERTOR